# Patient Record
Sex: FEMALE | Race: WHITE | NOT HISPANIC OR LATINO | Employment: OTHER | URBAN - METROPOLITAN AREA
[De-identification: names, ages, dates, MRNs, and addresses within clinical notes are randomized per-mention and may not be internally consistent; named-entity substitution may affect disease eponyms.]

---

## 2019-07-18 ENCOUNTER — TELEPHONE (OUTPATIENT)
Dept: PRIMARY CARE CLINIC | Facility: CLINIC | Age: 84
End: 2019-07-18

## 2019-07-18 NOTE — TELEPHONE ENCOUNTER
----- Message from Carlene Holland sent at 7/18/2019 10:40 AM CDT -----  Regarding: Annual/Lab Order  Contact: [phone]  Pt called in about wanting to schedule appt for 10/8/19. Pt also wants to schedule her chesy xray and labs for 10/1/19

## 2019-07-18 NOTE — TELEPHONE ENCOUNTER
----- Message from Carlene Holland sent at 7/18/2019 10:40 AM CDT -----  Regarding: Annual/Lab Order  Contact: [phone]  Pt called in about wanting to schedule appt for 10/8/19. Pt also wants to schedule her chesy xray and labs for 10/1/19

## 2019-07-18 NOTE — TELEPHONE ENCOUNTER
This is a duplicate message. I lm for pt to contact the office to explain in detail with her regarding setting up an appt/xray/labs

## 2019-09-27 ENCOUNTER — TELEPHONE (OUTPATIENT)
Dept: PRIMARY CARE CLINIC | Facility: CLINIC | Age: 84
End: 2019-09-27

## 2019-09-27 NOTE — TELEPHONE ENCOUNTER
----- Message from Hawa Dykes sent at 9/27/2019  1:47 PM CDT -----  Contact: Self [phone]  She is calling to discuss her orders for lab work and chest XRAY. She wants to know if this can still be done in time before her appt on 10/8/19. She states someone was supposed to be scheduling her for that date and needs to confirm if this will still be done.

## 2019-10-14 RX ORDER — OMEPRAZOLE 20 MG/1
0.05 TABLET, DELAYED RELEASE ORAL DAILY
COMMUNITY
End: 2019-10-15

## 2019-10-14 RX ORDER — SIMVASTATIN 20 MG/1
1 TABLET, FILM COATED ORAL NIGHTLY
Refills: 0 | COMMUNITY
Start: 2019-09-10 | End: 2019-12-09 | Stop reason: SDUPTHER

## 2019-10-14 RX ORDER — ASPIRIN 81 MG/1
81 TABLET ORAL DAILY
COMMUNITY
End: 2019-10-15

## 2019-10-14 RX ORDER — CITALOPRAM 20 MG/1
1.5 TABLET, FILM COATED ORAL 2 TIMES DAILY
Refills: 0 | COMMUNITY
Start: 2019-09-03 | End: 2019-12-09 | Stop reason: SDUPTHER

## 2019-10-14 RX ORDER — ALBUTEROL SULFATE 90 UG/1
2 AEROSOL, METERED RESPIRATORY (INHALATION) EVERY 6 HOURS PRN
COMMUNITY
End: 2023-06-02 | Stop reason: SDUPTHER

## 2019-10-14 RX ORDER — QUINIDINE GLUCONATE 324 MG
240 TABLET, EXTENDED RELEASE ORAL DAILY
COMMUNITY
End: 2022-04-19

## 2019-10-14 RX ORDER — LEVOTHYROXINE SODIUM 100 UG/1
1 TABLET ORAL DAILY
Refills: 1 | COMMUNITY
Start: 2019-08-11 | End: 2019-12-09 | Stop reason: SDUPTHER

## 2019-10-14 RX ORDER — LORAZEPAM 0.5 MG/1
0.5 TABLET ORAL DAILY PRN
COMMUNITY
End: 2020-04-13 | Stop reason: SDUPTHER

## 2019-10-14 NOTE — PROGRESS NOTES
[facility] Note    Chief Complaint      Chief Complaint   Patient presents with    Follow-up       History of Present Illness      Nickie Segura is a 84 y.o.  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  Last visit me was at [facility]-03/27/2019.    Recurrent UTI-followed up by Dr. Smith.  Stable on vaginal cream. Pt also on Myrbetriq for OAB.     HTN- Controlled off meds.  Continue low-sodium diet.    HLD - controlled on Zocor 20 mg QHS.   Repeat FLP in March.   TG 88 HDL 64 LDL 77 3/19/19.    Hypothyroidism-TSH -0.98 3/19/19.  Controlled on levothyroxine 100 mcg daily.  Repeat TFTs in March.     Anxiety-Pt doing ok.  She inc her Celexa 30 mg daily to 40 mg/daily without my knowledge 6 wks ago.  Pt aware I do not rec this dose.  Patient takes Ativan 0.5 mg rarely prn.  Continue current regimen.    Depression- Was more depressed because she cares for one of her Best friends who is on hospice now.  She inc her Celexa 30 mg daily to 40 mg/daily without my knowledge 6 wks ago.  Pt aware I do not rec this dose.    Wheezing-Pt has ProAir which she uses prn.  PFTs-WNL-07/14/2015.    Nicotine dependence-Pt smokes E-cigarette now.  Pt aware of the dangers.  She quit cigarette 07/05/2016. Pt never got CXR I ordered last March. She will get today.    Hiatal hernia-stable on Prilosec 1/2 tab/day.      Colon polyps-Patient is s/p partial colon resection '09  CRS Dr. Gibson.  GI -Dr. Giron.  Last C scope-'13.     Iron deficiency-Resolved. Due for repeat CBC and iron studies. Pt on iron daily.     IFG - H A1c 5.2-03/19/2019.    Hepatomegaly - 23 cm.  Fu by Dr. Giron.  AST - 54 - sl elev.    OA - Preston knees - Rec glucosamine.     Renal cysts - + fam h/o PCKD.  ? Angiomyolipoma on Left Kidney.  Fu by Dr. Smith.     Obesity - BMI - 29.6 - Rec low carb diet.     Elev  IGE - 742 ? Etiol.  No allergic Sx's.  ? Eos Esophagitis.     +LORNA - neg [place].     Acc by friend    HCM - Flu - 9/2019; Td - > 10 yrs ago; PCV 13 - 2/21/17;  PVX 23 - 1/8/15;  Shingrix - ?- pt defers;  MGM - refuses;  DEXA - declined; C-scope - '13; GI - Dr. Giron; Retina Sp - Dr. Gardner; Derm - Dr. Joseph; Ophtho - Dr. Calderon    Past Medical History:  Past Medical History:   Diagnosis Date    LORNA positive     Anxiety     Bilateral cataracts     Colitis     Colon polyp     Depression     Elevated IgE level     Hepatomegaly     Hiatal hernia     Hyperlipidemia     Hypertension     Hypothyroidism     IFG (impaired fasting glucose)     Iron deficiency anemia     Macular degeneration     Mild aortic stenosis     Nicotine dependence     Osteoarthritis     Recurrent UTI     Renal cyst     Skin cancer     Tuberculosis of bladder     Urinary incontinence     Venous insufficiency     Vitamin B12 deficiency     Wheezing        Past Surgical History:   has a past surgical history that includes Cholecystectomy.    Family History:  family history includes Atrial fibrillation in her sister; Diabetes in her mother; Heart disease in her father and mother; Polycystic kidney disease in her father.     Social History:  Social History     Tobacco Use    Smoking status: Former Smoker     Types: Cigarettes, Vaping with nicotine    Smokeless tobacco: Current User   Substance Use Topics    Alcohol use: Yes     Frequency: Monthly or less     Drinks per session: 1 or 2     Binge frequency: Never    Drug use: Never       Review of Systems   Constitutional: Negative for chills and fever.   HENT: Positive for hearing loss. Negative for tinnitus.         Hearing aids radha   Eyes: Positive for blurred vision. Negative for double vision.        Saw Kvng today   Respiratory: Negative for cough, shortness of breath and wheezing.    Cardiovascular: Negative for chest pain, palpitations and leg swelling.    Gastrointestinal: Positive for constipation and heartburn. Negative for abdominal pain, blood in stool, diarrhea, melena, nausea and vomiting.        Uses gavescon prn.     Genitourinary: Positive for frequency. Negative for dysuria.   Musculoskeletal: Positive for joint pain. Negative for myalgias.        RT knee gives out at times.   Skin: Negative for itching and rash.   Neurological: Negative for dizziness and headaches.   Endo/Heme/Allergies: Negative for polydipsia. Does not bruise/bleed easily.   Psychiatric/Behavioral: Positive for depression. The patient is nervous/anxious.         Medications:  Outpatient Encounter Medications as of 10/15/2019   Medication Sig Dispense Refill    albuterol (PROVENTIL/VENTOLIN HFA) 90 mcg/actuation inhaler Inhale 2 puffs into the lungs every 6 (six) hours as needed. Rescue      aspirin (ECOTRIN) 81 MG EC tablet Take 81 mg by mouth once daily.      citalopram (CELEXA) 20 MG tablet Take 1.5 tablets by mouth 2 (two) times daily.   0    estradiol (ESTRACE) 0.01 % (0.1 mg/gram) vaginal cream Place 1 g vaginally 3 (three) times a week.      ferrous gluconate (FERGON) 240 (27 FE) MG tablet Take 240 mg by mouth once daily.      levothyroxine (SYNTHROID) 100 MCG tablet Take 1 tablet by mouth once daily.  1    LORazepam (ATIVAN) 0.5 MG tablet Take 0.5 mg by mouth daily as needed.      mag/aluminum/sod bicarb/alginc (GAVISCON ORAL) Take 1 tablet by mouth daily as needed.      MYRBETRIQ 50 mg Tb24 Take 50 mg by mouth once daily.  0    omeprazole magnesium (PRILOSEC OTC ORAL) Take 20 mg by mouth once daily.      simvastatin (ZOCOR) 20 MG tablet Take 1 tablet by mouth nightly.  0    TOVIAZ 8 mg Tb24 Take 8 mg by mouth once daily.  0    [DISCONTINUED] aspirin (ECOTRIN) 81 MG EC tablet Take 81 mg by mouth once daily.      [DISCONTINUED] omeprazole (PRILOSEC OTC) 20 MG tablet Take 0.05 tablets by mouth once daily.      [DISCONTINUED] LORazepam (ATIVAN) 0.5 MG tablet Take 0.5  mg by mouth once daily.       No facility-administered encounter medications on file as of 10/15/2019.        Current Outpatient Medications:     albuterol (PROVENTIL/VENTOLIN HFA) 90 mcg/actuation inhaler, Inhale 2 puffs into the lungs every 6 (six) hours as needed. Rescue, Disp: , Rfl:     aspirin (ECOTRIN) 81 MG EC tablet, Take 81 mg by mouth once daily., Disp: , Rfl:     citalopram (CELEXA) 20 MG tablet, Take 1.5 tablets by mouth 2 (two) times daily. , Disp: , Rfl: 0    estradiol (ESTRACE) 0.01 % (0.1 mg/gram) vaginal cream, Place 1 g vaginally 3 (three) times a week., Disp: , Rfl:     ferrous gluconate (FERGON) 240 (27 FE) MG tablet, Take 240 mg by mouth once daily., Disp: , Rfl:     levothyroxine (SYNTHROID) 100 MCG tablet, Take 1 tablet by mouth once daily., Disp: , Rfl: 1    LORazepam (ATIVAN) 0.5 MG tablet, Take 0.5 mg by mouth daily as needed., Disp: , Rfl:     mag/aluminum/sod bicarb/alginc (GAVISCON ORAL), Take 1 tablet by mouth daily as needed., Disp: , Rfl:     MYRBETRIQ 50 mg Tb24, Take 50 mg by mouth once daily., Disp: , Rfl: 0    omeprazole magnesium (PRILOSEC OTC ORAL), Take 20 mg by mouth once daily., Disp: , Rfl:     simvastatin (ZOCOR) 20 MG tablet, Take 1 tablet by mouth nightly., Disp: , Rfl: 0    TOVIAZ 8 mg Tb24, Take 8 mg by mouth once daily., Disp: , Rfl: 0    Allergies:  Review of patient's allergies indicates:   Allergen Reactions    Penicillins      rash       Health Maintenance:  Immunization History   Administered Date(s) Administered    Pneumococcal Conjugate - 13 Valent 02/21/2017    Pneumococcal Polysaccharide - 23 Valent 01/08/2015      Health Maintenance   Topic Date Due    TETANUS VACCINE  03/12/1953    DEXA SCAN  03/12/1976    Lipid Panel  03/19/2024    Pneumococcal Vaccine (65+ Low/Medium Risk)  Completed      Objective:      Vital Signs  Temp: 98 °F (36.7 °C)  Pulse: 77  Resp: 15  SpO2: 96 %  BP: 115/60  BP Location: Left arm  Patient Position:  "Sitting  Pain Score: 0-No pain  Height and Weight  Height: 5' 6" (167.6 cm)  Weight: 83.3 kg (183 lb 10.3 oz)  BSA (Calculated - sq m): 1.97 sq meters  BMI (Calculated): 29.7  Weight in (lb) to have BMI = 25: 154.6]    Laboratory:    Physical Exam   Constitutional: She is oriented to person, place, and time. She appears well-developed and well-nourished.   Lip smacking on exam while talking to me throughout visit   HENT:   Head: Normocephalic and atraumatic.   Right Ear: External ear normal.   Left Ear: External ear normal.   Eyes: Pupils are equal, round, and reactive to light. Conjunctivae and EOM are normal.   Neck: Normal range of motion. Neck supple. Thyromegaly present.   radha carotid bruits   Cardiovascular: Normal rate, regular rhythm and intact distal pulses.   Murmur heard.  Pulmonary/Chest: Effort normal and breath sounds normal. No respiratory distress.   Abdominal: Soft. Bowel sounds are normal. She exhibits no distension.   Musculoskeletal: Normal range of motion. She exhibits no edema or deformity.   Neurological: She is alert and oriented to person, place, and time.   Skin: Skin is warm and dry.   Psychiatric: She has a normal mood and affect.           Assessment:       1. Iron deficiency anemia, unspecified iron deficiency anemia type    2. Hypertension, unspecified type    3. Other tobacco product nicotine dependence, uncomplicated    4. IFG (impaired fasting glucose)    5. Hypothyroidism, unspecified type    6. Hyperlipidemia, unspecified hyperlipidemia type    7. Depression, unspecified depression type    8. Anxiety        Nickie Segura is a 84 y.o.female with:    1. Iron deficiency anemia, unspecified iron deficiency anemia type  - X-Ray Chest PA And Lateral; Future  - CBC auto differential; Future  - Ferritin; Future  - Iron and TIBC; Future  -Will repeat labs  Cont iron once daily.     2. Hypertension, unspecified type  - Comprehensive metabolic panel; Future  -Controlled on current regimen.  " "Repeat CMP.     3. Other tobacco product nicotine dependence, uncomplicated  -Rec smoking cessation.  I d/w pt dangers of vaping with e -cig.     4. IFG (impaired fasting glucose)  -Cont low carb diet.     5. Hypothyroidism, unspecified type  -Cont current.  Repeat TFT"s in Mar.     6. Hyperlipidemia, unspecified hyperlipidemia type  -Cont current.  Repeat FLP in Mar.     7. Depression, unspecified depression type  -Rec dec back to citalopram 30 mg/d.  Pt will call with any issues.  She has lip smacking on exam today. Unsure if related to her inc her own dose of citalopram or not.     8. Anxiety  -Rec dec back to citalopram 30 mg/d.  Pt will call with any issues.  She has lip smacking on exam today. Unsure if related to her inc her own dose of citalopram or not.        Chronic conditions status updated as per HPI.  Other than changes above, cont current medications and maintain follow up with specialists.  Return to clinic in 6 months.    Perla Hopper MD  [facility]                "

## 2019-10-15 ENCOUNTER — OFFICE VISIT (OUTPATIENT)
Dept: PRIMARY CARE CLINIC | Facility: CLINIC | Age: 84
End: 2019-10-15
Payer: MEDICARE

## 2019-10-15 ENCOUNTER — HOSPITAL ENCOUNTER (OUTPATIENT)
Dept: RADIOLOGY | Facility: HOSPITAL | Age: 84
Discharge: HOME | End: 2019-10-15
Attending: INTERNAL MEDICINE
Payer: MEDICARE

## 2019-10-15 VITALS
HEIGHT: 66 IN | HEART RATE: 77 BPM | TEMPERATURE: 98 F | BODY MASS INDEX: 29.51 KG/M2 | WEIGHT: 183.63 LBS | OXYGEN SATURATION: 96 % | RESPIRATION RATE: 15 BRPM | SYSTOLIC BLOOD PRESSURE: 115 MMHG | DIASTOLIC BLOOD PRESSURE: 60 MMHG

## 2019-10-15 DIAGNOSIS — F41.9 ANXIETY: ICD-10-CM

## 2019-10-15 DIAGNOSIS — I10 HYPERTENSION, UNSPECIFIED TYPE: ICD-10-CM

## 2019-10-15 DIAGNOSIS — R73.01 IFG (IMPAIRED FASTING GLUCOSE): ICD-10-CM

## 2019-10-15 DIAGNOSIS — F32.A DEPRESSION, UNSPECIFIED DEPRESSION TYPE: ICD-10-CM

## 2019-10-15 DIAGNOSIS — E03.9 HYPOTHYROIDISM, UNSPECIFIED TYPE: ICD-10-CM

## 2019-10-15 DIAGNOSIS — D50.9 IRON DEFICIENCY ANEMIA, UNSPECIFIED IRON DEFICIENCY ANEMIA TYPE: ICD-10-CM

## 2019-10-15 DIAGNOSIS — F17.290 OTHER TOBACCO PRODUCT NICOTINE DEPENDENCE, UNCOMPLICATED: ICD-10-CM

## 2019-10-15 DIAGNOSIS — D50.9 IRON DEFICIENCY ANEMIA, UNSPECIFIED IRON DEFICIENCY ANEMIA TYPE: Primary | ICD-10-CM

## 2019-10-15 DIAGNOSIS — E78.5 HYPERLIPIDEMIA, UNSPECIFIED HYPERLIPIDEMIA TYPE: ICD-10-CM

## 2019-10-15 PROCEDURE — 99215 OFFICE O/P EST HI 40 MIN: CPT | Mod: HCNC,S$GLB,, | Performed by: INTERNAL MEDICINE

## 2019-10-15 PROCEDURE — 99999 PR PBB SHADOW E&M-EST. PATIENT-LVL III: CPT | Mod: PBBFAC,HCNC,, | Performed by: INTERNAL MEDICINE

## 2019-10-15 PROCEDURE — 71046 X-RAY EXAM CHEST 2 VIEWS: CPT | Mod: 26,HCNC,, | Performed by: RADIOLOGY

## 2019-10-15 PROCEDURE — 99999 PR PBB SHADOW E&M-EST. PATIENT-LVL III: ICD-10-PCS | Mod: PBBFAC,HCNC,, | Performed by: INTERNAL MEDICINE

## 2019-10-15 PROCEDURE — 1101F PT FALLS ASSESS-DOCD LE1/YR: CPT | Mod: HCNC,CPTII,S$GLB, | Performed by: INTERNAL MEDICINE

## 2019-10-15 PROCEDURE — 99215 PR OFFICE/OUTPT VISIT, EST, LEVL V, 40-54 MIN: ICD-10-PCS | Mod: HCNC,S$GLB,, | Performed by: INTERNAL MEDICINE

## 2019-10-15 PROCEDURE — 71046 XR CHEST PA AND LATERAL: ICD-10-PCS | Mod: 26,HCNC,, | Performed by: RADIOLOGY

## 2019-10-15 PROCEDURE — 1101F PR PT FALLS ASSESS DOC 0-1 FALLS W/OUT INJ PAST YR: ICD-10-PCS | Mod: HCNC,CPTII,S$GLB, | Performed by: INTERNAL MEDICINE

## 2019-10-15 PROCEDURE — 71046 X-RAY EXAM CHEST 2 VIEWS: CPT | Mod: TC,HCNC,PN

## 2019-10-15 RX ORDER — MIRABEGRON 50 MG/1
50 TABLET, FILM COATED, EXTENDED RELEASE ORAL DAILY
Refills: 0 | COMMUNITY
Start: 2019-07-09 | End: 2022-01-18 | Stop reason: SDUPTHER

## 2019-10-15 RX ORDER — ASPIRIN 81 MG/1
81 TABLET ORAL DAILY
COMMUNITY
Start: 2009-08-18

## 2019-10-15 RX ORDER — FESOTERODINE FUMARATE 8 MG/1
8 TABLET, FILM COATED, EXTENDED RELEASE ORAL DAILY
Refills: 0 | COMMUNITY
Start: 2019-08-31 | End: 2021-09-10 | Stop reason: SDUPTHER

## 2019-10-15 RX ORDER — ESTRADIOL 0.1 MG/G
1 CREAM VAGINAL
COMMUNITY
End: 2022-11-01 | Stop reason: SDUPTHER

## 2019-10-15 RX ORDER — LORAZEPAM 0.5 MG/1
0.5 TABLET ORAL DAILY
COMMUNITY
Start: 2009-08-18 | End: 2019-10-15 | Stop reason: SDUPTHER

## 2019-10-15 NOTE — PROGRESS NOTES
Please inform pt her CXR  Showed No acute radiographic findings in the chest. 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  She is fu by Dr. Nguyen Melgar. Cont to rec smoking cessation meaning including her e-cig as discussed at visit.

## 2019-10-16 ENCOUNTER — TELEPHONE (OUTPATIENT)
Dept: PRIMARY CARE CLINIC | Facility: CLINIC | Age: 84
End: 2019-10-16

## 2019-10-16 NOTE — TELEPHONE ENCOUNTER
----- Message from Perla Hopper MD sent at 10/15/2019  5:26 PM CDT -----  Please inform pt her CXR  Showed No acute radiographic findings in the chest. 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  She is fu by Dr. Nguyen Melgar. Cont to rec smoking cessation meaning including her e-cig as discussed at visit.

## 2019-10-18 ENCOUNTER — TELEPHONE (OUTPATIENT)
Dept: PRIMARY CARE CLINIC | Facility: CLINIC | Age: 84
End: 2019-10-18

## 2019-10-18 NOTE — TELEPHONE ENCOUNTER
----- Message from Charisse Miramontes sent at 10/18/2019  9:49 AM CDT -----  Contact: patient [phone]  Patient has a few questions for the nurse regarding lab results that they discussed this morning.

## 2019-10-18 NOTE — TELEPHONE ENCOUNTER
----- Message from Perla Hopper MD sent at 10/17/2019  6:22 PM CDT -----  Please call the patient regarding her abnormal result. I still await her old records.  She remains iron deficient and is barely anemic. Rec cont iron suppl.  See if she can inc from once daily to BID and tolerate this.  If she has issues with this she should alert us.

## 2019-12-09 DIAGNOSIS — E78.5 HYPERLIPIDEMIA, UNSPECIFIED HYPERLIPIDEMIA TYPE: ICD-10-CM

## 2019-12-09 DIAGNOSIS — E03.9 HYPOTHYROIDISM, UNSPECIFIED TYPE: ICD-10-CM

## 2019-12-09 DIAGNOSIS — F32.A DEPRESSION, UNSPECIFIED DEPRESSION TYPE: Primary | ICD-10-CM

## 2019-12-09 RX ORDER — CITALOPRAM 20 MG/1
TABLET, FILM COATED ORAL
Qty: 145 TABLET | Refills: 0 | Status: SHIPPED | OUTPATIENT
Start: 2019-12-09 | End: 2020-04-13 | Stop reason: SDUPTHER

## 2019-12-09 RX ORDER — SIMVASTATIN 20 MG/1
20 TABLET, FILM COATED ORAL NIGHTLY
Qty: 90 TABLET | Refills: 1 | Status: SHIPPED | OUTPATIENT
Start: 2019-12-09 | End: 2020-06-04

## 2019-12-09 RX ORDER — LEVOTHYROXINE SODIUM 100 UG/1
100 TABLET ORAL DAILY
Qty: 90 TABLET | Refills: 1 | Status: SHIPPED | OUTPATIENT
Start: 2019-12-09 | End: 2020-05-07

## 2020-04-13 DIAGNOSIS — F32.A DEPRESSION, UNSPECIFIED DEPRESSION TYPE: ICD-10-CM

## 2020-04-13 RX ORDER — LORAZEPAM 0.5 MG/1
0.5 TABLET ORAL DAILY PRN
Qty: 30 TABLET | Refills: 0 | Status: SHIPPED | OUTPATIENT
Start: 2020-04-13 | End: 2021-03-31 | Stop reason: SDUPTHER

## 2020-04-13 RX ORDER — CITALOPRAM 20 MG/1
TABLET, FILM COATED ORAL
Qty: 145 TABLET | Refills: 0 | Status: SHIPPED | OUTPATIENT
Start: 2020-04-13 | End: 2020-06-29

## 2020-05-07 DIAGNOSIS — E03.9 HYPOTHYROIDISM, UNSPECIFIED TYPE: ICD-10-CM

## 2020-05-07 RX ORDER — LEVOTHYROXINE SODIUM 100 UG/1
TABLET ORAL
Qty: 90 TABLET | Refills: 0 | Status: SHIPPED | OUTPATIENT
Start: 2020-05-07 | End: 2020-08-28

## 2020-06-04 DIAGNOSIS — I10 HYPERTENSION, UNSPECIFIED TYPE: ICD-10-CM

## 2020-06-04 DIAGNOSIS — D50.9 IRON DEFICIENCY ANEMIA, UNSPECIFIED IRON DEFICIENCY ANEMIA TYPE: Primary | ICD-10-CM

## 2020-06-04 DIAGNOSIS — E78.5 HYPERLIPIDEMIA, UNSPECIFIED HYPERLIPIDEMIA TYPE: ICD-10-CM

## 2020-06-04 RX ORDER — SIMVASTATIN 20 MG/1
TABLET, FILM COATED ORAL
Qty: 90 TABLET | Refills: 0 | Status: SHIPPED | OUTPATIENT
Start: 2020-06-04 | End: 2020-09-23 | Stop reason: SDUPTHER

## 2020-06-04 NOTE — TELEPHONE ENCOUNTER
Pt is scheduled for 7/21/2020. Would you like any labs scheduled prior to this appt. I see pt was due for FLP in March 2020

## 2020-06-04 NOTE — TELEPHONE ENCOUNTER
----- Message from Perla Hopper MD sent at 6/4/2020  9:09 AM CDT -----  Make sure pt schedules a jenny GARG

## 2020-07-17 ENCOUNTER — TELEPHONE (OUTPATIENT)
Dept: PRIMARY CARE CLINIC | Facility: CLINIC | Age: 85
End: 2020-07-17

## 2020-07-17 ENCOUNTER — LAB VISIT (OUTPATIENT)
Dept: LAB | Facility: HOSPITAL | Age: 85
End: 2020-07-17
Attending: INTERNAL MEDICINE
Payer: MEDICARE

## 2020-07-17 DIAGNOSIS — E87.5 HYPERKALEMIA: Primary | ICD-10-CM

## 2020-07-17 DIAGNOSIS — E78.5 HYPERLIPIDEMIA, UNSPECIFIED HYPERLIPIDEMIA TYPE: ICD-10-CM

## 2020-07-17 DIAGNOSIS — I10 HYPERTENSION, UNSPECIFIED TYPE: ICD-10-CM

## 2020-07-17 DIAGNOSIS — D50.9 IRON DEFICIENCY ANEMIA, UNSPECIFIED IRON DEFICIENCY ANEMIA TYPE: ICD-10-CM

## 2020-07-17 LAB
ALBUMIN SERPL BCP-MCNC: 3.6 G/DL (ref 3.5–5.2)
ALP SERPL-CCNC: 95 U/L (ref 55–135)
ALT SERPL W/O P-5'-P-CCNC: 10 U/L (ref 10–44)
ANION GAP SERPL CALC-SCNC: 8 MMOL/L (ref 8–16)
AST SERPL-CCNC: 16 U/L (ref 10–40)
BASOPHILS # BLD AUTO: 0.05 K/UL (ref 0–0.2)
BASOPHILS NFR BLD: 0.7 % (ref 0–1.9)
BILIRUB SERPL-MCNC: 0.3 MG/DL (ref 0.1–1)
BUN SERPL-MCNC: 19 MG/DL (ref 8–23)
CALCIUM SERPL-MCNC: 10.5 MG/DL (ref 8.7–10.5)
CHLORIDE SERPL-SCNC: 105 MMOL/L (ref 95–110)
CHOLEST SERPL-MCNC: 158 MG/DL (ref 120–199)
CHOLEST/HDLC SERPL: 2.2 {RATIO} (ref 2–5)
CO2 SERPL-SCNC: 29 MMOL/L (ref 23–29)
CREAT SERPL-MCNC: 1 MG/DL (ref 0.5–1.4)
DIFFERENTIAL METHOD: ABNORMAL
EOSINOPHIL # BLD AUTO: 0.2 K/UL (ref 0–0.5)
EOSINOPHIL NFR BLD: 3 % (ref 0–8)
ERYTHROCYTE [DISTWIDTH] IN BLOOD BY AUTOMATED COUNT: 15 % (ref 11.5–14.5)
EST. GFR  (AFRICAN AMERICAN): 59.8 ML/MIN/1.73 M^2
EST. GFR  (NON AFRICAN AMERICAN): 51.9 ML/MIN/1.73 M^2
FERRITIN SERPL-MCNC: 24 NG/ML (ref 20–300)
GLUCOSE SERPL-MCNC: 78 MG/DL (ref 70–110)
HCT VFR BLD AUTO: 41.5 % (ref 37–48.5)
HDLC SERPL-MCNC: 72 MG/DL (ref 40–75)
HDLC SERPL: 45.6 % (ref 20–50)
HGB BLD-MCNC: 12.7 G/DL (ref 12–16)
IMM GRANULOCYTES # BLD AUTO: 0.02 K/UL (ref 0–0.04)
IMM GRANULOCYTES NFR BLD AUTO: 0.3 % (ref 0–0.5)
LDLC SERPL CALC-MCNC: 67.8 MG/DL (ref 63–159)
LYMPHOCYTES # BLD AUTO: 1.5 K/UL (ref 1–4.8)
LYMPHOCYTES NFR BLD: 21.3 % (ref 18–48)
MCH RBC QN AUTO: 28.3 PG (ref 27–31)
MCHC RBC AUTO-ENTMCNC: 30.6 G/DL (ref 32–36)
MCV RBC AUTO: 92 FL (ref 82–98)
MONOCYTES # BLD AUTO: 0.6 K/UL (ref 0.3–1)
MONOCYTES NFR BLD: 8.4 % (ref 4–15)
NEUTROPHILS # BLD AUTO: 4.7 K/UL (ref 1.8–7.7)
NEUTROPHILS NFR BLD: 66.3 % (ref 38–73)
NONHDLC SERPL-MCNC: 86 MG/DL
NRBC BLD-RTO: 0 /100 WBC
PLATELET # BLD AUTO: 208 K/UL (ref 150–350)
PMV BLD AUTO: 10.2 FL (ref 9.2–12.9)
POTASSIUM SERPL-SCNC: 5.5 MMOL/L (ref 3.5–5.1)
PROT SERPL-MCNC: 7.3 G/DL (ref 6–8.4)
RBC # BLD AUTO: 4.49 M/UL (ref 4–5.4)
SODIUM SERPL-SCNC: 142 MMOL/L (ref 136–145)
TRIGL SERPL-MCNC: 91 MG/DL (ref 30–150)
WBC # BLD AUTO: 7.01 K/UL (ref 3.9–12.7)

## 2020-07-17 PROCEDURE — 85025 COMPLETE CBC W/AUTO DIFF WBC: CPT | Mod: HCNC

## 2020-07-17 PROCEDURE — 80061 LIPID PANEL: CPT | Mod: HCNC

## 2020-07-17 PROCEDURE — 80053 COMPREHEN METABOLIC PANEL: CPT | Mod: HCNC

## 2020-07-17 PROCEDURE — 82728 ASSAY OF FERRITIN: CPT | Mod: HCNC

## 2020-07-17 PROCEDURE — 36415 COLL VENOUS BLD VENIPUNCTURE: CPT | Mod: HCNC,PO

## 2020-07-17 NOTE — TELEPHONE ENCOUNTER
----- Message from Perla Hopper MD sent at 7/17/2020  3:19 PM CDT -----  Please inform pt labs were wnl.  Ferritin has improved from 17 to 24.  Her cholesterol is controlled -  TG 91 HDL 72 LDL 67.  Kidney function looks ok for age.  Liver fucntions are normal.  Her potassium is elev at 5.5.  She will need to repeat this next wk.  Please put in order.  I tried but was unable to put in through [employer]. Rec low potassium diet.  Make sure she is not taking any Potassium supplementation. She is not anemic - H/H-12.7/41.6.  Cont current regimen.  No further recs at this time.    Dr. GARG

## 2020-07-17 NOTE — PROGRESS NOTES
Please inform pt labs were wnl.  Ferritin has improved from 17 to 24.  Her cholesterol is controlled -  TG 91 HDL 72 LDL 67.  Kidney function looks ok for age.  Liver fucntions are normal.  Her potassium is elev at 5.5.  She will need to repeat this next wk.  Please put in order.  I tried but was unable to put in through [facility]. Rec low potassium diet.  Make sure she is not taking any Potassium supplementation. She is not anemic - H/H-12.7/41.6.  Cont current regimen.  No further recs at this time.    Dr. GARG

## 2020-07-17 NOTE — TELEPHONE ENCOUNTER
I went over labs with patient. She has an appt next week. We can do her repeat pot then. Pt not on any pot supplements

## 2020-07-21 ENCOUNTER — TELEPHONE (OUTPATIENT)
Dept: PRIMARY CARE CLINIC | Facility: CLINIC | Age: 85
End: 2020-07-21

## 2020-07-21 NOTE — TELEPHONE ENCOUNTER
----- Message from Aminata Infante sent at 7/21/2020  8:39 AM CDT -----  Regarding: Appointment Change  Contact: Patient @ [phone]  Good Morning  Patient has an appointment for 07/22/2020 but due to transportation cannot make it. Dr's next appointment is 09/8/2020. Patient Schedule with Dr Blanchard for 08/5/2020 but Patient would like to know if she can wait until Sept or should she keep appointment with Dr Blanchard.    Please call and advise

## 2020-07-24 RX ORDER — LORAZEPAM 0.5 MG/1
0.5 TABLET ORAL
COMMUNITY
Start: 2009-08-18 | End: 2020-09-23 | Stop reason: SDUPTHER

## 2020-07-27 ENCOUNTER — TELEPHONE (OUTPATIENT)
Dept: PRIMARY CARE CLINIC | Facility: CLINIC | Age: 85
End: 2020-07-27

## 2020-07-27 DIAGNOSIS — E87.5 HYPERKALEMIA: Primary | ICD-10-CM

## 2020-07-27 NOTE — TELEPHONE ENCOUNTER
----- Message from Jessica Hernandez sent at 7/27/2020  9:56 AM CDT -----  Regarding: Ms. SeguraZvenchz-[phone]  Ms. Segura is requesting a callback.  She states that she won't be able to make her appointment for today at 11 because of the bad weather.  She states that all her levels were good except her potassium level, it was a little elevated.  She would like to be advised if it is okay if she waits until 09/23/2020 to see the doctor.    Callback number: Ms. SeguraUjadchk-[phone]

## 2020-07-27 NOTE — TELEPHONE ENCOUNTER
Pt had to r/s her appt for today due to the weather. She is currently scheduled on 9/23/2020 and added to wait list. She was going to get her repeat potassium done while here. I have pended an order and will schedule her at the [place] lab later this week when the weather is better

## 2020-07-31 ENCOUNTER — LAB VISIT (OUTPATIENT)
Dept: LAB | Facility: HOSPITAL | Age: 85
End: 2020-07-31
Attending: INTERNAL MEDICINE
Payer: MEDICARE

## 2020-07-31 DIAGNOSIS — E87.5 HYPERKALEMIA: ICD-10-CM

## 2020-07-31 LAB — POTASSIUM SERPL-SCNC: 5.4 MMOL/L (ref 3.5–5.1)

## 2020-07-31 PROCEDURE — 84132 ASSAY OF SERUM POTASSIUM: CPT | Mod: HCNC

## 2020-07-31 PROCEDURE — 36415 COLL VENOUS BLD VENIPUNCTURE: CPT | Mod: HCNC,PO

## 2020-08-03 ENCOUNTER — TELEPHONE (OUTPATIENT)
Dept: PRIMARY CARE CLINIC | Facility: CLINIC | Age: 85
End: 2020-08-03

## 2020-08-03 NOTE — TELEPHONE ENCOUNTER
----- Message from Em Farnsworth sent at 8/3/2020 12:10 PM CDT -----  Contact: self/[phone]  Name of test: Potassium [[id number]    Date of test:07/31/20    Ordering provider: Dr Hopper    Where was the test performed: [facility]    Comments:

## 2020-08-07 ENCOUNTER — TELEPHONE (OUTPATIENT)
Dept: PRIMARY CARE CLINIC | Facility: CLINIC | Age: 85
End: 2020-08-07

## 2020-08-07 NOTE — TELEPHONE ENCOUNTER
----- Message from Perla Hopper MD sent at 8/7/2020 12:23 PM CDT -----  Please inform pt that I reviewed her repeat Potassium it was still slightly elevated at 5.4 but also slightly improved from prev 5.5.  Continue low potassium diet. Will continue to monitor.  She is not on any potassium supplementations or medications that would cause this that I am aware of.     Dr. GARG

## 2020-08-07 NOTE — TELEPHONE ENCOUNTER
Pt was informed and expressed understanding. I mailed literature to her regarding low potassium diet

## 2020-08-28 DIAGNOSIS — E03.9 HYPOTHYROIDISM, UNSPECIFIED TYPE: ICD-10-CM

## 2020-08-28 RX ORDER — LEVOTHYROXINE SODIUM 100 UG/1
TABLET ORAL
Qty: 90 TABLET | Refills: 0 | Status: SHIPPED | OUTPATIENT
Start: 2020-08-28 | End: 2020-09-24 | Stop reason: SDUPTHER

## 2020-08-28 NOTE — TELEPHONE ENCOUNTER
I will need to order repeat TFt's next visit in Sept. I will just get them when she sees me to save her a trip to the lab again.  I sent her a refill on her thyroid medication.     Dr. GARG

## 2020-09-23 ENCOUNTER — LAB VISIT (OUTPATIENT)
Dept: LAB | Facility: HOSPITAL | Age: 85
End: 2020-09-23
Attending: INTERNAL MEDICINE
Payer: MEDICARE

## 2020-09-23 ENCOUNTER — IMMUNIZATION (OUTPATIENT)
Dept: PHARMACY | Facility: CLINIC | Age: 85
End: 2020-09-23
Payer: MEDICARE

## 2020-09-23 ENCOUNTER — OFFICE VISIT (OUTPATIENT)
Dept: PRIMARY CARE CLINIC | Facility: CLINIC | Age: 85
End: 2020-09-23
Payer: MEDICARE

## 2020-09-23 VITALS
HEART RATE: 86 BPM | WEIGHT: 206.56 LBS | RESPIRATION RATE: 12 BRPM | BODY MASS INDEX: 33.2 KG/M2 | SYSTOLIC BLOOD PRESSURE: 112 MMHG | OXYGEN SATURATION: 97 % | DIASTOLIC BLOOD PRESSURE: 70 MMHG | TEMPERATURE: 98 F | HEIGHT: 66 IN

## 2020-09-23 DIAGNOSIS — F17.290 OTHER TOBACCO PRODUCT NICOTINE DEPENDENCE, UNCOMPLICATED: ICD-10-CM

## 2020-09-23 DIAGNOSIS — E87.5 HYPERKALEMIA: ICD-10-CM

## 2020-09-23 DIAGNOSIS — E78.5 HYPERLIPIDEMIA, UNSPECIFIED HYPERLIPIDEMIA TYPE: ICD-10-CM

## 2020-09-23 DIAGNOSIS — R73.01 IFG (IMPAIRED FASTING GLUCOSE): ICD-10-CM

## 2020-09-23 DIAGNOSIS — I10 HYPERTENSION, UNSPECIFIED TYPE: ICD-10-CM

## 2020-09-23 DIAGNOSIS — I70.0 AORTIC ATHEROSCLEROSIS: ICD-10-CM

## 2020-09-23 DIAGNOSIS — E66.9 CLASS 1 OBESITY WITH BODY MASS INDEX (BMI) OF 33.0 TO 33.9 IN ADULT, UNSPECIFIED OBESITY TYPE, UNSPECIFIED WHETHER SERIOUS COMORBIDITY PRESENT: ICD-10-CM

## 2020-09-23 DIAGNOSIS — D50.9 IRON DEFICIENCY ANEMIA, UNSPECIFIED IRON DEFICIENCY ANEMIA TYPE: ICD-10-CM

## 2020-09-23 DIAGNOSIS — E03.9 HYPOTHYROIDISM, UNSPECIFIED TYPE: ICD-10-CM

## 2020-09-23 DIAGNOSIS — E03.9 HYPOTHYROIDISM, UNSPECIFIED TYPE: Primary | ICD-10-CM

## 2020-09-23 DIAGNOSIS — F32.A DEPRESSION, UNSPECIFIED DEPRESSION TYPE: ICD-10-CM

## 2020-09-23 DIAGNOSIS — F41.9 ANXIETY: ICD-10-CM

## 2020-09-23 LAB — TSH SERPL DL<=0.005 MIU/L-ACNC: 1.7 UIU/ML (ref 0.4–4)

## 2020-09-23 PROCEDURE — 99214 PR OFFICE/OUTPT VISIT, EST, LEVL IV, 30-39 MIN: ICD-10-PCS | Mod: HCNC,S$GLB,, | Performed by: INTERNAL MEDICINE

## 2020-09-23 PROCEDURE — 36415 COLL VENOUS BLD VENIPUNCTURE: CPT | Mod: HCNC,PN

## 2020-09-23 PROCEDURE — 3078F DIAST BP <80 MM HG: CPT | Mod: HCNC,CPTII,S$GLB, | Performed by: INTERNAL MEDICINE

## 2020-09-23 PROCEDURE — 84443 ASSAY THYROID STIM HORMONE: CPT | Mod: HCNC

## 2020-09-23 PROCEDURE — 1101F PR PT FALLS ASSESS DOC 0-1 FALLS W/OUT INJ PAST YR: ICD-10-PCS | Mod: HCNC,CPTII,S$GLB, | Performed by: INTERNAL MEDICINE

## 2020-09-23 PROCEDURE — 1159F PR MEDICATION LIST DOCUMENTED IN MEDICAL RECORD: ICD-10-PCS | Mod: HCNC,S$GLB,, | Performed by: INTERNAL MEDICINE

## 2020-09-23 PROCEDURE — 3074F PR MOST RECENT SYSTOLIC BLOOD PRESSURE < 130 MM HG: ICD-10-PCS | Mod: HCNC,CPTII,S$GLB, | Performed by: INTERNAL MEDICINE

## 2020-09-23 PROCEDURE — 99499 RISK ADDL DX/OHS AUDIT: ICD-10-PCS | Mod: HCNC,,, | Performed by: INTERNAL MEDICINE

## 2020-09-23 PROCEDURE — 1159F MED LIST DOCD IN RCRD: CPT | Mod: HCNC,S$GLB,, | Performed by: INTERNAL MEDICINE

## 2020-09-23 PROCEDURE — 1101F PT FALLS ASSESS-DOCD LE1/YR: CPT | Mod: HCNC,CPTII,S$GLB, | Performed by: INTERNAL MEDICINE

## 2020-09-23 PROCEDURE — 99499 UNLISTED E&M SERVICE: CPT | Mod: HCNC,S$GLB,, | Performed by: INTERNAL MEDICINE

## 2020-09-23 PROCEDURE — 3078F PR MOST RECENT DIASTOLIC BLOOD PRESSURE < 80 MM HG: ICD-10-PCS | Mod: HCNC,CPTII,S$GLB, | Performed by: INTERNAL MEDICINE

## 2020-09-23 PROCEDURE — 99499 RISK ADDL DX/OHS AUDIT: ICD-10-PCS | Mod: HCNC,S$GLB,, | Performed by: INTERNAL MEDICINE

## 2020-09-23 PROCEDURE — 83036 HEMOGLOBIN GLYCOSYLATED A1C: CPT | Mod: HCNC

## 2020-09-23 PROCEDURE — 3074F SYST BP LT 130 MM HG: CPT | Mod: HCNC,CPTII,S$GLB, | Performed by: INTERNAL MEDICINE

## 2020-09-23 PROCEDURE — 99214 OFFICE O/P EST MOD 30 MIN: CPT | Mod: HCNC,S$GLB,, | Performed by: INTERNAL MEDICINE

## 2020-09-23 PROCEDURE — 99999 PR PBB SHADOW E&M-EST. PATIENT-LVL IV: ICD-10-PCS | Mod: PBBFAC,HCNC,, | Performed by: INTERNAL MEDICINE

## 2020-09-23 PROCEDURE — 99499 UNLISTED E&M SERVICE: CPT | Mod: HCNC,,, | Performed by: INTERNAL MEDICINE

## 2020-09-23 PROCEDURE — 99999 PR PBB SHADOW E&M-EST. PATIENT-LVL IV: CPT | Mod: PBBFAC,HCNC,, | Performed by: INTERNAL MEDICINE

## 2020-09-23 RX ORDER — CITALOPRAM 20 MG/1
30 TABLET, FILM COATED ORAL DAILY
Qty: 135 TABLET | Refills: 1 | Status: SHIPPED | OUTPATIENT
Start: 2020-09-23 | End: 2021-03-15 | Stop reason: SDUPTHER

## 2020-09-23 RX ORDER — TRAZODONE HYDROCHLORIDE 50 MG/1
TABLET ORAL
Qty: 45 TABLET | Refills: 0 | Status: SHIPPED | OUTPATIENT
Start: 2020-09-23 | End: 2020-12-28 | Stop reason: SDUPTHER

## 2020-09-23 RX ORDER — SIMVASTATIN 20 MG/1
20 TABLET, FILM COATED ORAL NIGHTLY
Qty: 90 TABLET | Refills: 3 | Status: SHIPPED | OUTPATIENT
Start: 2020-09-23 | End: 2021-11-24

## 2020-09-23 NOTE — PROGRESS NOTES
"[facility] Note    Chief Complaint      Chief Complaint   Patient presents with    Medication Management       History of Present Illness      Nickie Segura is a 85 y.o. 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  Last visit - 10/15/19.     Recurrent UTI-Fu by Dr. Smith.  Stable on vaginal cream. Pt also on Myrbetriq for OAB. Doing well.      HTN- Controlled off meds.  Continue low-sodium diet.     HLD - controlled on Zocor 20 mg QHS.  Her cholesterol is controlled -  TG 91 HDL 72 LDL 67.      Hypothyroidism-TSH -0.98 3/19/19.  Controlled on levothyroxine 100 mcg daily. Repeat TFTs.     Anxiety-Pt doing ok.  She inc her Celexa 30 mg daily to 40 mg/daily without my knowledge 6 wks ago.  Pt aware I do not rec this dose.  Patient takes Ativan 0.5 mg rarely prn.  Continue current regimen.     Depression- Was more depressed because she cares for one of her Best friends who is on hospice now.  She inc her Celexa 30 mg daily to 40 mg/daily without my knowledge 6 wks ago.  Pt aware I do not rec this dose. "I'm not motivated to do anything and it's not related to the pandemic". Will try adding Trazodone 50 mg 1/2 QHS.      Wheezing-Pt has ProAir which she uses prn - infrequently - has not used in several mos.  PFTs-WNL-07/14/2015.     Nicotine dependence - Pt smokes E-cigarette now.  Pt aware of the dangers.  She quit cigarette 07/05/2016.       Hiatal hernia-Stable on Gavescon     Colon polyps-Patient is s/p partial colon resection '09  CRS Dr. Gibson.  GI -Dr. Giron.  Last C scope-'13.      Iron deficiency-Ferritin has improved from 17 to 24.  She is not anemic - H/H-12.7/41.6. Dec Iron suppl from BID to alt  iron daily and BID on Qother day.     Hyperkalemia  - Her potassium is elev at 5.4 down from 5.5.  Rec low potassium diet.  Make sure she is not taking any " "Potassium supplementation.  Gave handouts. Do not feel pt would tolerate even low dose furosemide 10 mg Q M,W,F. Her oral mucosa is dry and I fear she would dehydrate on a diuretic. Repeat BMP in 2 wks.      IFG - H A1c 5.2-3/19/2019.  Will repeat     Hepatomegaly - 23 cm.  Fu by Dr. Giron.  AST - 54 - sl elev.     OA - Preston knees - Rec glucosamine or turmeric.      Renal cysts - + fam h/o PCKD.  ? Angiomyolipoma on Left Kidney.  Fu by Dr. Smith.      Obesity - BMI - 33.34 - up 23 lb since Oct. Rec low carb diet. She "tries"     Elev IGE - 742 ? Etiol.  No allergic Sx's.  ? Eos Esophagitis.     Aortic atherosclerosis - Tortuosity of the thoracic aorta with aortic atherosclerosis seen on CXR in Oct.  Fu by [place], Dr. Cedillo. She had a recent echo and is due for fu. Cont zocor. Pt rports she is due for Carotid U/S with Dr. Cedillo.     +LORNA - neg [place].      Acc by friend     HCM - Flu - 9//23/20; Td - > 10 yrs ago; PCV 13 - 2/21/17;  PVX 23 - 1/8/15;  Shingrix - ?- pt defers;  MGM - refuses;  DEXA - declined; C-scope - '13; GI - Dr. Giron; Retina Sp - Dr. Gardner; Derm - Dr. Joseph; Ophtho - Dr. Calderon    Past Medical History:  Past Medical History:   Diagnosis Date    LORNA positive     Anxiety     Bilateral cataracts     Colitis     Colon polyp     Depression     Elevated IgE level     Hepatomegaly     Hiatal hernia     Hyperlipidemia     Hypertension     Hypothyroidism     IFG (impaired fasting glucose)     Iron deficiency anemia     Macular degeneration     Mild aortic stenosis     Nicotine dependence     Osteoarthritis     Recurrent UTI     Renal cyst     Skin cancer     Tuberculosis of bladder     Urinary incontinence     Venous insufficiency     Vitamin B12 deficiency     Wheezing        Past Surgical History:   has a past surgical history that includes Cholecystectomy.    Family History:  family history includes Atrial fibrillation in her sister; Diabetes in her mother; Heart disease in " her father and mother; Polycystic kidney disease in her father.     Social History:  Social History     Tobacco Use    Smoking status: Former Smoker     Types: Cigarettes, Vaping with nicotine    Smokeless tobacco: Current User   Substance Use Topics    Alcohol use: Yes     Frequency: Monthly or less     Drinks per session: 1 or 2     Binge frequency: Never    Drug use: Never       Review of Systems   Constitutional: Negative for chills, diaphoresis and fever.   HENT: Positive for hearing loss. Negative for congestion and sore throat.    Eyes: Negative for blurred vision and double vision.        Wears glasses.  + MD radha.  Gets injections.  Fu by Dr. Gardner.  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    Respiratory: Negative for cough and shortness of breath.    Cardiovascular: Negative for chest pain and palpitations.   Gastrointestinal: Positive for constipation and heartburn. Negative for abdominal pain, blood in stool, diarrhea, melena, nausea and vomiting.        Due to iron - will dec slightly.    Genitourinary: Negative for dysuria and frequency.   Musculoskeletal: Negative for joint pain and myalgias.   Skin: Positive for itching. Negative for rash.        +dry skin   Neurological: Negative for dizziness and headaches.        + imbalance   Endo/Heme/Allergies: Does not bruise/bleed easily.   Psychiatric/Behavioral: Positive for depression. Negative for suicidal ideas. The patient is nervous/anxious. The patient does not have insomnia.         Medications:  Outpatient Encounter Medications as of 9/23/2020   Medication Sig Dispense Refill    albuterol (PROVENTIL/VENTOLIN HFA) 90 mcg/actuation inhaler Inhale 2 puffs into the lungs every 6 (six) hours as needed. Rescue      aspirin (ECOTRIN) 81 MG EC tablet Take 81 mg by mouth once daily.      citalopram (CELEXA) 20 MG tablet Take 1.5 tablets (30 mg total) by mouth once daily. 135 tablet 1    estradiol (ESTRACE) 0.01 % (0.1  mg/gram) vaginal cream Place 1 g vaginally 3 (three) times a week.      ferrous gluconate (FERGON) 240 (27 FE) MG tablet Take 240 mg by mouth once daily.      levothyroxine (SYNTHROID) 100 MCG tablet TAKE 1 TABLET(100 MCG) BY MOUTH EVERY DAY 90 tablet 0    LORazepam (ATIVAN) 0.5 MG tablet Take 1 tablet (0.5 mg total) by mouth daily as needed. 30 tablet 0    mag/aluminum/sod bicarb/alginc (GAVISCON ORAL) Take 1 tablet by mouth daily as needed.      MYRBETRIQ 50 mg Tb24 Take 50 mg by mouth once daily.  0    omeprazole magnesium (PRILOSEC OTC ORAL) Take 20 mg by mouth once daily.      simvastatin (ZOCOR) 20 MG tablet Take 1 tablet (20 mg total) by mouth every evening. 90 tablet 3    TOVIAZ 8 mg Tb24 Take 8 mg by mouth once daily.  0    [DISCONTINUED] citalopram (CELEXA) 20 MG tablet TAKE 1 AND 1/2 TABLETS BY MOUTH DAILY 135 tablet 0    [DISCONTINUED] simvastatin (ZOCOR) 20 MG tablet TAKE 1 TABLET(20 MG) BY MOUTH EVERY NIGHT 90 tablet 0    traZODone (DESYREL) 50 MG tablet 1/2 tab po QHS 45 tablet 0    [DISCONTINUED] LORazepam (ATIVAN) 0.5 MG tablet Take 0.5 mg by mouth.       No facility-administered encounter medications on file as of 9/23/2020.        Current Outpatient Medications:     albuterol (PROVENTIL/VENTOLIN HFA) 90 mcg/actuation inhaler, Inhale 2 puffs into the lungs every 6 (six) hours as needed. Rescue, Disp: , Rfl:     aspirin (ECOTRIN) 81 MG EC tablet, Take 81 mg by mouth once daily., Disp: , Rfl:     citalopram (CELEXA) 20 MG tablet, Take 1.5 tablets (30 mg total) by mouth once daily., Disp: 135 tablet, Rfl: 1    estradiol (ESTRACE) 0.01 % (0.1 mg/gram) vaginal cream, Place 1 g vaginally 3 (three) times a week., Disp: , Rfl:     ferrous gluconate (FERGON) 240 (27 FE) MG tablet, Take 240 mg by mouth once daily., Disp: , Rfl:     levothyroxine (SYNTHROID) 100 MCG tablet, TAKE 1 TABLET(100 MCG) BY MOUTH EVERY DAY, Disp: 90 tablet, Rfl: 0    LORazepam (ATIVAN) 0.5 MG tablet, Take 1 tablet  "(0.5 mg total) by mouth daily as needed., Disp: 30 tablet, Rfl: 0    mag/aluminum/sod bicarb/alginc (GAVISCON ORAL), Take 1 tablet by mouth daily as needed., Disp: , Rfl:     MYRBETRIQ 50 mg Tb24, Take 50 mg by mouth once daily., Disp: , Rfl: 0    omeprazole magnesium (PRILOSEC OTC ORAL), Take 20 mg by mouth once daily., Disp: , Rfl:     simvastatin (ZOCOR) 20 MG tablet, Take 1 tablet (20 mg total) by mouth every evening., Disp: 90 tablet, Rfl: 3    TOVIAZ 8 mg Tb24, Take 8 mg by mouth once daily., Disp: , Rfl: 0    flu vac 2020 65up-[id number],PF, (FLUAD QUAD 2020-21,65Y UP,,PF,) 60 mcg (15 mcg x 4)/0.5 mL Syrg, Inject 0.5 mLs into the muscle once. for 1 dose, Disp: 0.5 mL, Rfl: 0    traZODone (DESYREL) 50 MG tablet, 1/2 tab po QHS, Disp: 45 tablet, Rfl: 0    Allergies:  Review of patient's allergies indicates:   Allergen Reactions    Penicillins      rash       Health Maintenance:  Immunization History   Administered Date(s) Administered    Influenza (FLUAD) - Quadrivalent - Adjuvanted - PF *Preferred* (65+) 09/23/2020    Pneumococcal Conjugate - 13 Valent 02/21/2017    Pneumococcal Polysaccharide - 23 Valent 01/08/2015      Health Maintenance   Topic Date Due    TETANUS VACCINE  03/12/1953    DEXA SCAN  03/12/1976    Lipid Panel  07/17/2025    Pneumococcal Vaccine (65+ Low/Medium Risk)  Completed      Objective:      Vital Signs  Temp: 97.6 °F (36.4 °C)  Pulse: 86  Resp: 12  SpO2: 97 %  BP: 112/70  BP Location: Left arm  Height and Weight  Height: 5' 6" (167.6 cm)  Weight: 93.7 kg (206 lb 9.1 oz)  BSA (Calculated - sq m): 2.09 sq meters  BMI (Calculated): 33.4  Weight in (lb) to have BMI = 25: 154.6]    Laboratory:  CBC:  Recent Labs   Lab 10/15/19  1433 07/17/20  0909   WBC 6.82 7.01   RBC 4.03 4.49   Hemoglobin 11.4 L 12.7   Hematocrit 38.2 41.5   Platelets 243 208   Mean Corpuscular Volume 95 92   Mean Corpuscular Hemoglobin 28.3 28.3   Mean Corpuscular Hemoglobin Conc 29.8 L 30.6 L "       CMP:  Recent Labs   Lab 10/15/19  1433 07/17/20  0909 07/31/20  1048   Glucose 81 78  --    Calcium 9.3 10.5  --    Albumin 3.4 L 3.6  --    Total Protein 6.8 7.3  --    Sodium 139 142  --    Potassium 4.6 5.5 H 5.4 H   CO2 26 29  --    Chloride 105 105  --    BUN, Bld 19 19  --    Creatinine 0.8 1.0  --    eGFR if  >60.0 59.8 A  --    eGFR if non African American >60.0 51.9 A  --    Alkaline Phosphatase 92 95  --    ALT 11 10  --    AST 16 16  --    Total Bilirubin 0.2 0.3  --        LIPIDS:  Recent Labs   Lab 07/17/20  0909   HDL 72   Cholesterol 158   Triglycerides 91   LDL Cholesterol 67.8   Hdl/Cholesterol Ratio 45.6   Non-HDL Cholesterol 86   Total Cholesterol/HDL Ratio 2.2       Other:   Lab Results   Component Value Date    FERRITIN 24 07/17/2020    IRON 45 10/15/2019    TRANSFERRIN 300 10/15/2019    TIBC 444 10/15/2019    FESATURATED 10 (L) 10/15/2019       Physical Exam  Vitals signs reviewed.   Constitutional:       General: She is not in acute distress.     Appearance: Normal appearance. She is obese. She is not ill-appearing, toxic-appearing or diaphoretic.      Comments: Pt asked to be examined seats in chair   HENT:      Head: Normocephalic and atraumatic.      Mouth/Throat:      Mouth: Mucous membranes are dry.   Eyes:      Extraocular Movements: Extraocular movements intact.      Conjunctiva/sclera: Conjunctivae normal.      Pupils: Pupils are equal, round, and reactive to light.   Neck:      Comments: radha carotid bruits  Cardiovascular:      Rate and Rhythm: Normal rate and regular rhythm.      Pulses: Normal pulses.      Heart sounds: Murmur present.      Comments: III/VI Sys murmur radiates to sternal notch  Pulmonary:      Effort: Pulmonary effort is normal. No respiratory distress.      Breath sounds: No wheezing or rhonchi.      Comments: Dec BS in Rt base  Abdominal:      General: Bowel sounds are normal.      Palpations: Abdomen is soft.   Musculoskeletal:       Comments: Uses seated walker to ambulate   Skin:     General: Skin is warm and dry.   Neurological:      General: No focal deficit present.      Mental Status: She is alert and oriented to person, place, and time.   Psychiatric:         Mood and Affect: Mood normal.         Behavior: Behavior normal.             Assessment:       1. Hypothyroidism, unspecified type    2. Depression, unspecified depression type    3. IFG (impaired fasting glucose)    4. Hyperkalemia    5. Hyperlipidemia, unspecified hyperlipidemia type    6. Iron deficiency anemia, unspecified iron deficiency anemia type    7. Hypertension, unspecified type    8. Other tobacco product nicotine dependence, uncomplicated    9. Anxiety    10. Aortic atherosclerosis    11. Class 1 obesity with body mass index (BMI) of 33.0 to 33.9 in adult, unspecified obesity type, unspecified whether serious comorbidity present        Nickie Segura is a 85 y.o.female with:    1. Hypothyroidism, unspecified type  - TSH; Future  -Cont current.  Repeat TFT's.     2. Depression, unspecified depression type  - traZODone (DESYREL) 50 MG tablet; 1/2 tab po QHS  Dispense: 45 tablet; Refill: 0  - citalopram (CELEXA) 20 MG tablet; Take 1.5 tablets (30 mg total) by mouth once daily.  Dispense: 135 tablet; Refill: 1  -Uncontrolled.  Pt will cont Celexa 30 mg/d for now.  Will consider weaning in future. Will add Trazodone 25 mg po QHS.     3. IFG (impaired fasting glucose)  - Hemoglobin A1C; Future  -Rec low carb diet.  Repeat HA1c.     4. Hyperkalemia  - Basic metabolic panel; Future  - Rec low potassium diet. Repeat in 2 wks.  Do not feel pt would tolerate lasix.  Her oral mucosa is dry and I fear she would dehydrate on a diuretic.     5. Hyperlipidemia, unspecified hyperlipidemia type  - simvastatin (ZOCOR) 20 MG tablet; Take 1 tablet (20 mg total) by mouth every evening.  Dispense: 90 tablet; Refill: 3    6. Iron deficiency anemia, unspecified iron deficiency anemia type  - Dec  Iron suppl from BID to alt  iron daily and BID on Qother day.     7. Hypertension, unspecified type   - Controlled.  Cont current.     8. Other tobacco product nicotine dependence, uncomplicated  -Cont to enc stopping e cig.     9. Anxiety  -Uncontrolled.  Pt will cont Celexa 30 mg/d for now.  Will consider weaning in future. Will add Trazodone 25 mg po QHS.     10. Aortic atherosclerosis  -Cont statin and ASA.     11. Class 1 obesity with body mass index (BMI) of 33.0 to 33.9 in adult, unspecified obesity type, unspecified whether serious comorbidity present  - Rec resume low carb diet. Exercise limited due to debility.      Chronic conditions status updated as per HPI.  Other than changes above, cont current medications and maintain follow up with specialists.  Return to clinic in 3 months.    Perla Hopper MD  [facility]

## 2020-09-23 NOTE — PATIENT INSTRUCTIONS
Trazodone tablets  What is this medicine?  TRAZODONE (TRAZ oh done) is used to treat depression.  How should I use this medicine?  Take this medicine by mouth with a glass of water. Follow the directions on the prescription label. Take this medicine shortly after a meal or a light snack. Take your medicine at regular intervals. Do not take your medicine more often than directed. Do not stop taking this medicine suddenly except upon the advice of your doctor. Stopping this medicine too quickly may cause serious side effects or your condition may worsen.  A special MedGuide will be given to you by the pharmacist with each prescription and refill. Be sure to read this information carefully each time.  Talk to your pediatrician regarding the use of this medicine in children. Special care may be needed.  What side effects may I notice from receiving this medicine?  Side effects that you should report to your doctor or health care professional as soon as possible:  · allergic reactions like skin rash, itching or hives, swelling of the face, lips, or tongue  · fast, irregular heartbeat  · feeling faint or lightheaded, falls  · painful erections or other sexual dysfunction  · suicidal thoughts or other mood changes  · trembling  Side effects that usually do not require medical attention (report to your doctor or health care professional if they continue or are bothersome):  · constipation  · headache  · muscle aches or pains  · nausea, vomiting  · unusually weak or tired  What may interact with this medicine?  Do not take this medicine with any of the following medications:  · certain medicines for fungal infections like fluconazole, itraconazole, ketoconazole, posaconazole, voriconazole  · cisapride  · dofetilide  · dronedarone  · linezolid  · MAOIs like Carbex, Eldepryl, Marplan, Nardil, and Parnate  · mesoridazine  · methylene blue (injected into a vein)  · pimozide  · saquinavir  · thioridazine  · ziprasidone  This  medicine may also interact with the following medications:  · alcohol  · antiviral medicines for HIV or AIDS  · aspirin and aspirin-like medicines  · barbiturates like phenobarbital  · certain medicines for blood pressure, heart disease, irregular heart beat  · certain medicines for depression, anxiety, or psychotic disturbances  · certain medicines for migraine headache like almotriptan, eletriptan, frovatriptan, naratriptan, rizatriptan, sumatriptan, zolmitriptan  · certain medicines for seizures like carbamazepine and phenytoin  · certain medicines for sleep  · certain medicines that treat or prevent blood clots like dalteparin, enoxaparin, warfarin  · digoxin  · fentanyl  · lithium  · NSAIDS, medicines for pain and inflammation, like ibuprofen or naproxen  · other medicines that prolong the QT interval (cause an abnormal heart rhythm)  · rasagiline  · supplements like Aguila's wort, kava kava, valerian  · tramadol  · tryptophan  What if I miss a dose?  If you miss a dose, take it as soon as you can. If it is almost time for your next dose, take only that dose. Do not take double or extra doses.  Where should I keep my medicine?  Keep out of the reach of children.  Store at room temperature between 15 and 30 degrees C (59 to 86 degrees F). Protect from light. Keep container tightly closed. Throw away any unused medicine after the expiration date.  What should I tell my health care provider before I take this medicine?  They need to know if you have any of these conditions:  · attempted suicide or thinking about it  · bipolar disorder  · bleeding problems  · glaucoma  · heart disease, or previous heart attack  · irregular heart beat  · kidney or liver disease  · low levels of sodium in the blood  · an unusual or allergic reaction to trazodone, other medicines, foods, dyes or preservatives  · pregnant or trying to get pregnant  · breast-feeding  What should I watch for while using this medicine?  Tell your doctor  if your symptoms do not get better or if they get worse. Visit your doctor or health care professional for regular checks on your progress. Because it may take several weeks to see the full effects of this medicine, it is important to continue your treatment as prescribed by your doctor.  Patients and their families should watch out for new or worsening thoughts of suicide or depression. Also watch out for sudden changes in feelings such as feeling anxious, agitated, panicky, irritable, hostile, aggressive, impulsive, severely restless, overly excited and hyperactive, or not being able to sleep. If this happens, especially at the beginning of treatment or after a change in dose, call your health care professional.  You may get drowsy or dizzy. Do not drive, use machinery, or do anything that needs mental alertness until you know how this medicine affects you. Do not stand or sit up quickly, especially if you are an older patient. This reduces the risk of dizzy or fainting spells. Alcohol may interfere with the effect of this medicine. Avoid alcoholic drinks.  This medicine may cause dry eyes and blurred vision. If you wear contact lenses you may feel some discomfort. Lubricating drops may help. See your eye doctor if the problem does not go away or is severe.  Your mouth may get dry. Chewing sugarless gum, sucking hard candy and drinking plenty of water may help. Contact your doctor if the problem does not go away or is severe.  NOTE:This sheet is a summary. It may not cover all possible information. If you have questions about this medicine, talk to your doctor, pharmacist, or health care provider. Copyright© 2017 [employer]

## 2020-09-24 ENCOUNTER — TELEPHONE (OUTPATIENT)
Dept: PRIMARY CARE CLINIC | Facility: CLINIC | Age: 85
End: 2020-09-24

## 2020-09-24 DIAGNOSIS — E03.9 HYPOTHYROIDISM, UNSPECIFIED TYPE: ICD-10-CM

## 2020-09-24 LAB
ESTIMATED AVG GLUCOSE: 105 MG/DL (ref 68–131)
HBA1C MFR BLD HPLC: 5.3 % (ref 4–5.6)

## 2020-09-24 RX ORDER — LEVOTHYROXINE SODIUM 100 UG/1
100 TABLET ORAL DAILY
Qty: 90 TABLET | Refills: 2 | Status: SHIPPED | OUTPATIENT
Start: 2020-09-24 | End: 2021-05-31 | Stop reason: SDUPTHER

## 2020-09-24 NOTE — TELEPHONE ENCOUNTER
----- Message from Perla Hopper MD sent at 9/24/2020  3:40 PM CDT -----  Please inform pt - I reviewed your labs.  Your TSH - 1.704 - cont. Your current dose of Levothyroxine.  Your Ha1c - 5.3 - normal. No evidence of diabetes.     Dr. GARG

## 2020-09-24 NOTE — PROGRESS NOTES
Please inform pt - I reviewed your labs.  Your TSH - 1.704 - cont. Your current dose of Levothyroxine.  Your Ha1c - 5.3 - normal. No evidence of diabetes.     Dr. GARG

## 2020-09-28 ENCOUNTER — TELEPHONE (OUTPATIENT)
Dept: PRIMARY CARE CLINIC | Facility: CLINIC | Age: 85
End: 2020-09-28

## 2020-09-28 NOTE — TELEPHONE ENCOUNTER
----- Message from Librado Mckeon sent at 9/28/2020 10:16 AM CDT -----  Regarding: copy of last clinic visit  Contact: Self  Type:  Needs Medical Advice    Who Called: Self    Would the patient rather a call back or a response via [facility]? Call back     Best Call Back Number: [phone]    Additional Information: Self [phone]----calling to spk with the nurse regarding a copy of the pt last clinic visit. The pt is requesting a call back

## 2020-09-28 NOTE — TELEPHONE ENCOUNTER
I sw pt. She wanted to confirm her medication changes from her most recent visit. I went over all the changes and answered all patients questions. She will let us know if she thinks of any additional questions/concerns

## 2020-10-22 ENCOUNTER — TELEPHONE (OUTPATIENT)
Dept: PRIMARY CARE CLINIC | Facility: CLINIC | Age: 85
End: 2020-10-22

## 2020-10-22 DIAGNOSIS — E87.5 HYPERKALEMIA: Primary | ICD-10-CM

## 2020-10-22 NOTE — TELEPHONE ENCOUNTER
Pt had orders for a bmp to be performed at [facility]. She would like to orders switched to [facility]. Pt was scheduled for Monday 10/26/2020

## 2020-10-22 NOTE — TELEPHONE ENCOUNTER
----- Message from Jessica Hernandez sent at 10/22/2020 10:54 AM CDT -----  Regarding: [phone]  Nickie is requesting a callback.  She states that she needs to have lab work done to check her potassium.    Callback number: [phone]

## 2020-10-23 ENCOUNTER — TELEPHONE (OUTPATIENT)
Dept: PRIMARY CARE CLINIC | Facility: CLINIC | Age: 85
End: 2020-10-23

## 2020-10-23 NOTE — TELEPHONE ENCOUNTER
Pt states she has been on Celexa for years. You added trazodone 50mg 1/2 qhs on her last visit(9/23/2020). Pt states it is not helpting. Reports she had a panic attack two days straight and read that could be a side effect to the trazodone. Pt would like to get off of it and would like to know if she can just stop it or wean off

## 2020-10-23 NOTE — TELEPHONE ENCOUNTER
----- Message from Deepali Lihany sent at 10/23/2020 11:25 AM CDT -----  Contact: Pt [phone]  Patient has been on citalopram (CELEXA) 20 MG tablet for years but it hasn't been working. So she was put on traZODone (DESYREL) 50 MG tablet but nothing has changed. She did have a panic attack and wants to know if she can stop taking that medication.    Please call and advise.    Thank You

## 2020-10-26 ENCOUNTER — LAB VISIT (OUTPATIENT)
Dept: LAB | Facility: HOSPITAL | Age: 85
End: 2020-10-26
Attending: INTERNAL MEDICINE
Payer: MEDICARE

## 2020-10-26 DIAGNOSIS — E87.5 HYPERKALEMIA: ICD-10-CM

## 2020-10-26 LAB
ANION GAP SERPL CALC-SCNC: 11 MMOL/L (ref 8–16)
BUN SERPL-MCNC: 19 MG/DL (ref 8–23)
CALCIUM SERPL-MCNC: 9.5 MG/DL (ref 8.7–10.5)
CHLORIDE SERPL-SCNC: 104 MMOL/L (ref 95–110)
CO2 SERPL-SCNC: 27 MMOL/L (ref 23–29)
CREAT SERPL-MCNC: 0.9 MG/DL (ref 0.5–1.4)
EST. GFR  (AFRICAN AMERICAN): >60 ML/MIN/1.73 M^2
EST. GFR  (NON AFRICAN AMERICAN): 58.9 ML/MIN/1.73 M^2
GLUCOSE SERPL-MCNC: 88 MG/DL (ref 70–110)
POTASSIUM SERPL-SCNC: 5 MMOL/L (ref 3.5–5.1)
SODIUM SERPL-SCNC: 142 MMOL/L (ref 136–145)

## 2020-10-26 PROCEDURE — 80048 BASIC METABOLIC PNL TOTAL CA: CPT | Mod: HCNC

## 2020-10-26 PROCEDURE — 36415 COLL VENOUS BLD VENIPUNCTURE: CPT | Mod: HCNC,PO

## 2020-10-26 NOTE — PROGRESS NOTES
Please inform pt - I reviewed your labs and your Potassium improved from 5.4 to 5. Your kidney function is stable and looks good. Continue your low potassium diet. Have a great day  Sincerely,    Dr. GARG

## 2020-10-27 ENCOUNTER — TELEPHONE (OUTPATIENT)
Dept: PRIMARY CARE CLINIC | Facility: CLINIC | Age: 85
End: 2020-10-27

## 2020-10-27 NOTE — TELEPHONE ENCOUNTER
----- Message from Perla Hopper MD sent at 10/26/2020  5:57 PM CDT -----  Please inform pt - I reviewed your labs and your Potassium improved from 5.4 to 5. Your kidney function is stable and looks good. Continue your low potassium diet. Have a great day  Sincerely,    Dr. GARG

## 2020-11-23 ENCOUNTER — TELEPHONE (OUTPATIENT)
Dept: PRIMARY CARE CLINIC | Facility: CLINIC | Age: 85
End: 2020-11-23

## 2020-11-23 DIAGNOSIS — R53.83 FATIGUE, UNSPECIFIED TYPE: ICD-10-CM

## 2020-11-23 DIAGNOSIS — D50.9 IRON DEFICIENCY ANEMIA, UNSPECIFIED IRON DEFICIENCY ANEMIA TYPE: ICD-10-CM

## 2020-11-23 DIAGNOSIS — R53.81 DEBILITY: Primary | ICD-10-CM

## 2020-11-23 NOTE — TELEPHONE ENCOUNTER
Ok.  Let me know what pt decides.  I am happy to sign order if this is something she is interested in.    Dr. GARG

## 2020-11-23 NOTE — TELEPHONE ENCOUNTER
----- Message from Tiffany Espinoza sent at 11/23/2020  9:19 AM CST -----  Contact: Rodrick flores/ Senior Therapy Nurse   Rodrick is requesting orders for PT and OP. Please advise

## 2020-11-23 NOTE — TELEPHONE ENCOUNTER
I radha Mensah. They go out to the patient for pt/ot at her [facility]. I lvm requesting pt contact me to confirm this is something she is interested in. If so we can fax orders to them at [phone]

## 2020-11-24 NOTE — TELEPHONE ENCOUNTER
----- Message from Evon Lee MA sent at 11/24/2020 10:39 AM CST -----  Contact: [phone]  pt  Pt is calling concerning her iron level.  Pt is feeling tried and sleeping all day.  Patient would like lab orders to check her iron level.  Pt states she think her potassium might be the problem.  Please call to advise the pt.

## 2020-11-24 NOTE — TELEPHONE ENCOUNTER
I sw pt. She would like pt/ot orders. They will come out to her at the [facility] center she resides at.    Pt also states she has been really sleepy lately. She states she gets anemic off and on and usually she gets like this when her numbers are low. Pt states she is very concerned about this and would like to see if you can order labs to check her blood count/iron.

## 2020-11-24 NOTE — TELEPHONE ENCOUNTER
Pt was informed that her pt/ot orders were faxed. Pt also scheduled for labs. Orders were originally put in for [facility]. Pt wanted to go to [facility]. I put in new orders.

## 2020-11-25 ENCOUNTER — LAB VISIT (OUTPATIENT)
Dept: LAB | Facility: HOSPITAL | Age: 85
End: 2020-11-25
Attending: INTERNAL MEDICINE
Payer: MEDICARE

## 2020-11-25 DIAGNOSIS — R53.83 FATIGUE, UNSPECIFIED TYPE: ICD-10-CM

## 2020-11-25 DIAGNOSIS — D50.9 IRON DEFICIENCY ANEMIA, UNSPECIFIED IRON DEFICIENCY ANEMIA TYPE: ICD-10-CM

## 2020-11-25 LAB
BASOPHILS # BLD AUTO: 0.05 K/UL (ref 0–0.2)
BASOPHILS NFR BLD: 0.7 % (ref 0–1.9)
DIFFERENTIAL METHOD: ABNORMAL
EOSINOPHIL # BLD AUTO: 0.2 K/UL (ref 0–0.5)
EOSINOPHIL NFR BLD: 2.9 % (ref 0–8)
ERYTHROCYTE [DISTWIDTH] IN BLOOD BY AUTOMATED COUNT: 14.9 % (ref 11.5–14.5)
HCT VFR BLD AUTO: 41.8 % (ref 37–48.5)
HGB BLD-MCNC: 12.5 G/DL (ref 12–16)
IMM GRANULOCYTES # BLD AUTO: 0.02 K/UL (ref 0–0.04)
IMM GRANULOCYTES NFR BLD AUTO: 0.3 % (ref 0–0.5)
LYMPHOCYTES # BLD AUTO: 1.5 K/UL (ref 1–4.8)
LYMPHOCYTES NFR BLD: 21.8 % (ref 18–48)
MCH RBC QN AUTO: 28.3 PG (ref 27–31)
MCHC RBC AUTO-ENTMCNC: 29.9 G/DL (ref 32–36)
MCV RBC AUTO: 95 FL (ref 82–98)
MONOCYTES # BLD AUTO: 0.7 K/UL (ref 0.3–1)
MONOCYTES NFR BLD: 9.4 % (ref 4–15)
NEUTROPHILS # BLD AUTO: 4.5 K/UL (ref 1.8–7.7)
NEUTROPHILS NFR BLD: 64.9 % (ref 38–73)
NRBC BLD-RTO: 0 /100 WBC
PLATELET # BLD AUTO: 201 K/UL (ref 150–350)
PMV BLD AUTO: 10.3 FL (ref 9.2–12.9)
RBC # BLD AUTO: 4.42 M/UL (ref 4–5.4)
WBC # BLD AUTO: 6.93 K/UL (ref 3.9–12.7)

## 2020-11-25 PROCEDURE — 36415 COLL VENOUS BLD VENIPUNCTURE: CPT | Mod: HCNC,PO

## 2020-11-25 PROCEDURE — 85025 COMPLETE CBC W/AUTO DIFF WBC: CPT | Mod: HCNC

## 2020-12-03 NOTE — PROGRESS NOTES
I sent pt a my chart message -  I reviewed your labs and you are not anemic.  Are you still feeling super tired?  Any other symptoms? Do u think it may be depression?     Dr. GARG

## 2020-12-11 ENCOUNTER — PATIENT MESSAGE (OUTPATIENT)
Dept: OTHER | Facility: OTHER | Age: 85
End: 2020-12-11

## 2020-12-28 DIAGNOSIS — F32.A DEPRESSION, UNSPECIFIED DEPRESSION TYPE: ICD-10-CM

## 2020-12-28 RX ORDER — TRAZODONE HYDROCHLORIDE 50 MG/1
TABLET ORAL
Qty: 45 TABLET | Refills: 0 | Status: SHIPPED | OUTPATIENT
Start: 2020-12-28 | End: 2020-12-28

## 2020-12-28 NOTE — TELEPHONE ENCOUNTER
Please cancel the refill for Trazodone as per phone call 10/23/20 I told her she could stop it because it was not helping.     Dr. GARG

## 2021-01-06 ENCOUNTER — TELEPHONE (OUTPATIENT)
Dept: PRIMARY CARE CLINIC | Facility: CLINIC | Age: 86
End: 2021-01-06

## 2021-01-11 ENCOUNTER — TELEPHONE (OUTPATIENT)
Dept: PRIMARY CARE CLINIC | Facility: CLINIC | Age: 86
End: 2021-01-11

## 2021-01-22 ENCOUNTER — PATIENT MESSAGE (OUTPATIENT)
Dept: ADMINISTRATIVE | Facility: OTHER | Age: 86
End: 2021-01-22

## 2021-02-01 ENCOUNTER — PATIENT OUTREACH (OUTPATIENT)
Dept: ADMINISTRATIVE | Facility: HOSPITAL | Age: 86
End: 2021-02-01

## 2021-02-05 ENCOUNTER — IMMUNIZATION (OUTPATIENT)
Dept: PHARMACY | Facility: CLINIC | Age: 86
End: 2021-02-05
Payer: MEDICARE

## 2021-02-05 DIAGNOSIS — Z23 NEED FOR VACCINATION: Primary | ICD-10-CM

## 2021-02-15 ENCOUNTER — TELEPHONE (OUTPATIENT)
Dept: PRIMARY CARE CLINIC | Facility: CLINIC | Age: 86
End: 2021-02-15

## 2021-03-05 ENCOUNTER — IMMUNIZATION (OUTPATIENT)
Dept: PHARMACY | Facility: CLINIC | Age: 86
End: 2021-03-05
Payer: MEDICARE

## 2021-03-05 DIAGNOSIS — Z23 NEED FOR VACCINATION: Primary | ICD-10-CM

## 2021-03-15 ENCOUNTER — OFFICE VISIT (OUTPATIENT)
Dept: PRIMARY CARE CLINIC | Facility: CLINIC | Age: 86
End: 2021-03-15
Payer: MEDICARE

## 2021-03-15 VITALS
HEART RATE: 90 BPM | TEMPERATURE: 98 F | DIASTOLIC BLOOD PRESSURE: 68 MMHG | HEIGHT: 66 IN | WEIGHT: 199.5 LBS | BODY MASS INDEX: 32.06 KG/M2 | SYSTOLIC BLOOD PRESSURE: 114 MMHG | OXYGEN SATURATION: 97 %

## 2021-03-15 DIAGNOSIS — F32.A DEPRESSION, UNSPECIFIED DEPRESSION TYPE: ICD-10-CM

## 2021-03-15 DIAGNOSIS — I35.0 AORTIC VALVE STENOSIS, ETIOLOGY OF CARDIAC VALVE DISEASE UNSPECIFIED: ICD-10-CM

## 2021-03-15 DIAGNOSIS — M17.0 OSTEOARTHRITIS OF BOTH KNEES, UNSPECIFIED OSTEOARTHRITIS TYPE: ICD-10-CM

## 2021-03-15 DIAGNOSIS — E03.9 HYPOTHYROIDISM, UNSPECIFIED TYPE: ICD-10-CM

## 2021-03-15 DIAGNOSIS — D50.9 IRON DEFICIENCY ANEMIA, UNSPECIFIED IRON DEFICIENCY ANEMIA TYPE: ICD-10-CM

## 2021-03-15 DIAGNOSIS — F41.9 ANXIETY: ICD-10-CM

## 2021-03-15 DIAGNOSIS — I10 HYPERTENSION, UNSPECIFIED TYPE: ICD-10-CM

## 2021-03-15 DIAGNOSIS — E78.5 HYPERLIPIDEMIA, UNSPECIFIED HYPERLIPIDEMIA TYPE: Primary | ICD-10-CM

## 2021-03-15 PROCEDURE — 99499 RISK ADDL DX/OHS AUDIT: ICD-10-PCS | Mod: S$GLB,,, | Performed by: INTERNAL MEDICINE

## 2021-03-15 PROCEDURE — 3078F DIAST BP <80 MM HG: CPT | Mod: CPTII,S$GLB,, | Performed by: INTERNAL MEDICINE

## 2021-03-15 PROCEDURE — 3074F SYST BP LT 130 MM HG: CPT | Mod: CPTII,S$GLB,, | Performed by: INTERNAL MEDICINE

## 2021-03-15 PROCEDURE — 99499 UNLISTED E&M SERVICE: CPT | Mod: S$GLB,,, | Performed by: INTERNAL MEDICINE

## 2021-03-15 PROCEDURE — 1126F AMNT PAIN NOTED NONE PRSNT: CPT | Mod: S$GLB,,, | Performed by: INTERNAL MEDICINE

## 2021-03-15 PROCEDURE — 99214 OFFICE O/P EST MOD 30 MIN: CPT | Mod: S$GLB,,, | Performed by: INTERNAL MEDICINE

## 2021-03-15 PROCEDURE — 99214 PR OFFICE/OUTPT VISIT, EST, LEVL IV, 30-39 MIN: ICD-10-PCS | Mod: S$GLB,,, | Performed by: INTERNAL MEDICINE

## 2021-03-15 PROCEDURE — 1159F MED LIST DOCD IN RCRD: CPT | Mod: S$GLB,,, | Performed by: INTERNAL MEDICINE

## 2021-03-15 PROCEDURE — 3078F PR MOST RECENT DIASTOLIC BLOOD PRESSURE < 80 MM HG: ICD-10-PCS | Mod: CPTII,S$GLB,, | Performed by: INTERNAL MEDICINE

## 2021-03-15 PROCEDURE — 1100F PTFALLS ASSESS-DOCD GE2>/YR: CPT | Mod: CPTII,S$GLB,, | Performed by: INTERNAL MEDICINE

## 2021-03-15 PROCEDURE — 1100F PR PT FALLS ASSESS DOC 2+ FALLS/FALL W/INJURY/YR: ICD-10-PCS | Mod: CPTII,S$GLB,, | Performed by: INTERNAL MEDICINE

## 2021-03-15 PROCEDURE — 3074F PR MOST RECENT SYSTOLIC BLOOD PRESSURE < 130 MM HG: ICD-10-PCS | Mod: CPTII,S$GLB,, | Performed by: INTERNAL MEDICINE

## 2021-03-15 PROCEDURE — 3288F PR FALLS RISK ASSESSMENT DOCUMENTED: ICD-10-PCS | Mod: CPTII,S$GLB,, | Performed by: INTERNAL MEDICINE

## 2021-03-15 PROCEDURE — 99999 PR PBB SHADOW E&M-EST. PATIENT-LVL IV: ICD-10-PCS | Mod: PBBFAC,,, | Performed by: INTERNAL MEDICINE

## 2021-03-15 PROCEDURE — 99999 PR PBB SHADOW E&M-EST. PATIENT-LVL IV: CPT | Mod: PBBFAC,,, | Performed by: INTERNAL MEDICINE

## 2021-03-15 PROCEDURE — 1159F PR MEDICATION LIST DOCUMENTED IN MEDICAL RECORD: ICD-10-PCS | Mod: S$GLB,,, | Performed by: INTERNAL MEDICINE

## 2021-03-15 PROCEDURE — 1126F PR PAIN SEVERITY QUANTIFIED, NO PAIN PRESENT: ICD-10-PCS | Mod: S$GLB,,, | Performed by: INTERNAL MEDICINE

## 2021-03-15 PROCEDURE — 3288F FALL RISK ASSESSMENT DOCD: CPT | Mod: CPTII,S$GLB,, | Performed by: INTERNAL MEDICINE

## 2021-03-15 RX ORDER — CITALOPRAM 20 MG/1
30 TABLET, FILM COATED ORAL DAILY
Qty: 135 TABLET | Refills: 1 | Status: SHIPPED | OUTPATIENT
Start: 2021-03-15 | End: 2021-09-10

## 2021-03-15 RX ORDER — BUPROPION HYDROCHLORIDE 75 MG/1
75 TABLET ORAL 2 TIMES DAILY
Qty: 180 TABLET | Refills: 0 | Status: SHIPPED | OUTPATIENT
Start: 2021-03-15 | End: 2022-01-25

## 2021-03-17 DIAGNOSIS — M25.562 PAIN IN BOTH KNEES, UNSPECIFIED CHRONICITY: Primary | ICD-10-CM

## 2021-03-17 DIAGNOSIS — M25.561 PAIN IN BOTH KNEES, UNSPECIFIED CHRONICITY: Primary | ICD-10-CM

## 2021-03-22 ENCOUNTER — HOSPITAL ENCOUNTER (OUTPATIENT)
Dept: RADIOLOGY | Facility: HOSPITAL | Age: 86
Discharge: HOME | End: 2021-03-22
Attending: ORTHOPAEDIC SURGERY
Payer: MEDICARE

## 2021-03-22 DIAGNOSIS — M25.562 PAIN IN BOTH KNEES, UNSPECIFIED CHRONICITY: ICD-10-CM

## 2021-03-22 DIAGNOSIS — M25.561 PAIN IN BOTH KNEES, UNSPECIFIED CHRONICITY: ICD-10-CM

## 2021-03-22 PROCEDURE — 73564 X-RAY EXAM KNEE 4 OR MORE: CPT | Mod: TC,50,PN

## 2021-03-22 PROCEDURE — 73564 XR KNEE ORTHO BILAT WITH FLEXION: ICD-10-PCS | Mod: 26,50,, | Performed by: RADIOLOGY

## 2021-03-22 PROCEDURE — 73564 X-RAY EXAM KNEE 4 OR MORE: CPT | Mod: 26,50,, | Performed by: RADIOLOGY

## 2021-03-24 ENCOUNTER — PATIENT OUTREACH (OUTPATIENT)
Dept: ADMINISTRATIVE | Facility: OTHER | Age: 86
End: 2021-03-24

## 2021-03-25 ENCOUNTER — TELEPHONE (OUTPATIENT)
Dept: PRIMARY CARE CLINIC | Facility: CLINIC | Age: 86
End: 2021-03-25

## 2021-03-30 ENCOUNTER — OFFICE VISIT (OUTPATIENT)
Dept: PRIMARY CARE CLINIC | Facility: CLINIC | Age: 86
End: 2021-03-30
Payer: MEDICARE

## 2021-03-30 VITALS
OXYGEN SATURATION: 96 % | HEART RATE: 94 BPM | TEMPERATURE: 98 F | DIASTOLIC BLOOD PRESSURE: 78 MMHG | SYSTOLIC BLOOD PRESSURE: 128 MMHG | WEIGHT: 198.63 LBS | HEIGHT: 66 IN | BODY MASS INDEX: 31.92 KG/M2

## 2021-03-30 DIAGNOSIS — M99.09 SOMATIC DYSFUNCTON OF OTHER REGION: Primary | ICD-10-CM

## 2021-03-30 PROCEDURE — 1101F PR PT FALLS ASSESS DOC 0-1 FALLS W/OUT INJ PAST YR: ICD-10-PCS | Mod: CPTII,S$GLB,, | Performed by: FAMILY MEDICINE

## 2021-03-30 PROCEDURE — 99999 PR PBB SHADOW E&M-EST. PATIENT-LVL IV: CPT | Mod: PBBFAC,,, | Performed by: FAMILY MEDICINE

## 2021-03-30 PROCEDURE — 1101F PT FALLS ASSESS-DOCD LE1/YR: CPT | Mod: CPTII,S$GLB,, | Performed by: FAMILY MEDICINE

## 2021-03-30 PROCEDURE — 99212 OFFICE O/P EST SF 10 MIN: CPT | Mod: S$GLB,,, | Performed by: FAMILY MEDICINE

## 2021-03-30 PROCEDURE — 1126F AMNT PAIN NOTED NONE PRSNT: CPT | Mod: S$GLB,,, | Performed by: FAMILY MEDICINE

## 2021-03-30 PROCEDURE — 1159F MED LIST DOCD IN RCRD: CPT | Mod: S$GLB,,, | Performed by: FAMILY MEDICINE

## 2021-03-30 PROCEDURE — 3074F SYST BP LT 130 MM HG: CPT | Mod: CPTII,S$GLB,, | Performed by: FAMILY MEDICINE

## 2021-03-30 PROCEDURE — 99999 PR PBB SHADOW E&M-EST. PATIENT-LVL IV: ICD-10-PCS | Mod: PBBFAC,,, | Performed by: FAMILY MEDICINE

## 2021-03-30 PROCEDURE — 3078F DIAST BP <80 MM HG: CPT | Mod: CPTII,S$GLB,, | Performed by: FAMILY MEDICINE

## 2021-03-30 PROCEDURE — 1126F PR PAIN SEVERITY QUANTIFIED, NO PAIN PRESENT: ICD-10-PCS | Mod: S$GLB,,, | Performed by: FAMILY MEDICINE

## 2021-03-30 PROCEDURE — 3288F PR FALLS RISK ASSESSMENT DOCUMENTED: ICD-10-PCS | Mod: CPTII,S$GLB,, | Performed by: FAMILY MEDICINE

## 2021-03-30 PROCEDURE — 3288F FALL RISK ASSESSMENT DOCD: CPT | Mod: CPTII,S$GLB,, | Performed by: FAMILY MEDICINE

## 2021-03-30 PROCEDURE — 1159F PR MEDICATION LIST DOCUMENTED IN MEDICAL RECORD: ICD-10-PCS | Mod: S$GLB,,, | Performed by: FAMILY MEDICINE

## 2021-03-30 PROCEDURE — 99212 PR OFFICE/OUTPT VISIT, EST, LEVL II, 10-19 MIN: ICD-10-PCS | Mod: S$GLB,,, | Performed by: FAMILY MEDICINE

## 2021-03-30 PROCEDURE — 3074F PR MOST RECENT SYSTOLIC BLOOD PRESSURE < 130 MM HG: ICD-10-PCS | Mod: CPTII,S$GLB,, | Performed by: FAMILY MEDICINE

## 2021-03-30 PROCEDURE — 3078F PR MOST RECENT DIASTOLIC BLOOD PRESSURE < 80 MM HG: ICD-10-PCS | Mod: CPTII,S$GLB,, | Performed by: FAMILY MEDICINE

## 2021-03-30 RX ORDER — NEOMYCIN SULFATE, POLYMYXIN B SULFATE, AND DEXAMETHASONE 3.5; 10000; 1 MG/G; [USP'U]/G; MG/G
OINTMENT OPHTHALMIC
COMMUNITY
Start: 2021-02-25

## 2021-03-31 RX ORDER — LORAZEPAM 0.5 MG/1
0.5 TABLET ORAL DAILY PRN
Qty: 30 TABLET | Refills: 0 | Status: SHIPPED | OUTPATIENT
Start: 2021-03-31 | End: 2021-05-31 | Stop reason: SDUPTHER

## 2021-04-16 ENCOUNTER — TELEPHONE (OUTPATIENT)
Dept: PRIMARY CARE CLINIC | Facility: CLINIC | Age: 86
End: 2021-04-16

## 2021-04-16 DIAGNOSIS — F32.A DEPRESSION, UNSPECIFIED DEPRESSION TYPE: Primary | ICD-10-CM

## 2021-04-16 DIAGNOSIS — F41.9 ANXIETY: ICD-10-CM

## 2021-04-16 RX ORDER — BUSPIRONE HYDROCHLORIDE 5 MG/1
TABLET ORAL
Qty: 30 TABLET | Refills: 0 | Status: SHIPPED | OUTPATIENT
Start: 2021-04-16 | End: 2021-05-17 | Stop reason: SDUPTHER

## 2021-04-19 ENCOUNTER — TELEPHONE (OUTPATIENT)
Dept: PRIMARY CARE CLINIC | Facility: CLINIC | Age: 86
End: 2021-04-19

## 2021-04-20 ENCOUNTER — PES CALL (OUTPATIENT)
Dept: ADMINISTRATIVE | Facility: CLINIC | Age: 86
End: 2021-04-20

## 2021-04-22 ENCOUNTER — PES CALL (OUTPATIENT)
Dept: ADMINISTRATIVE | Facility: CLINIC | Age: 86
End: 2021-04-22

## 2021-05-17 DIAGNOSIS — F32.A DEPRESSION, UNSPECIFIED DEPRESSION TYPE: ICD-10-CM

## 2021-05-17 DIAGNOSIS — F41.9 ANXIETY: ICD-10-CM

## 2021-05-17 RX ORDER — BUSPIRONE HYDROCHLORIDE 5 MG/1
TABLET ORAL
Qty: 180 TABLET | Refills: 0 | Status: SHIPPED | OUTPATIENT
Start: 2021-05-17 | End: 2021-08-08

## 2021-05-31 DIAGNOSIS — F41.9 ANXIETY: Primary | ICD-10-CM

## 2021-05-31 DIAGNOSIS — E03.9 HYPOTHYROIDISM, UNSPECIFIED TYPE: ICD-10-CM

## 2021-05-31 RX ORDER — LORAZEPAM 0.5 MG/1
0.5 TABLET ORAL DAILY PRN
Qty: 30 TABLET | Refills: 0 | Status: SHIPPED | OUTPATIENT
Start: 2021-05-31 | End: 2021-09-10 | Stop reason: SDUPTHER

## 2021-06-01 RX ORDER — LEVOTHYROXINE SODIUM 100 UG/1
100 TABLET ORAL DAILY
Qty: 90 TABLET | Refills: 2 | Status: SHIPPED | OUTPATIENT
Start: 2021-06-01 | End: 2021-12-12

## 2021-07-01 ENCOUNTER — PATIENT MESSAGE (OUTPATIENT)
Dept: ADMINISTRATIVE | Facility: OTHER | Age: 86
End: 2021-07-01

## 2021-07-16 ENCOUNTER — OFFICE VISIT (OUTPATIENT)
Dept: URGENT CARE | Facility: CLINIC | Age: 86
End: 2021-07-16
Payer: MEDICARE

## 2021-07-16 VITALS
OXYGEN SATURATION: 96 % | HEART RATE: 87 BPM | RESPIRATION RATE: 18 BRPM | BODY MASS INDEX: 30.22 KG/M2 | HEIGHT: 66 IN | SYSTOLIC BLOOD PRESSURE: 118 MMHG | DIASTOLIC BLOOD PRESSURE: 70 MMHG | WEIGHT: 188 LBS | TEMPERATURE: 99 F

## 2021-07-16 DIAGNOSIS — J40 BRONCHITIS: Primary | ICD-10-CM

## 2021-07-16 DIAGNOSIS — R05.9 COUGH: ICD-10-CM

## 2021-07-16 DIAGNOSIS — Z71.6 TOBACCO ABUSE COUNSELING: ICD-10-CM

## 2021-07-16 LAB
CTP QC/QA: YES
SARS-COV-2 RDRP RESP QL NAA+PROBE: NEGATIVE

## 2021-07-16 PROCEDURE — U0002 COVID-19 LAB TEST NON-CDC: HCPCS | Mod: QW,S$GLB,, | Performed by: NURSE PRACTITIONER

## 2021-07-16 PROCEDURE — 1125F AMNT PAIN NOTED PAIN PRSNT: CPT | Mod: S$GLB,,, | Performed by: NURSE PRACTITIONER

## 2021-07-16 PROCEDURE — 99499 RISK ADDL DX/OHS AUDIT: ICD-10-PCS | Mod: S$GLB,,, | Performed by: NURSE PRACTITIONER

## 2021-07-16 PROCEDURE — U0002: ICD-10-PCS | Mod: QW,S$GLB,, | Performed by: NURSE PRACTITIONER

## 2021-07-16 PROCEDURE — 99214 PR OFFICE/OUTPT VISIT, EST, LEVL IV, 30-39 MIN: ICD-10-PCS | Mod: S$GLB,,, | Performed by: NURSE PRACTITIONER

## 2021-07-16 PROCEDURE — 99499 UNLISTED E&M SERVICE: CPT | Mod: S$GLB,,, | Performed by: NURSE PRACTITIONER

## 2021-07-16 PROCEDURE — 1125F PR PAIN SEVERITY QUANTIFIED, PAIN PRESENT: ICD-10-PCS | Mod: S$GLB,,, | Performed by: NURSE PRACTITIONER

## 2021-07-16 PROCEDURE — 71046 XR CHEST PA AND LATERAL: ICD-10-PCS | Mod: FY,S$GLB,, | Performed by: RADIOLOGY

## 2021-07-16 PROCEDURE — 99214 OFFICE O/P EST MOD 30 MIN: CPT | Mod: S$GLB,,, | Performed by: NURSE PRACTITIONER

## 2021-07-16 PROCEDURE — 71046 X-RAY EXAM CHEST 2 VIEWS: CPT | Mod: FY,S$GLB,, | Performed by: RADIOLOGY

## 2021-07-16 RX ORDER — DOXYCYCLINE 100 MG/1
100 CAPSULE ORAL 2 TIMES DAILY
Qty: 14 CAPSULE | Refills: 0 | Status: SHIPPED | OUTPATIENT
Start: 2021-07-16 | End: 2021-07-16 | Stop reason: SDUPTHER

## 2021-07-16 RX ORDER — DOXYCYCLINE 100 MG/1
100 CAPSULE ORAL 2 TIMES DAILY
Qty: 14 CAPSULE | Refills: 0 | Status: SHIPPED | OUTPATIENT
Start: 2021-07-16 | End: 2021-07-23

## 2021-09-12 ENCOUNTER — HOSPITAL ENCOUNTER (EMERGENCY)
Facility: HOSPITAL | Age: 86
Discharge: HOME | End: 2021-09-12
Attending: EMERGENCY MEDICINE
Payer: MEDICARE

## 2021-09-12 VITALS
HEART RATE: 80 BPM | RESPIRATION RATE: 18 BRPM | OXYGEN SATURATION: 98 % | TEMPERATURE: 98 F | SYSTOLIC BLOOD PRESSURE: 168 MMHG | DIASTOLIC BLOOD PRESSURE: 90 MMHG

## 2021-09-12 DIAGNOSIS — L89.151 PRESSURE INJURY OF SACRAL REGION, STAGE 1: Primary | ICD-10-CM

## 2021-09-12 PROCEDURE — 99282 EMERGENCY DEPT VISIT SF MDM: CPT | Mod: HCNC

## 2021-10-21 ENCOUNTER — TELEPHONE (OUTPATIENT)
Dept: PRIMARY CARE CLINIC | Facility: CLINIC | Age: 86
End: 2021-10-21

## 2021-10-22 ENCOUNTER — TELEPHONE (OUTPATIENT)
Dept: PRIMARY CARE CLINIC | Facility: CLINIC | Age: 86
End: 2021-10-22

## 2021-11-05 ENCOUNTER — HOSPITAL ENCOUNTER (EMERGENCY)
Facility: HOSPITAL | Age: 86
Discharge: HOME | End: 2021-11-05
Attending: EMERGENCY MEDICINE
Payer: MEDICARE

## 2021-11-05 VITALS
SYSTOLIC BLOOD PRESSURE: 163 MMHG | RESPIRATION RATE: 18 BRPM | OXYGEN SATURATION: 95 % | TEMPERATURE: 98 F | HEART RATE: 77 BPM | DIASTOLIC BLOOD PRESSURE: 72 MMHG

## 2021-11-05 DIAGNOSIS — R29.810 FACIAL DROOP: Primary | ICD-10-CM

## 2021-11-05 DIAGNOSIS — I63.9 STROKE: ICD-10-CM

## 2021-11-05 DIAGNOSIS — G45.9 TIA (TRANSIENT ISCHEMIC ATTACK): ICD-10-CM

## 2021-11-05 LAB
ALBUMIN SERPL BCP-MCNC: 3.2 G/DL (ref 3.5–5.2)
ALP SERPL-CCNC: 92 U/L (ref 55–135)
ALT SERPL W/O P-5'-P-CCNC: 45 U/L (ref 10–44)
ANION GAP SERPL CALC-SCNC: 9 MMOL/L (ref 8–16)
AST SERPL-CCNC: 44 U/L (ref 10–40)
BASOPHILS # BLD AUTO: 0.04 K/UL (ref 0–0.2)
BASOPHILS NFR BLD: 0.9 % (ref 0–1.9)
BILIRUB SERPL-MCNC: 0.2 MG/DL (ref 0.1–1)
BUN SERPL-MCNC: 21 MG/DL (ref 8–23)
CALCIUM SERPL-MCNC: 8.8 MG/DL (ref 8.7–10.5)
CHLORIDE SERPL-SCNC: 105 MMOL/L (ref 95–110)
CO2 SERPL-SCNC: 26 MMOL/L (ref 23–29)
CREAT SERPL-MCNC: 0.7 MG/DL (ref 0.5–1.4)
DIFFERENTIAL METHOD: ABNORMAL
EOSINOPHIL # BLD AUTO: 0.1 K/UL (ref 0–0.5)
EOSINOPHIL NFR BLD: 3.2 % (ref 0–8)
ERYTHROCYTE [DISTWIDTH] IN BLOOD BY AUTOMATED COUNT: 15.5 % (ref 11.5–14.5)
EST. GFR  (AFRICAN AMERICAN): >60 ML/MIN/1.73 M^2
EST. GFR  (NON AFRICAN AMERICAN): >60 ML/MIN/1.73 M^2
GLUCOSE SERPL-MCNC: 89 MG/DL (ref 70–110)
HCT VFR BLD AUTO: 32.8 % (ref 37–48.5)
HGB BLD-MCNC: 10.5 G/DL (ref 12–16)
IMM GRANULOCYTES # BLD AUTO: 0.01 K/UL (ref 0–0.04)
IMM GRANULOCYTES NFR BLD AUTO: 0.2 % (ref 0–0.5)
INR PPP: 1 (ref 0.8–1.2)
LYMPHOCYTES # BLD AUTO: 1.3 K/UL (ref 1–4.8)
LYMPHOCYTES NFR BLD: 30.3 % (ref 18–48)
MCH RBC QN AUTO: 28.2 PG (ref 27–31)
MCHC RBC AUTO-ENTMCNC: 32 G/DL (ref 32–36)
MCV RBC AUTO: 88 FL (ref 82–98)
MONOCYTES # BLD AUTO: 0.6 K/UL (ref 0.3–1)
MONOCYTES NFR BLD: 13.9 % (ref 4–15)
NEUTROPHILS # BLD AUTO: 2.2 K/UL (ref 1.8–7.7)
NEUTROPHILS NFR BLD: 51.5 % (ref 38–73)
NRBC BLD-RTO: 0 /100 WBC
PLATELET # BLD AUTO: 215 K/UL (ref 150–450)
PMV BLD AUTO: 9.8 FL (ref 9.2–12.9)
POTASSIUM SERPL-SCNC: 5 MMOL/L (ref 3.5–5.1)
PROT SERPL-MCNC: 6.9 G/DL (ref 6–8.4)
PROTHROMBIN TIME: 10.8 SEC (ref 9–12.5)
RBC # BLD AUTO: 3.72 M/UL (ref 4–5.4)
SODIUM SERPL-SCNC: 140 MMOL/L (ref 136–145)
TSH SERPL DL<=0.005 MIU/L-ACNC: 2.07 UIU/ML (ref 0.4–4)
WBC # BLD AUTO: 4.32 K/UL (ref 3.9–12.7)

## 2021-11-05 PROCEDURE — 80053 COMPREHEN METABOLIC PANEL: CPT | Mod: HCNC | Performed by: EMERGENCY MEDICINE

## 2021-11-05 PROCEDURE — 85610 PROTHROMBIN TIME: CPT | Mod: HCNC | Performed by: EMERGENCY MEDICINE

## 2021-11-05 PROCEDURE — 93010 EKG 12-LEAD: ICD-10-PCS | Mod: HCNC,,, | Performed by: INTERNAL MEDICINE

## 2021-11-05 PROCEDURE — 99285 EMERGENCY DEPT VISIT HI MDM: CPT | Mod: 25,HCNC

## 2021-11-05 PROCEDURE — 84443 ASSAY THYROID STIM HORMONE: CPT | Mod: HCNC | Performed by: EMERGENCY MEDICINE

## 2021-11-05 PROCEDURE — 93005 ELECTROCARDIOGRAM TRACING: CPT | Mod: HCNC

## 2021-11-05 PROCEDURE — 85025 COMPLETE CBC W/AUTO DIFF WBC: CPT | Mod: HCNC | Performed by: EMERGENCY MEDICINE

## 2021-11-05 PROCEDURE — 93010 ELECTROCARDIOGRAM REPORT: CPT | Mod: HCNC,,, | Performed by: INTERNAL MEDICINE

## 2021-11-08 ENCOUNTER — TELEPHONE (OUTPATIENT)
Dept: PRIMARY CARE CLINIC | Facility: CLINIC | Age: 86
End: 2021-11-08
Payer: MEDICARE

## 2021-11-09 ENCOUNTER — TELEPHONE (OUTPATIENT)
Dept: PRIMARY CARE CLINIC | Facility: CLINIC | Age: 86
End: 2021-11-09
Payer: MEDICARE

## 2021-11-09 DIAGNOSIS — R53.81 DEBILITY: Primary | ICD-10-CM

## 2021-11-09 DIAGNOSIS — I67.9 CEREBROVASCULAR DISEASE: ICD-10-CM

## 2021-11-10 PROCEDURE — G0180 MD CERTIFICATION HHA PATIENT: HCPCS | Mod: ,,, | Performed by: INTERNAL MEDICINE

## 2021-11-10 PROCEDURE — G0180 PR HOME HEALTH MD CERTIFICATION: ICD-10-PCS | Mod: ,,, | Performed by: INTERNAL MEDICINE

## 2021-11-24 DIAGNOSIS — E78.5 HYPERLIPIDEMIA, UNSPECIFIED HYPERLIPIDEMIA TYPE: ICD-10-CM

## 2021-11-24 RX ORDER — SIMVASTATIN 20 MG/1
TABLET, FILM COATED ORAL
Qty: 90 TABLET | Refills: 3 | Status: SHIPPED | OUTPATIENT
Start: 2021-11-24 | End: 2022-01-31

## 2021-12-12 DIAGNOSIS — E03.9 HYPOTHYROIDISM, UNSPECIFIED TYPE: ICD-10-CM

## 2021-12-12 RX ORDER — LEVOTHYROXINE SODIUM 100 UG/1
TABLET ORAL
Qty: 90 TABLET | Refills: 0 | Status: SHIPPED | OUTPATIENT
Start: 2021-12-12 | End: 2022-01-25 | Stop reason: SDUPTHER

## 2021-12-14 ENCOUNTER — TELEPHONE (OUTPATIENT)
Dept: PRIMARY CARE CLINIC | Facility: CLINIC | Age: 86
End: 2021-12-14
Payer: MEDICARE

## 2021-12-15 ENCOUNTER — LAB VISIT (OUTPATIENT)
Dept: LAB | Facility: HOSPITAL | Age: 86
End: 2021-12-15
Attending: INTERNAL MEDICINE
Payer: MEDICARE

## 2021-12-15 DIAGNOSIS — M17.0 PRIMARY OSTEOARTHRITIS OF BOTH KNEES: Primary | ICD-10-CM

## 2021-12-15 LAB
ANION GAP SERPL CALC-SCNC: 11 MMOL/L (ref 8–16)
BASOPHILS # BLD AUTO: 0.04 K/UL (ref 0–0.2)
BASOPHILS NFR BLD: 0.9 % (ref 0–1.9)
BUN SERPL-MCNC: 16 MG/DL (ref 8–23)
CALCIUM SERPL-MCNC: 8.9 MG/DL (ref 8.7–10.5)
CHLORIDE SERPL-SCNC: 102 MMOL/L (ref 95–110)
CO2 SERPL-SCNC: 25 MMOL/L (ref 23–29)
CREAT SERPL-MCNC: 0.7 MG/DL (ref 0.5–1.4)
DIFFERENTIAL METHOD: ABNORMAL
EOSINOPHIL # BLD AUTO: 0.2 K/UL (ref 0–0.5)
EOSINOPHIL NFR BLD: 3.5 % (ref 0–8)
ERYTHROCYTE [DISTWIDTH] IN BLOOD BY AUTOMATED COUNT: 15.9 % (ref 11.5–14.5)
EST. GFR  (AFRICAN AMERICAN): >60 ML/MIN/1.73 M^2
EST. GFR  (NON AFRICAN AMERICAN): >60 ML/MIN/1.73 M^2
FOLATE SERPL-MCNC: 5.1 NG/ML (ref 4–24)
GLUCOSE SERPL-MCNC: 96 MG/DL (ref 70–110)
HCT VFR BLD AUTO: 30.7 % (ref 37–48.5)
HGB BLD-MCNC: 8.8 G/DL (ref 12–16)
IMM GRANULOCYTES # BLD AUTO: 0.02 K/UL (ref 0–0.04)
IMM GRANULOCYTES NFR BLD AUTO: 0.4 % (ref 0–0.5)
LYMPHOCYTES # BLD AUTO: 1.1 K/UL (ref 1–4.8)
LYMPHOCYTES NFR BLD: 24.1 % (ref 18–48)
MCH RBC QN AUTO: 26.2 PG (ref 27–31)
MCHC RBC AUTO-ENTMCNC: 28.7 G/DL (ref 32–36)
MCV RBC AUTO: 91 FL (ref 82–98)
MONOCYTES # BLD AUTO: 0.6 K/UL (ref 0.3–1)
MONOCYTES NFR BLD: 13.3 % (ref 4–15)
NEUTROPHILS # BLD AUTO: 2.6 K/UL (ref 1.8–7.7)
NEUTROPHILS NFR BLD: 57.8 % (ref 38–73)
NRBC BLD-RTO: 0 /100 WBC
PLATELET # BLD AUTO: 325 K/UL (ref 150–450)
PMV BLD AUTO: 9.7 FL (ref 9.2–12.9)
POTASSIUM SERPL-SCNC: 4.4 MMOL/L (ref 3.5–5.1)
RBC # BLD AUTO: 3.36 M/UL (ref 4–5.4)
SODIUM SERPL-SCNC: 138 MMOL/L (ref 136–145)
VIT B12 SERPL-MCNC: 373 PG/ML (ref 210–950)
WBC # BLD AUTO: 4.57 K/UL (ref 3.9–12.7)

## 2021-12-15 PROCEDURE — 85025 COMPLETE CBC W/AUTO DIFF WBC: CPT | Mod: HCNC | Performed by: INTERNAL MEDICINE

## 2021-12-15 PROCEDURE — 80048 BASIC METABOLIC PNL TOTAL CA: CPT | Mod: HCNC | Performed by: INTERNAL MEDICINE

## 2021-12-15 PROCEDURE — 82607 VITAMIN B-12: CPT | Mod: HCNC | Performed by: INTERNAL MEDICINE

## 2021-12-15 PROCEDURE — 82746 ASSAY OF FOLIC ACID SERUM: CPT | Mod: HCNC | Performed by: INTERNAL MEDICINE

## 2021-12-16 ENCOUNTER — LAB VISIT (OUTPATIENT)
Dept: LAB | Facility: HOSPITAL | Age: 86
End: 2021-12-16
Attending: INTERNAL MEDICINE
Payer: MEDICARE

## 2021-12-16 DIAGNOSIS — M17.0 PRIMARY OSTEOARTHRITIS OF BOTH KNEES: Primary | ICD-10-CM

## 2021-12-16 DIAGNOSIS — D50.9 IRON DEFICIENCY ANEMIA, UNSPECIFIED IRON DEFICIENCY ANEMIA TYPE: Primary | ICD-10-CM

## 2021-12-16 PROCEDURE — 81001 URINALYSIS AUTO W/SCOPE: CPT | Mod: HCNC | Performed by: INTERNAL MEDICINE

## 2021-12-16 PROCEDURE — 87086 URINE CULTURE/COLONY COUNT: CPT | Mod: HCNC | Performed by: INTERNAL MEDICINE

## 2021-12-16 RX ORDER — OMEPRAZOLE 20 MG/1
20 CAPSULE, DELAYED RELEASE ORAL DAILY
Qty: 30 CAPSULE | Refills: 0 | Status: SHIPPED | OUTPATIENT
Start: 2021-12-16 | End: 2021-12-16 | Stop reason: SDUPTHER

## 2021-12-16 RX ORDER — OMEPRAZOLE 20 MG/1
20 CAPSULE, DELAYED RELEASE ORAL DAILY
Qty: 30 CAPSULE | Refills: 0 | Status: SHIPPED | OUTPATIENT
Start: 2021-12-16 | End: 2021-12-21

## 2021-12-17 ENCOUNTER — LAB VISIT (OUTPATIENT)
Dept: LAB | Facility: HOSPITAL | Age: 86
End: 2021-12-17
Attending: INTERNAL MEDICINE
Payer: MEDICARE

## 2021-12-17 ENCOUNTER — TELEPHONE (OUTPATIENT)
Dept: PRIMARY CARE CLINIC | Facility: CLINIC | Age: 86
End: 2021-12-17
Payer: MEDICARE

## 2021-12-17 DIAGNOSIS — D50.9 IRON DEFICIENCY ANEMIA, UNSPECIFIED IRON DEFICIENCY ANEMIA TYPE: ICD-10-CM

## 2021-12-17 LAB
BILIRUB UR QL STRIP: NEGATIVE
CLARITY UR REFRACT.AUTO: ABNORMAL
COLOR UR AUTO: YELLOW
GLUCOSE UR QL STRIP: NEGATIVE
HGB UR QL STRIP: NEGATIVE
KETONES UR QL STRIP: NEGATIVE
LEUKOCYTE ESTERASE UR QL STRIP: NEGATIVE
MICROSCOPIC COMMENT: NORMAL
NITRITE UR QL STRIP: NEGATIVE
OB PNL STL: NEGATIVE
PH UR STRIP: 6 [PH] (ref 5–8)
PROT UR QL STRIP: NEGATIVE
SP GR UR STRIP: 1.01 (ref 1–1.03)
URN SPEC COLLECT METH UR: ABNORMAL

## 2021-12-17 PROCEDURE — 82272 OCCULT BLD FECES 1-3 TESTS: CPT | Mod: HCNC | Performed by: INTERNAL MEDICINE

## 2021-12-18 LAB — BACTERIA UR CULT: NORMAL

## 2021-12-20 ENCOUNTER — TELEPHONE (OUTPATIENT)
Dept: PRIMARY CARE CLINIC | Facility: CLINIC | Age: 86
End: 2021-12-20
Payer: MEDICARE

## 2021-12-29 DIAGNOSIS — F41.9 ANXIETY: ICD-10-CM

## 2021-12-29 RX ORDER — LORAZEPAM 0.5 MG/1
0.5 TABLET ORAL DAILY PRN
Qty: 30 TABLET | Refills: 0 | Status: SHIPPED | OUTPATIENT
Start: 2021-12-29 | End: 2022-02-15

## 2021-12-29 NOTE — TELEPHONE ENCOUNTER
----- Message from Deidra Aguirre sent at 12/29/2021  1:05 PM CST -----  Regarding: refill - change in pharmacy  Contact: patient  [phone]  Requesting an RX refill or new RX.  Is this a refill or new RX:   RX name and strength (LORazepam (ATIVAN) 0.5 MG tablet   Is this a 30 day or 90 day RX:   Patient advised that in the future they can use their [facility] account to request a refill?:  yes  Pharmacy name and phone # (    [id number] - [place] - [address] [facility] [address]  [address] [address]  Phone: [phone] Fax: [phone]      Comments:

## 2021-12-30 NOTE — TELEPHONE ENCOUNTER
----- Message from Jailene Connolly sent at 12/30/2021 10:43 AM CST -----  Contact: /Kary [phone]  Stated that the patient is feeling more depressed and is requesting an adjustment of the follow Rx    buPROPion (WELLBUTRIN) 75 MG tablet  citalopram (CELEXA) 20 MG tablet    [facility] #[id number] - [place] - [address] [facility] [address]  [facility] [address]  Phone: [phone] Fax: [phone]    Please call and advise.    Thank You

## 2022-01-01 NOTE — PROGRESS NOTES
Please inform pt that I reviewed her repeat Potassium it was still slightly elevated at 5.4 but also slightly improved from prev 5.5.  Continue low potassium diet. Will continue to monitor.  She is not on any potassium supplementations or medications that would cause this that I am aware of.     Dr. GARG
,[email or web address]

## 2022-01-03 ENCOUNTER — TELEPHONE (OUTPATIENT)
Dept: PRIMARY CARE CLINIC | Facility: CLINIC | Age: 87
End: 2022-01-03
Payer: MEDICARE

## 2022-01-03 DIAGNOSIS — F41.9 ANXIETY: Primary | ICD-10-CM

## 2022-01-03 DIAGNOSIS — F32.A DEPRESSION, UNSPECIFIED DEPRESSION TYPE: ICD-10-CM

## 2022-01-03 NOTE — TELEPHONE ENCOUNTER
----- Message from Shonda Mckeon sent at 1/3/2022  1:44 PM CST -----  Contact: [phone]/Kary  Patient would like to get medical advice.    Comments:   Calling to discuss getting a anti depression medication.

## 2022-01-04 NOTE — TELEPHONE ENCOUNTER
----- Message from Ana Williamson sent at 1/4/2022  2:04 PM CST -----  Contact: Sister/Kary/[phone] or [phone]  Pt's sister(Kary) said that she is calling in regards to needing to speak with the nurse about a medication that is incorrect and also need to get same names of Cardiologist in the [facility] system. Please advise

## 2022-01-04 NOTE — TELEPHONE ENCOUNTER
She needs an appt. (at least virtual) with me.  I can't add meds over the phone. She has not fu with me since Mar and given her age we have to be careful with what is prescribed. She also has not tolerated certain things in the past.  I could refer her to a psychiatrist to help with med management if she is interested.  Dr. Ron Hernandez at  is very good particularly with geriatric Psychiatry.  I would also rec she consider virtual visits with our LCSW, Mikaela Jimenez, in the meantime. Let me know    Dr. GARG

## 2022-01-04 NOTE — TELEPHONE ENCOUNTER
Pt's sister  is calling requesting to get additional  depression medication because pt is not doing well on current medications Ativan and Celexa . Pt's sister is requesting to get a cardiology appt for recent stroke  Depression has become more noticeable since her stroke

## 2022-01-05 ENCOUNTER — TELEPHONE (OUTPATIENT)
Dept: BEHAVIORAL HEALTH | Facility: CLINIC | Age: 87
End: 2022-01-05
Payer: MEDICARE

## 2022-01-05 ENCOUNTER — PATIENT MESSAGE (OUTPATIENT)
Dept: BEHAVIORAL HEALTH | Facility: CLINIC | Age: 87
End: 2022-01-05
Payer: MEDICARE

## 2022-01-05 DIAGNOSIS — F32.A DEPRESSION, UNSPECIFIED DEPRESSION TYPE: ICD-10-CM

## 2022-01-05 RX ORDER — CITALOPRAM 20 MG/1
TABLET, FILM COATED ORAL
Qty: 135 TABLET | Refills: 0 | Status: SHIPPED | OUTPATIENT
Start: 2022-01-05 | End: 2022-04-14

## 2022-01-05 NOTE — PROGRESS NOTES
Behavioral Health Community Health Worker  Initial Assessment  Completed by: Cristy Lee  Date:  1/5/2022    Patient Enrollment in Behavioral Health Program:  · Patient verbalized understanding of Behavioral Health Integration services to include:  · Patient understands that CHW, LCSW, PharmD and consulting Psychiatrist are members of the care team working collaboratively with his/her primary care provider: Yes  · Patient understands that activation of their [facility] patient portal account is required for accessing the full scope of team services: Yes  · Patient understands that some counseling sessions may occur via video: Yes  · Clinic visits with the psychiatrist may be subject to a co-pay based on your insurance: Yes  · Patient consents to enroll in BHI program: Yes    Assessments     Single Item Health Literacy Scale:  · How often do you need to have someone help you read instructions, pamphlets or other written material from your doctor or pharmacy?: Always    Promis 10:  · Promis 10 Responses  · In general, would you say your health is: Good  · In general, would you say your quality of life is: Poor  · In general, how would you rate your physical health?: Good  · In general, how would you rate your mental health, including your mood and your ability to think?: Fair  · In general, how would you rate your satisfaction with your social activities and relationships?: Poor  · In general, please rate how well you carry out your usual social activities and roles. (This includes activities at home, at work and in your community, and responsibilities as a parent, child, spouse, employee, friend, etc.): Fair  · To what extent are you able to carry out your everyday physical activities such as walking, climbing stairs, carrying groceries, or moving a chair? : Not at all  · How often have you been bothered by emotional problems such as feeling anxious, depressed or irritable?: Always  · In the past 7 days, how would  "you rate your fatigue on average?: Moderate  · In the past 7 days, on a scale of 0 to 10 (where 0 is no pain and 10 is the worst pain imaginable) how would you rate your pain on average?: 0  · Global Physical Health: 7  · Global Mental health Score: 9    Depression PHQ:  PHQ9 1/5/2022   Total Score 16         Generalized Anxiety Disorder 7-Item Scale:  GAD7 1/5/2022   1. Feeling nervous, anxious, or on edge? 3   2. Not being able to stop or control worrying? 3   3. Worrying too much about different things? 3   4. Trouble relaxing? 1   5. Being so restless that it is hard to sit still? 0   6. Becoming easily annoyed or irritable? 1   7. Feeling afraid as if something awful might happen? 3   8. If you checked off any problems, how difficult have these problems made it for you to do your work, take care of things at home, or get along with other people? 1   RENÉE-7 Score 14       History     Social History     Socioeconomic History    Marital status: Single   Tobacco Use    Smoking status: Former Smoker     Types: Cigarettes, Vaping with nicotine    Smokeless tobacco: Current User   Substance and Sexual Activity    Alcohol use: Yes    Drug use: Never    Sexual activity: Not Currently       Call Summary     Patient was referred to the BHI (Non-opioid) program by Primary Care Provider, Dr. Perla Hopper. CHW contacted Nickie Segura who reports depression and anxiety that limits her activities of daily living (ADLs).   Patient scored "16" on the PHQ9 and "14" on the RENÉE 7. Based on these scores patient is eligible for the Behavioral Health Integration (Non-opioid) Program. CHW completed the intake and scheduled a virtual appointment for patient with Mikaela Jimenez LCSW, on 01/07/22 at 9:00am.       "

## 2022-01-05 NOTE — TELEPHONE ENCOUNTER
----- Message from Susie Mckeon sent at 1/5/2022 12:28 PM CST -----  Contact: pt  Requesting an RX refill or new RX.  Is this a refill or new RX: refill  RX name and strength (copy/paste from chart): citalopram (CELEXA) 20 MG tablet  Is this a 30 day or 90 day RX:   Patient advised that in the future they can use their [facility] account to request a refill?:yes  Pharmacy name and phone # (copy/paste from chart):    [facility] #[id number] - [place] - [address] [facility] [address]  [facility] [address]  Phone: [phone] Fax: [phone]      Comments:

## 2022-01-05 NOTE — TELEPHONE ENCOUNTER
No new care gaps identified.  Powered by [employer]. Reference number: [id number].   1/05/2022 1:32:12 PM CST

## 2022-01-05 NOTE — TELEPHONE ENCOUNTER
Pt/Kary was informed and expressed understanding. She has an audio visit scheduled for 1/24/22. They are also wondering if you knew a cardiology at [facility]. She is also ok with a referral to Mikaela.

## 2022-01-05 NOTE — TELEPHONE ENCOUNTER
That's good news.  I do think it would be very helpful to see Mikaela.  I will place the referral.  Thanks for setting up an audio for us.  In regards to Cards at [facility]. Dr. Ferminf is EP but also sees general Cards I believe. I can place a referral is she likes. Let me know.    Dr. GARG

## 2022-01-07 ENCOUNTER — TELEPHONE (OUTPATIENT)
Dept: BEHAVIORAL HEALTH | Facility: CLINIC | Age: 87
End: 2022-01-07
Payer: MEDICARE

## 2022-01-07 NOTE — PROGRESS NOTES
CHW reached out to pt to remind her of virtual appointment with Mikaela Jimenez LCSW,  On Monday. Pt  confirmed  the appointment. Her daughter will assist her

## 2022-01-07 NOTE — TELEPHONE ENCOUNTER
I radha Barksdale. She will talk with pt and let us know if they prefer to got to Chiqui or get a referral to Vineet/Kylah.

## 2022-01-10 ENCOUNTER — PATIENT MESSAGE (OUTPATIENT)
Dept: BEHAVIORAL HEALTH | Facility: CLINIC | Age: 87
End: 2022-01-10

## 2022-01-10 ENCOUNTER — OFFICE VISIT (OUTPATIENT)
Dept: BEHAVIORAL HEALTH | Facility: CLINIC | Age: 87
End: 2022-01-10
Payer: MEDICARE

## 2022-01-10 DIAGNOSIS — F32.A DEPRESSION, UNSPECIFIED DEPRESSION TYPE: ICD-10-CM

## 2022-01-10 DIAGNOSIS — F41.9 ANXIETY: ICD-10-CM

## 2022-01-10 DIAGNOSIS — F41.1 GAD (GENERALIZED ANXIETY DISORDER): ICD-10-CM

## 2022-01-10 PROCEDURE — 90791 PR PSYCHIATRIC DIAGNOSTIC EVALUATION: ICD-10-PCS | Mod: HCNC,95,, | Performed by: SOCIAL WORKER

## 2022-01-10 PROCEDURE — 1157F PR ADVANCE CARE PLAN OR EQUIV PRESENT IN MEDICAL RECORD: ICD-10-PCS | Mod: HCNC,CPTII,95, | Performed by: SOCIAL WORKER

## 2022-01-10 PROCEDURE — 99499 RISK ADDL DX/OHS AUDIT: ICD-10-PCS | Mod: 95,,, | Performed by: SOCIAL WORKER

## 2022-01-10 PROCEDURE — 90791 PSYCH DIAGNOSTIC EVALUATION: CPT | Mod: HCNC,95,, | Performed by: SOCIAL WORKER

## 2022-01-10 PROCEDURE — 99499 UNLISTED E&M SERVICE: CPT | Mod: 95,,, | Performed by: SOCIAL WORKER

## 2022-01-10 PROCEDURE — 1157F ADVNC CARE PLAN IN RCRD: CPT | Mod: HCNC,CPTII,95, | Performed by: SOCIAL WORKER

## 2022-01-10 NOTE — PROGRESS NOTES
"The patient location is: home in [place]  The chief complaint leading to consultation is: anxiety    Visit type: audiovisual    Face to Face time with patient: 60  90 minutes of total time spent on the encounter, which includes face to face time and non-face to face time preparing to see the patient (eg, review of tests), Obtaining and/or reviewing separately obtained history, Documenting clinical information in the electronic or other health record, Independently interpreting results (not separately reported) and communicating results to the patient/family/caregiver, or Care coordination (not separately reported).     Each patient to whom he or she provides medical services by telemedicine is:  (1) informed of the relationship between the physician and patient and the respective role of any other health care provider with respect to management of the patient; and (2) notified that he or she may decline to receive medical services by telemedicine and may withdraw from such care at any time.    Notes:   [facility] BEHAVIORAL HEALTH INTEGRATION INTAKE    DATE:  1/10/2022  REFERRAL SOURCE:  Perla Hopper MD  TYPE OF VISIT:  Video Session  LENGTH OF SESSION: 60  .  HISTORY OF PRESENTING ILLNESS:  Nickie Azarblanc, a 86 y.o. female with history of Anxiety disorders; generalized anxiety disorder [F41.1].  Met with patient.   Anxiety and depression, different meds and none work, PT reporting needs help with anxiety and depression.     PT asked for advice stating stroke on nov 5th, only been out once for Chirstmas, goes to communion service once a week, doesn't go to the dining room PT states often feels ok with staying in apt without leaving, not afraid, however can't see and hear well, which leads to feeling "outside of the loup." VIDHYA lives next door, easier to stay put, ok alone once in awhile, if feeling anxious calls someone. 90 minutes alone ok, staying inside is depressing,   Band comes to independent living, bingo, not " too eager to go outside the room. Often feels anxious  PT reports the people that eat with all talkers, not real talker, do better with one to one, good people, wonderful place, everyone is kind.    Goal go to lunch in dining room everyday. Practice breathing exercises.     Patient does not currently have a psychiatrist.    Patient does not currently have a therapist.     Previous Psychiatric Outpatient Treatment:  No  Pt is taking citalopram (Celexa) 20mg once daily for Depression. They are not interested in medication changes.    Current symptoms:  · Depression: dysphoric mood, worthlessness/guilt, hopelessness and decreased appetite.  · Anxiety: excessive worrying and panic attacks.  · Insomnia: denies.  · Teresa:  denies.  · Psychosis: denies .    PHQ9 1/5/2022   Total Score 16     GAD7 1/5/2022   1. Feeling nervous, anxious, or on edge? 3   2. Not being able to stop or control worrying? 3   3. Worrying too much about different things? 3   4. Trouble relaxing? 1   5. Being so restless that it is hard to sit still? 0   6. Becoming easily annoyed or irritable? 1   7. Feeling afraid as if something awful might happen? 3   8. If you checked off any problems, how difficult have these problems made it for you to do your work, take care of things at home, or get along with other people? 1   RENÉE-7 Score 14        Current social stressors:   Post stroke  Decline in health  Getting out in public      Risk assessment:  Patient reports no suicidal ideation  Patient reports no homicidal ideation  Patient reports no self-injurious behavior  Patient reports no violent behavior    PSYCHIATRIC HISTORY:  Previous Psychiatric Hospitalizations:  No  Previous SI/HI:   No  Previous Suicide Attempts:  No  Previous Medication Trials: No   Head trauma:  No  History of Trauma:  No  Legal Issues: No  Access to a Gun:  No    SUBSTANCE ABUSE HISTORY:  Tobacco:  Yes - vap   Alcohol: none  Illicit Substances: No  Misuse of Prescription  Medications:  No    MEDICAL HISTORY:  Past Medical History:   Diagnosis Date    LORNA positive     Anxiety     Bilateral cataracts     Colitis     Colon polyp     Depression     Elevated IgE level     Hepatomegaly     Hiatal hernia     Hyperlipidemia     Hypertension     Hypothyroidism     IFG (impaired fasting glucose)     Iron deficiency anemia     Macular degeneration     Mild aortic stenosis     Nicotine dependence     Osteoarthritis     Recurrent UTI     Renal cyst     Skin cancer     Tuberculosis of bladder     Urinary incontinence     Venous insufficiency     Vitamin B12 deficiency     Wheezing        SOCIAL HISTORY (MARRIAGE, EMPLOYMENT, etc.):  Living Situation: Living with sister Lluvia Preston Nuclear/Marriage: 10 children (5 alive- 4 girls and 1 boy, live in the city) at 20 yo dtrs of chirag stayed for 14 yrs and came out, 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, [facility], [facility] ice cream [employer], collections for [employer], [facility] from [employer] [facility] [employer] (2nd oldest Oldest girl) kary adopted into the family,     Supports: Kary (92) and sister Brittaney  Education/Vocation: special ed  Confucianist/Spirituality: Yazidi,   Hobbies and Interests: watch tv all day, read a lot but can't do, would draw, lunches and dinners and friends, family visits   Books on tape through library      PSYCHIATRIC FAMILY HISTORY: dad, anxiety and depression, was on dysanis  Memory worse since the stroke, help to get up, use walker, walking slower, WC to go the dinning room, hard to get out of chair without help,     MENTAL HEALTH STATUS EXAM  General Appearance:  unremarkable, age appropriate   Speech: normal tone, normal rate, normal pitch, normal volume      Level of Cooperation: guarded      Thought Processes: normal and logical   Mood: steady      Thought Content: normal, no suicidality, no homicidality, delusions, or paranoia   Affect: appropriate   Orientation:  Oriented x3   Memory: issues related to stroke   Attention Span & Concentration: intact   Fund of General Knowledge: intact and appropriate to age and level of education   Abstract Reasoning: not assessed   Judgment & Insight: fair     Language  intact       IMPRESSION:   My diagnostic impression is Anxiety disorders; panic disorder [F41.0], as evidenced by excessive worrying and panic attacks    PROVISIONAL DIAGNOSES:  1. Anxiety    2. Depression, unspecified depression type    3. RENÉE (generalized anxiety disorder)         STRENGTHS AND LIABILITIES: Strength: Patient is expressive/articulate., Strength: Patient is intelligent., Strength: Patient has positive support network., Strength: Patient has reasonable judgment., Liability: Patient lacks coping skills.    TREATMENT GOALS: Anxiety: reducing physical symptoms of anxiety    PLAN: In this session a psych evaluation was conducted to get history and process pt's life. Solution-focused Therapy and Relaxation Techniques  will be utilized in future individual  therapy sessions to increase insight, support and behavior modification.     RETURN TO CLINIC: No follow-ups on file. 2 weeks

## 2022-01-11 ENCOUNTER — EXTERNAL HOME HEALTH (OUTPATIENT)
Dept: HOME HEALTH SERVICES | Facility: HOSPITAL | Age: 87
End: 2022-01-11
Payer: MEDICARE

## 2022-01-14 ENCOUNTER — TELEPHONE (OUTPATIENT)
Dept: BEHAVIORAL HEALTH | Facility: CLINIC | Age: 87
End: 2022-01-14
Payer: MEDICARE

## 2022-01-14 NOTE — PROGRESS NOTES
CHW reached out to pt to remind her of virtual appointment with Mikaela Jimenez LCSW, on Tuesday, 1/18/22 at 2:00pm, no answer, left VM of the virtual appointment.

## 2022-01-18 ENCOUNTER — OFFICE VISIT (OUTPATIENT)
Dept: BEHAVIORAL HEALTH | Facility: CLINIC | Age: 87
End: 2022-01-18
Payer: MEDICARE

## 2022-01-18 DIAGNOSIS — F41.1 GAD (GENERALIZED ANXIETY DISORDER): Primary | ICD-10-CM

## 2022-01-18 PROCEDURE — 90832 PSYTX W PT 30 MINUTES: CPT | Mod: HCNC,95,, | Performed by: SOCIAL WORKER

## 2022-01-18 PROCEDURE — 1157F PR ADVANCE CARE PLAN OR EQUIV PRESENT IN MEDICAL RECORD: ICD-10-PCS | Mod: HCNC,CPTII,95, | Performed by: SOCIAL WORKER

## 2022-01-18 PROCEDURE — 1157F ADVNC CARE PLAN IN RCRD: CPT | Mod: HCNC,CPTII,95, | Performed by: SOCIAL WORKER

## 2022-01-18 PROCEDURE — 90832 PR PSYCHOTHERAPY W/PATIENT, 30 MIN: ICD-10-PCS | Mod: HCNC,95,, | Performed by: SOCIAL WORKER

## 2022-01-18 NOTE — TELEPHONE ENCOUNTER
----- Message from Brandon Bailey sent at 1/18/2022  2:40 PM CST -----  Contact: [phone] pt  Pt would like a call back from Kylie to call on [employer] phone. Pt states Kylie has the number.  In regards to a medication. Please call and advise

## 2022-01-18 NOTE — PROGRESS NOTES
The patient location is: home in [place]  The chief complaint leading to consultation is: anxiety    Visit type: audiovisual    Face to Face time with patient: 30  45 minutes of total time spent on the encounter, which includes face to face time and non-face to face time preparing to see the patient (eg, review of tests), Obtaining and/or reviewing separately obtained history, Documenting clinical information in the electronic or other health record, Independently interpreting results (not separately reported) and communicating results to the patient/family/caregiver, or Care coordination (not separately reported).     Each patient to whom he or she provides medical services by telemedicine is:  (1) informed of the relationship between the physician and patient and the respective role of any other health care provider with respect to management of the patient; and (2) notified that he or she may decline to receive medical services by telemedicine and may withdraw from such care at any time.    Notes:  Individual Psychotherapy (LCSW/PhD)  Nickie Segura,  1/18/2022    Site:  Telemed         Therapeutic Intervention: Met with patient for individual psychotherapy.    Chief complaint/reason for encounter: anxiety   Current Goals: Go to lunch in dining room everyday. Practice breathing exercises.   Interval history and content of current session:  Pt reports dictating feelings and progress with anxiety related to going to dining room for lunch, goal for the week. Pt reports going to the dining room 5 of the possible 6 available days. Pt reports that on one day not feeling well (stomach issues) and on one day the dining room was closed. Pt reports some anxiety when entering the dining room, however many staff and other residents made Pt feel welcome. Pt noted less anxiety if Pt arrives at the dining room earlier.   Discussed trying to drink some water and breathing exercises when anxious (Pt will try).  Pt is eating half  to 3/4 of meal, Boost or Ensure PRN.   No issues with sleep.     Pt will work on picking additional activities to enjoy.     Pt reports many visitors, walks the hallway with aid, [employer] service on Saturday.     Goals: When anxious Pt will drink some water and practice breathing exercises when anxious.  Pt will work on picking additional activities to enjoy.           Treatment plan:  · Target symptoms: anxiety   · Why chosen therapy is appropriate versus another modality: relevant to diagnosis, patient responds to this modality  · Outcome monitoring methods: self-report, checklist/rating scale  · Therapeutic intervention type: insight oriented psychotherapy, behavior modifying psychotherapy, supportive psychotherapy    Risk parameters:  Patient reports no suicidal ideation  Patient reports no homicidal ideation  Patient reports no self-injurious behavior  Patient reports no violent behavior    Verbal deficits: None    Patient's response to intervention:  The patient's response to intervention is accepting.    Progress toward goals and other mental status changes:  The patient's progress toward goals is excellent.    Diagnosis:     ICD-10-CM ICD-9-CM   1. RENÉE (generalized anxiety disorder)  F41.1 300.02       Plan: Pt plans to continue individual psychotherapy    Return to clinic: 2 weeks    Length of Service (minutes): 30

## 2022-01-18 NOTE — TELEPHONE ENCOUNTER
lov 3/30/21  Pt would like to know if you can fill these medications. She has been getting them from SENG Diaz and he will not fill again until she sees him. She tried to set up a virtual but could not. Pt/pts family states it is too hard for her to get to a visit at this time

## 2022-01-19 NOTE — TELEPHONE ENCOUNTER
Get a copy of Luis's last note.  I also have not seen pt since Mar and she has had a stroke since then so I don't feel comfortable refilling without a visit as these meds particularly in combo in elderly pt's can cause anticholinergic effects such as urinary retention, dry mouth, dizziness, constipation, confusion, hallucinations which is likely why he prefers to touch base with her before refilling.  I see she has an appt with me next wk. See if she is having any of the above    Dr. GARG

## 2022-01-19 NOTE — TELEPHONE ENCOUNTER
I sw pt. She is not having any of these symptoms. I also sw 's office. Pt was last seen in Nov 2020. They will fax a copy of his note to me.

## 2022-01-20 RX ORDER — MIRABEGRON 50 MG/1
50 TABLET, FILM COATED, EXTENDED RELEASE ORAL DAILY
Qty: 90 TABLET | Refills: 0 | Status: SHIPPED | OUTPATIENT
Start: 2022-01-20 | End: 2022-04-19 | Stop reason: SDUPTHER

## 2022-01-20 RX ORDER — FESOTERODINE FUMARATE 8 MG/1
1 TABLET, FILM COATED, EXTENDED RELEASE ORAL DAILY
Qty: 90 TABLET | Refills: 0 | Status: SHIPPED | OUTPATIENT
Start: 2022-01-20 | End: 2022-02-28

## 2022-01-25 ENCOUNTER — OFFICE VISIT (OUTPATIENT)
Dept: PRIMARY CARE CLINIC | Facility: CLINIC | Age: 87
End: 2022-01-25
Payer: MEDICARE

## 2022-01-25 DIAGNOSIS — F32.A DEPRESSION, UNSPECIFIED DEPRESSION TYPE: ICD-10-CM

## 2022-01-25 DIAGNOSIS — I67.9 CEREBROVASCULAR DISEASE: ICD-10-CM

## 2022-01-25 DIAGNOSIS — E78.5 HYPERLIPIDEMIA, UNSPECIFIED HYPERLIPIDEMIA TYPE: ICD-10-CM

## 2022-01-25 DIAGNOSIS — D50.9 IRON DEFICIENCY ANEMIA, UNSPECIFIED IRON DEFICIENCY ANEMIA TYPE: ICD-10-CM

## 2022-01-25 DIAGNOSIS — I10 HYPERTENSION, UNSPECIFIED TYPE: ICD-10-CM

## 2022-01-25 DIAGNOSIS — N89.8 VAGINAL LESION: ICD-10-CM

## 2022-01-25 DIAGNOSIS — R73.01 IFG (IMPAIRED FASTING GLUCOSE): ICD-10-CM

## 2022-01-25 DIAGNOSIS — E03.9 HYPOTHYROIDISM, UNSPECIFIED TYPE: Primary | ICD-10-CM

## 2022-01-25 DIAGNOSIS — F41.9 ANXIETY: ICD-10-CM

## 2022-01-25 DIAGNOSIS — R53.81 DEBILITY: ICD-10-CM

## 2022-01-25 PROCEDURE — 1157F PR ADVANCE CARE PLAN OR EQUIV PRESENT IN MEDICAL RECORD: ICD-10-PCS | Mod: HCNC,CPTII,95, | Performed by: INTERNAL MEDICINE

## 2022-01-25 PROCEDURE — 1157F ADVNC CARE PLAN IN RCRD: CPT | Mod: HCNC,CPTII,95, | Performed by: INTERNAL MEDICINE

## 2022-01-25 PROCEDURE — 99443 PR PHYSICIAN TELEPHONE EVALUATION 21-30 MIN: CPT | Mod: HCNC,95,, | Performed by: INTERNAL MEDICINE

## 2022-01-25 PROCEDURE — 99443 PR PHYSICIAN TELEPHONE EVALUATION 21-30 MIN: ICD-10-PCS | Mod: HCNC,95,, | Performed by: INTERNAL MEDICINE

## 2022-01-25 RX ORDER — MAG/ALUMINUM/SOD BICARB/ALGINC 14.2-80MG
1 TABLET,CHEWABLE ORAL DAILY
Start: 2022-01-25 | End: 2024-01-03

## 2022-01-25 RX ORDER — LEVOTHYROXINE SODIUM 100 UG/1
100 TABLET ORAL DAILY
Qty: 90 TABLET | Refills: 2 | Status: SHIPPED | OUTPATIENT
Start: 2022-01-25 | End: 2022-12-28

## 2022-01-25 NOTE — PROGRESS NOTES
"[facility] Note    Chief Complaint    No chief complaint on file.      History of Present Illness      Nickie Segura is a 86 y.o. 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  Last visit - 3/15/21.    The patient location is: "home"  The chief complaint leading to consultation is: "Fu chronic issues"    Visit type: audio only    Face to Face time with patient: 30 minutes  30 minutes of total time spent on the encounter, which includes face to face time and non-face to face time preparing to see the patient (eg, review of tests), Obtaining and/or reviewing separately obtained history, Documenting clinical information in the electronic or other health record, Independently interpreting results (not separately reported) and communicating results to the patient/family/caregiver, or Care coordination (not separately reported).         Each patient to whom he or she provides medical services by telemedicine is:  (1) informed of the relationship between the physician and patient and the respective role of any other health care provider with respect to management of the patient; and (2) notified that he or she may decline to receive medical services by telemedicine and may withdraw from such care at any time.    Notes:     H/o CVA - ED 11/5/21 - facial droop, RT arm, and leg weakness - stroke vs TIA.  She left ER before an MRI brain was done.  So unsure if had a stroke vs TIA. She is on ASA, statin.  She has residual Rt facial droop, numbness in her RT fingers,  and RLE weakness.       Recurrent UTI-Fu by Dr. Smith.  Stable on vaginal cream. Pt also on Myrbetriq and Toviaz for OAB. Doing well.      HTN- Controlled off meds.  Continue low-sodium diet.     HLD - controlled on Zocor 20 mg QHS.  Her cholesterol is controlled -  TG 91 HDL 72 LDL 67. Repeat July. " "     Hypothyroidism- Your TSH - 2.074 cont. Your current dose of Levothyroxine. Controlled on levothyroxine 100 mcg daily. Repeat TFT's in Sept.     Anxiety-When on Wellbutrin she noticed her skin is sensitive to touch. Pt started noticing this after 2-3 day on the medication. Pt on Buspar 5 mg BID, and Celexa 30 mg/d.   Pt takes Ativan 0.5 mg rarely prn. She saw LCSW, Mikaela Jimenez, 1/10/22 and1/18/22. She found this helpful. Pt on Celexa 30 mg daily and buspar 5 mg po BID. Will discuss med regimen with Dr. Glass.      Depression- Pt has always struggled with depression and anxiety.  Not worse due to pandemic or recent stroke She is upset about her failing eye sight.   Pt on Celexa 30 mg daily and buspar 5 mg po BID.  "I'm just down and am not motivated to do anything like get dressed or pay my bills on time". Tried adding Trazodone 50 mg 1/2 QHS - no help and felt more anxious on it.  She did not tolerate Bupropion 75 mg BID.  She saw Mikaela Jimenez 1/10/22 and 1/18/22. Will discuss med regimen with Dr. Glass.      Wheezing-Pt has ProAir which she uses prn - infrequently - has not used in several mos.  PFTs-WNL-07/14/2015. Doing well with getting rid of tobacco.     Nicotine dependence - Pt smokes E-cigarette for the past 3 yrs without tobacco.  Pt aware of the dangers.  She quit cigarette 07/05/2016.       Hiatal hernia-Stable on Gavescon daily - doing well.     Colon polyps - Pt is s/p partial colon resection '09  CRS Dr. Gibson.  GI -Dr. Giron.  Last C scope-'13.      Iron deficiency-Ferritin has improved from 17 to 24.  Her H/H have dropped.  She has a history of iron deficiency.  Denies any blood in her stool or black sticky stool.  FOBT - neg.  Her vitamin b12 is slightly low at 373.  I rec she start OTC Vitamin B12 1000 mcg daily. Her folate was normal. Pt on Iron suppl 2 tabs daily. As d/w pt we could try Oral Slow FE or I could refer her to H/O to discuss IV iron.      Hyperkalemia  Potassium improved " "from 5.5 to 5. Rec low potassium diet. She is not taking any Potassium supplementation.   Do not feel pt would tolerate even low dose furosemide 10 mg Q M,W,F.       IFG - Your Ha1c - 5.3 - normal in Sept. No evidence of diabetes. Repeat Ha1c.      Hepatomegaly - 23 cm.  Fu by Dr. Giron.  Resolved.      OA - Preston knees - RT > Left. ec glucosamine or turmeric. Rt Knee gives out.  She would like to renew PTx. She uses a seated walker.       Renal cysts - + fam h/o PCKD.  ? Angiomyolipoma on Left Kidney.  Fu by Dr. Smith.      OAB - Myrbetriq and Toviaz help.      Obesity - BMI - 32.2 - Rec low carb diet. She "tries". She eats TID meals most days and if misses a meal supplements with ensure.      Elev IGE - 742 ? Etiol.  No allergic Sx's.  ? Eos Esophagitis.      Aortic atherosclerosis - Tortuosity of the thoracic aorta with aortic atherosclerosis seen on CXR in Oct.  Prev fu by Dr. Nguyen Melgar. Has appt on 1/31/21 to est with new [facility], Dr. Andrew. She had a recent  is due for fu. Cont ASA and zocor. Pt reports she is due for Carotid U/S with Dr. Cedillo. Echo - Mild/Mod AS per Echo 2017.      +LORNA - neg [place].      Acc by friend     HCM - Flu - 10/4/21; Td - > 10 yrs ago; PCV 13 - 2/21/17;  PVX 23 - 1/8/15;  Shingrix - ?- pt defers; COVID -19 Vaccine -#1 - 2/5/21; #2 - 3/5/21; #3 - 11/7/21;  MGM - refuses;  DEXA - declined; C-scope - '13; GI - Dr. Giron; Retina Sp - Dr. Gardner; Derm - Dr. Joseph; Ophtho - Dr. Calderon/Dr. Melendrez       Patient Care Team:  Perla Hopper MD as PCP - General (Internal Medicine)  Markus Giron MD as Consulting Physician (Gastroenterology)  Deepali Joseph MD as Consulting Physician (Dermatology)  Opal Aburto II, MD (Ophthalmology)  Edith Vega MA as Care Coordinator     Health Maintenance:  Immunization History   Administered Date(s) Administered    COVID-19, MRNA, LN-S, PF (MODERNA FULL 0.5 ML DOSE) 02/05/2021, 03/05/2021, 11/07/2021    Influenza 12/03/2004, " 09/30/2007, 10/06/2009, 10/01/2010, 11/28/2015, 11/15/2016    Influenza (FLUAD) - Quadrivalent - Adjuvanted - PF *Preferred* (65+) 09/23/2020    Influenza (FLUAD) - Trivalent - Adjuvanted - PF (65+) 09/04/2019    Influenza - Trivalent (ADULT) 12/03/2004, 10/06/2009, 10/01/2010    Pneumococcal Conjugate - 13 Valent 02/21/2017    Pneumococcal Polysaccharide - 23 Valent 09/30/2007, 01/08/2015      Health Maintenance   Topic Date Due    TETANUS VACCINE  Never done    DEXA SCAN  Never done    Lipid Panel  07/17/2025        Past Medical History:  Past Medical History:   Diagnosis Date    LORNA positive     Anxiety     Bilateral cataracts     Colitis     Colon polyp     Depression     Elevated IgE level     Hepatomegaly     Hiatal hernia     Hyperlipidemia     Hypertension     Hypothyroidism     IFG (impaired fasting glucose)     Iron deficiency anemia     Macular degeneration     Mild aortic stenosis     Nicotine dependence     Osteoarthritis     Recurrent UTI     Renal cyst     Skin cancer     Tuberculosis of bladder     Urinary incontinence     Venous insufficiency     Vitamin B12 deficiency     Wheezing        Past Surgical History:   has a past surgical history that includes Cholecystectomy.    Family History:  family history includes Atrial fibrillation in her sister; Diabetes in her mother; Heart disease in her father and mother; Polycystic kidney disease in her father.     Social History:  Social History     Tobacco Use    Smoking status: Former Smoker     Types: Cigarettes, Vaping with nicotine    Smokeless tobacco: Current User   Substance Use Topics    Alcohol use: Yes    Drug use: Never       Review of Systems   Constitutional: Negative for chills and fever.   HENT: Negative for nasal congestion and sore throat.    Eyes: Positive for visual disturbance.   Respiratory: Negative for cough and shortness of breath.    Cardiovascular: Negative for chest pain.   Gastrointestinal:  "Positive for constipation and nausea. Negative for abdominal pain, blood in stool, diarrhea and vomiting.   Genitourinary: Negative for dysuria and frequency.        Bumps on her vagina. Not painful.    Neurological: Positive for weakness.        Has a sitter help her get up. She walks slowly with a walker.  She would like more PTx.    Psychiatric/Behavioral: Positive for dysphoric mood. Negative for suicidal ideas. The patient is nervous/anxious.         "I just don't have any motivation". Depression and anxiety.         Medications:    Current Outpatient Medications:     albuterol (PROVENTIL/VENTOLIN HFA) 90 mcg/actuation inhaler, Inhale 2 puffs into the lungs every 6 (six) hours as needed. Rescue, Disp: , Rfl:     aspirin (ECOTRIN) 81 MG EC tablet, Take 81 mg by mouth once daily., Disp: , Rfl:     busPIRone (BUSPAR) 5 MG Tab, TAKE 1 TABLET BY MOUTH TWICE DAILY, Disp: 180 tablet, Rfl: 0    citalopram (CELEXA) 20 MG tablet, TAKE 1 1/2 TABLETS BY MOUTH ONCE DAILY, Disp: 135 tablet, Rfl: 0    estradiol (ESTRACE) 0.01 % (0.1 mg/gram) vaginal cream, Place 1 g vaginally 3 (three) times a week., Disp: , Rfl:     ferrous gluconate (FERGON) 240 (27 FE) MG tablet, Take 240 mg by mouth once daily., Disp: , Rfl:     levothyroxine (SYNTHROID) 100 MCG tablet, Take 1 tablet (100 mcg total) by mouth once daily., Disp: 90 tablet, Rfl: 2    LORazepam (ATIVAN) 0.5 MG tablet, Take 1 tablet (0.5 mg total) by mouth daily as needed for Anxiety., Disp: 30 tablet, Rfl: 0    MYRBETRIQ 50 mg Tb24, Take 1 tablet (50 mg total) by mouth once daily., Disp: 90 tablet, Rfl: 0    Al hyd-Mg tr-alg ac-sod bicarb (GAVISCON) 80-14.2 mg Chew, Take 1 tablet by mouth once daily., Disp: , Rfl:     mag/aluminum/sod bicarb/alginc (GAVISCON ORAL), Take 1 tablet by mouth daily as needed., Disp: , Rfl:     neomycin-polymyxin-dexamethasone (DEXACINE) 3.5 mg/g-10,000 unit/g-0.1 % Oint, APPLY A SMALL AMOUNT IN BOTH EYES TWICE DAILY, Disp: , Rfl:     " simvastatin (ZOCOR) 20 MG tablet, TAKE 1 TABLET(20 MG) BY MOUTH EVERY EVENING, Disp: 90 tablet, Rfl: 3    TOVIAZ 8 mg Tb24, Take 1 tablet by mouth once daily., Disp: 90 tablet, Rfl: 0     Allergies:  Review of patient's allergies indicates:   Allergen Reactions    Penicillins      rash       Physical Exam                    Physical Exam     Laboratory:  CBC:  Recent Labs   Lab 11/25/20  0944 11/25/20  0944 11/05/21  1825 11/05/21  1825 12/15/21  1612   WBC 6.93   < > 4.32   < > 4.57   RBC 4.42   < > 3.72 L   < > 3.36 L   Hemoglobin 12.5   < > 10.5 L   < > 8.8 L   Hematocrit 41.8   < > 32.8 L   < > 30.7 L   Platelets 201   < > 215   < > 325   MCV 95   < > 88   < > 91   MCH 28.3   < > 28.2   < > 26.2 L   MCHC 29.9 L  --  32.0  --  28.7 L    < > = values in this interval not displayed.       CMP:  Recent Labs   Lab 10/15/19  1433 10/15/19  1433 07/17/20  0909 07/31/20  1048 11/05/21  1825 11/05/21  1825 12/15/21  1612   Glucose 81   < > 78   < > 89   < > 96   Calcium 9.3   < > 10.5   < > 8.8   < > 8.9   Albumin 3.4 L   < > 3.6  --  3.2 L  --   --    Total Protein 6.8   < > 7.3  --  6.9  --   --    Sodium 139   < > 142   < > 140   < > 138   Potassium 4.6   < > 5.5 H   < > 5.0   < > 4.4   CO2 26   < > 29   < > 26   < > 25   Chloride 105   < > 105   < > 105   < > 102   BUN 19   < > 19   < > 21   < > 16   Creatinine 0.8   < > 1.0   < > 0.7   < > 0.7   Alkaline Phosphatase 92   < > 95  --  92  --   --    ALT 11   < > 10  --  45 H  --   --    AST 16   < > 16  --  44 H  --   --    Total Bilirubin 0.2  --  0.3  --  0.2  --   --     < > = values in this interval not displayed.           URINALYSIS:  Recent Labs   Lab 12/16/21  1246   Color, UA Yellow   Specific Gravity, UA 1.010   pH, UA 6.0   Protein, UA Negative   Nitrite, UA Negative   Leukocytes, UA Negative        LIPIDS:  Recent Labs   Lab 07/17/20  0909 09/23/20  0948 11/05/21  1825   TSH  --  1.704 2.074   HDL 72  --   --    Cholesterol 158  --   --    Triglycerides  91  --   --    LDL Cholesterol 67.8  --   --    HDL/Cholesterol Ratio 45.6  --   --    Non-HDL Cholesterol 86  --   --    Total Cholesterol/HDL Ratio 2.2  --   --        TSH:  Recent Labs   Lab 09/23/20  0948 11/05/21  1825   TSH 1.704 2.074       A1C:  Recent Labs   Lab 09/23/20  0948   Hemoglobin A1C 5.3       Other:   Recent Labs   Lab 10/15/19  1433 10/15/19  1433 07/17/20  0909 12/15/21  1612   Vitamin B-12  --   --   --  373   Ferritin 17 L   < > 24  --    Iron 45  --   --   --    Transferrin 300  --   --   --    TIBC 444  --   --   --    Saturated Iron 10 L  --   --   --     < > = values in this interval not displayed.           Assessment/Plan     Nickie Segura is a 86 y.o.female with:    Hypothyroidism, unspecified type  -     levothyroxine (SYNTHROID) 100 MCG tablet; Take 1 tablet (100 mcg total) by mouth once daily.  Dispense: 90 tablet; Refill: 2  - Stable.  Cont current regimen. Repeat TFT's.     Hypertension, unspecified type  -     Comprehensive Metabolic Panel; Future; Expected date: 01/25/2022  - Stable.  Cont current regimen.  Repeat CMP.     Hyperlipidemia, unspecified hyperlipidemia type  -     Lipid Panel; Future; Expected date: 01/25/2022  -     Comprehensive Metabolic Panel; Future; Expected date: 01/25/2022  - Controlled.  Goal LDL < 70. Pt on ASA and simvastatin.     Iron deficiency anemia, unspecified iron deficiency anemia type  -     CBC Auto Differential; Future; Expected date: 01/25/2022  -     Ferritin; Future; Expected date: 01/25/2022  -     Iron and TIBC; Future; Expected date: 01/25/2022  - Repeat CBC, iron studies.  Unsure of source.  Suspicious GI source.  FOBT was neg.  If stil low may need IV iron and GI [place].     Cerebrovascular disease  - Cont ASA and statin.     Anxiety  - Uncontrolled.  Will d/w Dr. Glass.  May need to inc Buspar.  Cont therapy with Mikaelanick Baptisteole.     Depression, unspecified depression type  - Uncontrolled.  Will d/w Dr. Glass.  May need to inc Buspar.  Cont  therapy with Mikaela Jimenez.     IFG (impaired fasting glucose)  -     Hemoglobin A1C; Future; Expected date: 01/25/2022  -Repeat Ha1c.     Debility  -     Ambulatory referral/consult to Physical/Occupational Therapy; Future; Expected date: 02/01/2022  - Renew PTx.     Vaginal lesion  -     Ambulatory referral/consult to Obstetrics / Gynecology; Future; Expected date: 02/01/2022  -Refer to Dr. Navas.        Chronic conditions status updated as per HPI.  Other than changes above, cont current medications and maintain follow up with specialists.    Follow up in about 3 months (around 4/25/2022).      Perla Hopper MD  [facility]

## 2022-01-25 NOTE — PROGRESS NOTES
Patient discussed during [facility] meeting today.  Patient is interested in increasing medication for anxiety.  Unclear what she is taking currently.  Wellbutrin, Buspar, Celexa, and Ativan are all on her MAR.  She reports taking Ativan 0.5mg every morning when she wakes up.  It also appears she has been on Celexa 30mg daily for years.  She has appointment with PCP this afternoon so will send message to PCP to help clarify.    Time: 12 minutes

## 2022-01-26 ENCOUNTER — TELEPHONE (OUTPATIENT)
Dept: PRIMARY CARE CLINIC | Facility: CLINIC | Age: 87
End: 2022-01-26
Payer: MEDICARE

## 2022-01-26 DIAGNOSIS — R53.81 DEBILITY: Primary | ICD-10-CM

## 2022-01-26 DIAGNOSIS — F32.A DEPRESSION, UNSPECIFIED DEPRESSION TYPE: ICD-10-CM

## 2022-01-26 DIAGNOSIS — F41.9 ANXIETY: ICD-10-CM

## 2022-01-26 RX ORDER — BUSPIRONE HYDROCHLORIDE 7.5 MG/1
TABLET ORAL
Qty: 180 TABLET | Refills: 0 | Status: SHIPPED | OUTPATIENT
Start: 2022-01-26 | End: 2022-04-19 | Stop reason: SDUPTHER

## 2022-01-26 NOTE — TELEPHONE ENCOUNTER
The orders you put in yesterday for Pt was outpt. Pt would like to see if she can do pt via HH. Please sign orders

## 2022-01-26 NOTE — TELEPHONE ENCOUNTER
Please inform pt I inc her Buspar to 7.5 mg BID as per our visit yesterday and my discussion with Dr. Glass today.    Dr. GARG

## 2022-01-26 NOTE — TELEPHONE ENCOUNTER
----- Message from Luh Robbins sent at 1/26/2022 12:23 PM CST -----  Contact: Home Health Nurse(Malena)-[phone]  MsDemario Malena is requesting a order for Physical Therapy, please fax to [phone] or call back at [phone]. Thanks/ar

## 2022-01-26 NOTE — TELEPHONE ENCOUNTER
----- Message from Saba Glass MD sent at 1/26/2022  9:10 AM CST -----  Regarding: RE: Anxiety medications  Yes, I agree with increasing Buspar.  Due to her age, I would go slowly and increase next to 7.5mg BID - they make a 7.5mg tablet!  I hope this helps!    Saba Glass      ----- Message -----  From: Perla Hopper MD  Sent: 1/25/2022   3:52 PM CST  To: Saba Glass MD  Subject: RE: Anxiety medications                          She is on Celexa 30 mg/d, Buspar 5 mg BID, and Alprazolam 0.5 mg daily prn. She is both depressed and anxious. I told her we may inc her buspar but that would talk to you first.     Dr. GARG      ----- Message -----  From: Saba Glass MD  Sent: 1/25/2022  10:34 AM CST  To: Perla Hopper MD  Subject: RE: Anxiety medications                          Ok great.  I was also curious if she is taking the Buspar or not because it could be an option to increase that.  Reach out to me after appointment if I can help in any way!    Saba Glass      ----- Message -----  From: Perla Hopper MD  Sent: 1/25/2022  10:27 AM CST  To: Saba Glass MD  Subject: RE: Anxiety medications                          Thanks for reaching out because I also don;t want to increase her Ativan. She is a fall risk. I have not seen in her in quite sometime.  She had a possible stroke since our last visit and it has been very difficult getting her to come in.  She stopped the Wellbutrin after only a few days due to c/o skin hypersensitivity.  I believe she is still on Wellbutrin and Celexa but will verify at her  appt. this afternoon.     Perla  ----- Message -----  From: Saba Glass MD  Sent: 1/25/2022   9:28 AM CST  To: Perla Hopper MD  Subject: Anxiety medications                              Dr. Hopper,    I am the consulting psychiatrist for the [facility].  We discussed this patient of yours today in our [facility] meeting and I see she has an  appointment with you this afternoon.  She is interested in increasing medication for her anxiety.  I'm happy to make recommendations however I wasn't sure exactly what she is already taking?  Is she taking the Wellbutrin, Buspar BID, and Celexa?  She may be hoping you will increase the Ativan but I was just trying to think of other options that might help.      Thanks!  Saba Glass

## 2022-01-27 PROCEDURE — G0180 PR HOME HEALTH MD CERTIFICATION: ICD-10-PCS | Mod: ,,, | Performed by: INTERNAL MEDICINE

## 2022-01-27 PROCEDURE — G0180 MD CERTIFICATION HHA PATIENT: HCPCS | Mod: ,,, | Performed by: INTERNAL MEDICINE

## 2022-01-31 ENCOUNTER — OFFICE VISIT (OUTPATIENT)
Dept: CARDIOLOGY | Facility: CLINIC | Age: 87
End: 2022-01-31
Payer: MEDICARE

## 2022-01-31 ENCOUNTER — TELEPHONE (OUTPATIENT)
Dept: BEHAVIORAL HEALTH | Facility: CLINIC | Age: 87
End: 2022-01-31
Payer: MEDICARE

## 2022-01-31 ENCOUNTER — LAB VISIT (OUTPATIENT)
Dept: LAB | Facility: HOSPITAL | Age: 87
End: 2022-01-31
Attending: INTERNAL MEDICINE
Payer: MEDICARE

## 2022-01-31 VITALS
BODY MASS INDEX: 27.35 KG/M2 | DIASTOLIC BLOOD PRESSURE: 64 MMHG | HEIGHT: 66 IN | SYSTOLIC BLOOD PRESSURE: 120 MMHG | WEIGHT: 170.19 LBS | HEART RATE: 80 BPM

## 2022-01-31 DIAGNOSIS — I63.9 CRYPTOGENIC STROKE: ICD-10-CM

## 2022-01-31 DIAGNOSIS — G45.9 TRANSIENT CEREBRAL ISCHEMIA, UNSPECIFIED TYPE: ICD-10-CM

## 2022-01-31 DIAGNOSIS — I35.0 NONRHEUMATIC AORTIC VALVE STENOSIS: ICD-10-CM

## 2022-01-31 DIAGNOSIS — R73.01 IFG (IMPAIRED FASTING GLUCOSE): ICD-10-CM

## 2022-01-31 DIAGNOSIS — F17.290 OTHER TOBACCO PRODUCT NICOTINE DEPENDENCE, UNCOMPLICATED: ICD-10-CM

## 2022-01-31 DIAGNOSIS — Z86.73 OLD CARDIOEMBOLIC STROKE WITHOUT LATE EFFECT: Primary | ICD-10-CM

## 2022-01-31 DIAGNOSIS — D50.9 IRON DEFICIENCY ANEMIA, UNSPECIFIED IRON DEFICIENCY ANEMIA TYPE: ICD-10-CM

## 2022-01-31 DIAGNOSIS — I10 HYPERTENSION, UNSPECIFIED TYPE: ICD-10-CM

## 2022-01-31 DIAGNOSIS — E78.00 PURE HYPERCHOLESTEROLEMIA: ICD-10-CM

## 2022-01-31 DIAGNOSIS — E78.5 HYPERLIPIDEMIA, UNSPECIFIED HYPERLIPIDEMIA TYPE: ICD-10-CM

## 2022-01-31 LAB
ALBUMIN SERPL BCP-MCNC: 3.3 G/DL (ref 3.5–5.2)
ALP SERPL-CCNC: 97 U/L (ref 55–135)
ALT SERPL W/O P-5'-P-CCNC: 75 U/L (ref 10–44)
ANION GAP SERPL CALC-SCNC: 12 MMOL/L (ref 8–16)
AST SERPL-CCNC: 59 U/L (ref 10–40)
BASOPHILS # BLD AUTO: 0.05 K/UL (ref 0–0.2)
BASOPHILS NFR BLD: 1 % (ref 0–1.9)
BILIRUB SERPL-MCNC: 0.3 MG/DL (ref 0.1–1)
BUN SERPL-MCNC: 12 MG/DL (ref 8–23)
CALCIUM SERPL-MCNC: 9.3 MG/DL (ref 8.7–10.5)
CHLORIDE SERPL-SCNC: 106 MMOL/L (ref 95–110)
CHOLEST SERPL-MCNC: 129 MG/DL (ref 120–199)
CHOLEST/HDLC SERPL: 2.3 {RATIO} (ref 2–5)
CO2 SERPL-SCNC: 21 MMOL/L (ref 23–29)
CREAT SERPL-MCNC: 0.7 MG/DL (ref 0.5–1.4)
DIFFERENTIAL METHOD: ABNORMAL
EOSINOPHIL # BLD AUTO: 0.1 K/UL (ref 0–0.5)
EOSINOPHIL NFR BLD: 1.7 % (ref 0–8)
ERYTHROCYTE [DISTWIDTH] IN BLOOD BY AUTOMATED COUNT: 17.3 % (ref 11.5–14.5)
EST. GFR  (AFRICAN AMERICAN): >60 ML/MIN/1.73 M^2
EST. GFR  (NON AFRICAN AMERICAN): >60 ML/MIN/1.73 M^2
ESTIMATED AVG GLUCOSE: 91 MG/DL (ref 68–131)
FERRITIN SERPL-MCNC: 139 NG/ML (ref 20–300)
GLUCOSE SERPL-MCNC: 89 MG/DL (ref 70–110)
HBA1C MFR BLD: 4.8 % (ref 4–5.6)
HCT VFR BLD AUTO: 36.5 % (ref 37–48.5)
HDLC SERPL-MCNC: 55 MG/DL (ref 40–75)
HDLC SERPL: 42.6 % (ref 20–50)
HGB BLD-MCNC: 10.4 G/DL (ref 12–16)
IMM GRANULOCYTES # BLD AUTO: 0.01 K/UL (ref 0–0.04)
IMM GRANULOCYTES NFR BLD AUTO: 0.2 % (ref 0–0.5)
IRON SERPL-MCNC: 39 UG/DL (ref 30–160)
LDLC SERPL CALC-MCNC: 56.8 MG/DL (ref 63–159)
LYMPHOCYTES # BLD AUTO: 1.2 K/UL (ref 1–4.8)
LYMPHOCYTES NFR BLD: 24.9 % (ref 18–48)
MCH RBC QN AUTO: 24.4 PG (ref 27–31)
MCHC RBC AUTO-ENTMCNC: 28.5 G/DL (ref 32–36)
MCV RBC AUTO: 86 FL (ref 82–98)
MONOCYTES # BLD AUTO: 0.5 K/UL (ref 0.3–1)
MONOCYTES NFR BLD: 10.2 % (ref 4–15)
NEUTROPHILS # BLD AUTO: 3 K/UL (ref 1.8–7.7)
NEUTROPHILS NFR BLD: 62 % (ref 38–73)
NONHDLC SERPL-MCNC: 74 MG/DL
NRBC BLD-RTO: 0 /100 WBC
PLATELET # BLD AUTO: 256 K/UL (ref 150–450)
PMV BLD AUTO: 10.5 FL (ref 9.2–12.9)
POTASSIUM SERPL-SCNC: 4.1 MMOL/L (ref 3.5–5.1)
PROT SERPL-MCNC: 6.7 G/DL (ref 6–8.4)
RBC # BLD AUTO: 4.27 M/UL (ref 4–5.4)
RETICS/RBC NFR AUTO: 0.9 % (ref 0.5–2.5)
SATURATED IRON: 9 % (ref 20–50)
SODIUM SERPL-SCNC: 139 MMOL/L (ref 136–145)
TOTAL IRON BINDING CAPACITY: 414 UG/DL (ref 250–450)
TRANSFERRIN SERPL-MCNC: 280 MG/DL (ref 200–375)
TRIGL SERPL-MCNC: 86 MG/DL (ref 30–150)
WBC # BLD AUTO: 4.81 K/UL (ref 3.9–12.7)

## 2022-01-31 PROCEDURE — 1100F PR PT FALLS ASSESS DOC 2+ FALLS/FALL W/INJURY/YR: ICD-10-PCS | Mod: HCNC,CPTII,S$GLB, | Performed by: INTERNAL MEDICINE

## 2022-01-31 PROCEDURE — 1160F RVW MEDS BY RX/DR IN RCRD: CPT | Mod: HCNC,CPTII,S$GLB, | Performed by: INTERNAL MEDICINE

## 2022-01-31 PROCEDURE — 1159F MED LIST DOCD IN RCRD: CPT | Mod: HCNC,CPTII,S$GLB, | Performed by: INTERNAL MEDICINE

## 2022-01-31 PROCEDURE — 99999 PR PBB SHADOW E&M-EST. PATIENT-LVL III: CPT | Mod: PBBFAC,HCNC,, | Performed by: INTERNAL MEDICINE

## 2022-01-31 PROCEDURE — 85045 AUTOMATED RETICULOCYTE COUNT: CPT | Mod: HCNC | Performed by: INTERNAL MEDICINE

## 2022-01-31 PROCEDURE — 3074F SYST BP LT 130 MM HG: CPT | Mod: HCNC,CPTII,S$GLB, | Performed by: INTERNAL MEDICINE

## 2022-01-31 PROCEDURE — 1126F AMNT PAIN NOTED NONE PRSNT: CPT | Mod: HCNC,CPTII,S$GLB, | Performed by: INTERNAL MEDICINE

## 2022-01-31 PROCEDURE — 99204 OFFICE O/P NEW MOD 45 MIN: CPT | Mod: 25,HCNC,S$GLB, | Performed by: INTERNAL MEDICINE

## 2022-01-31 PROCEDURE — 1159F PR MEDICATION LIST DOCUMENTED IN MEDICAL RECORD: ICD-10-PCS | Mod: HCNC,CPTII,S$GLB, | Performed by: INTERNAL MEDICINE

## 2022-01-31 PROCEDURE — 1126F PR PAIN SEVERITY QUANTIFIED, NO PAIN PRESENT: ICD-10-PCS | Mod: HCNC,CPTII,S$GLB, | Performed by: INTERNAL MEDICINE

## 2022-01-31 PROCEDURE — 93000 ELECTROCARDIOGRAM COMPLETE: CPT | Mod: HCNC,S$GLB,, | Performed by: INTERNAL MEDICINE

## 2022-01-31 PROCEDURE — 99499 UNLISTED E&M SERVICE: CPT | Mod: S$GLB,,, | Performed by: INTERNAL MEDICINE

## 2022-01-31 PROCEDURE — 84466 ASSAY OF TRANSFERRIN: CPT | Mod: HCNC | Performed by: INTERNAL MEDICINE

## 2022-01-31 PROCEDURE — 1100F PTFALLS ASSESS-DOCD GE2>/YR: CPT | Mod: HCNC,CPTII,S$GLB, | Performed by: INTERNAL MEDICINE

## 2022-01-31 PROCEDURE — 99204 PR OFFICE/OUTPT VISIT, NEW, LEVL IV, 45-59 MIN: ICD-10-PCS | Mod: 25,HCNC,S$GLB, | Performed by: INTERNAL MEDICINE

## 2022-01-31 PROCEDURE — 1157F ADVNC CARE PLAN IN RCRD: CPT | Mod: HCNC,CPTII,S$GLB, | Performed by: INTERNAL MEDICINE

## 2022-01-31 PROCEDURE — 82728 ASSAY OF FERRITIN: CPT | Mod: HCNC | Performed by: INTERNAL MEDICINE

## 2022-01-31 PROCEDURE — 93000 EKG 12-LEAD: ICD-10-PCS | Mod: HCNC,S$GLB,, | Performed by: INTERNAL MEDICINE

## 2022-01-31 PROCEDURE — 3288F FALL RISK ASSESSMENT DOCD: CPT | Mod: HCNC,CPTII,S$GLB, | Performed by: INTERNAL MEDICINE

## 2022-01-31 PROCEDURE — 85025 COMPLETE CBC W/AUTO DIFF WBC: CPT | Mod: HCNC | Performed by: INTERNAL MEDICINE

## 2022-01-31 PROCEDURE — 1160F PR REVIEW ALL MEDS BY PRESCRIBER/CLIN PHARMACIST DOCUMENTED: ICD-10-PCS | Mod: HCNC,CPTII,S$GLB, | Performed by: INTERNAL MEDICINE

## 2022-01-31 PROCEDURE — 99999 PR PBB SHADOW E&M-EST. PATIENT-LVL III: ICD-10-PCS | Mod: PBBFAC,HCNC,, | Performed by: INTERNAL MEDICINE

## 2022-01-31 PROCEDURE — 99499 RISK ADDL DX/OHS AUDIT: ICD-10-PCS | Mod: S$GLB,,, | Performed by: INTERNAL MEDICINE

## 2022-01-31 PROCEDURE — 36415 COLL VENOUS BLD VENIPUNCTURE: CPT | Mod: HCNC,PO | Performed by: INTERNAL MEDICINE

## 2022-01-31 PROCEDURE — 3288F PR FALLS RISK ASSESSMENT DOCUMENTED: ICD-10-PCS | Mod: HCNC,CPTII,S$GLB, | Performed by: INTERNAL MEDICINE

## 2022-01-31 PROCEDURE — 3078F DIAST BP <80 MM HG: CPT | Mod: HCNC,CPTII,S$GLB, | Performed by: INTERNAL MEDICINE

## 2022-01-31 PROCEDURE — 3074F PR MOST RECENT SYSTOLIC BLOOD PRESSURE < 130 MM HG: ICD-10-PCS | Mod: HCNC,CPTII,S$GLB, | Performed by: INTERNAL MEDICINE

## 2022-01-31 PROCEDURE — 3078F PR MOST RECENT DIASTOLIC BLOOD PRESSURE < 80 MM HG: ICD-10-PCS | Mod: HCNC,CPTII,S$GLB, | Performed by: INTERNAL MEDICINE

## 2022-01-31 PROCEDURE — 80053 COMPREHEN METABOLIC PANEL: CPT | Mod: HCNC | Performed by: INTERNAL MEDICINE

## 2022-01-31 PROCEDURE — 83036 HEMOGLOBIN GLYCOSYLATED A1C: CPT | Mod: HCNC | Performed by: INTERNAL MEDICINE

## 2022-01-31 PROCEDURE — 80061 LIPID PANEL: CPT | Mod: HCNC | Performed by: INTERNAL MEDICINE

## 2022-01-31 PROCEDURE — 1157F PR ADVANCE CARE PLAN OR EQUIV PRESENT IN MEDICAL RECORD: ICD-10-PCS | Mod: HCNC,CPTII,S$GLB, | Performed by: INTERNAL MEDICINE

## 2022-01-31 RX ORDER — ROSUVASTATIN CALCIUM 20 MG/1
20 TABLET, COATED ORAL DAILY
Qty: 90 TABLET | Refills: 3 | Status: SHIPPED | OUTPATIENT
Start: 2022-01-31 | End: 2023-01-03

## 2022-01-31 NOTE — PROGRESS NOTES
CHW reached out to pt to remind her of virtual appointment with Mikaela Jimenez LCSW,  on  tomorrow pt sister who helps her confirmed  the appointment.

## 2022-01-31 NOTE — PROGRESS NOTES
Subjective:   01/31/2022     Patient ID:  Nickie Segura is a 86 y.o. female who presents for evaulation of Cerebrovascular Accident      She comes in today for cardiac follow-up after presenting with right arm and leg weakness in November of 2021. She was not admitted.  Laboratory was unremarkable.  CT scan of the head:  1. Involutional changes with chronic microvascular ischemic changes.  2. Mild hypoattenuation in the posterior right frontal cortex and subcortical white matter could represent a small infarct or chronic microvascular ischemic change.  I favor a chronic change.  MRI would better characterize if there is high clinical suspicion.  Recommend clinical correlation pertaining to follow-up.  3. Right mastoid air cell disease.     Her right-sided weakness has resolved, she still has weakness in both legs.  She has occasional burning in the stomach.  She has no chest pains or tightness, she has no edema.  She is not having palpitations.  She has never had atrial fibrillation.    EKG today shows sinus rhythm with only nonspecific ST T wave abnormalities.    Hypercholesterolemia is present, on moderate dose statin therapy, should be on high-dose.  Will order.      Past Medical History:   Diagnosis Date    LORNA positive     Anxiety     Bilateral cataracts     Colitis     Colon polyp     Depression     Elevated IgE level     Hepatomegaly     Hiatal hernia     Hypothyroidism     IFG (impaired fasting glucose)     Iron deficiency anemia     Macular degeneration     Nicotine dependence     Osteoarthritis     Recurrent UTI     Renal cyst     Skin cancer     Tuberculosis of bladder     Urinary incontinence     Vitamin B12 deficiency     Wheezing        Review of patient's allergies indicates:   Allergen Reactions    Penicillins      rash         Current Outpatient Medications:     Al hyd-Mg tr-alg ac-sod bicarb (GAVISCON) 80-14.2 mg Chew, Take 1 tablet by mouth once daily., Disp: , Rfl:      albuterol (PROVENTIL/VENTOLIN HFA) 90 mcg/actuation inhaler, Inhale 2 puffs into the lungs every 6 (six) hours as needed. Rescue, Disp: , Rfl:     aspirin (ECOTRIN) 81 MG EC tablet, Take 81 mg by mouth once daily., Disp: , Rfl:     busPIRone (BUSPAR) 7.5 MG tablet, 1 po BID, Disp: 180 tablet, Rfl: 0    citalopram (CELEXA) 20 MG tablet, TAKE 1 1/2 TABLETS BY MOUTH ONCE DAILY, Disp: 135 tablet, Rfl: 0    estradiol (ESTRACE) 0.01 % (0.1 mg/gram) vaginal cream, Place 1 g vaginally 3 (three) times a week., Disp: , Rfl:     ferrous gluconate (FERGON) 240 (27 FE) MG tablet, Take 240 mg by mouth once daily., Disp: , Rfl:     fluticasone propionate (FLONASE ALLERGY RELIEF NASL), by Each Nostril route as needed., Disp: , Rfl:     levothyroxine (SYNTHROID) 100 MCG tablet, Take 1 tablet (100 mcg total) by mouth once daily., Disp: 90 tablet, Rfl: 2    LORazepam (ATIVAN) 0.5 MG tablet, Take 1 tablet (0.5 mg total) by mouth daily as needed for Anxiety., Disp: 30 tablet, Rfl: 0    mag/aluminum/sod bicarb/alginc (GAVISCON ORAL), Take 1 tablet by mouth daily as needed., Disp: , Rfl:     MYRBETRIQ 50 mg Tb24, Take 1 tablet (50 mg total) by mouth once daily., Disp: 90 tablet, Rfl: 0    neomycin-polymyxin-dexamethasone (DEXACINE) 3.5 mg/g-10,000 unit/g-0.1 % Oint, APPLY A SMALL AMOUNT IN BOTH EYES TWICE DAILY, Disp: , Rfl:     TOVIAZ 8 mg Tb24, Take 1 tablet by mouth once daily., Disp: 90 tablet, Rfl: 0    vit C/E/zinc ox/amy/lut/zeax (ICAPS AREDS2 ORAL), Take by mouth Daily., Disp: , Rfl:     rosuvastatin (CRESTOR) 20 MG tablet, Take 1 tablet (20 mg total) by mouth once daily., Disp: 90 tablet, Rfl: 3     Objective:   Review of Systems   Cardiovascular: Negative for chest pain, claudication, cyanosis, dyspnea on exertion, irregular heartbeat, leg swelling, near-syncope, orthopnea, palpitations, paroxysmal nocturnal dyspnea and syncope.       Vitals:    01/31/22 1313   BP: 120/64   Pulse: 80       Physical Exam  Vitals  reviewed.   Constitutional:       General: She is not in acute distress.     Appearance: She is well-developed.   HENT:      Head: Normocephalic and atraumatic.      Nose: Nose normal.   Eyes:      Conjunctiva/sclera: Conjunctivae normal.      Pupils: Pupils are equal, round, and reactive to light.   Neck:      Vascular: No JVD.   Cardiovascular:      Rate and Rhythm: Normal rate and regular rhythm.      Pulses: Intact distal pulses.      Heart sounds: Murmur (Grade 2 systolic ejection murmur) heard.   No friction rub. No gallop.    Pulmonary:      Effort: Pulmonary effort is normal. No respiratory distress.      Breath sounds: Normal breath sounds. No wheezing or rales.   Chest:      Chest wall: No tenderness.   Abdominal:      General: Bowel sounds are normal. There is no distension.      Palpations: Abdomen is soft.      Tenderness: There is no abdominal tenderness.   Musculoskeletal:         General: No tenderness or deformity. Normal range of motion.      Cervical back: Normal range of motion and neck supple.   Skin:     General: Skin is warm and dry.      Findings: No erythema or rash.   Neurological:      Mental Status: She is alert and oriented to person, place, and time.      Cranial Nerves: No cranial nerve deficit.      Motor: Weakness present. No abnormal muscle tone.      Coordination: Coordination normal.   Psychiatric:         Behavior: Behavior normal.         Thought Content: Thought content normal.         Judgment: Judgment normal.           Lab Results   Component Value Date    CHOL 158 07/17/2020     Lab Results   Component Value Date    HDL 72 07/17/2020     Lab Results   Component Value Date    LDLCALC 67.8 07/17/2020     Lab Results   Component Value Date    ALT 45 (H) 11/05/2021    AST 44 (H) 11/05/2021    AST 16 07/17/2020    AST 16 10/15/2019     Lab Results   Component Value Date    CREATININE 0.7 12/15/2021    BUN 16 12/15/2021     12/15/2021    K 4.4 12/15/2021    CO2 25  12/15/2021    CO2 26 11/05/2021    CO2 27 10/26/2020     Lab Results   Component Value Date    HGB 8.8 (L) 12/15/2021    HCT 30.7 (L) 12/15/2021    HCT 32.8 (L) 11/05/2021    HCT 41.8 11/25/2020                         Assessment and Plan:     Old 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  Dispense: 90 tablet; Refill: 3  -     Cardiac event monitor; Future; Expected date: 01/31/2022  -     Echo; Future; Expected date: 02/07/2022    Transient cerebral ischemia, unspecified type   -     Cardiac event monitor; Future; Expected date: 01/31/2022    Cryptogenic stroke  -     Cardiac event monitor; Future; Expected date: 01/31/2022    Nonrheumatic aortic valve stenosis  Comments:  Echocardiogram to be  Orders:  -     Echo; Future; Expected date: 02/07/2022    Other tobacco product nicotine dependence, uncomplicated    Pure hypercholesterolemia  Comments:  Change to high-dose statin  Orders:  -     IN OFFICE EKG 12-LEAD (to Muse)  -     rosuvastatin (CRESTOR) 20 MG tablet; Take 1 tablet (20 mg total) by mouth once daily.  Dispense: 90 tablet; Refill: 3         No follow-ups on file.

## 2022-02-01 ENCOUNTER — TELEPHONE (OUTPATIENT)
Dept: CARDIOLOGY | Facility: CLINIC | Age: 87
End: 2022-02-01
Payer: MEDICARE

## 2022-02-01 ENCOUNTER — OFFICE VISIT (OUTPATIENT)
Dept: BEHAVIORAL HEALTH | Facility: CLINIC | Age: 87
End: 2022-02-01
Payer: MEDICARE

## 2022-02-01 DIAGNOSIS — F41.1 GAD (GENERALIZED ANXIETY DISORDER): Primary | ICD-10-CM

## 2022-02-01 PROCEDURE — 90832 PR PSYCHOTHERAPY W/PATIENT, 30 MIN: ICD-10-PCS | Mod: HCNC,95,, | Performed by: SOCIAL WORKER

## 2022-02-01 PROCEDURE — 90832 PSYTX W PT 30 MINUTES: CPT | Mod: HCNC,95,, | Performed by: SOCIAL WORKER

## 2022-02-01 PROCEDURE — 1157F ADVNC CARE PLAN IN RCRD: CPT | Mod: HCNC,CPTII,95, | Performed by: SOCIAL WORKER

## 2022-02-01 PROCEDURE — 1157F PR ADVANCE CARE PLAN OR EQUIV PRESENT IN MEDICAL RECORD: ICD-10-PCS | Mod: HCNC,CPTII,95, | Performed by: SOCIAL WORKER

## 2022-02-01 NOTE — PROGRESS NOTES
Please inform pt-  I reviewed your labs.  Your Cholesterol looked great. Your kidney function looked good.  Your liver functions were still slightly elevated. Your Ha1c was normal at 4.8. Your ferritin was normal but your % Iron saturation was low at 9%.  I suspect you have a mixed picture of Iron deficiency and anemia of chronic inflammation.  You are still anemic but this is improved.  I would like to refer you to heme/Onc since the iron has been difficult for you to tolerate.  I suspect you may do better with an IV iron infusion.  If you are in agreement with this plan I will place the referral to a hematologist here at [facility].  Please let me know. No further recommendations at this time.    Dr. GARG

## 2022-02-01 NOTE — TELEPHONE ENCOUNTER
----- Message from Meryl Helton sent at 2/1/2022  3:09 PM CST -----  Regarding: Call back  Brittaney, Pt's sister, would like a call back from Sandra about a test Dr.B frost.          Brittaney [phone]        Thanks

## 2022-02-02 ENCOUNTER — TELEPHONE (OUTPATIENT)
Dept: PRIMARY CARE CLINIC | Facility: CLINIC | Age: 87
End: 2022-02-02
Payer: MEDICARE

## 2022-02-02 DIAGNOSIS — D50.9 IRON DEFICIENCY ANEMIA, UNSPECIFIED IRON DEFICIENCY ANEMIA TYPE: Primary | ICD-10-CM

## 2022-02-02 NOTE — TELEPHONE ENCOUNTER
----- Message from Kelly Glover sent at 2/2/2022  1:48 PM CST -----  Contact: [phone] shannan  Patient is returning a phone call.  Who left a message for the patient: Kylie  Does patient know what this is regarding:  yes  Would you like a call back, or a response through your [facility] portal?:   call back  Comments:

## 2022-02-02 NOTE — TELEPHONE ENCOUNTER
----- Message from Perla Hopper MD sent at 2/1/2022  7:57 AM CST -----  Please inform pt-  I reviewed your labs.  Your Cholesterol looked great. Your kidney function looked good.  Your liver functions were still slightly elevated. Your Ha1c was normal at 4.8. Your ferritin was normal but your % Iron saturation was low at 9%.  I suspect you have a mixed picture of Iron deficiency and anemia of chronic inflammation.  You are still anemic but this is improved.  I would like to refer you to heme/Onc since the iron has been difficult for you to tolerate.  I suspect you may do better with an IV iron infusion.  If you are in agreement with this plan I will place the referral to a hematologist here at [facility].  Please let me know. No further recommendations at this time.    Dr. GARG

## 2022-02-14 ENCOUNTER — HOSPITAL ENCOUNTER (OUTPATIENT)
Dept: CARDIOLOGY | Facility: HOSPITAL | Age: 87
Discharge: HOME | End: 2022-02-14
Attending: INTERNAL MEDICINE
Payer: MEDICARE

## 2022-02-14 VITALS
SYSTOLIC BLOOD PRESSURE: 122 MMHG | HEIGHT: 66 IN | BODY MASS INDEX: 27.32 KG/M2 | DIASTOLIC BLOOD PRESSURE: 70 MMHG | WEIGHT: 170 LBS | HEART RATE: 76 BPM

## 2022-02-14 DIAGNOSIS — Z86.73 OLD CARDIOEMBOLIC STROKE WITHOUT LATE EFFECT: ICD-10-CM

## 2022-02-14 DIAGNOSIS — I35.0 NONRHEUMATIC AORTIC VALVE STENOSIS: ICD-10-CM

## 2022-02-14 LAB
ASCENDING AORTA: 2.69 CM
AV INDEX (PROSTH): 0.38
AV MEAN GRADIENT: 23 MMHG
AV PEAK GRADIENT: 31 MMHG
AV VALVE AREA: 0.93 CM2
AV VELOCITY RATIO: 0.39
BSA FOR ECHO PROCEDURE: 1.89 M2
CV ECHO LV RWT: 0.37 CM
DOP CALC AO PEAK VEL: 2.77 M/S
DOP CALC AO VTI: 59.12 CM
DOP CALC LVOT AREA: 2.5 CM2
DOP CALC LVOT DIAMETER: 1.77 CM
DOP CALC LVOT PEAK VEL: 1.09 M/S
DOP CALC LVOT STROKE VOLUME: 54.74 CM3
DOP CALC MV VTI: 32.36 CM
DOP CALCLVOT PEAK VEL VTI: 22.26 CM
E WAVE DECELERATION TIME: 184.6 MSEC
E/A RATIO: 0.63
E/E' RATIO: 12.46 M/S
ECHO LV POSTERIOR WALL: 0.73 CM (ref 0.6–1.1)
EJECTION FRACTION: 65 %
FRACTIONAL SHORTENING: 40 % (ref 28–44)
INTERVENTRICULAR SEPTUM: 0.79 CM (ref 0.6–1.1)
IVRT: 97.05 MSEC
LA MAJOR: 5.23 CM
LA MINOR: 5.38 CM
LA WIDTH: 4.43 CM
LEFT ATRIUM SIZE: 3.69 CM
LEFT ATRIUM VOLUME INDEX MOD: 39.3 ML/M2
LEFT ATRIUM VOLUME INDEX: 39.4 ML/M2
LEFT ATRIUM VOLUME MOD: 73.44 CM3
LEFT ATRIUM VOLUME: 73.7 CM3
LEFT INTERNAL DIMENSION IN SYSTOLE: 2.37 CM (ref 2.1–4)
LEFT VENTRICLE DIASTOLIC VOLUME INDEX: 35.71 ML/M2
LEFT VENTRICLE DIASTOLIC VOLUME: 66.78 ML
LEFT VENTRICLE MASS INDEX: 45 G/M2
LEFT VENTRICLE SYSTOLIC VOLUME INDEX: 10.5 ML/M2
LEFT VENTRICLE SYSTOLIC VOLUME: 19.58 ML
LEFT VENTRICULAR INTERNAL DIMENSION IN DIASTOLE: 3.92 CM (ref 3.5–6)
LEFT VENTRICULAR MASS: 84.43 G
LV LATERAL E/E' RATIO: 11.57 M/S
LV SEPTAL E/E' RATIO: 13.5 M/S
MV MEAN GRADIENT: 3 MMHG
MV PEAK A VEL: 1.29 M/S
MV PEAK E VEL: 0.81 M/S
MV PEAK GRADIENT: 7 MMHG
MV STENOSIS PRESSURE HALF TIME: 53.53 MS
MV VALVE AREA BY CONTINUITY EQUATION: 1.69 CM2
MV VALVE AREA P 1/2 METHOD: 4.11 CM2
PISA TR MAX VEL: 2.47 M/S
PULM VEIN S/D RATIO: 1.33
PV PEAK D VEL: 0.4 M/S
PV PEAK S VEL: 0.53 M/S
RA MAJOR: 4.12 CM
RA PRESSURE: 3 MMHG
RA WIDTH: 3.44 CM
RIGHT VENTRICULAR END-DIASTOLIC DIMENSION: 2.94 CM
SINUS: 3.28 CM
STJ: 2.78 CM
TDI LATERAL: 0.07 M/S
TDI SEPTAL: 0.06 M/S
TDI: 0.07 M/S
TR MAX PG: 24 MMHG
TRICUSPID ANNULAR PLANE SYSTOLIC EXCURSION: 1.49 CM
TV REST PULMONARY ARTERY PRESSURE: 27 MMHG

## 2022-02-14 PROCEDURE — 93306 TTE W/DOPPLER COMPLETE: CPT | Mod: HCNC

## 2022-02-14 PROCEDURE — 93306 TTE W/DOPPLER COMPLETE: CPT | Mod: 26,HCNC,, | Performed by: INTERNAL MEDICINE

## 2022-02-14 PROCEDURE — 93306 ECHO (CUPID ONLY): ICD-10-PCS | Mod: 26,HCNC,, | Performed by: INTERNAL MEDICINE

## 2022-02-21 ENCOUNTER — TELEPHONE (OUTPATIENT)
Dept: BEHAVIORAL HEALTH | Facility: CLINIC | Age: 87
End: 2022-02-21
Payer: MEDICARE

## 2022-02-21 NOTE — PROGRESS NOTES
CHW reached out to pt to remind her of virtual appointment with Mikaela Jimenez LCSW,  on  tomorrow pt  confirmed  the appointment.

## 2022-02-22 ENCOUNTER — OFFICE VISIT (OUTPATIENT)
Dept: HEMATOLOGY/ONCOLOGY | Facility: CLINIC | Age: 87
End: 2022-02-22
Payer: MEDICARE

## 2022-02-22 ENCOUNTER — OFFICE VISIT (OUTPATIENT)
Dept: BEHAVIORAL HEALTH | Facility: CLINIC | Age: 87
End: 2022-02-22
Payer: MEDICARE

## 2022-02-22 ENCOUNTER — PATIENT MESSAGE (OUTPATIENT)
Dept: BEHAVIORAL HEALTH | Facility: CLINIC | Age: 87
End: 2022-02-22

## 2022-02-22 VITALS
RESPIRATION RATE: 18 BRPM | BODY MASS INDEX: 28.29 KG/M2 | OXYGEN SATURATION: 99 % | HEIGHT: 65 IN | SYSTOLIC BLOOD PRESSURE: 120 MMHG | DIASTOLIC BLOOD PRESSURE: 68 MMHG | TEMPERATURE: 98 F | HEART RATE: 89 BPM

## 2022-02-22 DIAGNOSIS — D50.9 IRON DEFICIENCY ANEMIA, UNSPECIFIED IRON DEFICIENCY ANEMIA TYPE: ICD-10-CM

## 2022-02-22 DIAGNOSIS — F41.1 GAD (GENERALIZED ANXIETY DISORDER): Primary | ICD-10-CM

## 2022-02-22 PROCEDURE — 1101F PR PT FALLS ASSESS DOC 0-1 FALLS W/OUT INJ PAST YR: ICD-10-PCS | Mod: HCNC,CPTII,S$GLB, | Performed by: INTERNAL MEDICINE

## 2022-02-22 PROCEDURE — 90832 PSYTX W PT 30 MINUTES: CPT | Mod: HCNC,95,, | Performed by: SOCIAL WORKER

## 2022-02-22 PROCEDURE — 1157F PR ADVANCE CARE PLAN OR EQUIV PRESENT IN MEDICAL RECORD: ICD-10-PCS | Mod: HCNC,CPTII,95, | Performed by: SOCIAL WORKER

## 2022-02-22 PROCEDURE — 99999 PR PBB SHADOW E&M-EST. PATIENT-LVL III: CPT | Mod: PBBFAC,HCNC,, | Performed by: INTERNAL MEDICINE

## 2022-02-22 PROCEDURE — 1160F PR REVIEW ALL MEDS BY PRESCRIBER/CLIN PHARMACIST DOCUMENTED: ICD-10-PCS | Mod: HCNC,CPTII,S$GLB, | Performed by: INTERNAL MEDICINE

## 2022-02-22 PROCEDURE — 1159F MED LIST DOCD IN RCRD: CPT | Mod: HCNC,CPTII,S$GLB, | Performed by: INTERNAL MEDICINE

## 2022-02-22 PROCEDURE — 1159F PR MEDICATION LIST DOCUMENTED IN MEDICAL RECORD: ICD-10-PCS | Mod: HCNC,CPTII,S$GLB, | Performed by: INTERNAL MEDICINE

## 2022-02-22 PROCEDURE — 1101F PT FALLS ASSESS-DOCD LE1/YR: CPT | Mod: HCNC,CPTII,S$GLB, | Performed by: INTERNAL MEDICINE

## 2022-02-22 PROCEDURE — 3288F PR FALLS RISK ASSESSMENT DOCUMENTED: ICD-10-PCS | Mod: HCNC,CPTII,S$GLB, | Performed by: INTERNAL MEDICINE

## 2022-02-22 PROCEDURE — 3074F SYST BP LT 130 MM HG: CPT | Mod: HCNC,CPTII,S$GLB, | Performed by: INTERNAL MEDICINE

## 2022-02-22 PROCEDURE — 99204 PR OFFICE/OUTPT VISIT, NEW, LEVL IV, 45-59 MIN: ICD-10-PCS | Mod: HCNC,S$GLB,, | Performed by: INTERNAL MEDICINE

## 2022-02-22 PROCEDURE — 1126F AMNT PAIN NOTED NONE PRSNT: CPT | Mod: HCNC,CPTII,S$GLB, | Performed by: INTERNAL MEDICINE

## 2022-02-22 PROCEDURE — 1157F ADVNC CARE PLAN IN RCRD: CPT | Mod: HCNC,CPTII,S$GLB, | Performed by: INTERNAL MEDICINE

## 2022-02-22 PROCEDURE — 99204 OFFICE O/P NEW MOD 45 MIN: CPT | Mod: HCNC,S$GLB,, | Performed by: INTERNAL MEDICINE

## 2022-02-22 PROCEDURE — 1157F PR ADVANCE CARE PLAN OR EQUIV PRESENT IN MEDICAL RECORD: ICD-10-PCS | Mod: HCNC,CPTII,S$GLB, | Performed by: INTERNAL MEDICINE

## 2022-02-22 PROCEDURE — 3074F PR MOST RECENT SYSTOLIC BLOOD PRESSURE < 130 MM HG: ICD-10-PCS | Mod: HCNC,CPTII,S$GLB, | Performed by: INTERNAL MEDICINE

## 2022-02-22 PROCEDURE — 1160F RVW MEDS BY RX/DR IN RCRD: CPT | Mod: HCNC,CPTII,S$GLB, | Performed by: INTERNAL MEDICINE

## 2022-02-22 PROCEDURE — 3288F FALL RISK ASSESSMENT DOCD: CPT | Mod: HCNC,CPTII,S$GLB, | Performed by: INTERNAL MEDICINE

## 2022-02-22 PROCEDURE — 90832 PR PSYCHOTHERAPY W/PATIENT, 30 MIN: ICD-10-PCS | Mod: HCNC,95,, | Performed by: SOCIAL WORKER

## 2022-02-22 PROCEDURE — 3078F DIAST BP <80 MM HG: CPT | Mod: HCNC,CPTII,S$GLB, | Performed by: INTERNAL MEDICINE

## 2022-02-22 PROCEDURE — 99999 PR PBB SHADOW E&M-EST. PATIENT-LVL III: ICD-10-PCS | Mod: PBBFAC,HCNC,, | Performed by: INTERNAL MEDICINE

## 2022-02-22 PROCEDURE — 1157F ADVNC CARE PLAN IN RCRD: CPT | Mod: HCNC,CPTII,95, | Performed by: SOCIAL WORKER

## 2022-02-22 PROCEDURE — 3078F PR MOST RECENT DIASTOLIC BLOOD PRESSURE < 80 MM HG: ICD-10-PCS | Mod: HCNC,CPTII,S$GLB, | Performed by: INTERNAL MEDICINE

## 2022-02-22 PROCEDURE — 1126F PR PAIN SEVERITY QUANTIFIED, NO PAIN PRESENT: ICD-10-PCS | Mod: HCNC,CPTII,S$GLB, | Performed by: INTERNAL MEDICINE

## 2022-02-22 RX ORDER — SODIUM CHLORIDE 0.9 % (FLUSH) 0.9 %
10 SYRINGE (ML) INJECTION
Status: CANCELLED | OUTPATIENT
Start: 2022-02-22

## 2022-02-22 RX ORDER — HEPARIN 100 UNIT/ML
500 SYRINGE INTRAVENOUS
Status: CANCELLED | OUTPATIENT
Start: 2022-02-22

## 2022-02-22 RX ORDER — HEPARIN 100 UNIT/ML
500 SYRINGE INTRAVENOUS
Status: CANCELLED | OUTPATIENT
Start: 2022-03-15

## 2022-02-22 RX ORDER — SODIUM CHLORIDE 0.9 % (FLUSH) 0.9 %
10 SYRINGE (ML) INJECTION
Status: CANCELLED | OUTPATIENT
Start: 2022-03-15

## 2022-02-22 RX ORDER — FERROUS SULFATE 325(65) MG
TABLET ORAL
COMMUNITY
Start: 2022-01-27 | End: 2022-02-22

## 2022-02-22 RX ORDER — CETIRIZINE HYDROCHLORIDE 10 MG/1
10 TABLET ORAL DAILY
COMMUNITY
Start: 2022-01-27 | End: 2023-08-30

## 2022-02-22 NOTE — PROGRESS NOTES
"Pt presents with, co/o \"difficulty taking a deep breath, its not pain, states symptoms started approx 1 week, with sinus congestion, then went into her lungs.\" State she has been having chills, on and off. Pt appears anxious t/o assessment. Was immediately brought to room, EKG done and monitoring placed. See assessments. Call light placed within reach.   " The patient location is: home in [facility]  The chief complaint leading to consultation is: anxiety  Visit type: audiovisual    Face to Face time with patient: 30  45 minutes of total time spent on the encounter, which includes face to face time and non-face to face time preparing to see the patient (eg, review of tests), Obtaining and/or reviewing separately obtained history, Documenting clinical information in the electronic or other health record, Independently interpreting results (not separately reported) and communicating results to the patient/family/caregiver, or Care coordination (not separately reported).     Each patient to whom he or she provides medical services by telemedicine is:  (1) informed of the relationship between the physician and patient and the respective role of any other health care provider with respect to management of the patient; and (2) notified that he or she may decline to receive medical services by telemedicine and may withdraw from such care at any time.    Notes:   Individual Psychotherapy (LCSW/PhD)  Nickie Segura,  2/22/2022    Site:  Telemed         Therapeutic Intervention: Met with patient for individual psychotherapy.    Chief complaint/reason for encounter: anxiety   Current Goals: Pt will go to facility activity if does not go to lunch.   Interval history and content of current session:   Pt is happy to report attending lunch 13 days out of 16 days and attended 3 activities. The pt attended 2 live music sing alongs and chocolate treat event. Pt reports not liking activities where pt needs to use vision. PT is taking ativan before lunches but getting better, anxiety decreasing.   Anxiety is having to do with not seeing people (vision impairment).  Pt reports being able to see shape of head not face.  Congratulated pt on accomplishment.   Pt reports having trouble with sleep for the first time, trouble getting to sleep. Pt states once asleep, sleeps all night.     Few days  not going to sleep, prayers and breathing exercises, does help go to sleep.  Thinking about things going wrong. Pt states that Kary (sister) had a fall last night - not injuried. Pt reports couldn't all this without Kary, which is a lot of times concerned worried.      Discussed using mantra. Pt will pray for [facility] to intercede.     Goal: Pt will continue lunches, activities and talk to or meet a new person.      Treatment plan:  · Target symptoms: anxiety   · Why chosen therapy is appropriate versus another modality: patient responds to this modality  · Outcome monitoring methods: self-report, checklist/rating scale  · Therapeutic intervention type: insight oriented psychotherapy    Risk parameters:  Patient reports no suicidal ideation  Patient reports no homicidal ideation  Patient reports no self-injurious behavior  Patient reports no violent behavior    Verbal deficits: None    Patient's response to intervention:  The patient's response to intervention is guarded.    Progress toward goals and other mental status changes:  The patient's progress toward goals is excellent.    Diagnosis:     ICD-10-CM ICD-9-CM   1. RENÉE (generalized anxiety disorder)  F41.1 300.02       Plan: Pt plans to continue individual psychotherapy    Return to clinic: 1 month    Length of Service (minutes): 30

## 2022-02-22 NOTE — PROGRESS NOTES
"PATIENT: Nickie Segura  MRN: [id number]  DATE: 2/22/2022    Chief Complaint: CARLIN    Subjective:     History of Present Illness:     Referred by Dr. Perla Hopper for iron def anemia    January 31, 2022 - CBC shows hemoglobin 10.4, MCV 86, ferritin 139, iron saturation 9%    Her ferritin was only 17, 2 years ago    She is having lots of issues with constipation from oral iron    [facility]    Patient accompanied by sister, Ms. Brittaney Albarran    Past Medical, Surgical, Family and Social History Reviewed.    Medications and Allergies reviewed    Review of Systems   Constitutional: Positive for fatigue.   Gastrointestinal: Positive for constipation.     Objective:     Vitals:    02/22/22 0953   BP: 120/68   BP Location: Left arm   Patient Position: Sitting   BP Method: Medium (Automatic)   Pulse: 89   Resp: 18   Temp: 97.5 °F (36.4 °C)   TempSrc: Oral   SpO2: 99%   Height: 5' 5" (1.651 m)       BMI: Body mass index is 28.29 kg/m².    Physical Exam  Vitals and nursing note reviewed.   Constitutional:       General: She is not in acute distress.  Pulmonary:      Effort: Pulmonary effort is normal.      Breath sounds: Normal breath sounds.   Neurological:      Mental Status: She is alert. Mental status is at baseline.         Assessment:       1. Iron deficiency anemia, unspecified iron deficiency anemia type        Plan:   Iron deficiency anemia  -secondary to impaired absorption and chronic disease  -labs CBC, CMP, ferritin, iron saturation reviewed  -she has severe constipation from oral iron  -discussed IV iron with Venofer  -she only wants to try 2 doses of IV iron for now  -okay to discontinue oral iron  -will check CBC, CMP, ferritin, iron saturation 2 months    Many thanks to Dr. Hopper for the kind referral          "

## 2022-03-10 ENCOUNTER — TELEPHONE (OUTPATIENT)
Dept: PRIMARY CARE CLINIC | Facility: CLINIC | Age: 87
End: 2022-03-10
Payer: MEDICARE

## 2022-03-10 NOTE — TELEPHONE ENCOUNTER
----- Message from Phillip Lucio sent at 3/10/2022 11:07 AM CST -----  Contact: [phone]  Pt is calling in, she is needing a call back, she is needing a new RX for levothyroxine (SYNTHROID) 100 MCG tablet    Requesting an RX refill or new RX.  Is this a refill or new RX: refill    RX name and strength (copy/paste from chart):  levothyroxine (SYNTHROID) 100 MCG tablet    Is this a 30 day or 90 day RX: 90      Pharmacy name and phone # (copy/paste from chart):      [id number] - [place] - [address] AT [facility] [address]  [address] [address]  Phone: [phone] Fax: [phone]        The doctors have asked that we provide their patients with the following 2 reminders -- prescription refills can take up to 72 hours, and a friendly reminder that in the future you can use your [facility] account to request refills:

## 2022-03-10 NOTE — TELEPHONE ENCOUNTER
I sw Jose with [facility]. They have a rx on hold and will fill it for her. Kary was informed of this

## 2022-03-14 ENCOUNTER — INFUSION (OUTPATIENT)
Dept: INFUSION THERAPY | Facility: HOSPITAL | Age: 87
End: 2022-03-14
Attending: INTERNAL MEDICINE
Payer: MEDICARE

## 2022-03-14 VITALS
SYSTOLIC BLOOD PRESSURE: 142 MMHG | HEART RATE: 91 BPM | TEMPERATURE: 99 F | WEIGHT: 170 LBS | HEIGHT: 65 IN | DIASTOLIC BLOOD PRESSURE: 69 MMHG | BODY MASS INDEX: 28.32 KG/M2 | OXYGEN SATURATION: 96 % | RESPIRATION RATE: 18 BRPM

## 2022-03-14 DIAGNOSIS — D50.8 OTHER IRON DEFICIENCY ANEMIA: Primary | ICD-10-CM

## 2022-03-14 PROCEDURE — 25000003 PHARM REV CODE 250: Performed by: INTERNAL MEDICINE

## 2022-03-14 PROCEDURE — 96365 THER/PROPH/DIAG IV INF INIT: CPT

## 2022-03-14 PROCEDURE — A4216 STERILE WATER/SALINE, 10 ML: HCPCS | Performed by: INTERNAL MEDICINE

## 2022-03-14 PROCEDURE — 63600175 PHARM REV CODE 636 W HCPCS: Performed by: INTERNAL MEDICINE

## 2022-03-14 RX ORDER — SODIUM CHLORIDE 0.9 % (FLUSH) 0.9 %
10 SYRINGE (ML) INJECTION
Status: DISCONTINUED | OUTPATIENT
Start: 2022-03-14 | End: 2022-03-14 | Stop reason: HOSPADM

## 2022-03-14 RX ORDER — HEPARIN 100 UNIT/ML
500 SYRINGE INTRAVENOUS
Status: DISCONTINUED | OUTPATIENT
Start: 2022-03-14 | End: 2022-03-14 | Stop reason: HOSPADM

## 2022-03-14 RX ADMIN — Medication 10 ML: at 01:03

## 2022-03-14 RX ADMIN — SODIUM CHLORIDE: 0.9 INJECTION, SOLUTION INTRAVENOUS at 12:03

## 2022-03-14 RX ADMIN — IRON SUCROSE 200 MG: 20 INJECTION, SOLUTION INTRAVENOUS at 12:03

## 2022-03-14 NOTE — NURSING
Pt received IV Venoferr infusion dose 1/2. Pt tolerated it well. AVS given. Next appointment scheduled. Discharged with no acute distress.

## 2022-03-21 ENCOUNTER — PATIENT MESSAGE (OUTPATIENT)
Dept: BEHAVIORAL HEALTH | Facility: CLINIC | Age: 87
End: 2022-03-21
Payer: MEDICARE

## 2022-03-21 ENCOUNTER — INFUSION (OUTPATIENT)
Dept: INFUSION THERAPY | Facility: HOSPITAL | Age: 87
End: 2022-03-21
Attending: INTERNAL MEDICINE
Payer: MEDICARE

## 2022-03-21 ENCOUNTER — TELEPHONE (OUTPATIENT)
Dept: BEHAVIORAL HEALTH | Facility: CLINIC | Age: 87
End: 2022-03-21
Payer: MEDICARE

## 2022-03-21 VITALS
HEART RATE: 86 BPM | OXYGEN SATURATION: 97 % | TEMPERATURE: 98 F | RESPIRATION RATE: 18 BRPM | DIASTOLIC BLOOD PRESSURE: 55 MMHG | HEIGHT: 65 IN | BODY MASS INDEX: 28.32 KG/M2 | SYSTOLIC BLOOD PRESSURE: 109 MMHG | WEIGHT: 170 LBS

## 2022-03-21 DIAGNOSIS — D50.8 OTHER IRON DEFICIENCY ANEMIA: Primary | ICD-10-CM

## 2022-03-21 PROCEDURE — 25000003 PHARM REV CODE 250: Performed by: INTERNAL MEDICINE

## 2022-03-21 PROCEDURE — 96365 THER/PROPH/DIAG IV INF INIT: CPT

## 2022-03-21 PROCEDURE — 63600175 PHARM REV CODE 636 W HCPCS: Performed by: INTERNAL MEDICINE

## 2022-03-21 RX ORDER — SODIUM CHLORIDE 0.9 % (FLUSH) 0.9 %
10 SYRINGE (ML) INJECTION
Status: DISCONTINUED | OUTPATIENT
Start: 2022-03-21 | End: 2022-03-21 | Stop reason: HOSPADM

## 2022-03-21 RX ADMIN — SODIUM CHLORIDE: 0.9 INJECTION, SOLUTION INTRAVENOUS at 11:03

## 2022-03-21 RX ADMIN — IRON SUCROSE 200 MG: 20 INJECTION, SOLUTION INTRAVENOUS at 11:03

## 2022-03-21 NOTE — NURSING
Pt received IV Venofer infusion dose 2/2. Pt tolerated it well. AVS given. Pt will follow up with MD. Discharged with no acute distress.

## 2022-03-21 NOTE — PROGRESS NOTES
CHW reached out to pt to remind her of virtual appointment with Mikaela Jimenez LCSW,  on tomorrow. No answer, left VM of appt and sent reminder and assessmetns to pt MyChart.

## 2022-03-25 ENCOUNTER — TELEPHONE (OUTPATIENT)
Dept: BEHAVIORAL HEALTH | Facility: CLINIC | Age: 87
End: 2022-03-25
Payer: MEDICARE

## 2022-03-25 NOTE — PROGRESS NOTES
CHW reached out to pt to reschedule appointment with Mikaela Jimenez LCSW. No answer, left VM to call to reschedule appointment.

## 2022-03-27 ENCOUNTER — OFFICE VISIT (OUTPATIENT)
Dept: URGENT CARE | Facility: CLINIC | Age: 87
End: 2022-03-27
Payer: MEDICARE

## 2022-03-27 VITALS
HEART RATE: 89 BPM | RESPIRATION RATE: 19 BRPM | WEIGHT: 170 LBS | BODY MASS INDEX: 28.29 KG/M2 | OXYGEN SATURATION: 96 % | TEMPERATURE: 98 F | DIASTOLIC BLOOD PRESSURE: 64 MMHG | SYSTOLIC BLOOD PRESSURE: 100 MMHG

## 2022-03-27 DIAGNOSIS — J40 BRONCHITIS: ICD-10-CM

## 2022-03-27 DIAGNOSIS — R05.9 COUGH: Primary | ICD-10-CM

## 2022-03-27 PROCEDURE — 1159F MED LIST DOCD IN RCRD: CPT | Mod: CPTII,S$GLB,, | Performed by: FAMILY MEDICINE

## 2022-03-27 PROCEDURE — 99213 OFFICE O/P EST LOW 20 MIN: CPT | Mod: S$GLB,,, | Performed by: FAMILY MEDICINE

## 2022-03-27 PROCEDURE — 1157F ADVNC CARE PLAN IN RCRD: CPT | Mod: CPTII,S$GLB,, | Performed by: FAMILY MEDICINE

## 2022-03-27 PROCEDURE — 99213 PR OFFICE/OUTPT VISIT, EST, LEVL III, 20-29 MIN: ICD-10-PCS | Mod: S$GLB,,, | Performed by: FAMILY MEDICINE

## 2022-03-27 PROCEDURE — 1159F PR MEDICATION LIST DOCUMENTED IN MEDICAL RECORD: ICD-10-PCS | Mod: CPTII,S$GLB,, | Performed by: FAMILY MEDICINE

## 2022-03-27 PROCEDURE — 1160F PR REVIEW ALL MEDS BY PRESCRIBER/CLIN PHARMACIST DOCUMENTED: ICD-10-PCS | Mod: CPTII,S$GLB,, | Performed by: FAMILY MEDICINE

## 2022-03-27 PROCEDURE — 1157F PR ADVANCE CARE PLAN OR EQUIV PRESENT IN MEDICAL RECORD: ICD-10-PCS | Mod: CPTII,S$GLB,, | Performed by: FAMILY MEDICINE

## 2022-03-27 PROCEDURE — 1160F RVW MEDS BY RX/DR IN RCRD: CPT | Mod: CPTII,S$GLB,, | Performed by: FAMILY MEDICINE

## 2022-03-27 RX ORDER — DOXYCYCLINE 100 MG/1
100 CAPSULE ORAL EVERY 12 HOURS
Qty: 14 CAPSULE | Refills: 0 | Status: SHIPPED | OUTPATIENT
Start: 2022-03-27 | End: 2022-05-30

## 2022-03-27 NOTE — PROGRESS NOTES
Subjective:       Patient ID: Nickie Segura is a 87 y.o. female.    Vitals:  weight is 77.1 kg (170 lb). Her temperature is 98 °F (36.7 °C). Her blood pressure is 100/64 and her pulse is 89. Her respiration is 19 and oxygen saturation is 96%.     Chief Complaint: Cough    Pt is complaining of cough and back pain that started about 2 weeks ago. She been taking Clartin.   C/o cough and chest congestion x 3 weeks, denies fever or chills  Denies any SOB,      Respiratory: Positive for chest tightness and cough.    Musculoskeletal: Positive for back pain.   Allergic/Immunologic: Positive for sneezing.       Objective:      Physical Exam   Constitutional: She is oriented to person, place, and time. She appears well-developed. She is cooperative.  Non-toxic appearance. She does not appear ill. No distress.      Comments:Elderly female on a wheelchair  Breathing without difficulty   normal  HENT:   Head: Normocephalic and atraumatic.   Ears:   Right Ear: Hearing, tympanic membrane, external ear and ear canal normal.   Left Ear: Hearing, tympanic membrane, external ear and ear canal normal.   Nose: Nose normal. No mucosal edema, rhinorrhea or nasal deformity. No epistaxis. Right sinus exhibits no maxillary sinus tenderness and no frontal sinus tenderness. Left sinus exhibits no maxillary sinus tenderness and no frontal sinus tenderness.   Mouth/Throat: Uvula is midline, oropharynx is clear and moist and mucous membranes are normal. Mucous membranes are moist. No trismus in the jaw. Normal dentition. No uvula swelling. No posterior oropharyngeal erythema. Oropharynx is clear.   Eyes: Conjunctivae and lids are normal. Pupils are equal, round, and reactive to light. Right eye exhibits no discharge. Left eye exhibits no discharge. No scleral icterus.      extraocular movement intact   Neck: Trachea normal and phonation normal. Neck supple.   Cardiovascular: Normal rate, regular rhythm, normal heart sounds and normal pulses.    Pulmonary/Chest: Effort normal and breath sounds normal. No respiratory distress.   Abdominal: Normal appearance and bowel sounds are normal. She exhibits no distension, no pulsatile midline mass and no mass. Soft. There is no abdominal tenderness.   Musculoskeletal: Normal range of motion.         General: No deformity. Normal range of motion.   Neurological: She is alert and oriented to person, place, and time. She exhibits normal muscle tone. Coordination normal.   Skin: Skin is warm, dry, intact, not diaphoretic and not pale.   Psychiatric: Her speech is normal and behavior is normal. Judgment and thought content normal.   Nursing note and vitals reviewed.        Assessment:       1. Cough    2. Bronchitis          Plan:         Cough  -     X-Ray Chest PA And Lateral; Future; Expected date: 03/27/2022  -     EKG 12-lead    Bronchitis    Other orders  -     doxycycline (VIBRAMYCIN) 100 MG Cap; Take 1 capsule (100 mg total) by mouth every 12 (twelve) hours.  Dispense: 14 capsule; Refill: 0              Mucinex bid    Pt unable to stand up to do CXR, will treat her empirically and advised to follow up with PCP

## 2022-03-28 ENCOUNTER — TELEPHONE (OUTPATIENT)
Dept: BEHAVIORAL HEALTH | Facility: CLINIC | Age: 87
End: 2022-03-28
Payer: MEDICARE

## 2022-03-28 NOTE — PROGRESS NOTES
CHW reached out to pt's sister Brittaney Diaz at [phone] to reschedule visit with Mikaela Jimenez LCSW.  Pt was rescheduled for virtual visit on 03/30/22 at 2:00pm. Sister will assist with virtual visit.

## 2022-03-29 ENCOUNTER — TELEPHONE (OUTPATIENT)
Dept: BEHAVIORAL HEALTH | Facility: CLINIC | Age: 87
End: 2022-03-29
Payer: MEDICARE

## 2022-03-29 NOTE — PROGRESS NOTES
CHW reached out to patient to remind her of virtual visit with Mikaela Jimenez LCSW on 03/30/22 at 2:00pm. Spoke with her sister Brittaney who asked that I call pt's phone number with [phone] area code.  I called pt, no answer, left VM.  Called sister back as agreed and she recommended calling pt again at 4:00pm when pt and sister Apolonia have return from Dr. Visit.    Behavioral Health Community Health Worker  Follow-Up  Completed by: Cristy Lee    Date:  3/29/2022    Patient Enrollment in Behavioral Health Program:  · Nickie Segura was enrolled in the Behavioral Health Program on 01/05/2022    Assessments     Promis 10:  PROMIS-10 Questionnaire Scores 3/29/2022   Global Physical Health 5   Global Mental health Score 10       Depression PHQ:  PHQ9 3/29/2022   Total Score 8       Generalized Anxiety Disorder 7-Item Scale:  GAD7 3/29/2022   1. Feeling nervous, anxious, or on edge? 3   2. Not being able to stop or control worrying? 3   3. Worrying too much about different things? 2   4. Trouble relaxing? 1   5. Being so restless that it is hard to sit still? 0   6. Becoming easily annoyed or irritable? 0   7. Feeling afraid as if something awful might happen? 1   8. If you checked off any problems, how difficult have these problems made it for you to do your work, take care of things at home, or get along with other people? 1   RENÉE-7 Score 10       Patients' Global Impression of Change (PGIC) Scale:  Since beginning treatment at this clinic, how would you describe the change (if any) in ACTIVITY LIMITATIONS, SYMPTOMS, EMOTIONS, and OVERALL QUALITY OF LIFE, related to your painful condition?  No Value exists for the : [facility]#04031      In a similar way, please check the number below that matches your degree of change since beginning care at this clinic (Much better (0) - Much Worse (10)): No Value exists for the : [facility]#49396        Much Better                                     No Change                                  "   Much Worse                        -----------------------------------------------------------------------------                        0       1       2       3       4       5       6       7      8       9      10                     Call Summary     Patient was referred to the BHI (Non-opioid) program by Primary Care Provider, Dr. Perla Hopper. CHW contacted Nickie Segura who reports depression and anxiety that limits her activities of daily living (ADLs).   Patient scored "8" on the PHQ9 and "10" on the RENÉE 7. Based on these scores patient is eligible for the Behavioral Health Integration (Non-opioid) Program. CHW completed the Monthly follow up assessments and a virtual appointment for patient with Mikaela Jimenez LCSW was previously scheduled, on 03/30/22 at 2:00pm.         "

## 2022-03-30 ENCOUNTER — TELEPHONE (OUTPATIENT)
Dept: BEHAVIORAL HEALTH | Facility: CLINIC | Age: 87
End: 2022-03-30
Payer: MEDICARE

## 2022-03-30 NOTE — PROGRESS NOTES
CHW received call from patient's sister, Brittaney, who advocates for her and set up her virtual visit.  Sister is unable to travel to [place], [place] to assist so rescheduled visit for 4/4/22 at 2:00pm.

## 2022-04-01 ENCOUNTER — TELEPHONE (OUTPATIENT)
Dept: BEHAVIORAL HEALTH | Facility: CLINIC | Age: 87
End: 2022-04-01
Payer: MEDICARE

## 2022-04-01 NOTE — PROGRESS NOTES
CHW reached out to pt to remind her of virtual appointment with Mikaela Jimenez LCSW, on Monday. Pt confirmed the appointment.

## 2022-04-04 ENCOUNTER — OFFICE VISIT (OUTPATIENT)
Dept: BEHAVIORAL HEALTH | Facility: CLINIC | Age: 87
End: 2022-04-04
Payer: MEDICARE

## 2022-04-04 DIAGNOSIS — F41.1 GAD (GENERALIZED ANXIETY DISORDER): Primary | ICD-10-CM

## 2022-04-04 PROCEDURE — 1157F PR ADVANCE CARE PLAN OR EQUIV PRESENT IN MEDICAL RECORD: ICD-10-PCS | Mod: CPTII,95,, | Performed by: SOCIAL WORKER

## 2022-04-04 PROCEDURE — 90832 PSYTX W PT 30 MINUTES: CPT | Mod: 95,,, | Performed by: SOCIAL WORKER

## 2022-04-04 PROCEDURE — 90832 PR PSYCHOTHERAPY W/PATIENT, 30 MIN: ICD-10-PCS | Mod: 95,,, | Performed by: SOCIAL WORKER

## 2022-04-04 PROCEDURE — 1157F ADVNC CARE PLAN IN RCRD: CPT | Mod: CPTII,95,, | Performed by: SOCIAL WORKER

## 2022-04-04 NOTE — PROGRESS NOTES
The patient location is: Home in [facility]  The chief complaint leading to consultation is: anxiety    Visit type: audiovisual    Face to Face time with patient: 30  45 minutes of total time spent on the encounter, which includes face to face time and non-face to face time preparing to see the patient (eg, review of tests), Obtaining and/or reviewing separately obtained history, Documenting clinical information in the electronic or other health record, Independently interpreting results (not separately reported) and communicating results to the patient/family/caregiver, or Care coordination (not separately reported).     Each patient to whom he or she provides medical services by telemedicine is:  (1) informed of the relationship between the physician and patient and the respective role of any other health care provider with respect to management of the patient; and (2) notified that he or she may decline to receive medical services by telemedicine and may withdraw from such care at any time.    Notes:  Individual Psychotherapy (LCSW/PhD)  Nickie Segura,  4/4/2022    Site:  Telemed         Therapeutic Intervention: Met with patient for individual psychotherapy.    Chief complaint/reason for encounter: anxiety   Current Goal: Pt will continue lunches, activities and talk to or meet a new person  Interval history and content of current session:   Pt apologizes for cancelling a few times due to weather.    Pt reports that is most concerned about eye sight declining having a procedure tomorrow eye lid stitching but uncertain this will help the Macular Degeneration has on pt's sight. Discussed books on tape, pt was not interested in books on tape now is considering. Pt's niece will check on these at [employer].    No stomach burning anymore, pt contributes to breathing exercises.   Increasing medication has not helped (Celexa) would like to see what can be done to adjust medications. Pt states that  breathing and Ativan has helped, is hoping dr will not take away Ativan. Pt reports only has not taken Ativan. Sometimes pt takes Ativan later in the day. Discussed Ativan and increased risk of falls. Pt states that never walks halls or goes to the bathroom alone.     Physical therapy is complete, pt is doing exercises at home.      Pt reports attending [employer],  [employer]azdan[employer] service,  Lunch, and dinner every day.     Goals- work on getting books on tape.    Treatment plan:  · Target symptoms: anxiety   · Why chosen therapy is appropriate versus another modality: relevant to diagnosis, patient responds to this modality  · Outcome monitoring methods: self-report, checklist/rating scale  · Therapeutic intervention type: behavior modifying psychotherapy    Risk parameters:  Patient reports no suicidal ideation  Patient reports no homicidal ideation  Patient reports no self-injurious behavior  Patient reports no violent behavior    Verbal deficits: None    Patient's response to intervention:  The patient's response to intervention is guarded.    Progress toward goals and other mental status changes:  The patient's progress toward goals is fair .    Diagnosis:     ICD-10-CM ICD-9-CM   1. RENÉE (generalized anxiety disorder)  F41.1 300.02       Plan: Pt plans to continue individual psychotherapy    Return to clinic: 3 weeks, 4/25 @ 2pm    Length of Service (minutes): 30

## 2022-04-05 NOTE — PROGRESS NOTES
Patient discussed during I meeting today.  Patient with poorly controlled anxiety on Celexa 30mg daily and Buspar 7.5mg BID.  She reports higher dose of Buspar was not more helpful, however she has been showing improvement in her symptoms in therapy.  I would recommend increasing Buspar to 10mg BID.  I would also recommend getting EKG at next PCP appointment (last QTc 458 on 1/31/22).  I will send message to PCP regarding plan.      Time: 12 minutes

## 2022-04-14 DIAGNOSIS — F32.A DEPRESSION, UNSPECIFIED DEPRESSION TYPE: ICD-10-CM

## 2022-04-14 RX ORDER — CITALOPRAM 20 MG/1
TABLET, FILM COATED ORAL
Qty: 135 TABLET | Refills: 3 | Status: SHIPPED | OUTPATIENT
Start: 2022-04-14 | End: 2023-04-01

## 2022-04-14 NOTE — TELEPHONE ENCOUNTER
Refill Authorization Note   Nickie Segura  is requesting a refill authorization.  Brief Assessment and Rationale for Refill:  Approve     Medication Therapy Plan:       Medication Reconciliation Completed: No   Comments:     No Care Gaps recommended.     Note composed:11:47 AM 04/14/2022

## 2022-04-14 NOTE — TELEPHONE ENCOUNTER
No new care gaps identified.  Powered by [employer]. Reference number: [id number].   4/14/2022 9:17:10 AM CDT

## 2022-04-17 NOTE — PROGRESS NOTES
"[facility] Note    Chief Complaint      Chief Complaint   Patient presents with    Follow-up       History of Present Illness      Nickie Segura is a 87 y.o.  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  Last visit - 1/25/22.     H/o CVA - ED 11/5/21 - facial droop, RT arm, and leg weakness - stroke vs TIA.  She left ER before an MRI brain was done.  So unsure if had a stroke vs TIA. She is on ASA, statin.  She has residual Rt facial droop, numbness in her RT fingers, and RLE weakness.       Recurrent UTI-Fu by Dr. Smith.  Stable on vaginal cream. Pt also on Myrbetriq and Toviaz for OAB. Doing well.      OAB - Myrbetriq and Toviaz help.      HTN- Controlled off meds.  Continue low-sodium diet.     HLD - Controlled on Crestor 20 mg QHS.  Her cholesterol is controlled -  TG 86 HDL 55 LDL 56 - 1/31/22. Repeat Jan.       Hypothyroidism-  Controlled on levothyroxine 100 mcg daily. Repeat TFT's in Nov.     Anxiety-When on Wellbutrin she noticed her skin is sensitive to touch. Pt started noticing this after 2-3 day on the medication. Pt on Buspar 7.5 mg BID, and Celexa 30 mg/d.   Pt takes Ativan 0.5 mg rarely prn but feels it is the only thing that helps. She saw LCSWMikaela, 1/10/22 and1/18/22. She found this helpful.  Will likely inc to 10 mg BID and d/w Dr. Glass again. She has started going to lunch again.       Depression- Pt has always struggled with depression and anxiety.  Not worse due to pandemic or recent stroke She is upset about her failing eye sight.   Pt on Celexa 30 mg daily and buspar 5 mg po BID.  "I'm just down and am not motivated to do anything like get dressed or pay my bills on time". Tried adding Trazodone 50 mg 1/2 QHS - no help and felt more anxious on it.  She did not tolerate Bupropion 75 mg BID.  She saw Mikaela Jimenez 1/10/22 and 1/18/22. " "Her vision and memory are worsening which concerns her.      Wheezing-Pt has ProAir which she uses prn - infrequently - has not used in several mos.  PFTs-WNL-07/14/2015. Doing well with getting rid of tobacco.     Nicotine dependence - Pt smokes E-cigarette for the past 3 yrs without tobacco.  Pt aware of the dangers.  She quit cigarette 07/05/2016.  She still vapes.      Hiatal hernia-Stable on Gavescon daily - doing well.     Colon polyps - Pt is s/p partial colon resection '09  CRS Dr. Gibson.  GI -Dr. Giron.  Last C scope-'13.      Iron deficiency- H/H -10.4/36/.5 in Jan. ferritin was normal but your % Iron saturation was low at 9%.  I suspect you have a mixed picture of Iron deficiency and anemia of chronic inflammation.   Fu by H/O - Dr. Denton.     FOBT - neg.  Her vitamin b12 is slightly low at 373.  Pt is on OTC Vitamin B12 1000 mcg daily. Her folate was normal.  Did not mary jo Oral iron and now on  IV iron (3/10/22 and 3/21/22) per H/O, Dr. Denton.       Hyperkalemia - Resolved -4.1. Rec low potassium diet. She is not taking any Potassium supplementation.   Do not feel pt would tolerate even low dose furosemide 10 mg Q M,W,F.       IFG - Your Ha1c -4.8 - normal in Sept. No evidence of diabetes.       Hepatomegaly - 23 cm.  Fu by Dr. Giron.  Resolved.      OA - Preston knees - RT > Left. Rec glucosamine or turmeric. Rt Knee gives out.  She would like to renew PTx. She uses a seated walker.       Renal cysts - + fam h/o PCKD.  ? Angiomyolipoma on Left Kidney.  Fu by Dr. Smith.      Obesity - BMI - 32.2 - Rec low carb diet. She "tries". She eats TID meals most days and if misses a meal supplements with ensure.      Elev IGE - 742 ? Etiol.  No allergic Sx's.  ? Eos Esophagitis.      Aortic atherosclerosis - Tortuosity of the thoracic aorta with aortic atherosclerosis seen on CXR in Oct.  Prev fu by Dr. Nguyen Melgar. Has appt on 1/31/21 to est with new Dr. Kamran Melgar. She had a recent  is due for fu. Cont " ASA and Crestor.    Echo - 2/21/22 - EF - 65%; Mild LAE, Mod AR, Mod Aortic stenosis     +LORNA - neg durham.      Acc by friend, Kary     HCM - Flu - 10/4/21; Td - > 10 yrs ago; PCV 13 - 2/21/17;  PVX 23 - 1/8/15;  Shingrix - ?- pt defers; COVID -19 Vaccine -#1 - 2/5/21; #2 - 3/5/21; #3 - 11/7/21; # 4 ;  MGM - refuses;  DEXA - declined; C-scope - '13; GI - Dr. Giron; Retina Sp - Dr. Gardner; Derm - Dr. Joseph; Ophtho - Dr. Calderon/Dr. Melendrez; Cards - Dr. Manley; H/O - Dr. Denton      Patient Care Team:  Perla Hopper MD as PCP - General (Internal Medicine)  Markus Giron MD as Consulting Physician (Gastroenterology)  Deepali Joseph MD as Consulting Physician (Dermatology)  Opal Aburto II, MD (Ophthalmology)  Edith Vega MA as Care Coordinator     Health Maintenance:  Immunization History   Administered Date(s) Administered    COVID-19, MRNA, LN-S, PF (MODERNA FULL 0.5 ML DOSE) 02/05/2021, 03/05/2021, 11/07/2021    Influenza 12/03/2004, 09/30/2007, 10/06/2009, 10/01/2010, 11/28/2015, 11/15/2016    Influenza (FLUAD) - Quadrivalent - Adjuvanted - PF *Preferred* (65+) 09/23/2020    Influenza (FLUAD) - Trivalent - Adjuvanted - PF (65+) 09/04/2019    Influenza - Quadrivalent - High Dose - PF (65 years and older) 10/04/2021    Influenza - Trivalent (ADULT) 12/03/2004, 10/06/2009, 10/01/2010    Pneumococcal Conjugate - 13 Valent 02/21/2017    Pneumococcal Polysaccharide - 23 Valent 09/30/2007, 01/08/2015      Health Maintenance   Topic Date Due    TETANUS VACCINE  Never done    Hemoglobin A1c  07/31/2022    Lipid Panel  01/31/2027        Past Medical History:  Past Medical History:   Diagnosis Date    LORNA positive     Anxiety     Bilateral cataracts     Colitis     Colon polyp     Depression     Elevated IgE level     Hepatomegaly     Hiatal hernia     Hypothyroidism     IFG (impaired fasting glucose)     Iron deficiency anemia     Macular degeneration     Nicotine dependence      Osteoarthritis     Recurrent UTI     Renal cyst     Skin cancer     Tuberculosis of bladder     Urinary incontinence     Vitamin B12 deficiency     Wheezing        Past Surgical History:   has a past surgical history that includes Cholecystectomy.    Family History:  family history includes Atrial fibrillation in her sister; Diabetes in her mother; Heart disease in her father and mother; Polycystic kidney disease in her father.     Social History:  Social History     Tobacco Use    Smoking status: Former Smoker     Types: Cigarettes, Vaping with nicotine    Smokeless tobacco: Current User   Substance Use Topics    Alcohol use: Yes    Drug use: Never       Review of Systems   Constitutional: Negative for chills and fever.   HENT: Positive for hearing loss. Negative for nasal congestion and sore throat.    Eyes: Positive for visual disturbance.        Left eye bothers her at night. She had abx of a benign eyelid lesion.  Since then the eyelid is droopy.    Respiratory: Negative for chest tightness and shortness of breath.    Cardiovascular: Negative for chest pain.   Gastrointestinal: Negative for abdominal pain, constipation, diarrhea, nausea and vomiting.        Doing well on Miralax QAM.    Genitourinary: Positive for bladder incontinence. Negative for dysuria and frequency.        Uses 2 pullups and pads.    Neurological: Negative for dizziness and headaches.   Psychiatric/Behavioral: Positive for dysphoric mood. The patient is nervous/anxious.         Medications:    Current Outpatient Medications:     Al hyd-Mg tr-alg ac-sod bicarb (GAVISCON) 80-14.2 mg Chew, Take 1 tablet by mouth once daily., Disp: , Rfl:     albuterol (PROVENTIL/VENTOLIN HFA) 90 mcg/actuation inhaler, Inhale 2 puffs into the lungs every 6 (six) hours as needed. Rescue, Disp: , Rfl:     aspirin (ECOTRIN) 81 MG EC tablet, Take 81 mg by mouth once daily., Disp: , Rfl:     cetirizine (ZYRTEC) 10 MG tablet, 1 tablet DAILY  "(route: oral), Disp: , Rfl:     citalopram (CELEXA) 20 MG tablet, TAKE 1 AND 1/2 TABLETS BY MOUTH DAILY, Disp: 135 tablet, Rfl: 3    doxycycline (VIBRAMYCIN) 100 MG Cap, Take 1 capsule (100 mg total) by mouth every 12 (twelve) hours., Disp: 14 capsule, Rfl: 0    erythromycin (ROMYCIN) ophthalmic ointment, SMARTSI Inch(es) Left Eye Every Night, Disp: , Rfl:     estradiol (ESTRACE) 0.01 % (0.1 mg/gram) vaginal cream, Place 1 g vaginally 3 (three) times a week., Disp: , Rfl:     fluticasone propionate (FLONASE ALLERGY RELIEF NASL), by Each Nostril route as needed., Disp: , Rfl:     levothyroxine (SYNTHROID) 100 MCG tablet, Take 1 tablet (100 mcg total) by mouth once daily., Disp: 90 tablet, Rfl: 2    mag/aluminum/sod bicarb/alginc (GAVISCON ORAL), Take 1 tablet by mouth daily as needed., Disp: , Rfl:     neomycin-polymyxin-dexamethasone (DEXACINE) 3.5 mg/g-10,000 unit/g-0.1 % Oint, APPLY A SMALL AMOUNT IN BOTH EYES TWICE DAILY, Disp: , Rfl:     rosuvastatin (CRESTOR) 20 MG tablet, Take 1 tablet (20 mg total) by mouth once daily., Disp: 90 tablet, Rfl: 3    sodium chloride (OCEAN) 0.65 % nasal spray, 1 spray DAILY (route: nasal), Disp: , Rfl:     vit C/E/zinc ox/amy/lut/zeax (ICAPS AREDS2 ORAL), Take by mouth Daily., Disp: , Rfl:     busPIRone (BUSPAR) 10 MG tablet, 1 po BID, Disp: 180 tablet, Rfl: 0    LORazepam (ATIVAN) 0.5 MG tablet, TAKE 1 TABLET BY MOUTH EVERY DAY AS NEEDED ANXIETY, Disp: 30 tablet, Rfl: 1    MYRBETRIQ 50 mg Tb24, Take 1 tablet (50 mg total) by mouth once daily., Disp: 90 tablet, Rfl: 1    TOVIAZ 8 mg Tb24, Take 1 tablet by mouth once daily., Disp: 90 tablet, Rfl: 1     Allergies:  Review of patient's allergies indicates:   Allergen Reactions    Penicillins      rash       Physical Exam      Vital Signs  Temp: 98 °F (36.7 °C)  Pulse: 88  Resp: 19  SpO2: 99 %  BP: 124/70  BP Location: Left arm  Patient Position: Sitting  Pain Score: 0-No pain  Height and Weight  Height: 5' 5" " (165.1 cm)  Weight: 78 kg (171 lb 15.3 oz)  BSA (Calculated - sq m): 1.89 sq meters  BMI (Calculated): 28.6  Weight in (lb) to have BMI = 25: 149.9      Patient Position: Sitting      Physical Exam  Vitals reviewed.   Constitutional:       General: She is not in acute distress.     Appearance: She is not ill-appearing, toxic-appearing or diaphoretic.      Comments: Wheelchair bound- examined in wheelchair   HENT:      Head: Normocephalic and atraumatic.      Ears:      Comments: Hard of hearing     Mouth/Throat:      Mouth: Mucous membranes are dry.   Eyes:      Comments: Left lower lid everted   Cardiovascular:      Rate and Rhythm: Normal rate and regular rhythm.      Pulses: Normal pulses.      Heart sounds: Normal heart sounds. No murmur heard.  Pulmonary:      Effort: No respiratory distress.      Breath sounds: Normal breath sounds.   Musculoskeletal:      Comments: Kyphosis. Pt sits slumped with head down   Neurological:      General: No focal deficit present.      Mental Status: She is alert and oriented to person, place, and time.   Psychiatric:         Mood and Affect: Mood normal.         Behavior: Behavior normal.      Comments: answers questions appropriately          Laboratory:  CBC:  Recent Labs   Lab 11/05/21  1825 12/15/21  1612 01/31/22  1225   WBC 4.32 4.57 4.81   RBC 3.72 L 3.36 L 4.27   Hemoglobin 10.5 L 8.8 L 10.4 L   Hematocrit 32.8 L 30.7 L 36.5 L   Platelets 215 325 256   MCV 88 91 86   MCH 28.2 26.2 L 24.4 L   MCHC 32.0 28.7 L 28.5 L       CMP:  Recent Labs   Lab 07/17/20  0909 07/31/20  1048 11/05/21  1825 12/15/21  1612 01/31/22  1225   Glucose 78   < > 89   < > 89   Calcium 10.5   < > 8.8   < > 9.3   Albumin 3.6  --  3.2 L  --  3.3 L   Total Protein 7.3  --  6.9  --  6.7   Sodium 142   < > 140   < > 139   Potassium 5.5 H   < > 5.0   < > 4.1   CO2 29   < > 26   < > 21 L   Chloride 105   < > 105   < > 106   BUN 19   < > 21   < > 12   Creatinine 1.0   < > 0.7   < > 0.7   Alkaline  Phosphatase 95  --  92  --  97   ALT 10  --  45 H  --  75 H   AST 16  --  44 H  --  59 H   Total Bilirubin 0.3  --  0.2  --  0.3    < > = values in this interval not displayed.           URINALYSIS:  Recent Labs   Lab 12/16/21  1246   Color, UA Yellow   Specific Gravity, UA 1.010   pH, UA 6.0   Protein, UA Negative   Nitrite, UA Negative   Leukocytes, UA Negative        LIPIDS:  Recent Labs   Lab 07/17/20  0909 09/23/20  0948 11/05/21  1825 01/31/22  1225   TSH  --  1.704 2.074  --    HDL 72  --   --  55   Cholesterol 158  --   --  129   Triglycerides 91  --   --  86   LDL Cholesterol 67.8  --   --  56.8 L   HDL/Cholesterol Ratio 45.6  --   --  42.6   Non-HDL Cholesterol 86  --   --  74   Total Cholesterol/HDL Ratio 2.2  --   --  2.3       TSH:  Recent Labs   Lab 09/23/20  0948 11/05/21  1825   TSH 1.704 2.074       A1C:  Recent Labs   Lab 09/23/20  0948 01/31/22  1225   Hemoglobin A1C 5.3 4.8       Other:   Recent Labs   Lab 12/15/21  1612 01/31/22  1225   Vitamin B-12 373  --    Ferritin  --  139   Iron  --  39   Transferrin  --  280   TIBC  --  414   Saturated Iron  --  9 L           Assessment/Plan     Nickie Segura is a 87 y.o.female with:    Hypertension, unspecified type  - Controlled.  Cont current. Sched for upcoming labs  with Dr. Denton incl CMP.     Hypothyroidism, unspecified type  - Controlled.  Cont current.     Hyperlipidemia, unspecified hyperlipidemia type  - Controlled.  Cont current.     Depression, unspecified depression type  -     busPIRone (BUSPAR) 10 MG tablet; 1 po BID  Dispense: 180 tablet; Refill: 0  - Uncontrolled.  Will inc Buspar to 10 mg BID.     Anxiety  -     busPIRone (BUSPAR) 10 MG tablet; 1 po BID  Dispense: 180 tablet; Refill: 0  -     LORazepam (ATIVAN) 0.5 MG tablet; TAKE 1 TABLET BY MOUTH EVERY DAY AS NEEDED ANXIETY  Dispense: 30 tablet; Refill: 1  - Uncontrolled.  Will inc Buspar to 10 mg BID.     Cerebrovascular disease  - Stable.  Cont current regimen.    OAB (overactive  "bladder)  -     TOVIAZ 8 mg Tb24; Take 1 tablet by mouth once daily.  Dispense: 90 tablet; Refill: 1  -     MYRBETRIQ 50 mg Tb24; Take 1 tablet (50 mg total) by mouth once daily.  Dispense: 90 tablet; Refill: 1  - Stable.  Cont current regimen.    Iron deficiency anemia, unspecified iron deficiency anemia type  - Doing well s/p IV iron. Fu by H/O.     Debility  - she always has someone with her.. she does not get up by herself.    Aortic valve stenosis, etiology of cardiac valve disease unspecified  - Stable.  Cont current regimen.    Engages in vaping  - Pt not interested in quitting. It's "the one thing she enjoys".        Chronic conditions status updated as per HPI.  Other than changes above, cont current medications and maintain follow up with specialists.  Follow up in about 4 months (around 8/19/2022) for fu chronic issues or sooner if needed.      Perla Hopper MD  [facility]                "

## 2022-04-19 ENCOUNTER — OFFICE VISIT (OUTPATIENT)
Dept: PRIMARY CARE CLINIC | Facility: CLINIC | Age: 87
End: 2022-04-19
Payer: MEDICARE

## 2022-04-19 VITALS
WEIGHT: 171.94 LBS | SYSTOLIC BLOOD PRESSURE: 124 MMHG | HEIGHT: 65 IN | DIASTOLIC BLOOD PRESSURE: 70 MMHG | BODY MASS INDEX: 28.65 KG/M2 | OXYGEN SATURATION: 99 % | TEMPERATURE: 98 F | HEART RATE: 88 BPM | RESPIRATION RATE: 19 BRPM

## 2022-04-19 DIAGNOSIS — I67.9 CEREBROVASCULAR DISEASE: ICD-10-CM

## 2022-04-19 DIAGNOSIS — I10 HYPERTENSION, UNSPECIFIED TYPE: Primary | ICD-10-CM

## 2022-04-19 DIAGNOSIS — D50.9 IRON DEFICIENCY ANEMIA, UNSPECIFIED IRON DEFICIENCY ANEMIA TYPE: ICD-10-CM

## 2022-04-19 DIAGNOSIS — F41.9 ANXIETY: ICD-10-CM

## 2022-04-19 DIAGNOSIS — R53.81 DEBILITY: ICD-10-CM

## 2022-04-19 DIAGNOSIS — I35.0 AORTIC VALVE STENOSIS, ETIOLOGY OF CARDIAC VALVE DISEASE UNSPECIFIED: ICD-10-CM

## 2022-04-19 DIAGNOSIS — F32.A DEPRESSION, UNSPECIFIED DEPRESSION TYPE: ICD-10-CM

## 2022-04-19 DIAGNOSIS — E78.5 HYPERLIPIDEMIA, UNSPECIFIED HYPERLIPIDEMIA TYPE: ICD-10-CM

## 2022-04-19 DIAGNOSIS — Z72.89 ENGAGES IN VAPING: ICD-10-CM

## 2022-04-19 DIAGNOSIS — E03.9 HYPOTHYROIDISM, UNSPECIFIED TYPE: ICD-10-CM

## 2022-04-19 DIAGNOSIS — N32.81 OAB (OVERACTIVE BLADDER): ICD-10-CM

## 2022-04-19 PROCEDURE — 3288F FALL RISK ASSESSMENT DOCD: CPT | Mod: CPTII,S$GLB,, | Performed by: INTERNAL MEDICINE

## 2022-04-19 PROCEDURE — 99499 RISK ADDL DX/OHS AUDIT: ICD-10-PCS | Mod: S$GLB,,, | Performed by: INTERNAL MEDICINE

## 2022-04-19 PROCEDURE — 1160F PR REVIEW ALL MEDS BY PRESCRIBER/CLIN PHARMACIST DOCUMENTED: ICD-10-PCS | Mod: CPTII,S$GLB,, | Performed by: INTERNAL MEDICINE

## 2022-04-19 PROCEDURE — 1126F AMNT PAIN NOTED NONE PRSNT: CPT | Mod: CPTII,S$GLB,, | Performed by: INTERNAL MEDICINE

## 2022-04-19 PROCEDURE — 99999 PR PBB SHADOW E&M-EST. PATIENT-LVL IV: ICD-10-PCS | Mod: PBBFAC,,, | Performed by: INTERNAL MEDICINE

## 2022-04-19 PROCEDURE — 1157F ADVNC CARE PLAN IN RCRD: CPT | Mod: CPTII,S$GLB,, | Performed by: INTERNAL MEDICINE

## 2022-04-19 PROCEDURE — 1101F PT FALLS ASSESS-DOCD LE1/YR: CPT | Mod: CPTII,S$GLB,, | Performed by: INTERNAL MEDICINE

## 2022-04-19 PROCEDURE — 1160F RVW MEDS BY RX/DR IN RCRD: CPT | Mod: CPTII,S$GLB,, | Performed by: INTERNAL MEDICINE

## 2022-04-19 PROCEDURE — 1159F MED LIST DOCD IN RCRD: CPT | Mod: CPTII,S$GLB,, | Performed by: INTERNAL MEDICINE

## 2022-04-19 PROCEDURE — 99999 PR PBB SHADOW E&M-EST. PATIENT-LVL IV: CPT | Mod: PBBFAC,,, | Performed by: INTERNAL MEDICINE

## 2022-04-19 PROCEDURE — 99499 UNLISTED E&M SERVICE: CPT | Mod: S$GLB,,, | Performed by: INTERNAL MEDICINE

## 2022-04-19 PROCEDURE — 1126F PR PAIN SEVERITY QUANTIFIED, NO PAIN PRESENT: ICD-10-PCS | Mod: CPTII,S$GLB,, | Performed by: INTERNAL MEDICINE

## 2022-04-19 PROCEDURE — 1157F PR ADVANCE CARE PLAN OR EQUIV PRESENT IN MEDICAL RECORD: ICD-10-PCS | Mod: CPTII,S$GLB,, | Performed by: INTERNAL MEDICINE

## 2022-04-19 PROCEDURE — 99214 PR OFFICE/OUTPT VISIT, EST, LEVL IV, 30-39 MIN: ICD-10-PCS | Mod: S$GLB,,, | Performed by: INTERNAL MEDICINE

## 2022-04-19 PROCEDURE — 1101F PR PT FALLS ASSESS DOC 0-1 FALLS W/OUT INJ PAST YR: ICD-10-PCS | Mod: CPTII,S$GLB,, | Performed by: INTERNAL MEDICINE

## 2022-04-19 PROCEDURE — 1159F PR MEDICATION LIST DOCUMENTED IN MEDICAL RECORD: ICD-10-PCS | Mod: CPTII,S$GLB,, | Performed by: INTERNAL MEDICINE

## 2022-04-19 PROCEDURE — 3288F PR FALLS RISK ASSESSMENT DOCUMENTED: ICD-10-PCS | Mod: CPTII,S$GLB,, | Performed by: INTERNAL MEDICINE

## 2022-04-19 PROCEDURE — 99214 OFFICE O/P EST MOD 30 MIN: CPT | Mod: S$GLB,,, | Performed by: INTERNAL MEDICINE

## 2022-04-19 RX ORDER — MIRABEGRON 50 MG/1
50 TABLET, FILM COATED, EXTENDED RELEASE ORAL DAILY
Qty: 90 TABLET | Refills: 1 | Status: SHIPPED | OUTPATIENT
Start: 2022-04-19 | End: 2022-11-01

## 2022-04-19 RX ORDER — LORAZEPAM 0.5 MG/1
TABLET ORAL
Qty: 30 TABLET | Refills: 1 | Status: SHIPPED | OUTPATIENT
Start: 2022-04-19 | End: 2022-06-29

## 2022-04-19 RX ORDER — ERYTHROMYCIN 5 MG/G
OINTMENT OPHTHALMIC
COMMUNITY
Start: 2022-04-05 | End: 2023-06-02

## 2022-04-19 RX ORDER — BUSPIRONE HYDROCHLORIDE 10 MG/1
TABLET ORAL
Qty: 180 TABLET | Refills: 0 | Status: SHIPPED | OUTPATIENT
Start: 2022-04-19 | End: 2022-07-19

## 2022-04-19 RX ORDER — FESOTERODINE FUMARATE 8 MG/1
1 TABLET, FILM COATED, EXTENDED RELEASE ORAL DAILY
Qty: 90 TABLET | Refills: 1 | Status: SHIPPED | OUTPATIENT
Start: 2022-04-19 | End: 2022-12-28

## 2022-04-22 ENCOUNTER — TELEPHONE (OUTPATIENT)
Dept: BEHAVIORAL HEALTH | Facility: CLINIC | Age: 87
End: 2022-04-22
Payer: MEDICARE

## 2022-04-22 NOTE — PROGRESS NOTES
CHW reached out to pt to remind her of virtual appointment with Mikaela Jimenez LCSW, on Monday. No answer, left VM.

## 2022-04-22 NOTE — PROGRESS NOTES
CHW received a call from pt sister and confirmed that sister will keep virtual visit on Monday, 04/25/22 at 2:00pm with Mikaela Jimenez LCSW.

## 2022-04-24 NOTE — PROGRESS NOTES
The patient location is: home in [facility] ([place])  The chief complaint leading to consultation is: anxiety    Visit type: audiovisual    Face to Face time with patient: 30  45 minutes of total time spent on the encounter, which includes face to face time and non-face to face time preparing to see the patient (eg, review of tests), Obtaining and/or reviewing separately obtained history, Documenting clinical information in the electronic or other health record, Independently interpreting results (not separately reported) and communicating results to the patient/family/caregiver, or Care coordination (not separately reported).     Each patient to whom he or she provides medical services by telemedicine is:  (1) informed of the relationship between the physician and patient and the respective role of any other health care provider with respect to management of the patient; and (2) notified that he or she may decline to receive medical services by telemedicine and may withdraw from such care at any time.    Individual Psychotherapy (LCSW/PhD)  Nickie Segura,  4/25/2022    Site:  Telemed         Therapeutic Intervention: Met with patient for individual psychotherapy.    Chief complaint/reason for encounter: anxiety   Current Goals- work on getting books on tape.  Interval history and content of current session:   Pt feels is doing well. Pt continues to go to lunch, participating more, confirmed by sister.   Listening to mass, novena and prayers on tv, pt feels is helping.   Going to lunch almost every day except when dining room was closed. Pt states still feels relieved when doesn't have to go to the dining room.   Pt reports Busbar increased by PCP, will see if it helps.     Pt reports the biggest thing is decrease in vision- shots since '06 for macular degeneration, injection no longer seems to be working. Will see specialist soon.   Feel like can't see and is major concern. Pt reports is sleeping well, doesn't affect  response to people.    Pt states although does not leave apartment community a lot does get a lot of company. Pt is leaving for doctor's appointments.     Pt is attending live music or a social events at complex as apposed to activities. Pt is very willing to attend activities.      Kary is reading to pt about a mystery about ASHLEY.     Sleeping well, falling a sleep around 9 pm, and up about 7 am. Less anxious than used to be upon awakening.      Goal: Focus on leg exercises, walking in the [place] with sitter behind.       Treatment plan:  · Target symptoms: anxiety   · Why chosen therapy is appropriate versus another modality: patient responds to this modality  · Outcome monitoring methods: self-report, checklist/rating scale  · Therapeutic intervention type: behavior modifying psychotherapy    Risk parameters:  Patient reports no suicidal ideation  Patient reports no homicidal ideation  Patient reports no self-injurious behavior  Patient reports no violent behavior    Verbal deficits: None    Patient's response to intervention:  The patient's response to intervention is guarded.    Progress toward goals and other mental status changes:  The patient's progress toward goals is fair .    Diagnosis:     ICD-10-CM ICD-9-CM   1. RENÉE (generalized anxiety disorder)  F41.1 300.02       Plan: Pt plans to continue individual psychotherapy    Return to clinic: 1 month, 5/24 @ 2pm    Length of Service (minutes): 30

## 2022-04-25 ENCOUNTER — OFFICE VISIT (OUTPATIENT)
Dept: BEHAVIORAL HEALTH | Facility: CLINIC | Age: 87
End: 2022-04-25
Payer: MEDICARE

## 2022-04-25 DIAGNOSIS — F41.1 GAD (GENERALIZED ANXIETY DISORDER): Primary | ICD-10-CM

## 2022-04-25 PROCEDURE — 90832 PR PSYCHOTHERAPY W/PATIENT, 30 MIN: ICD-10-PCS | Mod: 95,,, | Performed by: SOCIAL WORKER

## 2022-04-25 PROCEDURE — 90832 PSYTX W PT 30 MINUTES: CPT | Mod: 95,,, | Performed by: SOCIAL WORKER

## 2022-04-25 PROCEDURE — 1157F PR ADVANCE CARE PLAN OR EQUIV PRESENT IN MEDICAL RECORD: ICD-10-PCS | Mod: CPTII,95,, | Performed by: SOCIAL WORKER

## 2022-04-25 PROCEDURE — 1157F ADVNC CARE PLAN IN RCRD: CPT | Mod: CPTII,95,, | Performed by: SOCIAL WORKER

## 2022-05-16 ENCOUNTER — DOCUMENTATION ONLY (OUTPATIENT)
Dept: NEUROLOGY | Facility: HOSPITAL | Age: 87
End: 2022-05-16

## 2022-05-16 ENCOUNTER — LAB VISIT (OUTPATIENT)
Dept: LAB | Facility: HOSPITAL | Age: 87
End: 2022-05-16
Attending: INTERNAL MEDICINE
Payer: MEDICARE

## 2022-05-16 DIAGNOSIS — D50.9 IRON DEFICIENCY ANEMIA, UNSPECIFIED IRON DEFICIENCY ANEMIA TYPE: ICD-10-CM

## 2022-05-16 LAB
ALBUMIN SERPL BCP-MCNC: 3.4 G/DL (ref 3.5–5.2)
ALP SERPL-CCNC: 126 U/L (ref 55–135)
ALT SERPL W/O P-5'-P-CCNC: 158 U/L (ref 10–44)
ANION GAP SERPL CALC-SCNC: 5 MMOL/L (ref 8–16)
ANISOCYTOSIS BLD QL SMEAR: SLIGHT
AST SERPL-CCNC: 210 U/L (ref 10–40)
BASOPHILS # BLD AUTO: 0.03 K/UL (ref 0–0.2)
BASOPHILS NFR BLD: 1.7 % (ref 0–1.9)
BILIRUB SERPL-MCNC: 0.2 MG/DL (ref 0.1–1)
BUN SERPL-MCNC: 16 MG/DL (ref 8–23)
CALCIUM SERPL-MCNC: 8.8 MG/DL (ref 8.7–10.5)
CHLORIDE SERPL-SCNC: 107 MMOL/L (ref 95–110)
CO2 SERPL-SCNC: 29 MMOL/L (ref 23–29)
CREAT SERPL-MCNC: 0.7 MG/DL (ref 0.5–1.4)
DIFFERENTIAL METHOD: ABNORMAL
EOSINOPHIL # BLD AUTO: 0.1 K/UL (ref 0–0.5)
EOSINOPHIL NFR BLD: 4.5 % (ref 0–8)
ERYTHROCYTE [DISTWIDTH] IN BLOOD BY AUTOMATED COUNT: 18.6 % (ref 11.5–14.5)
EST. GFR  (AFRICAN AMERICAN): >60 ML/MIN/1.73 M^2
EST. GFR  (NON AFRICAN AMERICAN): >60 ML/MIN/1.73 M^2
FERRITIN SERPL-MCNC: 274 NG/ML (ref 20–300)
GLUCOSE SERPL-MCNC: 85 MG/DL (ref 70–110)
HCT VFR BLD AUTO: 22.7 % (ref 37–48.5)
HGB BLD-MCNC: 6.3 G/DL (ref 12–16)
HYPOCHROMIA BLD QL SMEAR: ABNORMAL
IMM GRANULOCYTES # BLD AUTO: 0 K/UL (ref 0–0.04)
IMM GRANULOCYTES NFR BLD AUTO: 0 % (ref 0–0.5)
IRON SERPL-MCNC: 14 UG/DL (ref 30–160)
LYMPHOCYTES # BLD AUTO: 0.7 K/UL (ref 1–4.8)
LYMPHOCYTES NFR BLD: 41.8 % (ref 18–48)
MCH RBC QN AUTO: 22 PG (ref 27–31)
MCHC RBC AUTO-ENTMCNC: 27.8 G/DL (ref 32–36)
MCV RBC AUTO: 79 FL (ref 82–98)
MONOCYTES # BLD AUTO: 0.3 K/UL (ref 0.3–1)
MONOCYTES NFR BLD: 17.5 % (ref 4–15)
NEUTROPHILS # BLD AUTO: 0.6 K/UL (ref 1.8–7.7)
NEUTROPHILS NFR BLD: 34.5 % (ref 38–73)
NRBC BLD-RTO: 0 /100 WBC
OVALOCYTES BLD QL SMEAR: ABNORMAL
PLATELET # BLD AUTO: 235 K/UL (ref 150–450)
PLATELET BLD QL SMEAR: ABNORMAL
PMV BLD AUTO: 8.8 FL (ref 9.2–12.9)
POIKILOCYTOSIS BLD QL SMEAR: SLIGHT
POLYCHROMASIA BLD QL SMEAR: ABNORMAL
POTASSIUM SERPL-SCNC: 4.5 MMOL/L (ref 3.5–5.1)
PROT SERPL-MCNC: 6.3 G/DL (ref 6–8.4)
RBC # BLD AUTO: 2.87 M/UL (ref 4–5.4)
SATURATED IRON: 3 % (ref 20–50)
SODIUM SERPL-SCNC: 141 MMOL/L (ref 136–145)
TOTAL IRON BINDING CAPACITY: 540 UG/DL (ref 250–450)
TRANSFERRIN SERPL-MCNC: 365 MG/DL (ref 200–375)
WBC # BLD AUTO: 1.77 K/UL (ref 3.9–12.7)

## 2022-05-16 PROCEDURE — 84466 ASSAY OF TRANSFERRIN: CPT | Performed by: INTERNAL MEDICINE

## 2022-05-16 PROCEDURE — 82728 ASSAY OF FERRITIN: CPT | Performed by: INTERNAL MEDICINE

## 2022-05-16 PROCEDURE — 85027 COMPLETE CBC AUTOMATED: CPT | Performed by: INTERNAL MEDICINE

## 2022-05-16 PROCEDURE — 85007 BL SMEAR W/DIFF WBC COUNT: CPT | Performed by: INTERNAL MEDICINE

## 2022-05-16 PROCEDURE — 80053 COMPREHEN METABOLIC PANEL: CPT | Performed by: INTERNAL MEDICINE

## 2022-05-16 PROCEDURE — 36415 COLL VENOUS BLD VENIPUNCTURE: CPT | Performed by: INTERNAL MEDICINE

## 2022-05-17 ENCOUNTER — PATIENT MESSAGE (OUTPATIENT)
Dept: HEMATOLOGY/ONCOLOGY | Facility: CLINIC | Age: 87
End: 2022-05-17
Payer: MEDICARE

## 2022-05-17 ENCOUNTER — PATIENT MESSAGE (OUTPATIENT)
Dept: PRIMARY CARE CLINIC | Facility: CLINIC | Age: 87
End: 2022-05-17
Payer: MEDICARE

## 2022-05-17 ENCOUNTER — HOSPITAL ENCOUNTER (OUTPATIENT)
Facility: HOSPITAL | Age: 87
Discharge: HOME HEALTH | End: 2022-05-19
Attending: EMERGENCY MEDICINE | Admitting: FAMILY MEDICINE
Payer: MEDICARE

## 2022-05-17 ENCOUNTER — TELEPHONE (OUTPATIENT)
Dept: HEMATOLOGY/ONCOLOGY | Facility: CLINIC | Age: 87
End: 2022-05-17
Payer: MEDICARE

## 2022-05-17 ENCOUNTER — TELEPHONE (OUTPATIENT)
Dept: PRIMARY CARE CLINIC | Facility: CLINIC | Age: 87
End: 2022-05-17
Payer: MEDICARE

## 2022-05-17 ENCOUNTER — DOCUMENTATION ONLY (OUTPATIENT)
Dept: HEMATOLOGY/ONCOLOGY | Facility: CLINIC | Age: 87
End: 2022-05-17
Payer: MEDICARE

## 2022-05-17 DIAGNOSIS — D64.9 SYMPTOMATIC ANEMIA: Primary | ICD-10-CM

## 2022-05-17 DIAGNOSIS — D64.9 SYMPTOMATIC ANEMIA: ICD-10-CM

## 2022-05-17 DIAGNOSIS — D64.9 ANEMIA: Primary | ICD-10-CM

## 2022-05-17 DIAGNOSIS — R07.9 CHEST PAIN: ICD-10-CM

## 2022-05-17 DIAGNOSIS — D70.9 NEUTROPENIA, UNSPECIFIED TYPE: ICD-10-CM

## 2022-05-17 DIAGNOSIS — D64.9 ANEMIA, UNSPECIFIED TYPE: ICD-10-CM

## 2022-05-17 DIAGNOSIS — R42 LIGHTHEADEDNESS: ICD-10-CM

## 2022-05-17 PROBLEM — R79.89 ELEVATED LFTS: Status: ACTIVE | Noted: 2022-05-17

## 2022-05-17 LAB
ABO + RH BLD: NORMAL
BLD GP AB SCN CELLS X3 SERPL QL: NORMAL

## 2022-05-17 PROCEDURE — 86920 COMPATIBILITY TEST SPIN: CPT | Performed by: STUDENT IN AN ORGANIZED HEALTH CARE EDUCATION/TRAINING PROGRAM

## 2022-05-17 PROCEDURE — 94761 N-INVAS EAR/PLS OXIMETRY MLT: CPT

## 2022-05-17 PROCEDURE — 99900035 HC TECH TIME PER 15 MIN (STAT)

## 2022-05-17 PROCEDURE — 36430 TRANSFUSION BLD/BLD COMPNT: CPT

## 2022-05-17 PROCEDURE — 93010 EKG 12-LEAD: ICD-10-PCS | Mod: ,,, | Performed by: INTERNAL MEDICINE

## 2022-05-17 PROCEDURE — G0378 HOSPITAL OBSERVATION PER HR: HCPCS

## 2022-05-17 PROCEDURE — 93010 ELECTROCARDIOGRAM REPORT: CPT | Mod: ,,, | Performed by: INTERNAL MEDICINE

## 2022-05-17 PROCEDURE — A4216 STERILE WATER/SALINE, 10 ML: HCPCS | Performed by: NURSE PRACTITIONER

## 2022-05-17 PROCEDURE — P9016 RBC LEUKOCYTES REDUCED: HCPCS | Performed by: STUDENT IN AN ORGANIZED HEALTH CARE EDUCATION/TRAINING PROGRAM

## 2022-05-17 PROCEDURE — 63600175 PHARM REV CODE 636 W HCPCS: Performed by: INTERNAL MEDICINE

## 2022-05-17 PROCEDURE — 93005 ELECTROCARDIOGRAM TRACING: CPT

## 2022-05-17 PROCEDURE — 25000003 PHARM REV CODE 250: Performed by: NURSE PRACTITIONER

## 2022-05-17 PROCEDURE — 86850 RBC ANTIBODY SCREEN: CPT | Mod: 91 | Performed by: EMERGENCY MEDICINE

## 2022-05-17 PROCEDURE — 96365 THER/PROPH/DIAG IV INF INIT: CPT

## 2022-05-17 PROCEDURE — 99285 EMERGENCY DEPT VISIT HI MDM: CPT | Mod: 25

## 2022-05-17 PROCEDURE — 25000003 PHARM REV CODE 250: Performed by: INTERNAL MEDICINE

## 2022-05-17 RX ORDER — TALC
6 POWDER (GRAM) TOPICAL NIGHTLY PRN
Status: DISCONTINUED | OUTPATIENT
Start: 2022-05-17 | End: 2022-05-19 | Stop reason: HOSPADM

## 2022-05-17 RX ORDER — IPRATROPIUM BROMIDE AND ALBUTEROL SULFATE 2.5; .5 MG/3ML; MG/3ML
3 SOLUTION RESPIRATORY (INHALATION) EVERY 4 HOURS PRN
Status: DISCONTINUED | OUTPATIENT
Start: 2022-05-17 | End: 2022-05-19 | Stop reason: HOSPADM

## 2022-05-17 RX ORDER — ONDANSETRON 2 MG/ML
4 INJECTION INTRAMUSCULAR; INTRAVENOUS EVERY 8 HOURS PRN
Status: DISCONTINUED | OUTPATIENT
Start: 2022-05-17 | End: 2022-05-19 | Stop reason: HOSPADM

## 2022-05-17 RX ORDER — ACETAMINOPHEN 325 MG/1
650 TABLET ORAL EVERY 8 HOURS PRN
Status: DISCONTINUED | OUTPATIENT
Start: 2022-05-17 | End: 2022-05-19 | Stop reason: HOSPADM

## 2022-05-17 RX ORDER — NALOXONE HCL 0.4 MG/ML
0.02 VIAL (ML) INJECTION
Status: DISCONTINUED | OUTPATIENT
Start: 2022-05-17 | End: 2022-05-19 | Stop reason: HOSPADM

## 2022-05-17 RX ORDER — IBUPROFEN 200 MG
16 TABLET ORAL
Status: DISCONTINUED | OUTPATIENT
Start: 2022-05-17 | End: 2022-05-19 | Stop reason: HOSPADM

## 2022-05-17 RX ORDER — SODIUM CHLORIDE 0.9 % (FLUSH) 0.9 %
10 SYRINGE (ML) INJECTION EVERY 8 HOURS
Status: DISCONTINUED | OUTPATIENT
Start: 2022-05-17 | End: 2022-05-19 | Stop reason: HOSPADM

## 2022-05-17 RX ORDER — IBUPROFEN 200 MG
24 TABLET ORAL
Status: DISCONTINUED | OUTPATIENT
Start: 2022-05-17 | End: 2022-05-19 | Stop reason: HOSPADM

## 2022-05-17 RX ORDER — DIPHENHYDRAMINE HCL 25 MG
25 CAPSULE ORAL
Status: DISCONTINUED | OUTPATIENT
Start: 2022-05-17 | End: 2022-05-19 | Stop reason: HOSPADM

## 2022-05-17 RX ORDER — DIPHENHYDRAMINE HCL 25 MG
25 CAPSULE ORAL
Status: CANCELLED | OUTPATIENT
Start: 2022-05-17

## 2022-05-17 RX ORDER — ACETAMINOPHEN 325 MG/1
650 TABLET ORAL
Status: DISCONTINUED | OUTPATIENT
Start: 2022-05-17 | End: 2022-05-19 | Stop reason: HOSPADM

## 2022-05-17 RX ORDER — HYDROCODONE BITARTRATE AND ACETAMINOPHEN 500; 5 MG/1; MG/1
TABLET ORAL ONCE
Status: CANCELLED | OUTPATIENT
Start: 2022-05-17 | End: 2022-05-17

## 2022-05-17 RX ORDER — HYDROCODONE BITARTRATE AND ACETAMINOPHEN 500; 5 MG/1; MG/1
TABLET ORAL
Status: DISCONTINUED | OUTPATIENT
Start: 2022-05-17 | End: 2022-05-19 | Stop reason: HOSPADM

## 2022-05-17 RX ORDER — HYDROCODONE BITARTRATE AND ACETAMINOPHEN 500; 5 MG/1; MG/1
TABLET ORAL ONCE
Status: COMPLETED | OUTPATIENT
Start: 2022-05-17 | End: 2022-05-17

## 2022-05-17 RX ORDER — GLUCAGON 1 MG
1 KIT INJECTION
Status: DISCONTINUED | OUTPATIENT
Start: 2022-05-17 | End: 2022-05-19 | Stop reason: HOSPADM

## 2022-05-17 RX ORDER — ACETAMINOPHEN 325 MG/1
650 TABLET ORAL
Status: CANCELLED | OUTPATIENT
Start: 2022-05-17

## 2022-05-17 RX ADMIN — IRON SUCROSE 200 MG: 20 INJECTION, SOLUTION INTRAVENOUS at 11:05

## 2022-05-17 RX ADMIN — SODIUM CHLORIDE: 0.9 INJECTION, SOLUTION INTRAVENOUS at 11:05

## 2022-05-17 RX ADMIN — Medication 10 ML: at 11:05

## 2022-05-17 NOTE — ED PROVIDER NOTES
Encounter Date: 5/17/2022       History     Chief Complaint   Patient presents with    MD referral     RBC, WBC, H&H was lower today than yesterday at Dr. Peña office and sent for recheck. Patient is awake and alert. Over weekend was lightheaded     87-year-old female with hypothyroid, iron deficiency anemia, anxiety, history of stroke with residual right-sided weakness, macular degeneration, but thyroid presents for anemia and leukopenia on outpatient labs.  Patient has been having occasional lightheadedness for the last 3 days, but is not persistent.  Patient is mostly bedbound patient has history of anemia requiring blood transfusions, but no history of leukopenia/neutropenia.  Patient is followed by Hematology as an outpatient for iron deficiency anemia.  Patient denies any chest pain, palpitations, worsening weakness, dysuria, abdominal pain, nausea/vomiting, black/bloody stools    The history is provided by the patient and medical records.     Review of patient's allergies indicates:   Allergen Reactions    Penicillins      rash     Past Medical History:   Diagnosis Date    LORNA positive     Anxiety     Bilateral cataracts     Colitis     Colon polyp     Depression     Elevated IgE level     Hepatomegaly     Hiatal hernia     Hypothyroidism     IFG (impaired fasting glucose)     Iron deficiency anemia     Macular degeneration     Nicotine dependence     Osteoarthritis     Recurrent UTI     Renal cyst     Skin cancer     Tuberculosis of bladder     Urinary incontinence     Vitamin B12 deficiency     Wheezing      Past Surgical History:   Procedure Laterality Date    CHOLECYSTECTOMY       Family History   Problem Relation Age of Onset    Heart disease Mother     Diabetes Mother     Heart disease Father     Polycystic kidney disease Father     Atrial fibrillation Sister      Social History     Tobacco Use    Smoking status: Former Smoker     Types: Cigarettes, Vaping with nicotine     Smokeless tobacco: Current User   Substance Use Topics    Alcohol use: Yes    Drug use: Never     Review of Systems   Constitutional: Negative for chills and fever.   HENT: Negative for rhinorrhea and sore throat.    Eyes: Negative for photophobia and visual disturbance.   Respiratory: Negative for cough, chest tightness and shortness of breath.    Cardiovascular: Negative for chest pain and palpitations.   Gastrointestinal: Negative for nausea and vomiting.   Genitourinary: Negative for difficulty urinating and dysuria.   Musculoskeletal: Negative for back pain and myalgias.   Skin: Negative for pallor and rash.   Neurological: Positive for light-headedness. Negative for numbness and headaches.   Psychiatric/Behavioral: Negative for agitation and confusion.       Physical Exam     Initial Vitals [05/17/22 1710]   BP Pulse Resp Temp SpO2   (!) 120/56 99 (!) 22 98.3 °F (36.8 °C) 97 %      MAP       --         Physical Exam    Nursing note and vitals reviewed.  Constitutional:   Alert, frail appearing, speaking full sentences, no acute distress   HENT:   Head: Normocephalic and atraumatic.   Mouth/Throat: Oropharynx is clear and moist.   Eyes: Conjunctivae and EOM are normal. Pupils are equal, round, and reactive to light.   Ectropion of left eye   Cardiovascular: Normal rate, regular rhythm, normal heart sounds and intact distal pulses.   Pulmonary/Chest: Breath sounds normal. No stridor. No respiratory distress.   Abdominal: Abdomen is soft. She exhibits no distension. There is no abdominal tenderness.   Musculoskeletal:         General: No tenderness or edema.     Neurological: She is alert and oriented to person, place, and time. She has normal strength. No cranial nerve deficit or sensory deficit.   Skin: Skin is warm and dry. No rash noted.   Psychiatric: She has a normal mood and affect. Thought content normal.         ED Course   Procedures  Labs Reviewed   URINALYSIS, REFLEX TO URINE CULTURE   TYPE &  SCREEN   PREPARE RBC SOFT          Imaging Results          X-Ray Chest AP Portable (Final result)  Result time 05/17/22 20:36:31    Final result by Main Garcia DO (05/17/22 20:36:31)                 Impression:      No acute cardiopulmonary abnormality.      Electronically signed by: Main Garcia  Date:    05/17/2022  Time:    20:36             Narrative:    EXAMINATION:  XR CHEST AP PORTABLE    CLINICAL HISTORY:  Anemia, unspecified    TECHNIQUE:  Single frontal view of the chest was performed.    COMPARISON:  07/16/2021.    FINDINGS:  The lungs are well expanded and clear. No focal opacities are seen. The pleural spaces are clear.    The cardiac silhouette is unremarkable.  There are atherosclerotic calcifications aortic arch.    The visualized osseous structures demonstrate degenerative changes and osteopenia.                                 Medications   0.9%  NaCl infusion (for blood administration) (has no administration in time range)   iron sucrose (VENOFER) 200 mg in sodium chloride 0.9% 100 mL IVPB (has no administration in time range)     Medical Decision Making:   History:   Old Medical Records: I decided to obtain old medical records.  Old Records Summarized: records from clinic visits.  Initial Assessment:   87-year-old female with hypothyroid, iron deficiency anemia, anxiety, history of stroke with residual right-sided weakness, macular degeneration, but thyroid presents for anemia and leukopenia on outpatient labs  Clinical Tests:   Lab Tests: Ordered and Reviewed  Radiological Study: Ordered and Reviewed  ED Management:  Anemia and leukopenia of unclear source.  Patient does have a history of iron deficiency anemia, however she has had a rapid drop in her hemoglobin compared to most recent labs before yesterday.  Patient has no history of leukopenia/neutropenia  Symptoms include occasional lightheadedness without chest pain, shortness of breath, nausea/vomiting, evidence of  bleeding  Differentials include malignancy, infection, UTI, pneumonia, less likely GI bleed or hemolysis             ED Course as of 05/17/22 2145   Tue May 17, 2022   2145 Patient admitted to hospital medicine for further evaluation [OK]      ED Course User Index  [OK] uAgie Cardenas MD             Clinical Impression:   Final diagnoses:  [R42] Lightheadedness  [D64.9] Anemia (Primary)  [D70.9] Neutropenia, unspecified type          ED Disposition Condition    Observation               Augie Cardenas MD  Resident  05/17/22 2145

## 2022-05-17 NOTE — TELEPHONE ENCOUNTER
Spoke with the patient's sister Brittaney. Confirmed today's lab appt and infusion appt 5/17 at 10:30am. Emphasized that the patient leave the armband on for blood transfusion tomorrow.

## 2022-05-17 NOTE — FIRST PROVIDER EVALUATION
Emergency Department TeleTriage Encounter Note      CHIEF COMPLAINT    Chief Complaint   Patient presents with    MD referral     RBC, WBC, H&H was lower today than yesterday at Dr. Peña office and sent for recheck. Patient is awake and alert. Over weekend was lightheaded       VITAL SIGNS   Initial Vitals [05/17/22 1710]   BP Pulse Resp Temp SpO2   (!) 120/56 99 (!) 22 (!) 83 °F (28.3 °C) 97 %      MAP       --            ALLERGIES    Review of patient's allergies indicates:   Allergen Reactions    Penicillins      rash       PROVIDER TRIAGE NOTE  This is a teletriage evaluation of a 87 y.o. female presenting to the ED complaining of abnormal labs. Patient reports feeling lightheaded over the weekend. Labs done today showed H&H 5.8 and 21.1. Patient sent for blood transfusion. She denies black or bloody stools.     Initial orders will be placed and care will be transferred to an alternate provider when patient is roomed for a full evaluation. Any additional orders and the final disposition will be determined by that provider.           ORDERS  Labs Reviewed   CBC W/ AUTO DIFFERENTIAL   COMPREHENSIVE METABOLIC PANEL   TYPE & SCREEN       ED Orders (720h ago, onward)    Start Ordered     Status Ordering Provider    05/17/22 1718 05/17/22 1718  CBC auto differential  STAT         Ordered CARLIBRI G.    05/17/22 1718 05/17/22 1718  Comprehensive metabolic panel  STAT         Ordered CARLI, BRI G.    05/17/22 1718 05/17/22 1718  Type & Screen  STAT         Ordered CARLIBRI G.    05/17/22 1718 05/17/22 1718  Insert Saline lock IV  Once         Ordered BRI BOYCE            Virtual Visit Note: The provider triage portion of this emergency department evaluation and documentation was performed via [employer], a HIPAA-compliant telemedicine application, in concert with a tele-presenter in the room. A face to face patient evaluation with one of my colleagues will occur once the patient is placed in an  emergency department room.      DISCLAIMER: This note was prepared with [employer] voice recognition transcription software. Garbled syntax, mangled pronouns, and other bizarre constructions may be attributed to that software system.

## 2022-05-17 NOTE — TELEPHONE ENCOUNTER
Spoke with the patient's sister. Notified her that the patient's hemoglobin decreased to 5.8. Dr. Gibson would like the patient to go to ED for evaluation. Pt's sister verbalizes understanding

## 2022-05-17 NOTE — TELEPHONE ENCOUNTER
----- Message from Susie Mckeon sent at 5/17/2022  8:22 AM CDT -----  Contact: Brittaney [phone]  Pt sister Brittaney is calling to speak with you regarding this patient blood work she would like to discuss the  red and white blood counts ,liver enzymes and iron she is very concerned and would like a call back from the nurse.Please advise

## 2022-05-17 NOTE — ED NOTES
Ibeth Coronel, charge nurse, aware unable to work up patient due to acuity. SILVIA Gee, preceptor, aware.

## 2022-05-17 NOTE — Clinical Note
Diagnosis: Anemia [660804]   Future Attending Provider: KOLBY POTTER [[id number]]   Admitting Provider:: KOLBY POTTER [[id number]]   Special Needs:: No Special Needs [1]

## 2022-05-17 NOTE — TELEPHONE ENCOUNTER
----- Message from Louis Gibson MD sent at 5/17/2022 10:09 AM CDT -----  Regarding: labs/blood  1. Schedule labs cbc, cmp, type screen today  2. Schedule unit of blood tomorrow or Thursday.  3. Schedule appt with Itzel reaves to get blood consent signed tomorrow. Or just tell patient to come up today/tomorrow to sign consent.    Thanks,  louis

## 2022-05-17 NOTE — TELEPHONE ENCOUNTER
I called her sister and reviewed Ms. Segura's labs. Hemoglobin is very decreased at 6.3 g/dL. I will arrange for

## 2022-05-17 NOTE — TELEPHONE ENCOUNTER
Spoke with Brittaney, informed that we received the portal messages which have been forwarded to PCP to review.  Once I receive a reply, I will contact you.  Brittaney verbalized understanding.

## 2022-05-18 PROBLEM — D72.818 OTHER DECREASED WHITE BLOOD CELL COUNT: Status: ACTIVE | Noted: 2022-05-18

## 2022-05-18 LAB
ALBUMIN SERPL BCP-MCNC: 3.1 G/DL (ref 3.5–5.2)
ALP SERPL-CCNC: 129 U/L (ref 55–135)
ALT SERPL W/O P-5'-P-CCNC: 204 U/L (ref 10–44)
ANION GAP SERPL CALC-SCNC: 11 MMOL/L (ref 8–16)
ANISOCYTOSIS BLD QL SMEAR: SLIGHT
AST SERPL-CCNC: 264 U/L (ref 10–40)
BASOPHILS # BLD AUTO: 0.03 K/UL (ref 0–0.2)
BASOPHILS NFR BLD: 1.6 % (ref 0–1.9)
BILIRUB SERPL-MCNC: 0.8 MG/DL (ref 0.1–1)
BLD PROD TYP BPU: NORMAL
BLD PROD TYP BPU: NORMAL
BLOOD UNIT EXPIRATION DATE: NORMAL
BLOOD UNIT EXPIRATION DATE: NORMAL
BLOOD UNIT TYPE CODE: 7300
BLOOD UNIT TYPE CODE: 7300
BLOOD UNIT TYPE: NORMAL
BLOOD UNIT TYPE: NORMAL
BUN SERPL-MCNC: 14 MG/DL (ref 8–23)
CALCIUM SERPL-MCNC: 8.8 MG/DL (ref 8.7–10.5)
CHLORIDE SERPL-SCNC: 107 MMOL/L (ref 95–110)
CO2 SERPL-SCNC: 23 MMOL/L (ref 23–29)
CODING SYSTEM: NORMAL
CODING SYSTEM: NORMAL
CREAT SERPL-MCNC: 0.7 MG/DL (ref 0.5–1.4)
CRP SERPL-MCNC: 1.6 MG/L (ref 0–8.2)
DIFFERENTIAL METHOD: ABNORMAL
DISPENSE STATUS: NORMAL
DISPENSE STATUS: NORMAL
EOSINOPHIL # BLD AUTO: 0.1 K/UL (ref 0–0.5)
EOSINOPHIL NFR BLD: 6.7 % (ref 0–8)
ERYTHROCYTE [DISTWIDTH] IN BLOOD BY AUTOMATED COUNT: 17.8 % (ref 11.5–14.5)
ERYTHROCYTE [SEDIMENTATION RATE] IN BLOOD BY WESTERGREN METHOD: 24 MM/HR (ref 0–20)
EST. GFR  (AFRICAN AMERICAN): >60 ML/MIN/1.73 M^2
EST. GFR  (NON AFRICAN AMERICAN): >60 ML/MIN/1.73 M^2
FIBRINOGEN PPP-MCNC: 362 MG/DL (ref 182–400)
FOLATE SERPL-MCNC: 7.3 NG/ML (ref 4–24)
GLUCOSE SERPL-MCNC: 78 MG/DL (ref 70–110)
HAPTOGLOB SERPL-MCNC: 142 MG/DL (ref 30–250)
HCT VFR BLD AUTO: 27.5 % (ref 37–48.5)
HGB BLD-MCNC: 8.1 G/DL (ref 12–16)
HYPOCHROMIA BLD QL SMEAR: ABNORMAL
IMM GRANULOCYTES # BLD AUTO: 0.01 K/UL (ref 0–0.04)
IMM GRANULOCYTES NFR BLD AUTO: 0.5 % (ref 0–0.5)
LDH SERPL L TO P-CCNC: 242 U/L (ref 110–260)
LYMPHOCYTES # BLD AUTO: 0.6 K/UL (ref 1–4.8)
LYMPHOCYTES NFR BLD: 32.1 % (ref 18–48)
MCH RBC QN AUTO: 23.3 PG (ref 27–31)
MCHC RBC AUTO-ENTMCNC: 29.5 G/DL (ref 32–36)
MCV RBC AUTO: 79 FL (ref 82–98)
MONOCYTES # BLD AUTO: 0.5 K/UL (ref 0.3–1)
MONOCYTES NFR BLD: 25.4 % (ref 4–15)
NEUTROPHILS # BLD AUTO: 0.7 K/UL (ref 1.8–7.7)
NEUTROPHILS NFR BLD: 33.7 % (ref 38–73)
NRBC BLD-RTO: 0 /100 WBC
NUM UNITS TRANS PACKED RBC: NORMAL
NUM UNITS TRANS PACKED RBC: NORMAL
OVALOCYTES BLD QL SMEAR: ABNORMAL
PATH REV BLD -IMP: NORMAL
PLATELET # BLD AUTO: 194 K/UL (ref 150–450)
PLATELET BLD QL SMEAR: ABNORMAL
PMV BLD AUTO: 8.6 FL (ref 9.2–12.9)
POIKILOCYTOSIS BLD QL SMEAR: SLIGHT
POLYCHROMASIA BLD QL SMEAR: ABNORMAL
POTASSIUM SERPL-SCNC: 4 MMOL/L (ref 3.5–5.1)
PROT SERPL-MCNC: 6.4 G/DL (ref 6–8.4)
RBC # BLD AUTO: 3.47 M/UL (ref 4–5.4)
RETICS/RBC NFR AUTO: 1.5 % (ref 0.5–2.5)
SODIUM SERPL-SCNC: 141 MMOL/L (ref 136–145)
TARGETS BLD QL SMEAR: ABNORMAL
URATE SERPL-MCNC: 3.8 MG/DL (ref 2.4–5.7)
VIT B12 SERPL-MCNC: 1925 PG/ML (ref 210–950)
WBC # BLD AUTO: 1.93 K/UL (ref 3.9–12.7)

## 2022-05-18 PROCEDURE — 25000003 PHARM REV CODE 250: Performed by: NURSE PRACTITIONER

## 2022-05-18 PROCEDURE — 84550 ASSAY OF BLOOD/URIC ACID: CPT | Performed by: INTERNAL MEDICINE

## 2022-05-18 PROCEDURE — 99214 PR OFFICE/OUTPT VISIT, EST, LEVL IV, 30-39 MIN: ICD-10-PCS | Mod: ,,, | Performed by: INTERNAL MEDICINE

## 2022-05-18 PROCEDURE — A4216 STERILE WATER/SALINE, 10 ML: HCPCS | Performed by: NURSE PRACTITIONER

## 2022-05-18 PROCEDURE — 99900035 HC TECH TIME PER 15 MIN (STAT)

## 2022-05-18 PROCEDURE — 63600175 PHARM REV CODE 636 W HCPCS: Performed by: INTERNAL MEDICINE

## 2022-05-18 PROCEDURE — P9016 RBC LEUKOCYTES REDUCED: HCPCS | Performed by: STUDENT IN AN ORGANIZED HEALTH CARE EDUCATION/TRAINING PROGRAM

## 2022-05-18 PROCEDURE — 85652 RBC SED RATE AUTOMATED: CPT | Performed by: INTERNAL MEDICINE

## 2022-05-18 PROCEDURE — 25000003 PHARM REV CODE 250: Performed by: INTERNAL MEDICINE

## 2022-05-18 PROCEDURE — 36415 COLL VENOUS BLD VENIPUNCTURE: CPT | Performed by: INTERNAL MEDICINE

## 2022-05-18 PROCEDURE — 82607 VITAMIN B-12: CPT | Performed by: INTERNAL MEDICINE

## 2022-05-18 PROCEDURE — 82746 ASSAY OF FOLIC ACID SERUM: CPT | Performed by: INTERNAL MEDICINE

## 2022-05-18 PROCEDURE — 99214 OFFICE O/P EST MOD 30 MIN: CPT | Mod: ,,, | Performed by: INTERNAL MEDICINE

## 2022-05-18 PROCEDURE — 86140 C-REACTIVE PROTEIN: CPT | Performed by: INTERNAL MEDICINE

## 2022-05-18 PROCEDURE — 83010 ASSAY OF HAPTOGLOBIN QUANT: CPT | Performed by: INTERNAL MEDICINE

## 2022-05-18 PROCEDURE — 85060 BLOOD SMEAR INTERPRETATION: CPT | Mod: ,,, | Performed by: PATHOLOGY

## 2022-05-18 PROCEDURE — 85045 AUTOMATED RETICULOCYTE COUNT: CPT | Performed by: INTERNAL MEDICINE

## 2022-05-18 PROCEDURE — 85060 PATHOLOGIST REVIEW: ICD-10-PCS | Mod: ,,, | Performed by: PATHOLOGY

## 2022-05-18 PROCEDURE — 96366 THER/PROPH/DIAG IV INF ADDON: CPT

## 2022-05-18 PROCEDURE — 94761 N-INVAS EAR/PLS OXIMETRY MLT: CPT

## 2022-05-18 PROCEDURE — G0378 HOSPITAL OBSERVATION PER HR: HCPCS

## 2022-05-18 PROCEDURE — 83615 LACTATE (LD) (LDH) ENZYME: CPT | Performed by: INTERNAL MEDICINE

## 2022-05-18 PROCEDURE — 80053 COMPREHEN METABOLIC PANEL: CPT | Performed by: NURSE PRACTITIONER

## 2022-05-18 PROCEDURE — 85025 COMPLETE CBC W/AUTO DIFF WBC: CPT | Performed by: NURSE PRACTITIONER

## 2022-05-18 PROCEDURE — 85384 FIBRINOGEN ACTIVITY: CPT | Performed by: INTERNAL MEDICINE

## 2022-05-18 RX ORDER — LORAZEPAM 0.5 MG/1
0.5 TABLET ORAL DAILY PRN
Status: DISCONTINUED | OUTPATIENT
Start: 2022-05-18 | End: 2022-05-19 | Stop reason: HOSPADM

## 2022-05-18 RX ORDER — BUSPIRONE HYDROCHLORIDE 5 MG/1
10 TABLET ORAL 2 TIMES DAILY
Status: DISCONTINUED | OUTPATIENT
Start: 2022-05-18 | End: 2022-05-19 | Stop reason: HOSPADM

## 2022-05-18 RX ORDER — LEVOTHYROXINE SODIUM 50 UG/1
100 TABLET ORAL
Status: DISCONTINUED | OUTPATIENT
Start: 2022-05-18 | End: 2022-05-19 | Stop reason: HOSPADM

## 2022-05-18 RX ADMIN — BUSPIRONE HYDROCHLORIDE 10 MG: 5 TABLET ORAL at 09:05

## 2022-05-18 RX ADMIN — Medication 10 ML: at 02:05

## 2022-05-18 RX ADMIN — LEVOTHYROXINE SODIUM 100 MCG: 50 TABLET ORAL at 05:05

## 2022-05-18 RX ADMIN — ACETAMINOPHEN 650 MG: 325 TABLET ORAL at 12:05

## 2022-05-18 RX ADMIN — Medication 10 ML: at 06:05

## 2022-05-18 RX ADMIN — IRON SUCROSE 200 MG: 20 INJECTION, SOLUTION INTRAVENOUS at 10:05

## 2022-05-18 RX ADMIN — BUSPIRONE HYDROCHLORIDE 10 MG: 5 TABLET ORAL at 08:05

## 2022-05-18 RX ADMIN — Medication 10 ML: at 10:05

## 2022-05-18 NOTE — SUBJECTIVE & OBJECTIVE
Past Medical History:   Diagnosis Date    LORNA positive     Anxiety     Bilateral cataracts     Colitis     Colon polyp     Depression     Elevated IgE level     Hepatomegaly     Hiatal hernia     Hypothyroidism     IFG (impaired fasting glucose)     Iron deficiency anemia     Macular degeneration     Nicotine dependence     Osteoarthritis     Recurrent UTI     Renal cyst     Skin cancer     Tuberculosis of bladder     Urinary incontinence     Vitamin B12 deficiency     Wheezing        Past Surgical History:   Procedure Laterality Date    CHOLECYSTECTOMY         Review of patient's allergies indicates:   Allergen Reactions    Penicillins      rash       No current facility-administered medications on file prior to encounter.     Current Outpatient Medications on File Prior to Encounter   Medication Sig    Al hyd-Mg tr-alg ac-sod bicarb (GAVISCON) 80-14.2 mg Chew Take 1 tablet by mouth once daily.    albuterol (PROVENTIL/VENTOLIN HFA) 90 mcg/actuation inhaler Inhale 2 puffs into the lungs every 6 (six) hours as needed. Rescue    aspirin (ECOTRIN) 81 MG EC tablet Take 81 mg by mouth once daily.    busPIRone (BUSPAR) 10 MG tablet 1 po BID    cetirizine (ZYRTEC) 10 MG tablet 1 tablet DAILY (route: oral)    citalopram (CELEXA) 20 MG tablet TAKE 1 AND 1/2 TABLETS BY MOUTH DAILY    doxycycline (VIBRAMYCIN) 100 MG Cap Take 1 capsule (100 mg total) by mouth every 12 (twelve) hours.    erythromycin (ROMYCIN) ophthalmic ointment SMARTSI Inch(es) Left Eye Every Night    estradiol (ESTRACE) 0.01 % (0.1 mg/gram) vaginal cream Place 1 g vaginally 3 (three) times a week.    fluticasone propionate (FLONASE ALLERGY RELIEF NASL) by Each Nostril route as needed.    levothyroxine (SYNTHROID) 100 MCG tablet Take 1 tablet (100 mcg total) by mouth once daily.    LORazepam (ATIVAN) 0.5 MG tablet TAKE 1 TABLET BY MOUTH EVERY DAY AS NEEDED ANXIETY    mag/aluminum/sod bicarb/alginc (GAVISCON ORAL) Take 1  tablet by mouth daily as needed.    MYRBETRIQ 50 mg Tb24 Take 1 tablet (50 mg total) by mouth once daily.    neomycin-polymyxin-dexamethasone (DEXACINE) 3.5 mg/g-10,000 unit/g-0.1 % Oint APPLY A SMALL AMOUNT IN BOTH EYES TWICE DAILY    rosuvastatin (CRESTOR) 20 MG tablet Take 1 tablet (20 mg total) by mouth once daily.    sodium chloride (OCEAN) 0.65 % nasal spray 1 spray DAILY (route: nasal)    TOVIAZ 8 mg Tb24 Take 1 tablet by mouth once daily.    vit C/E/zinc ox/amy/lut/zeax (ICAPS AREDS2 ORAL) Take by mouth Daily.     Family History       Problem Relation (Age of Onset)    Atrial fibrillation Sister    Diabetes Mother    Heart disease Mother, Father    Polycystic kidney disease Father          Tobacco Use    Smoking status: Former Smoker     Types: Cigarettes, Vaping with nicotine    Smokeless tobacco: Current User   Substance and Sexual Activity    Alcohol use: Yes    Drug use: Never    Sexual activity: Not Currently     Review of Systems   Constitutional:  Positive for fatigue. Negative for activity change, appetite change, chills, diaphoresis, fever and unexpected weight change.   HENT: Negative.     Eyes: Negative.    Respiratory: Negative.     Cardiovascular: Negative.    Gastrointestinal: Negative.    Genitourinary: Negative.    Musculoskeletal: Negative.    Skin: Negative.    Neurological:  Positive for weakness and light-headedness.   Objective:     Vital Signs (Most Recent):  Temp: 96.8 °F (36 °C) (05/17/22 2324)  Pulse: 74 (05/18/22 0000)  Resp: 18 (05/17/22 2324)  BP: (!) 114/56 (05/17/22 2324)  SpO2: 96 % (05/17/22 2354)   Vital Signs (24h Range):  Temp:  [96.8 °F (36 °C)-98.6 °F (37 °C)] 96.8 °F (36 °C)  Pulse:  [70-99] 74  Resp:  [17-25] 18  SpO2:  [96 %-98 %] 96 %  BP: (111-131)/(52-58) 114/56     Weight: 77.1 kg (170 lb)  Body mass index is 28.29 kg/m².    Physical Exam  Vitals and nursing note reviewed.   Constitutional:       General: She is not in acute distress.     Appearance:  She is not toxic-appearing.      Comments: Frail, coherent   HENT:      Head: Normocephalic and atraumatic.      Mouth/Throat:      Mouth: Mucous membranes are moist.   Eyes:      Pupils: Pupils are equal, round, and reactive to light.   Cardiovascular:      Rate and Rhythm: Normal rate and regular rhythm.      Heart sounds: Murmur heard.   Pulmonary:      Effort: Pulmonary effort is normal. No respiratory distress.      Breath sounds: Normal breath sounds.   Abdominal:      General: Bowel sounds are normal. There is no distension.      Palpations: Abdomen is soft.      Tenderness: There is no abdominal tenderness.   Musculoskeletal:         General: No swelling.      Cervical back: Normal range of motion.   Skin:     General: Skin is warm and dry.      Capillary Refill: Capillary refill takes 2 to 3 seconds.   Neurological:      General: No focal deficit present.      Mental Status: She is alert and oriented to person, place, and time. Mental status is at baseline.   Psychiatric:         Mood and Affect: Mood normal.         Behavior: Behavior normal.         Thought Content: Thought content normal.         Judgment: Judgment normal.         CRANIAL NERVES     CN III, IV, VI   Pupils are equal, round, and reactive to light.     Significant Labs: All pertinent labs within the past 24 hours have been reviewed.  Recent Lab Results         05/17/22 2025 05/17/22  1436        Unit Blood Type Code 7300  [P]          7300  [P]         Unit Expiration [id number]  [P]          [id number]  [P]         Unit Blood Type B POS  [P]          B POS  [P]         Albumin   3.1       Alkaline Phosphatase   134       ALT   208       Anion Gap   5       Aniso   Slight       AST   282       Baso #   0.04       Basophil %   2.3       BILIRUBIN TOTAL   0.3  Comment: For infants and newborns, interpretation of results should be based  on gestational age, weight and in agreement with clinical  observations.    Premature Infant  recommended reference ranges:  Up to 24 hours.............<8.0 mg/dL  Up to 48 hours............<12.0 mg/dL  3-5 days..................<15.0 mg/dL  6-29 days.................<15.0 mg/dL         BUN   18       Calcium   8.8       Chloride   106       CO2   29       CODING SYSTEM [id number]  [P]          [id number]  [P]         Creatinine   0.8       Differential Method   Automated       DISPENSE STATUS ISSUED  [P]          CROSSMATCHED  [P]         eGFR if    >60       eGFR if non    >60  Comment: Calculation used to obtain the estimated glomerular filtration  rate (eGFR) is the CKD-EPI equation.          Eos #   0.1       Eosinophil %   5.7       Glucose   108       Gran # (ANC)   0.5       Gran %   25.7       Group & Rh B POS   B POS       Hematocrit   21.1       Hemoglobin   5.8  Comment: WBC and Hemoglobin critical result(s) called and verbal readback   obtained from  Edith Major RN. at 15:30 on 05/17/2022 by JS   05/17/2022 16:16         Hypo   Moderate       Immature Grans (Abs)   0.00  Comment: Mild elevation in immature granulocytes is non specific and   can be seen in a variety of conditions including stress response,   acute inflammation, trauma and pregnancy. Correlation with other   laboratory and clinical findings is essential.         Immature Granulocytes   0.0       INDIRECT BRIAN NEG   NEG       Lymph #   0.8       Lymph %   44.6       MCH   21.8       MCHC   27.5       MCV   79       Mono #   0.4       Mono %   21.7       MPV   9.2       nRBC   0       Ovalocytes   Occasional       Platelet Estimate   Appears normal       Platelets   243       Poikilocytosis   Slight       Poly   Occasional       Potassium   4.5       Product Code [id number]  [P]          [id number]  [P]         PROTEIN TOTAL   6.0       RBC   2.66       RDW   18.6       Sodium   140       UNIT NUMBER [id number]  [P]          [id number]  [P]         WBC   1.75  Comment: WBC and Hemoglobin critical  result(s) called and verbal readback   obtained from  Edith Major RN. at 15:30 on 05/17/2022 by JS   05/17/2022 16:16                  [P] - Preliminary Result               Significant Imaging: I have reviewed all pertinent imaging results/findings within the past 24 hours.  I have reviewed and interpreted all pertinent imaging results/findings within the past 24 hours.

## 2022-05-18 NOTE — ASSESSMENT & PLAN NOTE
- Labs in May 2022 reveal a leukopenia/neutropenia of unclear etiology. Uric acid and LDH are within normal limits, perhaps arguing against a hematologic malignancy.  - I stated that a bone marrow biopsy is likely indicated for further evaluation of her leukopenia. After discussion with family, they would like to defer that decision to the outpatient setting.  - perhaps there is some connection between her elevated LFTs and her leukopenia, but I'm not sure at this time.  - she has a follow-up appointment with Dr. Denton on 5/23/22. They will discuss further with him then.

## 2022-05-18 NOTE — NURSING
Pt has spent a lot of time sleeping today, External female catheter in use. Was assisted to BSC once where pt voided and had large BM. Urine not able to be collected as of yet. VSS. Gastroenterology consulted. Will continue to monitor.

## 2022-05-18 NOTE — NURSING
Pt arrived from ED with sister at bedside. Denies acute issues. RA. Pt placed on telemetry. Non-slip socks applied. Call light within reach. Bed is in lowest position. Orientation to room explained. WCDIRK.

## 2022-05-18 NOTE — PROGRESS NOTES
05/17/22 2331   Admission   Initial VN Admission Questions Complete   Safety/Activity   Safety Promotion/Fall Prevention Fall Risk reviewed with patient/family   VIRTUAL NURSE: Admission questions completed with patient and friend (Kary) at this time. Care plan and safety precautions reviewed. No complaints verbalized, no needs expressed. Instructed to use call light for assistance,  they verbalized understanding. Will continue to monitor.

## 2022-05-18 NOTE — SUBJECTIVE & OBJECTIVE
Today, she is feeling better status post blood transfusion. Her lightheadedness has resolved.  - she endorses fatigue, generalized weakness, vision changes (chronic). She denies abdominal pain, bleeding.    Medications:  Continuous Infusions:  Scheduled Meds:   busPIRone  10 mg Oral BID    levothyroxine  100 mcg Oral Before breakfast    sodium chloride 0.9%  10 mL Intravenous Q8H     PRN Meds:sodium chloride, acetaminophen, acetaminophen, albuterol-ipratropium, diphenhydrAMINE, glucagon (human recombinant), glucose, glucose, LORazepam, melatonin, naloxone, ondansetron     Review of patient's allergies indicates:   Allergen Reactions    Penicillins      rash        Past Medical History:   Diagnosis Date    LORNA positive     Anxiety     Bilateral cataracts     Colitis     Colon polyp     Depression     Elevated IgE level     Hepatomegaly     Hiatal hernia     Hypothyroidism     IFG (impaired fasting glucose)     Iron deficiency anemia     Macular degeneration     Nicotine dependence     Osteoarthritis     Recurrent UTI     Renal cyst     Skin cancer     Tuberculosis of bladder     Urinary incontinence     Vitamin B12 deficiency     Wheezing      Past Surgical History:   Procedure Laterality Date    CHOLECYSTECTOMY       Family History       Problem Relation (Age of Onset)    Atrial fibrillation Sister    Diabetes Mother    Heart disease Mother, Father    Polycystic kidney disease Father          Tobacco Use    Smoking status: Former Smoker     Types: Cigarettes, Vaping with nicotine    Smokeless tobacco: Current User   Substance and Sexual Activity    Alcohol use: Yes    Drug use: Never    Sexual activity: Not Currently       Review of Systems   Constitutional:  Positive for fatigue.   HENT:  Negative for sore throat.    Eyes:  Positive for visual disturbance.   Respiratory:  Negative for cough and shortness of breath.    Cardiovascular:  Negative for chest pain.   Gastrointestinal:  Negative for abdominal pain,  constipation, diarrhea, nausea and vomiting.   Genitourinary:  Negative for dysuria.   Musculoskeletal:  Negative for back pain.   Skin:  Negative for rash.   Neurological:  Positive for weakness. Negative for headaches.   Hematological:  Negative for adenopathy.   Psychiatric/Behavioral:  The patient is not nervous/anxious.    Objective:     Vital Signs (Most Recent):  Temp: 98.6 °F (37 °C) (05/18/22 1132)  Pulse: 83 (05/18/22 1132)  Resp: 18 (05/18/22 1132)  BP: (!) 159/70 (05/18/22 1132)  SpO2: 96 % (05/18/22 1132)   Vital Signs (24h Range):  Temp:  [96.8 °F (36 °C)-98.6 °F (37 °C)] 98.6 °F (37 °C)  Pulse:  [70-99] 83  Resp:  [17-25] 18  SpO2:  [95 %-98 %] 96 %  BP: (111-171)/(52-72) 159/70     Weight: 77.1 kg (169 lb 15.6 oz)  Body mass index is 28.29 kg/m².  Body surface area is 1.88 meters squared.    No intake or output data in the 24 hours ending 05/18/22 1210    Physical Exam  Vitals and nursing note reviewed.   Constitutional:       Appearance: She is well-developed.      Comments: Fatigued.   HENT:      Head: Normocephalic and atraumatic.   Eyes:      Pupils: Pupils are equal, round, and reactive to light.   Cardiovascular:      Rate and Rhythm: Normal rate and regular rhythm.   Pulmonary:      Effort: Pulmonary effort is normal.      Breath sounds: Normal breath sounds.   Abdominal:      General: Bowel sounds are normal.      Palpations: Abdomen is soft.   Musculoskeletal:         General: Normal range of motion.      Cervical back: Normal range of motion and neck supple.   Skin:     General: Skin is warm and dry.   Neurological:      Mental Status: She is alert and oriented to person, place, and time.   Psychiatric:         Behavior: Behavior normal.         Thought Content: Thought content normal.         Judgment: Judgment normal.       Significant Labs:   Labs have been reviewed.    Lab Results   Component Value Date    WBC 1.93 (LL) 05/18/2022    HGB 8.1 (L) 05/18/2022    HCT 27.5 (L) 05/18/2022     MCV 79 (L) 05/18/2022     05/18/2022

## 2022-05-18 NOTE — ASSESSMENT & PLAN NOTE
Patient has chronic hypothyroidism. TFTs reviewed-   Lab Results   Component Value Date    TSH 2.074 11/05/2021   . Will continue chronic levothyroxine and adjust for and clinical changes.

## 2022-05-18 NOTE — ASSESSMENT & PLAN NOTE
Patient sees Dr. Denton with hematology and oncology outpatient  Last seen in February 2022  Iron deficiency is secondary to impaired absorption and chronic disease  -she has severe constipation from oral iron  -discussed receiving IV iron with Venofer with Dr. Denton at that time and she only wanted to try 2 doses of IV iron for now  -discontinued oral iron     -this admission MCV 79, ferritin pending  -Received Iron IV x1 in ED  -monitor

## 2022-05-18 NOTE — ASSESSMENT & PLAN NOTE
Anemia and leukopenia of unclear source  history of iron deficiency anemia, however with rapid drop in hemoglobin   no history of leukopenia/neutropenia  Presents with lightheadedness  Denies chest pain, shortness of breath, nausea/vomiting, evidence of bleeding, sick contacts  CXR: No acute cardiopulmonary abnormality  On exam: BS clear, no immediate evidence of infection  Iron 14  TIBC 540 hgb 5.8 WBC 1.75  Received iron IV in ED x1  Urine sample pending  Transfusing 2 units PRBCs now  Consult hematology/oncology

## 2022-05-18 NOTE — PLAN OF CARE
CM met with pt and sister Brittaney Albarran  [phone]  per [facility] (pt is on Neutropenic precautions)   pt lives at [facility] with roomate/friend Apolonia Tipton         pt uses a RW and WC - has sitter services 5x/day to assist with ADL's - takes lunch at community dining room.   Built in bench and bars present at bathroom.       Sister Brittaney transports pt to appointments and will transport pt to home at d/c.      dx:  Anemia, Neutropenia      pt wants home health at discharge - has used [facility]  in the past.   - pt info sent per Juan Andres MD informed as well.        05/18/22 0965   Discharge Planning   Assessment Type Discharge Planning Brief Assessment   Resource/Environmental Concerns none   Support Systems Family members   Equipment Currently Used at Home walker, rolling;wheelchair   Current Living Arrangements home/apartment/condo   Patient/Family Anticipates Transition to home   Patient/Family Anticipated Services at Transition home health care;other (see comments)  (sitter with assisted living - est 5 visits/day to assist with ADL)   DME Needed Upon Discharge  none   Discharge Plan A Home;Home Health

## 2022-05-18 NOTE — ASSESSMENT & PLAN NOTE
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

## 2022-05-18 NOTE — HPI
87 year-old female previously seen by Dr. Denton for iron deficiency anemia was admitted on 5/17/22 for symptomatic anemia. Consult is for leukopenia/anemia.

## 2022-05-18 NOTE — ASSESSMENT & PLAN NOTE
Dangers of cigarette smoking were reviewed with patient in detail for 3 minutes and patient was encouraged to quit. Nicotine replacement options were discussed with the patient and they decided to continue smoking her electronic cigarettes with decline of nicotine patch while inpatient.

## 2022-05-18 NOTE — ASSESSMENT & PLAN NOTE
Cardiovascular disease   Patient is chronically on statin.will not continue for now. Monitor clinically. Last LDL was   Lab Results   Component Value Date    LDLCALC 56.8 (L) 01/31/2022      Elevated LFTs   Hold statin for now  monitor

## 2022-05-18 NOTE — CONSULTS
[facility]  Hematology/Oncology  Consult Note    Patient Name: Nickie Segura  MRN: [id number]  Admission Date: 5/17/2022  Hospital Length of Stay: 0 days  Code Status: Full Code   Attending Provider: Torin Carr MD  Consulting Provider: Louis Gibson MD  Primary Care Physician: Perla Hopper MD  Principal Problem:Neutropenia    Inpatient consult to Hematology  Consult performed by: Louis Gibson MD  Consult ordered by: Augie Cardenas MD  Reason for consult: anemia/leukopenia        Subjective:     HPI:  87 year-old female previously seen by Dr. Denton for iron deficiency anemia was admitted on 5/17/22 for symptomatic anemia. Consult is for leukopenia/anemia.      Today, she is feeling better status post blood transfusion. Her lightheadedness has resolved.  - she endorses fatigue, generalized weakness, vision changes (chronic). She denies abdominal pain, bleeding.    Medications:  Continuous Infusions:  Scheduled Meds:   busPIRone  10 mg Oral BID    levothyroxine  100 mcg Oral Before breakfast    sodium chloride 0.9%  10 mL Intravenous Q8H     PRN Meds:sodium chloride, acetaminophen, acetaminophen, albuterol-ipratropium, diphenhydrAMINE, glucagon (human recombinant), glucose, glucose, LORazepam, melatonin, naloxone, ondansetron     Review of patient's allergies indicates:   Allergen Reactions    Penicillins      rash        Past Medical History:   Diagnosis Date    LORNA positive     Anxiety     Bilateral cataracts     Colitis     Colon polyp     Depression     Elevated IgE level     Hepatomegaly     Hiatal hernia     Hypothyroidism     IFG (impaired fasting glucose)     Iron deficiency anemia     Macular degeneration     Nicotine dependence     Osteoarthritis     Recurrent UTI     Renal cyst     Skin cancer     Tuberculosis of bladder     Urinary incontinence     Vitamin B12 deficiency     Wheezing      Past Surgical History:   Procedure Laterality Date     CHOLECYSTECTOMY       Family History       Problem Relation (Age of Onset)    Atrial fibrillation Sister    Diabetes Mother    Heart disease Mother, Father    Polycystic kidney disease Father          Tobacco Use    Smoking status: Former Smoker     Types: Cigarettes, Vaping with nicotine    Smokeless tobacco: Current User   Substance and Sexual Activity    Alcohol use: Yes    Drug use: Never    Sexual activity: Not Currently       Review of Systems   Constitutional:  Positive for fatigue.   HENT:  Negative for sore throat.    Eyes:  Positive for visual disturbance.   Respiratory:  Negative for cough and shortness of breath.    Cardiovascular:  Negative for chest pain.   Gastrointestinal:  Negative for abdominal pain, constipation, diarrhea, nausea and vomiting.   Genitourinary:  Negative for dysuria.   Musculoskeletal:  Negative for back pain.   Skin:  Negative for rash.   Neurological:  Positive for weakness. Negative for headaches.   Hematological:  Negative for adenopathy.   Psychiatric/Behavioral:  The patient is not nervous/anxious.    Objective:     Vital Signs (Most Recent):  Temp: 98.6 °F (37 °C) (05/18/22 1132)  Pulse: 83 (05/18/22 1132)  Resp: 18 (05/18/22 1132)  BP: (!) 159/70 (05/18/22 1132)  SpO2: 96 % (05/18/22 1132)   Vital Signs (24h Range):  Temp:  [96.8 °F (36 °C)-98.6 °F (37 °C)] 98.6 °F (37 °C)  Pulse:  [70-99] 83  Resp:  [17-25] 18  SpO2:  [95 %-98 %] 96 %  BP: (111-171)/(52-72) 159/70     Weight: 77.1 kg (169 lb 15.6 oz)  Body mass index is 28.29 kg/m².  Body surface area is 1.88 meters squared.    No intake or output data in the 24 hours ending 05/18/22 1210    Physical Exam  Vitals and nursing note reviewed.   Constitutional:       Appearance: She is well-developed.      Comments: Fatigued.   HENT:      Head: Normocephalic and atraumatic.   Eyes:      Pupils: Pupils are equal, round, and reactive to light.   Cardiovascular:      Rate and Rhythm: Normal rate and regular rhythm.    Pulmonary:      Effort: Pulmonary effort is normal.      Breath sounds: Normal breath sounds.   Abdominal:      General: Bowel sounds are normal.      Palpations: Abdomen is soft.   Musculoskeletal:         General: Normal range of motion.      Cervical back: Normal range of motion and neck supple.   Skin:     General: Skin is warm and dry.   Neurological:      Mental Status: She is alert and oriented to person, place, and time.   Psychiatric:         Behavior: Behavior normal.         Thought Content: Thought content normal.         Judgment: Judgment normal.       Significant Labs:   Labs have been reviewed.    Lab Results   Component Value Date    WBC 1.93 (LL) 05/18/2022    HGB 8.1 (L) 05/18/2022    HCT 27.5 (L) 05/18/2022    MCV 79 (L) 05/18/2022     05/18/2022             Assessment/Plan:     * Neutropenia  - Labs in May 2022 reveal a leukopenia/neutropenia of unclear etiology. Uric acid and LDH are within normal limits, perhaps arguing against a hematologic malignancy.  - I stated that a bone marrow biopsy is likely indicated for further evaluation of her leukopenia. After discussion with family, they would like to defer that decision to the outpatient setting.  - perhaps there is some connection between her elevated LFTs and her leukopenia, but I'm not sure at this time.  - she has a follow-up appointment with Dr. Denton on 5/23/22. They will discuss further with him then.    Iron deficiency anemia  - patient of Dr. Denton  - she had received iron sucrose x 2 doses in February/March 2022 for iron deficiency anemia.  - iron studies (5/16/22) reveal a normal ferritin but increased total iron binding capacity, suggestive of iron deficiency anemia  - she has responded to blood transfusion. I had written for iron sucrose x 2 doses (first dose yesterday)  - given her persistent iron deficiency and elevated liver function tests, I recommend gastroenterology consult for further evaluation for possible source  of blood loss and cause of transaminitis.        Thank you for your consult.     Louis Gibson MD  Hematology/Oncology  [facility]

## 2022-05-18 NOTE — ASSESSMENT & PLAN NOTE
- patient of Dr. Denton  - she had received iron sucrose x 2 doses in February/March 2022 for iron deficiency anemia.  - iron studies (5/16/22) reveal a normal ferritin but increased total iron binding capacity, suggestive of iron deficiency anemia  - she has responded to blood transfusion. I had written for iron sucrose x 2 doses (first dose yesterday)  - given her persistent iron deficiency and elevated liver function tests, I recommend gastroenterology consult for further evaluation for possible source of blood loss and cause of transaminitis.

## 2022-05-18 NOTE — HPI
Nickie Segura is a 87-year-old female with a history of hypothyroid, iron deficiency anemia, anxiety, history of stroke with residual right-sided weakness, and macular degeneration who presented to the ED for complaints of  occasional lightheadedness and anemia and leukopenia on outpatient labs. Sister at bedside. Patient has been having occasional lightheadedness for the last 3 days, but is not persistent. Patient is mostly bedbound and has history of anemia requiring blood transfusions, but no history of leukopenia/neutropenia. Patient is followed by Dr. Denton Hematology and Oncology as an outpatient for iron deficiency anemia and recently discontinued oral iron due to constipation. Patient has received 2 doses of IV iron outpatient. Patient denies any chest pain, palpitations, worsening weakness, dysuria, abdominal pain, nausea/vomiting, black/bloody stools.    In ED labs remarkable for WBC 1.75, RBC 2.66, Hgb 5.8, Alb 3.1, , , AG 5, (5/16/22 iron 14, TIBC 540). CXR: No acute cardiopulmonary abnormality. Admitted for observation to [facility] for further care.

## 2022-05-18 NOTE — NURSING
RAPID RESPONSE NURSE PROACTIVE ROUNDING NOTE     Time of Visit: 2330    Admit Date: 2022  LOS: 0  Code Status: Full Code   Date of Visit: 2022  : 1935  Age: 87 y.o.  Sex: female  Race: White  Bed: K533/K533 A:   MRN: [id number]       Called by MINH Vega NP for PIV placement. Per request, 20G PIV inserted to Left Posterior Forearm. Primary nurse, Valerio, updated on PIV placement.

## 2022-05-18 NOTE — NURSING
Blood administration initiated with Markus WILKINSON RN. Pt tolerating transfusion well. No adverse effects reported. SHIRAZ.

## 2022-05-19 ENCOUNTER — PATIENT MESSAGE (OUTPATIENT)
Dept: HEMATOLOGY/ONCOLOGY | Facility: CLINIC | Age: 87
End: 2022-05-19
Payer: MEDICARE

## 2022-05-19 ENCOUNTER — TELEPHONE (OUTPATIENT)
Dept: PRIMARY CARE CLINIC | Facility: CLINIC | Age: 87
End: 2022-05-19
Payer: MEDICARE

## 2022-05-19 VITALS
BODY MASS INDEX: 28.32 KG/M2 | DIASTOLIC BLOOD PRESSURE: 55 MMHG | SYSTOLIC BLOOD PRESSURE: 111 MMHG | RESPIRATION RATE: 18 BRPM | HEART RATE: 78 BPM | OXYGEN SATURATION: 96 % | HEIGHT: 65 IN | WEIGHT: 170 LBS | TEMPERATURE: 98 F

## 2022-05-19 LAB
ALBUMIN SERPL BCP-MCNC: 3.2 G/DL (ref 3.5–5.2)
ALP SERPL-CCNC: 131 U/L (ref 55–135)
ALT SERPL W/O P-5'-P-CCNC: 179 U/L (ref 10–44)
ANION GAP SERPL CALC-SCNC: 12 MMOL/L (ref 8–16)
AST SERPL-CCNC: 175 U/L (ref 10–40)
BACTERIA #/AREA URNS HPF: NORMAL /HPF
BASOPHILS # BLD AUTO: 0.03 K/UL (ref 0–0.2)
BASOPHILS NFR BLD: 1.1 % (ref 0–1.9)
BILIRUB SERPL-MCNC: 0.5 MG/DL (ref 0.1–1)
BILIRUB UR QL STRIP: NEGATIVE
BUN SERPL-MCNC: 10 MG/DL (ref 8–23)
CALCIUM SERPL-MCNC: 8.6 MG/DL (ref 8.7–10.5)
CHLORIDE SERPL-SCNC: 107 MMOL/L (ref 95–110)
CLARITY UR: CLEAR
CO2 SERPL-SCNC: 21 MMOL/L (ref 23–29)
COLOR UR: COLORLESS
CREAT SERPL-MCNC: 0.7 MG/DL (ref 0.5–1.4)
DIFFERENTIAL METHOD: ABNORMAL
EOSINOPHIL # BLD AUTO: 0.1 K/UL (ref 0–0.5)
EOSINOPHIL NFR BLD: 4.5 % (ref 0–8)
ERYTHROCYTE [DISTWIDTH] IN BLOOD BY AUTOMATED COUNT: 18.1 % (ref 11.5–14.5)
EST. GFR  (AFRICAN AMERICAN): >60 ML/MIN/1.73 M^2
EST. GFR  (NON AFRICAN AMERICAN): >60 ML/MIN/1.73 M^2
GLUCOSE SERPL-MCNC: 82 MG/DL (ref 70–110)
GLUCOSE UR QL STRIP: NEGATIVE
HCT VFR BLD AUTO: 27.7 % (ref 37–48.5)
HGB BLD-MCNC: 8.3 G/DL (ref 12–16)
HGB UR QL STRIP: ABNORMAL
IMM GRANULOCYTES # BLD AUTO: 0.02 K/UL (ref 0–0.04)
IMM GRANULOCYTES NFR BLD AUTO: 0.7 % (ref 0–0.5)
KETONES UR QL STRIP: NEGATIVE
LEUKOCYTE ESTERASE UR QL STRIP: NEGATIVE
LYMPHOCYTES # BLD AUTO: 0.7 K/UL (ref 1–4.8)
LYMPHOCYTES NFR BLD: 25.3 % (ref 18–48)
MCH RBC QN AUTO: 23.4 PG (ref 27–31)
MCHC RBC AUTO-ENTMCNC: 30 G/DL (ref 32–36)
MCV RBC AUTO: 78 FL (ref 82–98)
MICROSCOPIC COMMENT: NORMAL
MONOCYTES # BLD AUTO: 0.6 K/UL (ref 0.3–1)
MONOCYTES NFR BLD: 21.6 % (ref 4–15)
NEUTROPHILS # BLD AUTO: 1.3 K/UL (ref 1.8–7.7)
NEUTROPHILS NFR BLD: 46.8 % (ref 38–73)
NITRITE UR QL STRIP: NEGATIVE
NRBC BLD-RTO: 0 /100 WBC
PATH REV BLD -IMP: NORMAL
PH UR STRIP: 7 [PH] (ref 5–8)
PLATELET # BLD AUTO: 233 K/UL (ref 150–450)
PMV BLD AUTO: 9.6 FL (ref 9.2–12.9)
POTASSIUM SERPL-SCNC: 4 MMOL/L (ref 3.5–5.1)
PROT SERPL-MCNC: 6.1 G/DL (ref 6–8.4)
PROT UR QL STRIP: NEGATIVE
RBC # BLD AUTO: 3.55 M/UL (ref 4–5.4)
RBC #/AREA URNS HPF: 2 /HPF (ref 0–4)
SODIUM SERPL-SCNC: 140 MMOL/L (ref 136–145)
SP GR UR STRIP: 1 (ref 1–1.03)
SQUAMOUS #/AREA URNS HPF: 1 /HPF
URN SPEC COLLECT METH UR: ABNORMAL
UROBILINOGEN UR STRIP-ACNC: NEGATIVE EU/DL
WBC # BLD AUTO: 2.69 K/UL (ref 3.9–12.7)
WBC #/AREA URNS HPF: 2 /HPF (ref 0–5)

## 2022-05-19 PROCEDURE — 81000 URINALYSIS NONAUTO W/SCOPE: CPT | Performed by: STUDENT IN AN ORGANIZED HEALTH CARE EDUCATION/TRAINING PROGRAM

## 2022-05-19 PROCEDURE — 97162 PT EVAL MOD COMPLEX 30 MIN: CPT

## 2022-05-19 PROCEDURE — 97165 OT EVAL LOW COMPLEX 30 MIN: CPT

## 2022-05-19 PROCEDURE — G0378 HOSPITAL OBSERVATION PER HR: HCPCS

## 2022-05-19 PROCEDURE — 25000003 PHARM REV CODE 250: Performed by: INTERNAL MEDICINE

## 2022-05-19 PROCEDURE — 85025 COMPLETE CBC W/AUTO DIFF WBC: CPT | Performed by: NURSE PRACTITIONER

## 2022-05-19 PROCEDURE — 97530 THERAPEUTIC ACTIVITIES: CPT

## 2022-05-19 PROCEDURE — 94761 N-INVAS EAR/PLS OXIMETRY MLT: CPT

## 2022-05-19 PROCEDURE — 25000003 PHARM REV CODE 250: Performed by: NURSE PRACTITIONER

## 2022-05-19 PROCEDURE — 80053 COMPREHEN METABOLIC PANEL: CPT | Performed by: NURSE PRACTITIONER

## 2022-05-19 PROCEDURE — 97535 SELF CARE MNGMENT TRAINING: CPT

## 2022-05-19 PROCEDURE — 36415 COLL VENOUS BLD VENIPUNCTURE: CPT | Performed by: NURSE PRACTITIONER

## 2022-05-19 PROCEDURE — A4216 STERILE WATER/SALINE, 10 ML: HCPCS | Performed by: NURSE PRACTITIONER

## 2022-05-19 PROCEDURE — 97116 GAIT TRAINING THERAPY: CPT

## 2022-05-19 RX ORDER — NAPROXEN SODIUM 220 MG/1
81 TABLET, FILM COATED ORAL DAILY
Status: DISCONTINUED | OUTPATIENT
Start: 2022-05-19 | End: 2022-05-19 | Stop reason: HOSPADM

## 2022-05-19 RX ADMIN — ACETAMINOPHEN 650 MG: 325 TABLET ORAL at 03:05

## 2022-05-19 RX ADMIN — ACETAMINOPHEN 650 MG: 325 TABLET ORAL at 06:05

## 2022-05-19 RX ADMIN — ASPIRIN 81 MG CHEWABLE TABLET 81 MG: 81 TABLET CHEWABLE at 09:05

## 2022-05-19 RX ADMIN — LEVOTHYROXINE SODIUM 100 MCG: 50 TABLET ORAL at 06:05

## 2022-05-19 RX ADMIN — Medication 10 ML: at 06:05

## 2022-05-19 RX ADMIN — BUSPIRONE HYDROCHLORIDE 10 MG: 5 TABLET ORAL at 09:05

## 2022-05-19 RX ADMIN — LORAZEPAM 0.5 MG: 0.5 TABLET ORAL at 09:05

## 2022-05-19 RX ADMIN — Medication 10 ML: at 02:05

## 2022-05-19 NOTE — CONSULTS
"[facility]    CC: anemia    HPI 87 y.o. female with PMH significant for hypothyroidism, CARLIN, CVA GI consulted for acute on chronic, severe, microcytic anemia not associated with overt GI bleeding.      Past Medical History  Past Medical History:   Diagnosis Date    LORNA positive     Anxiety     Bilateral cataracts     Colitis     Colon polyp     Depression     Elevated IgE level     Hepatomegaly     Hiatal hernia     Hypothyroidism     IFG (impaired fasting glucose)     Iron deficiency anemia     Macular degeneration     Nicotine dependence     Osteoarthritis     Recurrent UTI     Renal cyst     Skin cancer     Tuberculosis of bladder     Urinary incontinence     Vitamin B12 deficiency     Wheezing        Past Surgical History  Past Surgical History:   Procedure Laterality Date    CHOLECYSTECTOMY         Social History  Social History     Tobacco Use    Smoking status: Former Smoker     Types: Cigarettes, Vaping with nicotine    Smokeless tobacco: Current User   Substance Use Topics    Alcohol use: Yes    Drug use: Never       Family History  No FHx of GI malignancy    Review of Systems  General ROS: negative for chills, fever, fatigue or weight loss  Psychological ROS: negative for hallucination, depression, anxiety, or suicidal ideation  Ophthalmic ROS: negative for blurry vision, photophobia or eye pain  ENT ROS: negative for epistaxis, sore throat or rhinorrhea  Respiratory ROS: no cough, shortness of breath, or wheezing  Cardiovascular ROS: no chest pain or dyspnea on exertion  Gastrointestinal ROS: no abdominal pain, change in bowel habits, or black/ bloody stools  Musculoskeletal ROS: +BLE weakness. No muscular weakness  Neurological ROS: no syncope or seizures; no ataxia  Dermatological ROS: negative for pruritis, rash and jaundice    Physical Examination  /60 (Patient Position: Sitting)   Pulse 82   Temp 97.4 °F (36.3 °C) (Oral)   Resp 18   Ht 5' 5" (1.651 m)   " Wt 77.1 kg (169 lb 15.6 oz)   SpO2 (!) 94%   BMI 28.29 kg/m²   General appearance: alert, cooperative, no distress  HENT: Normocephalic, atraumatic, neck symmetrical, no nasal discharge   Eyes: no scleral icterus, PERRL, EOM's intact  Heart: regular rate and rhythm without rub; no displacement of the PMI   Abdomen: soft, non-tender; non-distended, dullness to percussion, bowel sounds normoactive; no organomegaly. Brown stool in diaper  Extremities: extremities symmetric; no clubbing, cyanosis, or edema  Integument: Skin color, texture, turgor normal; no rashes; hair distrubution normal  Neurologic: Alert and oriented X 3, normal sensation, normal coordination  Psychiatric: no pressured speech; normal affect; no evidence of impaired cognition     Recent Labs   Lab 05/19/22  0557   WBC 2.69*   RBC 3.55*   HGB 8.3*   HCT 27.7*      MCV 78*   MCH 23.4*   MCHC 30.0*       Recent Labs   Lab 05/19/22  0557      K 4.0      CO2 21*   GLU 82   BUN 10   CREATININE 0.7     Recent Labs   Lab 05/19/22  0557   PROT 6.1   ALBUMIN 3.2*   BILITOT 0.5   *   *   ALKPHOS 131       Reviewed CXR images that do not show mediastinal widening or distension of stomach per my view    Review and summarization of old records    Assessment:   87 y.o. 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 Admitted for asymptomatic leukopenia and anemia. Has care taker at home who carefully monitors BM's and denies any GI bleeding, reportedly. Reports both an EGD and colonoscopy several years (PCP documents 2013) ago that was normal.    #Iron Deficiency Anemia - low iron, elevated TIBC, normal ferritin on 5/16. Possibly has occult GI bleeding. Follows GI (not able to see documentation in EMR) and heme/onc outpatient who primarily manage her with IV iron PRN (last dose 200 mg  venofer 3/21/22) and PO daily iron.  #Elevated Liver Enzymes - possibly medication induced vs infectious vs cardiac/ischemic vs fatty liver. Recently started rosuvastatin medication. First noted 11/2021.  #Leukopenia - new problem being addressed with heme/onc    Plan:   - will discuss endoscopic planning with staff  - Maintain active type and screen  - Two large bore IV's  - Trend Hb and transfuse for goal >7-8 (as indicated by co-morbidities), or >10 if symptomatic (MI or orthostatic/tachycardia not responding to IVF's)  - monitor CMP daily; possibly related to rosuvastatin vs PRN tylenol on med list; would stop tylenol for now  - okay to monitor liver enzymes and continue rosuvastatin but if continues to worsen may need to stop it      Attestation to follow which may differ from above plan. Please appreciate.    Garcia Jaeger MD  [facility]

## 2022-05-19 NOTE — PT/OT/SLP EVAL
"Physical Therapy Evaluation    Patient Name:  Nickie Segura   MRN:  [id number]    Recommendations:     Discharge Recommendations:  home health PT, home health OT   Discharge Equipment Recommendations: none   Barriers to discharge: None for discharge back to [facility] with support of daily sitters    Assessment:     Nickie Segura is a 87 y.o. female admitted with a medical diagnosis of Neutropenia.  She presents with the following impairments/functional limitations:  weakness, impaired self care skills, impaired balance, impaired endurance, impaired functional mobilty, visual deficits, gait instability, decreased lower extremity function.  Patient seen for physical therapy evaluation.  Patient is at Min assist with bed mobility and transfers.  Patient ambulated with RW x 50' with Min Assist and VC's for upright posture, slow pace, shuffling steps, forward kyphosis.  Anticipate patient will benefit from Home Health PT/OT after discharge back to [facility] with continued daily assist from sitters at facility.  No equipment needs.      Rehab Prognosis: Fair; patient would benefit from acute skilled PT services to address these deficits and reach maximum level of function.    Recent Surgery: * No surgery found *      Plan:     During this hospitalization, patient to be seen 3 x/week to address the identified rehab impairments via gait training, therapeutic activities, therapeutic exercises, neuromuscular re-education and progress toward the following goals:    · Plan of Care Expires:  06/18/22    Subjective     Chief Complaint: "I feel like I am peeing on myself"  (changed undergarment and Pure Wick replaced)  Patient/Family Comments/goals: To return back to [facility]  Pain/Comfort:  · Pain Rating 1: 0/10  · Pain Rating Post-Intervention 1: 0/10    Patients cultural, spiritual, Advent conflicts given the current situation: no    Living Environment:  Patient lives at [facility] " facility with her 87 yo sister.  Apartment is on 2nd floor with elevator access.  Bathroom has a WIS with a built in bench and grab bars.   Prior to admission, patients level of function was patient has sitters that check on patient 5x/day and assist with gait to the bathroom with RW and all ADLs, they also take patient to the dining room for lunch. Patient walks short household distances with RW and assist.   Equipment used at home: walker, rolling, wheelchair.  DME owned (not currently used): none.  Upon discharge, patient will have assistance from staff at facility.    Objective:     Communicated with nurse Amina prior to session.  Patient found supine with bed alarm, peripheral IV, PureWick  upon PT entry to room.    General Precautions: Standard, fall, neutropenic, hearing impaired, vision impaired, other (see comments) (hearing loss and macular degeneration)   Orthopedic Precautions:N/A   Braces: N/A  Respiratory Status: Room air    Exams:  · Cognitive Exam:  Patient is oriented to Person, Place, Time and Situation  · Fine Motor Coordination:    · -       Intact  Left hand, finger to nose, Right hand, finger to nose, Left hand thumb/finger opposition skills and Right hand thumb/finger opposition skills  · Gross Motor Coordination:  WFL  · Postural Exam:  Patient presented with the following abnormalities:    · -       Rounded shoulders  · -       Max Forward head  · -       Max Kyphosis  · Sensation:    · -       Intact  light/touch B LEs  · Skin Integrity/Edema:      · -       Skin integrity: Visible skin intact  · RLE ROM: WFL  · RLE Strength: Deficits: 3+ to 4/5 grossly  · LLE ROM: WFL  · LLE Strength: Deficits: 3+ to 4/5 grossly  · Vision deficits due to macular degeneration - requires VCs for locating chair and for ambulating in hallway  · Hearing loss B    Functional Mobility:  · Bed Mobility:     · Rolling Right: minimum assistance  · Scooting: minimum assistance  · Supine to Sit: minimum  assistance  · Transfers:     · Sit to Stand:  minimum assistance with rolling walker  · Gait: with RW x 50' with CG/Min A, slow pace, shuffling steps, VC's for visual directions due to low vision.  Flexed kyphotic posture with gait  · Balance: Seated:  no LOB sitting on EOB with SBA  Standing:  requires RW and Min A, unsteady    Therapeutic Activities and Exercises:  Educated on role of physical therapy.  Educated on importance of OOB.    Patient stating that she feels wet even with Pure Wick.  After gait trial, changed patient's undergarment and PureWick with total assist.  Notified Nursing.  Educated on B LE seated exercises to complete on own.  Patient also educated to call for assist to return back to bed.  Sister present for entire evaluation.      AM-PAC 6 CLICK MOBILITY  Total Score:16     Patient left up in chair with all lines intact, call button in reach, chair alarm on and nurse notified.    GOALS:   Multidisciplinary Problems     Physical Therapy Goals        Problem: Physical Therapy    Goal Priority Disciplines Outcome Goal Variances Interventions   Physical Therapy Goal     PT, PT/OT Ongoing, Progressing     Description: Goals to be met by: 2022    Patient will increase functional independence with mobility by performin. Supine to sit with Stand-by Assistance  2. Sit to supine with Stand-by Assistance  3. Sit to stand transfer with Stand-by Assistance  4. Bed to chair transfer with Stand-by Assistance using Rolling Walker  5. Gait  x 75 feet with Contact Guard Assistance using Rolling Walker.                      History:     Past Medical History:   Diagnosis Date    LORNA positive     Anxiety     Bilateral cataracts     Colitis     Colon polyp     Depression     Elevated IgE level     Hepatomegaly     Hiatal hernia     Hypothyroidism     IFG (impaired fasting glucose)     Iron deficiency anemia     Macular degeneration     Nicotine dependence     Osteoarthritis     Recurrent  UTI     Renal cyst     Skin cancer     Tuberculosis of bladder     Urinary incontinence     Vitamin B12 deficiency     Wheezing        Past Surgical History:   Procedure Laterality Date    CHOLECYSTECTOMY         Time Tracking:     PT Received On: 05/19/22  PT Start Time: 0940     PT Stop Time: 1015  PT Total Time (min): 35 min     Billable Minutes: Evaluation 10, Gait Training 10 and Therapeutic Exercise 15      05/19/2022

## 2022-05-19 NOTE — PLAN OF CARE
Problem: Physical Therapy  Goal: Physical Therapy Goal  Description: Goals to be met by: 2022    Patient will increase functional independence with mobility by performin. Supine to sit with Stand-by Assistance  2. Sit to supine with Stand-by Assistance  3. Sit to stand transfer with Stand-by Assistance  4. Bed to chair transfer with Stand-by Assistance using Rolling Walker  5. Gait  x 75 feet with Contact Guard Assistance using Rolling Walker.     Outcome: Ongoing, Progressing     Patient seen for physical therapy evaluation.  Patient is at Min assist with bed mobility and transfers.  Patient ambulated with RW x 50' with Min Assist and VC's for upright posture, slow pace, shuffling steps, forward kyphosis.  Anticipate patient will benefit from Home Health PT/OT after discharge back to [facility] with continued daily assist from sitters at facility.  No equipment needs.

## 2022-05-19 NOTE — DISCHARGE SUMMARY
[facility] Medicine  Discharge Summary      Patient Name: Nickie Segura  MRN: [id number]  Patient Class: OP- Observation  Admission Date: 5/17/2022  Hospital Length of Stay: 0 days  Discharge Date and Time:  05/19/2022 4:26 PM  Attending Physician: Torin Carr MD   Discharging Provider: Torin Carr MD  Primary Care Provider: Perla Hopper MD      HPI:   Nickie Segura is a 87-year-old female with a history of hypothyroid, iron deficiency anemia, anxiety, history of stroke with residual right-sided weakness, and macular degeneration who presented to the ED for complaints of  occasional lightheadedness and anemia and leukopenia on outpatient labs. Sister at bedside. Patient has been having occasional lightheadedness for the last 3 days, but is not persistent. Patient is mostly bedbound and has history of anemia requiring blood transfusions, but no history of leukopenia/neutropenia. Patient is followed by Dr. Denton Hematology and Oncology as an outpatient for iron deficiency anemia and recently discontinued oral iron due to constipation. Patient has received 2 doses of IV iron outpatient. Patient denies any chest pain, palpitations, worsening weakness, dysuria, abdominal pain, nausea/vomiting, black/bloody stools.    In ED labs remarkable for WBC 1.75, RBC 2.66, Hgb 5.8, Alb 3.1, , , AG 5, (5/16/22 iron 14, TIBC 540). CXR: No acute cardiopulmonary abnormality. Admitted for observation to [facility] for further care.    Hospital Course:   Neutropenia  Acute anemia on chronic CARLIN  Anemia and leukopenia of unclear source - POSSIBLE CONSUMPTION DUE TO RECENT VIRAL PROCESS ?  history of iron deficiency anemia, however with rapid drop in hemoglobin   no history of leukopenia/neutropenia  Presents with lightheadedness  Denies chest pain, shortness of breath, nausea/vomiting, evidence of bleeding, sick contacts  CXR: No acute cardiopulmonary abnormality  On exam: BS  clear, no immediate evidence of infection  Iron 14  TIBC 540 hgb 5.8 WBC 1.75  Received iron IV in ED x1  Urine sample pending  Transfusing 2 units PRBCs now  Consult hematology/oncology     APPRECIATE HEMEONC  S/P PRBC X 2 AND IV IRON X 2 - PATIENT FEELS BETTER  APPRECIATE GI CONSULTATION  PT/OT MOBILIZE AS ABLE  SERIAL LABS  STABLE FOR DC     Elevated LFTs      Patient has known hepatomegaly  Levels noted to be increasing since 11/5/21  Monitor  STABLE        Debility  Old cardioembolic stroke with late effect  Mild right sided residual weakness noted  Fall precautions         RENÉE (generalized anxiety disorder)  Depression  Anxiety  Resume home meds     Pure hypercholesterolemia  Cardiovascular disease   Patient is chronically on statin.will not continue for now. Monitor clinically. Last LDL was         Lab Results   Component Value Date     LDLCALC 56.8 (L) 01/31/2022      Elevated LFTs   Hold statin for now  monitor        Hypothyroidism  Patient has chronic hypothyroidism. TFTs reviewed-         Lab Results   Component Value Date     TSH 2.074 11/05/2021   . Will continue chronic levothyroxine and adjust for and clinical changes.     Nicotine dependence  Dangers of cigarette smoking were reviewed with patient in detail for 3 minutes and patient was encouraged to quit. Nicotine replacement options were discussed with the patient and they decided to continue smoking her electronic cigarettes with decline of nicotine patch while inpatient.        Iron deficiency anemia  Patient sees Dr. Denton with hematology and oncology outpatient  Last seen in February 2022  Iron deficiency is secondary to impaired absorption and chronic disease  -she has severe constipation from oral iron  -discussed receiving IV iron with Venofer with Dr. Denton at that time and she only wanted to try 2 doses of IV iron for now  -discontinued oral iron      -this admission MCV 79, ferritin pending  -Received Iron IV x1 in  ED  -monitor           Primary hypertension  BP stable on admission   Denies home meds  monitor       Goals of Care Treatment Preferences:  Code Status: Full Code    Living Will: Yes              Consults:   Consults (From admission, onward)        Status Ordering Provider     Inpatient consult to Gastroenterology  Once        Provider:  (Not yet assigned)    Completed RAFA PEREZ     IP consult to case management  Once        Provider:  (Not yet assigned)    Acknowledged INNOCENT-SUZANNA IQBAL     Inpatient consult to Hematology  Once        Provider:  (Not yet assigned)    Completed ADALID WARE new Assessment & Plan notes have been filed under this hospital service since the last note was generated.  Service: Hospital Medicine    Final Active Diagnoses:    Diagnosis Date Noted POA    PRINCIPAL PROBLEM:  Neutropenia [D70.9] 05/17/2022 Yes    Other decreased white blood cell count [D72.818] 05/18/2022 Yes    Elevated LFTs [R79.89] 05/17/2022 Yes    Old cardioembolic stroke without late effect [Z86.73] 01/31/2022 Not Applicable    Anxiety [F41.9] 01/25/2022 Yes    Debility [R53.81] 01/25/2022 Yes    Cerebrovascular disease [I67.9] 01/25/2022 Yes    Primary hypertension [I10]  Yes    Iron deficiency anemia [D50.9]  Yes    Nicotine dependence [F17.200]  Yes    Hypothyroidism [E03.9]  Yes    Pure hypercholesterolemia [E78.00]  Yes    Depression [F32.A]  Yes    RENÉE (generalized anxiety disorder) [F41.1]  Yes      Problems Resolved During this Admission:     DC Exam:  GEN: nontoxic, nondiaphoretic, comfortable  ENT: Moist mucous membranes  EYES: PERRLA, anicteric sclera, no conjunctival discharge  PULM: comfortable work of breathing, CTA B  CV: RRR, 2+ radial pulses  Psych: mood calm, affect normal, insight good    Discharged Condition: stable    Disposition: Home-Health Care [facility]    Follow Up:   Follow-up Information     Perla Hopper MD Follow up on 5/26/2022.    Specialty:  Internal Medicine  Why: 11:00 am  - you will see Dr. Betzy Freitas  Contact information:  [address] [facility] [address]  [phone]             [facility] [place] Follow up.    Specialty: Home Health Services  Why: Home Health  -- service set to  begin Monday, 5/23 - but you will likely be seen sooner.  Contact information:  [address]  [address]  [place] [address]  [phone]             Louis Gibson MD Follow up on 5/23/2022.    Specialty: Hematology and Oncology  Why: 3:40 pm  Contact information:  [address]  [address]  [place] [address]  [phone]             Alcides Lewis MD Follow up in 2 week(s).    Specialty: Gastroenterology  Contact information:  [address]  [place] [address]  [phone]                       Patient Instructions:      Diet Cardiac     Notify your health care provider if you experience any of the following:  temperature >100.4     Notify your health care provider if you experience any of the following:  persistent nausea and vomiting or diarrhea     Notify your health care provider if you experience any of the following:  severe uncontrolled pain     Notify your health care provider if you experience any of the following:  difficulty breathing or increased cough     SUBSEQUENT HOME HEALTH ORDERS   Order Comments: Subsequent Home Health Orders    Current Medications:  Current Facility-Administered Medications:  0.9%  NaCl infusion (for blood administration), , Intravenous, Q24H PRN, Augie Cardenas MD  acetaminophen tablet 650 mg, 650 mg, Oral, Q8H PRN, Sheila Vega NP, 650 mg at 05/19/22 1521  acetaminophen tablet 650 mg, 650 mg, Oral, PRN, Louis Gibson MD  albuterol-ipratropium 2.5 mg-0.5 mg/3 mL nebulizer solution 3 mL, 3 mL, Nebulization, Q4H PRN, Sheila Vega NP  aspirin chewable tablet 81 mg, 81 mg, Oral, Daily, Torin Carr MD, 81 mg at 05/19/22 0916  busPIRone tablet 10 mg,  10 mg, Oral, BID, Sheila Vega NP, 10 mg at 05/19/22 0916  diphenhydrAMINE capsule 25 mg, 25 mg, Oral, PRN, Louis Gibson MD  glucagon (human recombinant) injection 1 mg, 1 mg, Intramuscular, PRN, Sheila Vega NP  glucose chewable tablet 16 g, 16 g, Oral, PRN, Sheila Vega NP  glucose chewable tablet 24 g, 24 g, Oral, PRN, Sheila Vega NP  levothyroxine tablet 100 mcg, 100 mcg, Oral, Before breakfast, Sheila Vega NP, 100 mcg at 05/19/22 0600  LORazepam tablet 0.5 mg, 0.5 mg, Oral, Daily PRN, Sheila Vega NP, 0.5 mg at 05/19/22 0917  melatonin tablet 6 mg, 6 mg, Oral, Nightly PRN, Sheila Vega NP  naloxone 0.4 mg/mL injection 0.02 mg, 0.02 mg, Intravenous, PRN, Sheila Vega NP  ondansetron injection 4 mg, 4 mg, Intravenous, Q8H PRN, Sheila Vega NP  sodium chloride 0.9% flush 10 mL, 10 mL, Intravenous, Q8H, Sheila Vega NP, 10 mL at 05/19/22 1400        Nursing:   Routine Skin for Bedridden Patients: Instruct patient/caregiver to apply moisture barrier cream to all skin folds and wet areas in perineal area daily and after baths and all bowel movements.    Diet:   cardiac diet    Activities:   activity as tolerated    Labs:   none    Referrals/ Consults  Physical Therapy to evaluate and treat. Evaluate for home safety and equipment needs; Establish/upgrade home exercise program. Perform / instruct on therapeutic exercises, gait training, transfer training, and Range of Motion.  Occupational Therapy to evaluate and treat. Evaluate home environment for safety and equipment needs. Perform/Instruct on transfers, ADL training, ROM, and therapeutic exercises.    Home Health Aide:  Nursing Weekly, Physical Therapy Twice weekly, and Occupational Therapy Twice weekly     Order Specific Question Answer Comments   What Home Health Agency is the patient currently using? [facility]      Activity as tolerated       Pending Diagnostic  Studies:     None         Medications:  Reconciled Home Medications:      Medication List      CONTINUE taking these medications    albuterol 90 mcg/actuation inhaler  Commonly known as: PROVENTIL/VENTOLIN HFA  Inhale 2 puffs into the lungs every 6 (six) hours as needed. Rescue     aspirin 81 MG EC tablet  Commonly known as: ECOTRIN  Take 81 mg by mouth once daily.     busPIRone 10 MG tablet  Commonly known as: BUSPAR  1 po BID     cetirizine 10 MG tablet  Commonly known as: ZYRTEC  1 tablet DAILY (route: oral)     citalopram 20 MG tablet  Commonly known as: CeleXA  TAKE 1 AND 1/2 TABLETS BY MOUTH DAILY     doxycycline 100 MG Cap  Commonly known as: VIBRAMYCIN  Take 1 capsule (100 mg total) by mouth every 12 (twelve) hours.     erythromycin ophthalmic ointment  Commonly known as: ROMYCIN  SMARTSI Inch(es) Left Eye Every Night     estradioL 0.01 % (0.1 mg/gram) vaginal cream  Commonly known as: ESTRACE  Place 1 g vaginally 3 (three) times a week.     FLONASE ALLERGY RELIEF NASL  by Each Nostril route as needed.     GAVISCON 80-14.2 mg Chew  Generic drug: Al hyd-Mg tr-alg ac-sod bicarb  Take 1 tablet by mouth once daily.     GAVISCON ORAL  Take 1 tablet by mouth daily as needed.     ICAPS AREDS2 ORAL  Take by mouth Daily.     levothyroxine 100 MCG tablet  Commonly known as: SYNTHROID  Take 1 tablet (100 mcg total) by mouth once daily.     LORazepam 0.5 MG tablet  Commonly known as: ATIVAN  TAKE 1 TABLET BY MOUTH EVERY DAY AS NEEDED ANXIETY     MYRBETRIQ 50 mg Tb24  Generic drug: mirabegron  Take 1 tablet (50 mg total) by mouth once daily.     neomycin-polymyxin-dexamethasone 3.5 mg/g-10,000 unit/g-0.1 % Oint  Commonly known as: DEXACINE  APPLY A SMALL AMOUNT IN BOTH EYES TWICE DAILY     rosuvastatin 20 MG tablet  Commonly known as: CRESTOR  Take 1 tablet (20 mg total) by mouth once daily.     sodium chloride 0.65 % nasal spray  Commonly known as: OCEAN  1 spray DAILY (route: nasal)     TOVIAZ 8 mg Tb24  Generic  drug: fesoterodine  Take 1 tablet by mouth once daily.            Indwelling Lines/Drains at time of discharge:   Lines/Drains/Airways     None                 Time spent on the discharge of patient: 32 minutes         Torin Carr MD  Department of [facility] Medicine  [facility]

## 2022-05-19 NOTE — NURSING
AVS reviewed with pt and family. Verbalized understanding of upcoming appointments and home medications. IVs and Cardiac monitoring removed. W/c transport escorted pt and belongings to main lobby. Voiced no concerns.

## 2022-05-19 NOTE — TELEPHONE ENCOUNTER
----- Message from Yessenia Cummings sent at 5/18/2022  5:10 PM CDT -----  Regarding: hosp f/u      Patient is being discharged from [facility] and is requiring a hospital follow up with Dr. Hopper within 7 days.  I am unable to schedule an appointment within that time frame.      Please schedule a sooner appointment and message me back to advise patient prior to discharge.       DX:Iron Deficiency/labs          ThanksYessenia Luis Miguel/Discharge

## 2022-05-19 NOTE — PLAN OF CARE
VN note: VN completed AVS and attachments and notified bedside nurse, Amina. Will cont to be available and intervene prn.

## 2022-05-19 NOTE — PROGRESS NOTES
Perla Hopper MD PCP - General Internal Medicine [phone]   [address] [facility] [address] Next Steps: Follow up on 5/26/2022 Instructions: 11:00 am - you will see Dr. Betzy Freitas         [facility] [phone]   [address] [facility] [address] Next Steps: Follow up Instructions: Home Health -- service set to begin Monday, 5/23 - but you will likely be seen sooner.         Louis Gibson MD Hematology and Oncology [phone]   [address] [facility] [address] Next Steps: Follow up on 5/23/2022 Instructions: 3:40 pm

## 2022-05-19 NOTE — PT/OT/SLP EVAL
Occupational Therapy   Evaluation, tx and Discharge Summary    Name: Nickie Segura  MRN: [id number]  Admitting Diagnosis:  Neutropenia  Recent Surgery: * No surgery found *    The primary encounter diagnosis was Anemia. Diagnoses of Lightheadedness, Neutropenia, unspecified type, Chest pain, and Symptomatic anemia were also pertinent to this visit.    Recommendations:     Discharge Recommendations: home health OT, home health PT  Discharge Equipment Recommendations:  none  Barriers to discharge:  None    Assessment:     Nickie Segura is a 87 y.o. female with a medical diagnosis of Neutropenia.  She presents with Performance deficits affecting function: weakness, impaired self care skills, impaired functional mobilty, gait instability, impaired balance, decreased upper extremity function, decreased lower extremity function, decreased safety awareness, visual deficits, decreased ROM.      OT initial eval completed, tx completed and OT recommending HHOT at discharge. Dc acute OT.    Rehab Prognosis: Good and Fair; patient would benefit from acute skilled OT services to address these deficits and reach maximum level of function.       Plan:     Patient to be seen   to address the above listed problems via self-care/home management, therapeutic activities  · Plan of Care Expires:    · Plan of Care Reviewed with: patient, sibling    Subjective     Chief Complaint: none  Patient/Family Comments/goals: none    Occupational Profile:  Living Environment: pt is a resident of [facility], has a roommate who assists her, pt lives on 2nd floor with elevator access.  Previous level of function: pt was assisted with self care and ambulation via sitter service 5x/day; ambulated with RW and assist to bathroom, sponge bathe seated on toilet; pt ate lunch in facility dining room; used wc to dining room and transported by her roommate. Sister provided transportation.   Roles and Routines: pt spends most of her time in her recliner  chair  Equipment Used at Home:  walker, rolling, wheelchair  Assistance upon Discharge: sitters, sister and roommate    Pain/Comfort:  · Pain Rating 1: 0/10  · Pain Rating Post-Intervention 1: 0/10    Patients cultural, spiritual, Anglican conflicts given the current situation: no    Objective:     Communicated with: nurse prior to session.  Patient found up in chair with peripheral IV, telemetry upon OT entry to room.    General Precautions: Standard, fall, vision impaired, neutropenic, hearing impaired, aspiration   Orthopedic Precautions:N/A   Braces: N/A  Respiratory Status: Room air    Occupational Performance:      Functional Mobility/Transfers:  · Patient completed Sit <> Stand Transfer with minimum assistance  with  rolling walker   · Patient completed Toilet Transfer Step Transfer technique with contact guard assistance and minimum assistance with  rolling walker and bedside commode  · Functional Mobility: ambulated CGA/min A with RW short distance in room to BSc on opposite side of room and back to BS chair, good pace, poor vision, assisted with walker management, HP on chair to sit after ambulation.     Activities of Daily Living:  · Grooming: minimum assistance to wash hands standing at sink, impaired vision, chronic mac degeneration, standing inside RW, vc and assist to get walker infront of sink safely  · Upper Body Dressing: minimum assistance to don dress, max for snaps  · Lower Body Dressing: maximal assistance shoes, brief and pads  · Toileting: maximal assistance for all aspects, seated and standing with RW for perirectal hygiene and pull up garment from BSC    Cognitive/Visual Perceptual:  Cognitive/Psychosocial Skills:     -       Oriented to: Person, Time and Situation   -       Follows Commands/attention:Follows one-step commands  -       Communication: clear/fluent  -       Memory: Impaired STM and Poor immediate recall  -       Safety awareness/insight to disability: impaired   -        Mood/Affect/Coping skills/emotional control: Cooperative  Visual/Perceptual:      -Impaired  hx macular degeneration, wears glasses .    Physical Exam:  Balance:    -       sitting; fair plus  dynamic: fair standing: fair-  dynamic: poor plus  Postural examination/scapula alignment:    -       Rounded shoulders  -       Forward head  -       Posterior pelvic tilt  -       Lateral weight shift of hips  -       Abnormal trunk flexion  -       Kyphosis  Dominant hand:    -       right  Upper Extremity Range of Motion:     -       Right Upper Extremity: WFL  -       Left Upper Extremity: WFL  Upper Extremity Strength:    -       Right Upper Extremity: WFL  -       Left Upper Extremity: WFL   Strength:    -       Right Upper Extremity: WFL  -       Left Upper Extremity: WFL    AMPAC 6 Click ADL:  AMPAC Total Score: 15    Treatment & Education:  Purpose of OT visit and POC  Fx ambulation with RW CGA-min A , walker management assist for poor vision, vc for directions.  BSC t/f training, min A with emphasis on hand placement guidance 2/2 poor vision.  Dressing with RW, max A, safety strategies to minimize fall risk: seated to thread legs, don shoes and brief before standing to RW.  All questions/concerns addressed within scope.    Education:    Patient left up in chair with all lines intact, call button in reach, nurse notified and sister present    GOALS:   Multidisciplinary Problems     Occupational Therapy Goals        Problem: Occupational Therapy    Goal Priority Disciplines Outcome Interventions   Occupational Therapy Goal     OT, PT/OT Adequate for Care Transition                    History:     Past Medical History:   Diagnosis Date    LORNA positive     Anxiety     Bilateral cataracts     Colitis     Colon polyp     Depression     Elevated IgE level     Hepatomegaly     Hiatal hernia     Hypothyroidism     IFG (impaired fasting glucose)     Iron deficiency anemia     Macular degeneration      Nicotine dependence     Osteoarthritis     Recurrent UTI     Renal cyst     Skin cancer     Tuberculosis of bladder     Urinary incontinence     Vitamin B12 deficiency     Wheezing        Past Surgical History:   Procedure Laterality Date    CHOLECYSTECTOMY         Time Tracking:     OT Date of Treatment: 05/19/22  OT Start Time: 1517  OT Stop Time: 1552  OT Total Time (min): 35 min    Billable Minutes:Evaluation 10  Self Care/Home Management 15  Therapeutic Activity 10  Total Time 35    5/19/2022

## 2022-05-19 NOTE — PLAN OF CARE
KATHLEEN met with pt, her sister Brittaney and pt's roommate prior to d/c   reviewed f/u apts   [facility] - however she has had openings - pt should be seen sooner.   no dme ordered    Future Appointments   Date Time Provider Department Center   5/23/2022  3:40 PM Louis Gibson MD [facility] HEM ONC [facility]   5/26/2022 11:00 AM Betzy Penaloza MD [facility]        05/19/22 0909   Final Note   Assessment Type Final Discharge Note   Anticipated Discharge Disposition Home-Health  ([facility])   What phone number can be called within the next 1-3 days to see how you are doing after discharge? [phone]   Hospital Resources/Appts/Education Provided Post-Acute resouces added to AVS;Appointments scheduled and added to AVS   Post-Acute Status   Post-Acute Authorization Home Health   Home Health Status Set-up Complete/Auth obtained  (pt accepted per Lupe)   Patient choice form signed by patient/caregiver   (pt choice form signed)   Discharge Delays None known at this time

## 2022-05-19 NOTE — PLAN OF CARE
AVS and educational attachments shared with patient and family via [employer]. Discussed thoroughly. Patient and family verbalized clear understanding using teach back method. Notified bedside nurse of completion of education. At present no distress noted. Patient to be discharged via w/c with escort service and family with all of patient's belonings. Will cont to be available to patient and family and intervene prn.

## 2022-05-19 NOTE — PROGRESS NOTES
[facility]  Progress Note    Patient Name: Nickie Segura  MRN: [id number]  Patient Class: OP- Observation  Admission Date: 5/17/2022  Attending Physician: Torin Carr MD   Primary Care Provider: Perla Hopper MD  DOS: 05/18/2022     Subjective:     Principal Problem:Neutropenia    Chief Complaint:   Chief Complaint   Patient presents with    MD referral     RBC, WBC, H&H was lower today than yesterday at Dr. Peña office and sent for recheck. Patient is awake and alert. Over weekend was lightheaded        HPI: Nickie Segura is a 87-year-old female with a history of hypothyroid, iron deficiency anemia, anxiety, history of stroke with residual right-sided weakness, and macular degeneration who presented to the ED for complaints of  occasional lightheadedness and anemia and leukopenia on outpatient labs. Sister at bedside. Patient has been having occasional lightheadedness for the last 3 days, but is not persistent. Patient is mostly bedbound and has history of anemia requiring blood transfusions, but no history of leukopenia/neutropenia. Patient is followed by Dr. Denton [facility] as an outpatient for iron deficiency anemia and recently discontinued oral iron due to constipation. Patient has received 2 doses of IV iron outpatient. Patient denies any chest pain, palpitations, worsening weakness, dysuria, abdominal pain, nausea/vomiting, black/bloody stools.    In ED labs remarkable for WBC 1.75, RBC 2.66, Hgb 5.8, Alb 3.1, , , AG 5, (5/16/22 iron 14, TIBC 540). CXR: No acute cardiopulmonary abnormality. Admitted for observation to [facility] for further care.    Interval History:  Patient seen and examined. Family at bedside. Still weak and lightheaded but improved some. No N/V. No fever. No CP. Wants to work with PT/OT.    ROS: 3 pt ROS negative except as per HPI above    GEN: nontoxic, nondiaphoretic, comfortable  ENT: Moist mucous  membranes  EYES: PERRLA, anicteric sclera, no conjunctival discharge  PULM: comfortable work of breathing, CTA B  CV: RRR, 2+ radial pulses  GI: abd soft NTND +BS  Skin: dry and warm no jaundice  Psych: mood calm, affect normal, insight good    LABS:  Cr 0.7    Assessment/Plan:     * Neutropenia  Anemia and leukopenia of unclear source  history of iron deficiency anemia, however with rapid drop in hemoglobin   no history of leukopenia/neutropenia  Presents with lightheadedness  Denies chest pain, shortness of breath, nausea/vomiting, evidence of bleeding, sick contacts  CXR: No acute cardiopulmonary abnormality  On exam: BS clear, no immediate evidence of infection  Iron 14  TIBC 540 hgb 5.8 WBC 1.75  Received iron IV in ED x1  Urine sample pending  Transfusing 2 units PRBCs now  Consult hematology/oncology    APPRECIATE HEMEONC  S/P PRBC X 2 AND IV IRON X 2  GI CONSULTATION  PT/OT MOBILIZE AS ABLE  SERIAL LABS    Elevated LFTs      Patient has known hepatomegaly  Levels noted to be increasing since 11/5/21  monitor      Debility  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 Monitor clinically. Last LDL was   Lab Results   Component Value Date    LDLCALC 56.8 (L) 01/31/2022      Elevated LFTs   Hold statin for now  monitor      Hypothyroidism  Patient has chronic hypothyroidism. TFTs reviewed-   Lab Results   Component Value Date    TSH 2.074 11/05/2021   . Will continue chronic levothyroxine and adjust for and clinical changes.    Nicotine dependence  Dangers of cigarette smoking were reviewed with patient in detail for 3 minutes and patient was encouraged to quit. Nicotine replacement options were discussed with the patient and they decided to  continue smoking her electronic cigarettes with decline of nicotine patch while inpatient.      Iron deficiency anemia  Patient sees Dr. Denton with hematology and oncology outpatient  Last seen in February 2022  Iron deficiency is secondary to impaired absorption and chronic disease  -she has severe constipation from oral iron  -discussed receiving IV iron with Venofer with Dr. Denton at that time and she only wanted to try 2 doses of IV iron for now  -discontinued oral iron     -this admission MCV 79, ferritin pending  -Received Iron IV x1 in ED  -monitor        Primary hypertension  BP stable on admission   Denies home meds  monitor      VTE Risk Mitigation (From admission, onward)         Ordered     IP VTE HIGH RISK PATIENT  Once         05/17/22 2224     Place sequential compression device  Until discontinued         05/17/22 2224                 Torin Carr MD

## 2022-05-19 NOTE — PROGRESS NOTES
Future Appointments   Date Time Provider Department Center   5/23/2022  3:40 PM Louis Gibson MD [facility] HEM ONC Chiqui Kaiser

## 2022-05-19 NOTE — PLAN OF CARE
Problem: Occupational Therapy  Goal: Occupational Therapy Goal  Outcome: Adequate for Care Transition   OT initial eval and tx completed. OT recommending home with HHOT. Dc acute OT.

## 2022-05-20 NOTE — TELEPHONE ENCOUNTER
Pt was admitted and transfused per H/O.  She is fu with Dr. GUZMAN On 5/26/22.  I prefer to see her.  See where we can fit her.     Dr. GARG

## 2022-05-21 NOTE — PROGRESS NOTES
PATIENT: Nickie Segura  MRN: [id number]  DATE: 5/23/2022    Diagnosis:   1. Other iron deficiency anemia    2. Other neutropenia    3. Elevated liver enzymes    4. Vision changes      Chief Complaint: anemia, leukopenia    Oncologic History:      Oncologic History     Oncologic Treatment     Pathology       Subjective:    History of Present Illness: Ms. Segura is a 87 y.o. female who presents for evaluation and management of iron deficiency anemia and neutropenia. She had previously seen Dr. Denton.    Interval history:  - she presents for a follow-up appointment for her iron deficiency anemia and neutropenia  - she received iron sucrose x 2 doses in February/March 2022.  - she was hospitalized in mid-May 2022 for symptomatic anemia and elevated liver enzymes. She responded to blood transfusion. She received two doses of iron sucrose during the hospitalization.  - today, she is doing well. She endorses fatigue, weakness, constipation. She denies shortness of breath, chest pain, nausea, vomiting, diarrhea.      Past medical, surgical, family, and social histories have been reviewed and updated below.    Past Medical History:   Past Medical History:   Diagnosis Date    LORNA positive     Anxiety     Bilateral cataracts     Colitis     Colon polyp     Depression     Elevated IgE level     Hepatomegaly     Hiatal hernia     Hypothyroidism     IFG (impaired fasting glucose)     Iron deficiency anemia     Macular degeneration     Nicotine dependence     Osteoarthritis     Recurrent UTI     Renal cyst     Skin cancer     Tuberculosis of bladder     Urinary incontinence     Vitamin B12 deficiency     Wheezing        Past Surgical History:   Past Surgical History:   Procedure Laterality Date    CHOLECYSTECTOMY         Family History:   Family History   Problem Relation Age of Onset    Heart disease Mother     Diabetes Mother     Heart disease Father     Polycystic kidney disease Father     Atrial  fibrillation Sister        Social History:  reports that she has quit smoking. Her smoking use included cigarettes and vaping with nicotine. She uses smokeless tobacco. She reports current alcohol use. She reports that she does not use drugs.    Allergies:  Review of patient's allergies indicates:   Allergen Reactions    Penicillins      rash       Medications:  Current Outpatient Medications   Medication Sig Dispense Refill    Al hyd-Mg tr-alg ac-sod bicarb (GAVISCON) 80-14.2 mg Chew Take 1 tablet by mouth once daily.      albuterol (PROVENTIL/VENTOLIN HFA) 90 mcg/actuation inhaler Inhale 2 puffs into the lungs every 6 (six) hours as needed. Rescue      aspirin (ECOTRIN) 81 MG EC tablet Take 81 mg by mouth once daily.      busPIRone (BUSPAR) 10 MG tablet 1 po  tablet 0    cetirizine (ZYRTEC) 10 MG tablet 1 tablet DAILY (route: oral)      citalopram (CELEXA) 20 MG tablet TAKE 1 AND 1/2 TABLETS BY MOUTH DAILY 135 tablet 3    doxycycline (VIBRAMYCIN) 100 MG Cap Take 1 capsule (100 mg total) by mouth every 12 (twelve) hours. 14 capsule 0    erythromycin (ROMYCIN) ophthalmic ointment SMARTSI Inch(es) Left Eye Every Night      estradiol (ESTRACE) 0.01 % (0.1 mg/gram) vaginal cream Place 1 g vaginally 3 (three) times a week.      fluticasone propionate (FLONASE ALLERGY RELIEF NASL) by Each Nostril route as needed.      levothyroxine (SYNTHROID) 100 MCG tablet Take 1 tablet (100 mcg total) by mouth once daily. 90 tablet 2    LORazepam (ATIVAN) 0.5 MG tablet TAKE 1 TABLET BY MOUTH EVERY DAY AS NEEDED ANXIETY 30 tablet 1    mag/aluminum/sod bicarb/alginc (GAVISCON ORAL) Take 1 tablet by mouth daily as needed.      MYRBETRIQ 50 mg Tb24 Take 1 tablet (50 mg total) by mouth once daily. 90 tablet 1    neomycin-polymyxin-dexamethasone (DEXACINE) 3.5 mg/g-10,000 unit/g-0.1 % Oint APPLY A SMALL AMOUNT IN BOTH EYES TWICE DAILY      rosuvastatin (CRESTOR) 20 MG tablet Take 1 tablet (20 mg total) by mouth  "once daily. 90 tablet 3    sodium chloride (OCEAN) 0.65 % nasal spray 1 spray DAILY (route: nasal)      TOVIAZ 8 mg Tb24 Take 1 tablet by mouth once daily. 90 tablet 1    vit C/E/zinc ox/amy/lut/zeax (ICAPS AREDS2 ORAL) Take by mouth Daily.       No current facility-administered medications for this visit.       Review of Systems   Constitutional: Positive for fatigue.   HENT: Negative for sore throat.    Eyes: Positive for visual disturbance.   Respiratory: Negative for cough and shortness of breath.    Cardiovascular: Negative for chest pain.   Gastrointestinal: Positive for constipation. Negative for abdominal pain, diarrhea, nausea and vomiting.   Genitourinary: Negative for dysuria.   Musculoskeletal: Negative for back pain.   Skin: Negative for rash.   Neurological: Positive for weakness. Negative for headaches.   Hematological: Negative for adenopathy.   Psychiatric/Behavioral: The patient is not nervous/anxious.        ECOG Performance Status:   ECOG SCORE    1 - Restricted in strenuous activity-ambulatory and able to carry out work of a light nature         Objective:      Vitals:   Vitals:    05/23/22 1541   BP: 115/69   BP Location: Left arm   Patient Position: Sitting   BP Method: Medium (Automatic)   Pulse: 83   Resp: 18   Temp: 98.1 °F (36.7 °C)   TempSrc: Oral   SpO2: 98%   Weight: 77.8 kg (171 lb 8.3 oz)   Height: 5' 5" (1.651 m)     BMI: Body mass index is 28.54 kg/m².    Physical Exam  Vitals and nursing note reviewed.   Constitutional:       Appearance: She is well-developed.   HENT:      Head: Normocephalic and atraumatic.   Eyes:      Pupils: Pupils are equal, round, and reactive to light.   Cardiovascular:      Rate and Rhythm: Normal rate and regular rhythm.   Pulmonary:      Effort: Pulmonary effort is normal.      Breath sounds: Normal breath sounds.   Abdominal:      General: Bowel sounds are normal.      Palpations: Abdomen is soft.   Musculoskeletal:         General: Normal range of " motion.      Cervical back: Normal range of motion and neck supple.   Skin:     General: Skin is warm and dry.   Neurological:      Mental Status: She is alert and oriented to person, place, and time.   Psychiatric:         Behavior: Behavior normal.         Thought Content: Thought content normal.         Judgment: Judgment normal.         Laboratory Data:  Labs have been reviewed.    Lab Results   Component Value Date    WBC 2.69 (L) 05/19/2022    HGB 8.3 (L) 05/19/2022    HCT 27.7 (L) 05/19/2022    MCV 78 (L) 05/19/2022     05/19/2022           Imaging:    Assessment:       1. Other iron deficiency anemia    2. Other neutropenia    3. Elevated liver enzymes    4. Vision changes           Plan:     1. Other iron deficiency anemia  - I have reviewed her chart  - she received iron sucrose x 2 doses in February/March 2022  - Labs have been reviewed. Iron studies on 5/16/22 revealed an elevated total iron binding capacity, consistent with recurrent iron deficiency anemia.  - she was hospitalized in mid-May 2022 for symptomatic anemia and elevated liver enzymes. She responded to blood transfusion. She received two doses of iron sucrose during the hospitalization.  - check labs, including iron studies today. Depending on results, we will likely arrange for more iron sucrose.  - return to clinic in 2 months with repeat labs.    2. Other neutropenia  - unclear etiology, although her leukopenia/neutropenia was improving at time of discharge  - check labs today  - we discussed a bone marrow biopsy during her hospitalization. After discussion, she declined to proceed with it.    3. Elevated liver function tests  - unclear etiology, although her numbers were improving at time of discharge.  - check labs today.    - return to clinic in 2 months with repeat labs.    Louis Gibson M.D.  Hematology/Oncology  [facility] [address], [address]  [place] [address]  Phone: [phone]  Fax:  [phone]

## 2022-05-23 ENCOUNTER — OFFICE VISIT (OUTPATIENT)
Dept: HEMATOLOGY/ONCOLOGY | Facility: CLINIC | Age: 87
End: 2022-05-23
Payer: MEDICARE

## 2022-05-23 ENCOUNTER — LAB VISIT (OUTPATIENT)
Dept: LAB | Facility: HOSPITAL | Age: 87
End: 2022-05-23
Attending: INTERNAL MEDICINE
Payer: MEDICARE

## 2022-05-23 VITALS
DIASTOLIC BLOOD PRESSURE: 69 MMHG | OXYGEN SATURATION: 98 % | TEMPERATURE: 98 F | BODY MASS INDEX: 28.57 KG/M2 | HEIGHT: 65 IN | HEART RATE: 83 BPM | SYSTOLIC BLOOD PRESSURE: 115 MMHG | WEIGHT: 171.5 LBS | RESPIRATION RATE: 18 BRPM

## 2022-05-23 DIAGNOSIS — R74.8 ELEVATED LIVER ENZYMES: ICD-10-CM

## 2022-05-23 DIAGNOSIS — D70.8 OTHER NEUTROPENIA: ICD-10-CM

## 2022-05-23 DIAGNOSIS — D50.8 OTHER IRON DEFICIENCY ANEMIA: ICD-10-CM

## 2022-05-23 DIAGNOSIS — D50.8 OTHER IRON DEFICIENCY ANEMIA: Primary | ICD-10-CM

## 2022-05-23 DIAGNOSIS — H53.9 VISION CHANGES: ICD-10-CM

## 2022-05-23 LAB
ABO + RH BLD: NORMAL
ALBUMIN SERPL BCP-MCNC: 3.3 G/DL (ref 3.5–5.2)
ALP SERPL-CCNC: 124 U/L (ref 55–135)
ALT SERPL W/O P-5'-P-CCNC: 129 U/L (ref 10–44)
ANION GAP SERPL CALC-SCNC: 10 MMOL/L (ref 8–16)
ANISOCYTOSIS BLD QL SMEAR: SLIGHT
AST SERPL-CCNC: 109 U/L (ref 10–40)
BASOPHILS # BLD AUTO: ABNORMAL K/UL (ref 0–0.2)
BASOPHILS NFR BLD: 5 % (ref 0–1.9)
BILIRUB SERPL-MCNC: 0.2 MG/DL (ref 0.1–1)
BLD GP AB SCN CELLS X3 SERPL QL: NORMAL
BUN SERPL-MCNC: 14 MG/DL (ref 8–23)
BURR CELLS BLD QL SMEAR: ABNORMAL
CALCIUM SERPL-MCNC: 9.3 MG/DL (ref 8.7–10.5)
CHLORIDE SERPL-SCNC: 108 MMOL/L (ref 95–110)
CO2 SERPL-SCNC: 24 MMOL/L (ref 23–29)
CREAT SERPL-MCNC: 0.7 MG/DL (ref 0.5–1.4)
DACRYOCYTES BLD QL SMEAR: ABNORMAL
DIFFERENTIAL METHOD: ABNORMAL
EOSINOPHIL # BLD AUTO: ABNORMAL K/UL (ref 0–0.5)
EOSINOPHIL NFR BLD: 8 % (ref 0–8)
ERYTHROCYTE [DISTWIDTH] IN BLOOD BY AUTOMATED COUNT: 19.8 % (ref 11.5–14.5)
EST. GFR  (AFRICAN AMERICAN): >60 ML/MIN/1.73 M^2
EST. GFR  (NON AFRICAN AMERICAN): >60 ML/MIN/1.73 M^2
FERRITIN SERPL-MCNC: 364 NG/ML (ref 20–300)
GLUCOSE SERPL-MCNC: 100 MG/DL (ref 70–110)
HCT VFR BLD AUTO: 28.9 % (ref 37–48.5)
HGB BLD-MCNC: 8.5 G/DL (ref 12–16)
HYPOCHROMIA BLD QL SMEAR: ABNORMAL
IMM GRANULOCYTES # BLD AUTO: ABNORMAL K/UL (ref 0–0.04)
IMM GRANULOCYTES NFR BLD AUTO: ABNORMAL % (ref 0–0.5)
LYMPHOCYTES # BLD AUTO: ABNORMAL K/UL (ref 1–4.8)
LYMPHOCYTES NFR BLD: 35 % (ref 18–48)
MCH RBC QN AUTO: 23.7 PG (ref 27–31)
MCHC RBC AUTO-ENTMCNC: 29.4 G/DL (ref 32–36)
MCV RBC AUTO: 81 FL (ref 82–98)
MONOCYTES # BLD AUTO: ABNORMAL K/UL (ref 0.3–1)
MONOCYTES NFR BLD: 13 % (ref 4–15)
NEUTROPHILS NFR BLD: 39 % (ref 38–73)
NRBC BLD-RTO: 0 /100 WBC
OVALOCYTES BLD QL SMEAR: ABNORMAL
PLATELET # BLD AUTO: 247 K/UL (ref 150–450)
PMV BLD AUTO: 9.1 FL (ref 9.2–12.9)
POIKILOCYTOSIS BLD QL SMEAR: SLIGHT
POTASSIUM SERPL-SCNC: 3.9 MMOL/L (ref 3.5–5.1)
PROT SERPL-MCNC: 6.5 G/DL (ref 6–8.4)
RBC # BLD AUTO: 3.59 M/UL (ref 4–5.4)
SODIUM SERPL-SCNC: 142 MMOL/L (ref 136–145)
TARGETS BLD QL SMEAR: ABNORMAL
WBC # BLD AUTO: 2.52 K/UL (ref 3.9–12.7)

## 2022-05-23 PROCEDURE — 99214 OFFICE O/P EST MOD 30 MIN: CPT | Mod: S$GLB,,, | Performed by: INTERNAL MEDICINE

## 2022-05-23 PROCEDURE — 36415 COLL VENOUS BLD VENIPUNCTURE: CPT | Performed by: INTERNAL MEDICINE

## 2022-05-23 PROCEDURE — 1160F PR REVIEW ALL MEDS BY PRESCRIBER/CLIN PHARMACIST DOCUMENTED: ICD-10-PCS | Mod: CPTII,S$GLB,, | Performed by: INTERNAL MEDICINE

## 2022-05-23 PROCEDURE — 1126F PR PAIN SEVERITY QUANTIFIED, NO PAIN PRESENT: ICD-10-PCS | Mod: CPTII,S$GLB,, | Performed by: INTERNAL MEDICINE

## 2022-05-23 PROCEDURE — 99499 UNLISTED E&M SERVICE: CPT | Mod: HCNC,,, | Performed by: INTERNAL MEDICINE

## 2022-05-23 PROCEDURE — 99499 RISK ADDL DX/OHS AUDIT: ICD-10-PCS | Mod: HCNC,,, | Performed by: INTERNAL MEDICINE

## 2022-05-23 PROCEDURE — 1101F PR PT FALLS ASSESS DOC 0-1 FALLS W/OUT INJ PAST YR: ICD-10-PCS | Mod: CPTII,S$GLB,, | Performed by: INTERNAL MEDICINE

## 2022-05-23 PROCEDURE — 84466 ASSAY OF TRANSFERRIN: CPT | Performed by: INTERNAL MEDICINE

## 2022-05-23 PROCEDURE — 99999 PR PBB SHADOW E&M-EST. PATIENT-LVL III: ICD-10-PCS | Mod: PBBFAC,,, | Performed by: INTERNAL MEDICINE

## 2022-05-23 PROCEDURE — 3288F FALL RISK ASSESSMENT DOCD: CPT | Mod: CPTII,S$GLB,, | Performed by: INTERNAL MEDICINE

## 2022-05-23 PROCEDURE — 99999 PR PBB SHADOW E&M-EST. PATIENT-LVL III: CPT | Mod: PBBFAC,,, | Performed by: INTERNAL MEDICINE

## 2022-05-23 PROCEDURE — 1101F PT FALLS ASSESS-DOCD LE1/YR: CPT | Mod: CPTII,S$GLB,, | Performed by: INTERNAL MEDICINE

## 2022-05-23 PROCEDURE — 82728 ASSAY OF FERRITIN: CPT | Performed by: INTERNAL MEDICINE

## 2022-05-23 PROCEDURE — 1159F PR MEDICATION LIST DOCUMENTED IN MEDICAL RECORD: ICD-10-PCS | Mod: CPTII,S$GLB,, | Performed by: INTERNAL MEDICINE

## 2022-05-23 PROCEDURE — 1157F PR ADVANCE CARE PLAN OR EQUIV PRESENT IN MEDICAL RECORD: ICD-10-PCS | Mod: CPTII,S$GLB,, | Performed by: INTERNAL MEDICINE

## 2022-05-23 PROCEDURE — 1126F AMNT PAIN NOTED NONE PRSNT: CPT | Mod: CPTII,S$GLB,, | Performed by: INTERNAL MEDICINE

## 2022-05-23 PROCEDURE — 1157F ADVNC CARE PLAN IN RCRD: CPT | Mod: CPTII,S$GLB,, | Performed by: INTERNAL MEDICINE

## 2022-05-23 PROCEDURE — 86850 RBC ANTIBODY SCREEN: CPT | Performed by: INTERNAL MEDICINE

## 2022-05-23 PROCEDURE — 1159F MED LIST DOCD IN RCRD: CPT | Mod: CPTII,S$GLB,, | Performed by: INTERNAL MEDICINE

## 2022-05-23 PROCEDURE — 3288F PR FALLS RISK ASSESSMENT DOCUMENTED: ICD-10-PCS | Mod: CPTII,S$GLB,, | Performed by: INTERNAL MEDICINE

## 2022-05-23 PROCEDURE — 99214 PR OFFICE/OUTPT VISIT, EST, LEVL IV, 30-39 MIN: ICD-10-PCS | Mod: S$GLB,,, | Performed by: INTERNAL MEDICINE

## 2022-05-23 PROCEDURE — 85027 COMPLETE CBC AUTOMATED: CPT | Performed by: INTERNAL MEDICINE

## 2022-05-23 PROCEDURE — 1160F RVW MEDS BY RX/DR IN RCRD: CPT | Mod: CPTII,S$GLB,, | Performed by: INTERNAL MEDICINE

## 2022-05-23 PROCEDURE — 85007 BL SMEAR W/DIFF WBC COUNT: CPT | Performed by: INTERNAL MEDICINE

## 2022-05-23 PROCEDURE — 80053 COMPREHEN METABOLIC PANEL: CPT | Performed by: INTERNAL MEDICINE

## 2022-05-23 RX ORDER — HEPARIN 100 UNIT/ML
500 SYRINGE INTRAVENOUS
Status: CANCELLED | OUTPATIENT
Start: 2022-05-31

## 2022-05-23 RX ORDER — SODIUM CHLORIDE 0.9 % (FLUSH) 0.9 %
10 SYRINGE (ML) INJECTION
Status: CANCELLED | OUTPATIENT
Start: 2022-06-07

## 2022-05-23 RX ORDER — SODIUM CHLORIDE 0.9 % (FLUSH) 0.9 %
10 SYRINGE (ML) INJECTION
Status: CANCELLED | OUTPATIENT
Start: 2022-05-31

## 2022-05-23 RX ORDER — SODIUM CHLORIDE 0.9 % (FLUSH) 0.9 %
10 SYRINGE (ML) INJECTION
Status: CANCELLED | OUTPATIENT
Start: 2022-06-21

## 2022-05-23 RX ORDER — HEPARIN 100 UNIT/ML
500 SYRINGE INTRAVENOUS
Status: CANCELLED | OUTPATIENT
Start: 2022-06-14

## 2022-05-23 RX ORDER — HEPARIN 100 UNIT/ML
500 SYRINGE INTRAVENOUS
Status: CANCELLED | OUTPATIENT
Start: 2022-06-07

## 2022-05-23 RX ORDER — SODIUM CHLORIDE 0.9 % (FLUSH) 0.9 %
10 SYRINGE (ML) INJECTION
Status: CANCELLED | OUTPATIENT
Start: 2022-06-14

## 2022-05-23 RX ORDER — HEPARIN 100 UNIT/ML
500 SYRINGE INTRAVENOUS
Status: CANCELLED | OUTPATIENT
Start: 2022-06-21

## 2022-05-24 ENCOUNTER — PATIENT MESSAGE (OUTPATIENT)
Dept: HEMATOLOGY/ONCOLOGY | Facility: CLINIC | Age: 87
End: 2022-05-24
Payer: MEDICARE

## 2022-05-24 LAB
IRON SERPL-MCNC: 21 UG/DL (ref 30–160)
SATURATED IRON: 4 % (ref 20–50)
TOTAL IRON BINDING CAPACITY: 487 UG/DL (ref 250–450)
TRANSFERRIN SERPL-MCNC: 329 MG/DL (ref 200–375)

## 2022-05-25 DIAGNOSIS — Z78.0 ASYMPTOMATIC MENOPAUSAL STATE: ICD-10-CM

## 2022-05-26 ENCOUNTER — TELEPHONE (OUTPATIENT)
Dept: PRIMARY CARE CLINIC | Facility: CLINIC | Age: 87
End: 2022-05-26
Payer: MEDICARE

## 2022-05-26 NOTE — TELEPHONE ENCOUNTER
----- Message from Aminata Infante sent at 5/26/2022  9:29 AM CDT -----  Contact: Patient @ [phone]  Good Morning  Patient's sister would like a call due to her sisters appointment on Monday    Please call and advise

## 2022-05-29 NOTE — PROGRESS NOTES
"[facility]    Chief Complaint    No chief complaint on file.      History of Present Illness      Nickie Segura is a 87 y.o. 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  Last visit - 4/19/22.    The patient location is: "home"  The chief complaint leading to consultation is: "fu hospitalization"    Visit type: audiovisual    Face to Face time with patient: 22 minutes  22 minutes of total time spent on the encounter, which includes face to face time and non-face to face time preparing to see the patient (eg, review of tests), Obtaining and/or reviewing separately obtained history, Documenting clinical information in the electronic or other health record, Independently interpreting results (not separately reported) and communicating results to the patient/family/caregiver, or Care coordination (not separately reported).         Each patient to whom he or she provides medical services by telemedicine is:  (1) informed of the relationship between the physician and patient and the respective role of any other health care provider with respect to management of the patient; and (2) notified that he or she may decline to receive medical services by telemedicine and may withdraw from such care at any time.    Notes:     Hosp fu - she was sent to ED for critical labs.  Hb - 5.8. Now fu by Dr. Gibson. H/H - 8.5/28.9 - 5/23/22 s/p PRBC's and iron. She was d/c with HH, PTx, OT via [facility].     Iron deficiency Anemia - mixed picture of Iron deficiency and anemia of chronic inflammation.   Fu by H/O.  FOBT - neg.  Her vitamin b12 is slightly low at 373.  Pt is on OTC Vitamin B12 1000 mcg daily. Her folate was normal.  Did not mary jo Oral iron and now on  IV iron (3/10/22 and 3/21/22). Hosp fu - she was sent to ED for critical labs.  Hb - 5.8. Now fu by Dr. Gibson. H/H - 8.5/28.9 - " "5/23/22 s/p 2 uPRBC's and iron IV x 2.  She is sched for weekly iron infusions x 4 wks starting in 2 days and is sched for repeat labs in 8 wks.     Leukopenia -2.52 up from prev  1.75 - F/u H/O.  Considering a Bone Marrow Bx only if needed.     Transaminitis - Trending down.  AST/ALT - 129/109.  Cont to monitor.      H/o CVA - ED 11/5/21 - facial droop, RT arm, and leg weakness - stroke vs TIA.  She left ER before an MRI brain was done.  So unsure if had a stroke vs TIA. She is on ASA, statin.  She has residual Rt facial droop, numbness in her RT fingers, and RLE weakness.       Recurrent UTI-Fu by Dr. Smith.  Stable on vaginal cream. Pt also on Myrbetriq and Toviaz for OAB. Doing well.       OAB - Myrbetriq and Toviaz help.      HTN- Controlled off meds.  Continue low-sodium diet.     HLD - Controlled on Crestor 20 mg QHS.  Her cholesterol is controlled -  TG 86 HDL 55 LDL 56 - 1/31/22. Repeat Jan.       Hypothyroidism-  Controlled on levothyroxine 100 mcg daily. Repeat TFT's in Nov.     Anxiety-When on Wellbutrin she noticed her skin is sensitive to touch. Pt started noticing this after 2-3 day on the medication. Pt on Buspar 10 mg BID, and Celexa 30 mg/d.   Pt takes Ativan 0.5 mg rarely prn but feels it is the only thing that helps. She saw LCSWMikaela, 1/10/22 and1/18/22. She found this helpful.  We recently inc to 10 mg BID  As d/w Dr. Glass. She has started going to lunch again  But it makes her anxious because she can't see and there are 60 people in the lunch room. She gets anxious talking about hurricanes.      Depression- Pt has always struggled with depression and anxiety.  Not worse due to pandemic or recent stroke She is upset about her failing eye sight.   Pt on Celexa 30 mg daily and buspar 10 mg po BID.  "I'm just down and am not motivated to do anything like get dressed or pay my bills on time". Tried adding Trazodone 50 mg 1/2 QHS - no help and felt more anxious on it.  She did not " "tolerate Bupropion 75 mg BID.  She saw Mikaela Jimenez 1/10/22 and 1/18/22. Her vision and memory are worsening which concerns her.      Wheezing-Pt has ProAir which she uses prn - infrequently - has not used in several mos.  PFTs-WNL-07/14/2015. Doing well with getting rid of tobacco.     Nicotine dependence - Pt smokes E-cigarette for the past 3 yrs without tobacco.  Pt aware of the dangers.  She quit cigarette 07/05/2016.  She still vapes but less frequently. "It's the only thing she gets enjoyment from".      Hiatal hernia-Stable on Gavescon daily - doing well.     Colon polyps - Pt is s/p partial colon resection '09  CRS Dr. Gibson.  GI -Dr. Giron.  Last C scope-'13.      Hyperkalemia - Resolved - 3.9. Rec low potassium diet. She is not taking any Potassium supplementation.   Do not feel pt would tolerate even low dose furosemide 10 mg Q M,W,F.       IFG - Your Ha1c -4.8 - normal in Sept. No evidence of diabetes.       Hepatomegaly - 23 cm.  Fu by Dr. Giron.  Resolved.      OA - Preston knees - RT > Left. Rec glucosamine or turmeric. Rt Knee gives out.  She would like to renew PTx. She uses a seated walker.       Renal cysts - + fam h/o PCKD.  ? Angiomyolipoma on Left Kidney.  Fu by Dr. Smith.      Obesity - BMI - 28.54 - Rec low carb diet. She "tries". She eats TID meals most days and if misses a meal supplements with ensure.      Elev IGE - 742 ? Etiol.  No allergic Sx's.  ? Eos Esophagitis.      Aortic atherosclerosis - Tortuosity of the thoracic aorta with aortic atherosclerosis seen on CXR in Oct.  Prev fu by Dr. Nguyen Melgar. Has appt on 1/31/21 to est with new Dr. Kamran Melgar. She had a recent  is due for fu. Cont ASA and Crestor.   Echo - 2/21/22 - EF - 65%; Mild LAE, Mod AR, Mod Aortic stenosis     +LORNA - neg [place].      Acc by friend, Kary and sister Brittaney     HCM - Flu - 10/4/21; Td - > 10 yrs ago; PCV 13 - 2/21/17;  PVX 23 - 1/8/15;  Shingrix - ?- pt defers; COVID -19 Vaccine -#1 - 2/5/21; #2 - " 3/5/21; #3 - 11/7/21; # 4 ;  MGM - refuses;  DEXA - declined; C-scope - '13; GI - Dr. Giron; Retina Sp - Dr. Gardner; Derm - Dr. Joseph; Ophtho - Dr. Calderon/Dr. Melendrez; Cards - Dr. Manley; H/O - Dr. Denton    Patient Care Team:  Perla Hopper MD as PCP - General (Internal Medicine)  Markus Giron MD as Consulting Physician (Gastroenterology)  Deepali Joseph MD as Consulting Physician (Dermatology)  Opal Aburto II, MD (Ophthalmology)  Edith Vega MA as Care Coordinator     Health Maintenance:  Immunization History   Administered Date(s) Administered    COVID-19, MRNA, LN-S, PF (MODERNA FULL 0.5 ML DOSE) 02/05/2021, 03/05/2021, 11/07/2021    Influenza 12/03/2004, 09/30/2007, 10/06/2009, 10/01/2010, 11/28/2015, 11/15/2016    Influenza (FLUAD) - Quadrivalent - Adjuvanted - PF *Preferred* (65+) 09/23/2020    Influenza (FLUAD) - Trivalent - Adjuvanted - PF (65+) 09/04/2019    Influenza - Quadrivalent - High Dose - PF (65 years and older) 10/04/2021    Influenza - Trivalent (ADULT) 12/03/2004, 10/06/2009, 10/01/2010    Pneumococcal Conjugate - 13 Valent 02/21/2017    Pneumococcal Polysaccharide - 23 Valent 09/30/2007, 01/08/2015      Health Maintenance   Topic Date Due    TETANUS VACCINE  Never done    Lipid Panel  01/31/2027        Past Medical History:  Past Medical History:   Diagnosis Date    LORNA positive     Anxiety     Bilateral cataracts     Colitis     Colon polyp     Depression     Elevated IgE level     Hepatomegaly     Hiatal hernia     Hypothyroidism     IFG (impaired fasting glucose)     Iron deficiency anemia     Macular degeneration     Nicotine dependence     Osteoarthritis     Recurrent UTI     Renal cyst     Skin cancer     Tuberculosis of bladder     Urinary incontinence     Vitamin B12 deficiency     Wheezing        Past Surgical History:   has a past surgical history that includes Cholecystectomy.    Family History:  family history includes  Atrial fibrillation in her sister; Diabetes in her mother; Heart disease in her father and mother; Polycystic kidney disease in her father.     Social History:  Social History     Tobacco Use    Smoking status: Former Smoker     Types: Cigarettes, Vaping with nicotine    Smokeless tobacco: Current User   Substance Use Topics    Alcohol use: Yes    Drug use: Never       Review of Systems   Constitutional: Negative for activity change and unexpected weight change.   HENT: Negative for hearing loss, rhinorrhea and trouble swallowing.    Eyes: Negative for discharge and visual disturbance.   Respiratory: Negative for chest tightness and wheezing.    Cardiovascular: Negative for chest pain and palpitations.   Gastrointestinal: Negative for blood in stool, constipation, diarrhea, nausea and vomiting.        Does well with Prube juice and miralax and since being off oral iron.   Endocrine: Negative for polydipsia and polyuria.   Genitourinary: Negative for difficulty urinating, dysuria, hematuria and menstrual problem.   Musculoskeletal: Negative for arthralgias, joint swelling and neck pain.   Neurological: Negative for dizziness, weakness and headaches.   Psychiatric/Behavioral: Positive for dysphoric mood. Negative for confusion.        Medications:    Current Outpatient Medications:     Al hyd-Mg tr-alg ac-sod bicarb (GAVISCON) 80-14.2 mg Chew, Take 1 tablet by mouth once daily., Disp: , Rfl:     albuterol (PROVENTIL/VENTOLIN HFA) 90 mcg/actuation inhaler, Inhale 2 puffs into the lungs every 6 (six) hours as needed. Rescue, Disp: , Rfl:     aspirin (ECOTRIN) 81 MG EC tablet, Take 81 mg by mouth once daily., Disp: , Rfl:     busPIRone (BUSPAR) 10 MG tablet, 1 po BID, Disp: 180 tablet, Rfl: 0    cetirizine (ZYRTEC) 10 MG tablet, 1 tablet DAILY (route: oral), Disp: , Rfl:     citalopram (CELEXA) 20 MG tablet, TAKE 1 AND 1/2 TABLETS BY MOUTH DAILY, Disp: 135 tablet, Rfl: 3    erythromycin (ROMYCIN) ophthalmic  ointment, SMARTSI Inch(es) Left Eye Every Night, Disp: , Rfl:     estradiol (ESTRACE) 0.01 % (0.1 mg/gram) vaginal cream, Place 1 g vaginally 3 (three) times a week., Disp: , Rfl:     fluticasone propionate (FLONASE ALLERGY RELIEF NASL), by Each Nostril route as needed., Disp: , Rfl:     levothyroxine (SYNTHROID) 100 MCG tablet, Take 1 tablet (100 mcg total) by mouth once daily., Disp: 90 tablet, Rfl: 2    LORazepam (ATIVAN) 0.5 MG tablet, TAKE 1 TABLET BY MOUTH EVERY DAY AS NEEDED ANXIETY, Disp: 30 tablet, Rfl: 1    mag/aluminum/sod bicarb/alginc (GAVISCON ORAL), Take 1 tablet by mouth daily as needed., Disp: , Rfl:     MYRBETRIQ 50 mg Tb24, Take 1 tablet (50 mg total) by mouth once daily., Disp: 90 tablet, Rfl: 1    neomycin-polymyxin-dexamethasone (DEXACINE) 3.5 mg/g-10,000 unit/g-0.1 % Oint, APPLY A SMALL AMOUNT IN BOTH EYES TWICE DAILY, Disp: , Rfl:     rosuvastatin (CRESTOR) 20 MG tablet, Take 1 tablet (20 mg total) by mouth once daily., Disp: 90 tablet, Rfl: 3    sodium chloride (OCEAN) 0.65 % nasal spray, 1 spray DAILY (route: nasal), Disp: , Rfl:     TOVIAZ 8 mg Tb24, Take 1 tablet by mouth once daily., Disp: 90 tablet, Rfl: 1    vit C/E/zinc ox/amy/lut/zeax (ICAPS AREDS2 ORAL), Take by mouth Daily., Disp: , Rfl:      Allergies:  Review of patient's allergies indicates:   Allergen Reactions    Penicillins      rash       Physical Exam                    Physical Exam  Vitals reviewed.   Constitutional:       General: She is not in acute distress.     Appearance: Normal appearance. She is not ill-appearing or toxic-appearing.   Pulmonary:      Effort: No respiratory distress.   Neurological:      General: No focal deficit present.      Mental Status: She is alert and oriented to person, place, and time.   Psychiatric:         Mood and Affect: Mood normal.         Behavior: Behavior normal.          Laboratory:  CBC:  Recent Labs   Lab 22  0626 22  0557 22  1651   WBC 1.93 LL  2.69 L 2.52 L   RBC 3.47 L 3.55 L 3.59 L   Hemoglobin 8.1 L 8.3 L 8.5 L   Hematocrit 27.5 L 27.7 L 28.9 L   Platelets 194 233 247   MCV 79 L 78 L 81 L   MCH 23.3 L 23.4 L 23.7 L   MCHC 29.5 L 30.0 L 29.4 L       CMP:  Recent Labs   Lab 05/18/22  0627 05/19/22  0557 05/23/22  1651   Glucose 78 82 100   Calcium 8.8 8.6 L 9.3   Albumin 3.1 L 3.2 L 3.3 L   Total Protein 6.4 6.1 6.5   Sodium 141 140 142   Potassium 4.0 4.0 3.9   CO2 23 21 L 24   Chloride 107 107 108   BUN 14 10 14   Creatinine 0.7 0.7 0.7   Alkaline Phosphatase 129 131 124    H 179 H 129 H    H 175 H 109 H   Total Bilirubin 0.8 0.5 0.2       URINALYSIS:  Recent Labs   Lab 05/19/22  1046   Color, UA Colorless A   Specific Gravity, UA 1.005   pH, UA 7.0   Protein, UA Negative   Bacteria Rare   Nitrite, UA Negative   Leukocytes, UA Negative   Urobilinogen, UA Negative        LIPIDS:  Recent Labs   Lab 07/17/20  0909 09/23/20  0948 11/05/21  1825 01/31/22  1225   TSH  --  1.704 2.074  --    HDL 72  --   --  55   Cholesterol 158  --   --  129   Triglycerides 91  --   --  86   LDL Cholesterol 67.8  --   --  56.8 L   HDL/Cholesterol Ratio 45.6  --   --  42.6   Non-HDL Cholesterol 86  --   --  74   Total Cholesterol/HDL Ratio 2.2  --   --  2.3       TSH:  Recent Labs   Lab 09/23/20  0948 11/05/21  1825   TSH 1.704 2.074       A1C:  Recent Labs   Lab 09/23/20  0948 01/31/22  1225   Hemoglobin A1C 5.3 4.8          Other:   Recent Labs   Lab 05/18/22  0627 05/23/22  1651   Vitamin B-12 1925 H  --    Ferritin  --  364 H   Iron  --  21 L   Transferrin  --  329   TIBC  --  487 H   Saturated Iron  --  4 L           Assessment/Plan     Nickieaurelio Segura is a 87 y.o.female with:    Transaminitis  -     Hepatic Function Panel; Future; Expected date: 06/30/2022  - Improving.     Hypertension, unspecified type  - Controlled.  Cont current.     Iron deficiency anemia, unspecified iron deficiency anemia type  - Improving with PRBCs and iron infusions.  Fu by H/O.      Anxiety  - Stable.  Cont current regimen. She takes Ativan sparingly but finds it helpful.     Other tobacco product nicotine dependence, uncomplicated  - Pt not interested in quitting at this time.     Debility  - Pt has HH, PT, OT.     Leukopenia, unspecified type  - Monitored by H/O.     Depression, unspecified depression type  - Stable.  Cont current regimen.      Chronic conditions status updated as per HPI.  Other than changes above, cont current medications and maintain follow up with specialists.   Follow up in about 3 months (around 8/30/2022) for fu chronic issues or sooner if needed.      Perla Hopper MD  [facility]

## 2022-05-30 ENCOUNTER — PATIENT MESSAGE (OUTPATIENT)
Dept: ADMINISTRATIVE | Facility: HOSPITAL | Age: 87
End: 2022-05-30
Payer: MEDICARE

## 2022-05-30 ENCOUNTER — OFFICE VISIT (OUTPATIENT)
Dept: PRIMARY CARE CLINIC | Facility: CLINIC | Age: 87
End: 2022-05-30
Payer: MEDICARE

## 2022-05-30 DIAGNOSIS — D50.9 IRON DEFICIENCY ANEMIA, UNSPECIFIED IRON DEFICIENCY ANEMIA TYPE: ICD-10-CM

## 2022-05-30 DIAGNOSIS — R53.81 DEBILITY: ICD-10-CM

## 2022-05-30 DIAGNOSIS — I10 HYPERTENSION, UNSPECIFIED TYPE: ICD-10-CM

## 2022-05-30 DIAGNOSIS — D72.819 LEUKOPENIA, UNSPECIFIED TYPE: ICD-10-CM

## 2022-05-30 DIAGNOSIS — R74.01 TRANSAMINITIS: Primary | ICD-10-CM

## 2022-05-30 DIAGNOSIS — F17.290 OTHER TOBACCO PRODUCT NICOTINE DEPENDENCE, UNCOMPLICATED: ICD-10-CM

## 2022-05-30 DIAGNOSIS — F32.A DEPRESSION, UNSPECIFIED DEPRESSION TYPE: ICD-10-CM

## 2022-05-30 DIAGNOSIS — F41.9 ANXIETY: ICD-10-CM

## 2022-05-30 PROCEDURE — 99214 PR OFFICE/OUTPT VISIT, EST, LEVL IV, 30-39 MIN: ICD-10-PCS | Mod: 95,,, | Performed by: INTERNAL MEDICINE

## 2022-05-30 PROCEDURE — 99214 OFFICE O/P EST MOD 30 MIN: CPT | Mod: 95,,, | Performed by: INTERNAL MEDICINE

## 2022-05-30 PROCEDURE — 1157F PR ADVANCE CARE PLAN OR EQUIV PRESENT IN MEDICAL RECORD: ICD-10-PCS | Mod: CPTII,95,, | Performed by: INTERNAL MEDICINE

## 2022-05-30 PROCEDURE — 1157F ADVNC CARE PLAN IN RCRD: CPT | Mod: CPTII,95,, | Performed by: INTERNAL MEDICINE

## 2022-06-01 ENCOUNTER — INFUSION (OUTPATIENT)
Dept: INFUSION THERAPY | Facility: HOSPITAL | Age: 87
End: 2022-06-01
Attending: INTERNAL MEDICINE
Payer: MEDICARE

## 2022-06-01 VITALS
DIASTOLIC BLOOD PRESSURE: 65 MMHG | TEMPERATURE: 99 F | HEIGHT: 65 IN | OXYGEN SATURATION: 97 % | HEART RATE: 79 BPM | SYSTOLIC BLOOD PRESSURE: 141 MMHG | BODY MASS INDEX: 28.57 KG/M2 | RESPIRATION RATE: 16 BRPM | WEIGHT: 171.5 LBS

## 2022-06-01 DIAGNOSIS — D50.8 OTHER IRON DEFICIENCY ANEMIA: Primary | ICD-10-CM

## 2022-06-01 PROCEDURE — 25000003 PHARM REV CODE 250: Performed by: INTERNAL MEDICINE

## 2022-06-01 PROCEDURE — 63600175 PHARM REV CODE 636 W HCPCS: Performed by: INTERNAL MEDICINE

## 2022-06-01 PROCEDURE — A4216 STERILE WATER/SALINE, 10 ML: HCPCS | Performed by: INTERNAL MEDICINE

## 2022-06-01 PROCEDURE — 96365 THER/PROPH/DIAG IV INF INIT: CPT

## 2022-06-01 RX ORDER — HEPARIN 100 UNIT/ML
500 SYRINGE INTRAVENOUS
Status: DISCONTINUED | OUTPATIENT
Start: 2022-06-01 | End: 2022-06-01 | Stop reason: HOSPADM

## 2022-06-01 RX ORDER — SODIUM CHLORIDE 0.9 % (FLUSH) 0.9 %
10 SYRINGE (ML) INJECTION
Status: DISCONTINUED | OUTPATIENT
Start: 2022-06-01 | End: 2022-06-01 | Stop reason: HOSPADM

## 2022-06-01 RX ADMIN — SODIUM CHLORIDE: 0.9 INJECTION, SOLUTION INTRAVENOUS at 10:06

## 2022-06-01 RX ADMIN — IRON SUCROSE 200 MG: 20 INJECTION, SOLUTION INTRAVENOUS at 10:06

## 2022-06-01 RX ADMIN — Medication 10 ML: at 11:06

## 2022-06-01 NOTE — NURSING
Pt received IV Venofer infusion dose 1/4. Pt tolerated it well. AVS given. Next appointment scheduled. Discharged with no acute distress.

## 2022-06-08 ENCOUNTER — INFUSION (OUTPATIENT)
Dept: INFUSION THERAPY | Facility: HOSPITAL | Age: 87
End: 2022-06-08
Attending: INTERNAL MEDICINE
Payer: MEDICARE

## 2022-06-08 VITALS
DIASTOLIC BLOOD PRESSURE: 61 MMHG | WEIGHT: 171.5 LBS | SYSTOLIC BLOOD PRESSURE: 126 MMHG | HEART RATE: 84 BPM | TEMPERATURE: 98 F | RESPIRATION RATE: 16 BRPM | HEIGHT: 65 IN | BODY MASS INDEX: 28.57 KG/M2 | OXYGEN SATURATION: 97 %

## 2022-06-08 DIAGNOSIS — D50.8 OTHER IRON DEFICIENCY ANEMIA: Primary | ICD-10-CM

## 2022-06-08 PROCEDURE — 96365 THER/PROPH/DIAG IV INF INIT: CPT

## 2022-06-08 PROCEDURE — A4216 STERILE WATER/SALINE, 10 ML: HCPCS | Performed by: INTERNAL MEDICINE

## 2022-06-08 PROCEDURE — 25000003 PHARM REV CODE 250: Performed by: INTERNAL MEDICINE

## 2022-06-08 PROCEDURE — 63600175 PHARM REV CODE 636 W HCPCS: Performed by: INTERNAL MEDICINE

## 2022-06-08 RX ORDER — HEPARIN 100 UNIT/ML
500 SYRINGE INTRAVENOUS
Status: DISCONTINUED | OUTPATIENT
Start: 2022-06-08 | End: 2022-06-08 | Stop reason: HOSPADM

## 2022-06-08 RX ORDER — SODIUM CHLORIDE 0.9 % (FLUSH) 0.9 %
10 SYRINGE (ML) INJECTION
Status: DISCONTINUED | OUTPATIENT
Start: 2022-06-08 | End: 2022-06-08 | Stop reason: HOSPADM

## 2022-06-08 RX ADMIN — Medication 10 ML: at 12:06

## 2022-06-08 RX ADMIN — IRON SUCROSE 200 MG: 20 INJECTION, SOLUTION INTRAVENOUS at 11:06

## 2022-06-08 RX ADMIN — SODIUM CHLORIDE: 0.9 INJECTION, SOLUTION INTRAVENOUS at 11:06

## 2022-06-08 NOTE — NURSING
Tolerated Venofer infusion well, no s/s of discomfort or reaction noted, instructed to speak with MD if worsening of symptoms occur prior to next infusion such as increased shortness of breath, increased fatigue or weakness noted. Ambulated out of clinic.

## 2022-06-13 ENCOUNTER — TELEPHONE (OUTPATIENT)
Dept: BEHAVIORAL HEALTH | Facility: CLINIC | Age: 87
End: 2022-06-13
Payer: MEDICARE

## 2022-06-14 ENCOUNTER — PATIENT OUTREACH (OUTPATIENT)
Dept: ADMINISTRATIVE | Facility: HOSPITAL | Age: 87
End: 2022-06-14
Payer: MEDICARE

## 2022-06-15 ENCOUNTER — INFUSION (OUTPATIENT)
Dept: INFUSION THERAPY | Facility: HOSPITAL | Age: 87
End: 2022-06-15
Attending: INTERNAL MEDICINE
Payer: MEDICARE

## 2022-06-15 VITALS
HEART RATE: 80 BPM | WEIGHT: 171.5 LBS | DIASTOLIC BLOOD PRESSURE: 62 MMHG | TEMPERATURE: 98 F | SYSTOLIC BLOOD PRESSURE: 131 MMHG | HEIGHT: 65 IN | BODY MASS INDEX: 28.57 KG/M2 | RESPIRATION RATE: 16 BRPM | OXYGEN SATURATION: 96 %

## 2022-06-15 DIAGNOSIS — D50.8 OTHER IRON DEFICIENCY ANEMIA: Primary | ICD-10-CM

## 2022-06-15 PROCEDURE — 63600175 PHARM REV CODE 636 W HCPCS: Performed by: INTERNAL MEDICINE

## 2022-06-15 PROCEDURE — 25000003 PHARM REV CODE 250: Performed by: INTERNAL MEDICINE

## 2022-06-15 PROCEDURE — 96365 THER/PROPH/DIAG IV INF INIT: CPT

## 2022-06-15 PROCEDURE — A4216 STERILE WATER/SALINE, 10 ML: HCPCS | Performed by: INTERNAL MEDICINE

## 2022-06-15 RX ORDER — HEPARIN 100 UNIT/ML
500 SYRINGE INTRAVENOUS
Status: DISCONTINUED | OUTPATIENT
Start: 2022-06-15 | End: 2022-06-15 | Stop reason: HOSPADM

## 2022-06-15 RX ORDER — SODIUM CHLORIDE 0.9 % (FLUSH) 0.9 %
10 SYRINGE (ML) INJECTION
Status: DISCONTINUED | OUTPATIENT
Start: 2022-06-15 | End: 2022-06-15 | Stop reason: HOSPADM

## 2022-06-15 RX ADMIN — Medication 10 ML: at 11:06

## 2022-06-15 RX ADMIN — SODIUM CHLORIDE: 0.9 INJECTION, SOLUTION INTRAVENOUS at 10:06

## 2022-06-15 RX ADMIN — IRON SUCROSE 200 MG: 20 INJECTION, SOLUTION INTRAVENOUS at 10:06

## 2022-06-17 ENCOUNTER — TELEPHONE (OUTPATIENT)
Dept: BEHAVIORAL HEALTH | Facility: CLINIC | Age: 87
End: 2022-06-17
Payer: MEDICARE

## 2022-06-20 ENCOUNTER — TELEPHONE (OUTPATIENT)
Dept: PRIMARY CARE CLINIC | Facility: CLINIC | Age: 87
End: 2022-06-20
Payer: MEDICARE

## 2022-06-20 NOTE — TELEPHONE ENCOUNTER
----- Message from Deidra Aguirre sent at 6/20/2022  3:51 PM CDT -----  Regarding: order needed  Contact: [facility] [phone]  Type: Home Health (orders, updates, clarifications, etc.)    Home Health Agency/Nurse: [facility] [phone]    Phone number:[facility] [phone]    Reason for call: requesting verbal order to continue PT for 2 x a week for 3 weeks    Comments:

## 2022-06-21 ENCOUNTER — TELEPHONE (OUTPATIENT)
Dept: BEHAVIORAL HEALTH | Facility: CLINIC | Age: 87
End: 2022-06-21
Payer: MEDICARE

## 2022-06-21 NOTE — PROGRESS NOTES
CHW reached out to pt to remind her of virtual appointment with Mikaela Jimenez LCSW, on tomorrow. Pt sister confirmed the appointment.

## 2022-06-22 ENCOUNTER — INFUSION (OUTPATIENT)
Dept: INFUSION THERAPY | Facility: HOSPITAL | Age: 87
End: 2022-06-22
Attending: INTERNAL MEDICINE
Payer: MEDICARE

## 2022-06-22 ENCOUNTER — OFFICE VISIT (OUTPATIENT)
Dept: BEHAVIORAL HEALTH | Facility: CLINIC | Age: 87
End: 2022-06-22
Payer: MEDICARE

## 2022-06-22 VITALS
WEIGHT: 171.5 LBS | BODY MASS INDEX: 28.57 KG/M2 | HEART RATE: 88 BPM | HEIGHT: 65 IN | RESPIRATION RATE: 16 BRPM | TEMPERATURE: 98 F | SYSTOLIC BLOOD PRESSURE: 112 MMHG | DIASTOLIC BLOOD PRESSURE: 56 MMHG | OXYGEN SATURATION: 97 %

## 2022-06-22 DIAGNOSIS — F41.1 GAD (GENERALIZED ANXIETY DISORDER): Primary | ICD-10-CM

## 2022-06-22 DIAGNOSIS — D50.8 OTHER IRON DEFICIENCY ANEMIA: Primary | ICD-10-CM

## 2022-06-22 PROCEDURE — 96365 THER/PROPH/DIAG IV INF INIT: CPT

## 2022-06-22 PROCEDURE — 63600175 PHARM REV CODE 636 W HCPCS: Performed by: INTERNAL MEDICINE

## 2022-06-22 PROCEDURE — 90832 PR PSYCHOTHERAPY W/PATIENT, 30 MIN: ICD-10-PCS | Mod: 95,,, | Performed by: SOCIAL WORKER

## 2022-06-22 PROCEDURE — 1157F PR ADVANCE CARE PLAN OR EQUIV PRESENT IN MEDICAL RECORD: ICD-10-PCS | Mod: CPTII,95,, | Performed by: SOCIAL WORKER

## 2022-06-22 PROCEDURE — 25000003 PHARM REV CODE 250: Performed by: INTERNAL MEDICINE

## 2022-06-22 PROCEDURE — 90832 PSYTX W PT 30 MINUTES: CPT | Mod: 95,,, | Performed by: SOCIAL WORKER

## 2022-06-22 PROCEDURE — 1157F ADVNC CARE PLAN IN RCRD: CPT | Mod: CPTII,95,, | Performed by: SOCIAL WORKER

## 2022-06-22 PROCEDURE — A4216 STERILE WATER/SALINE, 10 ML: HCPCS | Performed by: INTERNAL MEDICINE

## 2022-06-22 RX ORDER — HEPARIN 100 UNIT/ML
500 SYRINGE INTRAVENOUS
Status: DISCONTINUED | OUTPATIENT
Start: 2022-06-22 | End: 2022-06-22 | Stop reason: HOSPADM

## 2022-06-22 RX ORDER — SODIUM CHLORIDE 0.9 % (FLUSH) 0.9 %
10 SYRINGE (ML) INJECTION
Status: DISCONTINUED | OUTPATIENT
Start: 2022-06-22 | End: 2022-06-22 | Stop reason: HOSPADM

## 2022-06-22 RX ADMIN — SODIUM CHLORIDE: 9 INJECTION, SOLUTION INTRAVENOUS at 09:06

## 2022-06-22 RX ADMIN — IRON SUCROSE 200 MG: 20 INJECTION, SOLUTION INTRAVENOUS at 09:06

## 2022-06-22 RX ADMIN — Medication 10 ML: at 10:06

## 2022-06-22 NOTE — NURSING
Pt received IV Venofer infusion dose 4/4. Pt tolerated it well. AVS given. Pt will follow up with MD. Discharged with no acute distress.

## 2022-06-22 NOTE — PROGRESS NOTES
"The patient location is:Home in [place]  The chief complaint leading to consultation is: anxiety    Visit type: audiovisual    Face to Face time with patient: 30  45 minutes of total time spent on the encounter, which includes face to face time and non-face to face time preparing to see the patient (eg, review of tests), Obtaining and/or reviewing separately obtained history, Documenting clinical information in the electronic or other health record, Independently interpreting results (not separately reported) and communicating results to the patient/family/caregiver, or Care coordination (not separately reported).     Each patient to whom he or she provides medical services by telemedicine is:  (1) informed of the relationship between the physician and patient and the respective role of any other health care provider with respect to management of the patient; and (2) notified that he or she may decline to receive medical services by telemedicine and may withdraw from such care at any time.    Individual Psychotherapy (LCSW/PhD)  Nickie Segura,  6/22/2022    Site:  Telemed         Therapeutic Intervention: Met with patient for individual psychotherapy.    Chief complaint/reason for encounter: anxiety   Current Goal: Focus on leg exercises, walking in the [place] with sitter behind.   Interval history and content of current session:   Pt states is doing fair to good.     Pt states that biggest area of growth since starting therapy is LCSW has helped pt stay on task to go to the dining room. Pt practices breathing exercises beofre going to the dining room, before getting out of bed, and when pt starts to feel anxious. Pt states continues to take PRN ativan daily for anxiety. Pt states that meds have not helped with depression.  Pt states wants it "on the record" that pt is that the oncologist is very good, very please with Dr Marie.      Pt's goal is to work with PT to get up from lounge chair without assistance.  Pt " "states is currently not walking as much but has started back with PT/OT.    Pt states that family suggests that lack of motivation is the issue.   Discussed gradually increasing activity one additional day a week each week, discussed increasing attendance at facility activities. Pt expressed desire to focus on getting out of chair and walking, focus on walking standing up and sitting down.    Discussed termination, pt feels as had "enough therapy for now." LCSW will provide resources for pt to have on hand.     Treatment plan:  · Target symptoms: anxiety   · Why chosen therapy is appropriate versus another modality: relevant to diagnosis, patient responds to this modality, evidence based practice  · Outcome monitoring methods: self-report, checklist/rating scale  · Therapeutic intervention type: insight oriented psychotherapy, behavior modifying psychotherapy, supportive psychotherapy    Risk parameters:  Patient reports no suicidal ideation  Patient reports no homicidal ideation  Patient reports no self-injurious behavior  Patient reports no violent behavior    Verbal deficits: None    Patient's response to intervention:  The patient's response to intervention is accepting.    Progress toward goals and other mental status changes:  The patient's progress toward goals is excellent.    Diagnosis:     ICD-10-CM ICD-9-CM   1. RENÉE (generalized anxiety disorder)  F41.1 300.02       Plan: Pt plans to continue individual psychotherapy    Return to clinic: terminate    Length of Service (minutes): 30          "

## 2022-06-29 ENCOUNTER — PATIENT MESSAGE (OUTPATIENT)
Dept: BEHAVIORAL HEALTH | Facility: CLINIC | Age: 87
End: 2022-06-29
Payer: MEDICARE

## 2022-07-14 ENCOUNTER — DOCUMENT SCAN (OUTPATIENT)
Dept: HOME HEALTH SERVICES | Facility: HOSPITAL | Age: 87
End: 2022-07-14
Payer: MEDICARE

## 2022-07-22 NOTE — PROGRESS NOTES
PATIENT: Nickie Segura  MRN: [id number]  DATE: 7/25/2022    Diagnosis:   1. Other iron deficiency anemia    2. Other neutropenia    3. Elevated liver enzymes    4. Vision changes    5. Anemia of chronic disease      Chief Complaint: Neutropenia and iron deficiency anemia    Oncologic History:      Oncologic History     Oncologic Treatment     Pathology       Subjective:    History of Present Illness: Ms. Segura is a 87 y.o. female who presents for evaluation and management of iron deficiency anemia and neutropenia. She had previously seen Dr. Denton.    - she received iron sucrose x 2 doses in February/March 2022.  - she was hospitalized in mid-May 2022 for symptomatic anemia and elevated liver enzymes. She responded to blood transfusion. She received two doses of iron sucrose during the hospitalization.    Interval history:  - she presents for a follow-up appointment for her iron deficiency anemia and neutropenia  - she received iron sucrose x 4 doses in May/June 2022.  - today, she is doing okay. She does not see a significant improvement in her fatigue, weakness, constipation. She denies shortness of breath, chest pain, nausea, vomiting, diarrhea.      Past medical, surgical, family, and social histories have been reviewed and updated below.    Past Medical History:   Past Medical History:   Diagnosis Date    LORNA positive     Anxiety     Bilateral cataracts     Colitis     Colon polyp     Depression     Elevated IgE level     Hepatomegaly     Hiatal hernia     Hypothyroidism     IFG (impaired fasting glucose)     Iron deficiency anemia     Macular degeneration     Nicotine dependence     Osteoarthritis     Recurrent UTI     Renal cyst     Skin cancer     Tuberculosis of bladder     Urinary incontinence     Vitamin B12 deficiency     Wheezing        Past Surgical History:   Past Surgical History:   Procedure Laterality Date    CHOLECYSTECTOMY         Family History:   Family History    Problem Relation Age of Onset    Heart disease Mother     Diabetes Mother     Heart disease Father     Polycystic kidney disease Father     Atrial fibrillation Sister        Social History:  reports that she has quit smoking. Her smoking use included cigarettes and vaping with nicotine. She uses smokeless tobacco. She reports current alcohol use. She reports that she does not use drugs.    Allergies:  Review of patient's allergies indicates:   Allergen Reactions    Penicillins      rash       Medications:  Current Outpatient Medications   Medication Sig Dispense Refill    Al hyd-Mg tr-alg ac-sod bicarb (GAVISCON) 80-14.2 mg Chew Take 1 tablet by mouth once daily.      albuterol (PROVENTIL/VENTOLIN HFA) 90 mcg/actuation inhaler Inhale 2 puffs into the lungs every 6 (six) hours as needed. Rescue      aspirin (ECOTRIN) 81 MG EC tablet Take 81 mg by mouth once daily.      busPIRone (BUSPAR) 10 MG tablet TAKE 1 TABLET BY MOUTH TWICE DAILY 180 tablet 0    cetirizine (ZYRTEC) 10 MG tablet 1 tablet DAILY (route: oral)      citalopram (CELEXA) 20 MG tablet TAKE 1 AND 1/2 TABLETS BY MOUTH DAILY 135 tablet 3    erythromycin (ROMYCIN) ophthalmic ointment SMARTSI Inch(es) Left Eye Every Night      estradiol (ESTRACE) 0.01 % (0.1 mg/gram) vaginal cream Place 1 g vaginally 3 (three) times a week.      fluticasone propionate (FLONASE ALLERGY RELIEF NASL) by Each Nostril route as needed.      levothyroxine (SYNTHROID) 100 MCG tablet Take 1 tablet (100 mcg total) by mouth once daily. 90 tablet 2    LORazepam (ATIVAN) 0.5 MG tablet TAKE 1 TABLET BY MOUTH EVERY DAY AS NEEDED FOR ANXIETY 30 tablet 1    mag/aluminum/sod bicarb/alginc (GAVISCON ORAL) Take 1 tablet by mouth daily as needed.      MYRBETRIQ 50 mg Tb24 Take 1 tablet (50 mg total) by mouth once daily. 90 tablet 1    neomycin-polymyxin-dexamethasone (DEXACINE) 3.5 mg/g-10,000 unit/g-0.1 % Oint APPLY A SMALL AMOUNT IN BOTH EYES TWICE DAILY       rosuvastatin (CRESTOR) 20 MG tablet Take 1 tablet (20 mg total) by mouth once daily. 90 tablet 3    sodium chloride (OCEAN) 0.65 % nasal spray 1 spray DAILY (route: nasal)      TOVIAZ 8 mg Tb24 Take 1 tablet by mouth once daily. 90 tablet 1    vit C/E/zinc ox/amy/lut/zeax (ICAPS AREDS2 ORAL) Take by mouth Daily.       No current facility-administered medications for this visit.       Review of Systems   Constitutional: Positive for fatigue.   HENT: Negative for sore throat.    Eyes: Positive for visual disturbance.   Respiratory: Negative for cough and shortness of breath.    Cardiovascular: Negative for chest pain.   Gastrointestinal: Positive for constipation. Negative for abdominal pain, diarrhea, nausea and vomiting.   Genitourinary: Negative for dysuria.   Musculoskeletal: Negative for back pain.   Skin: Negative for rash.   Neurological: Positive for weakness. Negative for headaches.   Hematological: Negative for adenopathy.   Psychiatric/Behavioral: The patient is not nervous/anxious.        ECOG Performance Status:   ECOG SCORE 1          Objective:      Vitals:   Vitals:    07/25/22 1100   BP: (!) 97/59   BP Location: Left arm   Patient Position: Sitting   BP Method: Medium (Automatic)   Pulse: 85   Resp: 16   SpO2: 95%     BMI: There is no height or weight on file to calculate BMI.    Physical Exam  Vitals and nursing note reviewed.   Constitutional:       Appearance: She is well-developed.   HENT:      Head: Normocephalic and atraumatic.   Eyes:      Pupils: Pupils are equal, round, and reactive to light.   Cardiovascular:      Rate and Rhythm: Normal rate and regular rhythm.   Pulmonary:      Effort: Pulmonary effort is normal.      Breath sounds: Normal breath sounds.   Abdominal:      General: Bowel sounds are normal.      Palpations: Abdomen is soft.   Musculoskeletal:         General: Normal range of motion.      Cervical back: Normal range of motion and neck supple.   Skin:     General: Skin is  warm and dry.   Neurological:      Mental Status: She is alert and oriented to person, place, and time.   Psychiatric:         Behavior: Behavior normal.         Thought Content: Thought content normal.         Judgment: Judgment normal.         Laboratory Data:  Labs have been reviewed.    Lab Results   Component Value Date    WBC 2.95 (L) 07/25/2022    HGB 8.4 (L) 07/25/2022    HCT 28.4 (L) 07/25/2022    MCV 78 (L) 07/25/2022     07/25/2022           Imaging:    Assessment:       1. Other iron deficiency anemia    2. Other neutropenia    3. Elevated liver enzymes    4. Vision changes           Plan:     1. Other iron deficiency anemia  - I have reviewed her chart  - she received iron sucrose x 2 doses in February/March 2022  - Labs have been reviewed. Iron studies on 5/16/22 revealed an elevated total iron binding capacity, consistent with recurrent iron deficiency anemia.  - she was hospitalized in mid-May 2022 for symptomatic anemia and elevated liver enzymes. She responded to blood transfusion. She received two doses of iron sucrose during the hospitalization.  - labs (5/23/22) revealed an elevated total iron binding capacity, suggesting continued iron deficiency  - she received iron sucrose x 4 doses in May/June 2022.  - clinically, she did not notice a significant improvement in her symptoms.  - Labs have been reviewed. Hemoglobin is stable at 8.4 g/dL. Unfortunately, her anemia did not improve much. Follow up iron studies.  - repeat labs in 2 months  - return to clinic in 4 months with repeat labs.    2. Other neutropenia  - unclear etiology, although her leukopenia/neutropenia was improving at time of discharge  - Labs have been reviewed. White blood cell count had improved slightly  - we discussed a bone marrow biopsy during her hospitalization. After discussion, she declined to proceed with it.    3. Elevated liver function tests  - unclear etiology, although her numbers were improving at time of  discharge.  - follow up CMP from today.    - repeat labs in 2 months  - return to clinic in 4 months with repeat labs.    Louis Gibson M.D.  Hematology/Oncology  [facility] - [address], [address]  [place] [address]  Phone: [phone]  Fax: [phone]

## 2022-07-25 ENCOUNTER — OFFICE VISIT (OUTPATIENT)
Dept: HEMATOLOGY/ONCOLOGY | Facility: CLINIC | Age: 87
End: 2022-07-25
Payer: MEDICARE

## 2022-07-25 ENCOUNTER — LAB VISIT (OUTPATIENT)
Dept: LAB | Facility: HOSPITAL | Age: 87
End: 2022-07-25
Attending: INTERNAL MEDICINE
Payer: MEDICARE

## 2022-07-25 VITALS
HEART RATE: 85 BPM | OXYGEN SATURATION: 95 % | SYSTOLIC BLOOD PRESSURE: 97 MMHG | RESPIRATION RATE: 16 BRPM | DIASTOLIC BLOOD PRESSURE: 59 MMHG

## 2022-07-25 DIAGNOSIS — H53.9 VISION CHANGES: ICD-10-CM

## 2022-07-25 DIAGNOSIS — R74.01 TRANSAMINITIS: ICD-10-CM

## 2022-07-25 DIAGNOSIS — D70.8 OTHER NEUTROPENIA: ICD-10-CM

## 2022-07-25 DIAGNOSIS — D50.8 OTHER IRON DEFICIENCY ANEMIA: Primary | ICD-10-CM

## 2022-07-25 DIAGNOSIS — D50.8 OTHER IRON DEFICIENCY ANEMIA: ICD-10-CM

## 2022-07-25 DIAGNOSIS — D63.8 ANEMIA OF CHRONIC DISEASE: ICD-10-CM

## 2022-07-25 DIAGNOSIS — R74.8 ELEVATED LIVER ENZYMES: ICD-10-CM

## 2022-07-25 LAB
ALBUMIN SERPL BCP-MCNC: 3.1 G/DL (ref 3.5–5.2)
ALP SERPL-CCNC: 168 U/L (ref 55–135)
ALT SERPL W/O P-5'-P-CCNC: 338 U/L (ref 10–44)
ANION GAP SERPL CALC-SCNC: 10 MMOL/L (ref 8–16)
AST SERPL-CCNC: 457 U/L (ref 10–40)
BASOPHILS # BLD AUTO: 0.02 K/UL (ref 0–0.2)
BASOPHILS NFR BLD: 0.7 % (ref 0–1.9)
BILIRUB DIRECT SERPL-MCNC: 0.1 MG/DL (ref 0.1–0.3)
BILIRUB SERPL-MCNC: 0.3 MG/DL (ref 0.1–1)
BUN SERPL-MCNC: 16 MG/DL (ref 8–23)
CALCIUM SERPL-MCNC: 9.1 MG/DL (ref 8.7–10.5)
CHLORIDE SERPL-SCNC: 106 MMOL/L (ref 95–110)
CO2 SERPL-SCNC: 25 MMOL/L (ref 23–29)
CREAT SERPL-MCNC: 0.8 MG/DL (ref 0.5–1.4)
DIFFERENTIAL METHOD: ABNORMAL
EOSINOPHIL # BLD AUTO: 0.1 K/UL (ref 0–0.5)
EOSINOPHIL NFR BLD: 2.4 % (ref 0–8)
ERYTHROCYTE [DISTWIDTH] IN BLOOD BY AUTOMATED COUNT: 22 % (ref 11.5–14.5)
EST. GFR  (AFRICAN AMERICAN): >60 ML/MIN/1.73 M^2
EST. GFR  (NON AFRICAN AMERICAN): >60 ML/MIN/1.73 M^2
FERRITIN SERPL-MCNC: 3846 NG/ML (ref 20–300)
GLUCOSE SERPL-MCNC: 101 MG/DL (ref 70–110)
HCT VFR BLD AUTO: 28.4 % (ref 37–48.5)
HGB BLD-MCNC: 8.4 G/DL (ref 12–16)
IMM GRANULOCYTES # BLD AUTO: 0.01 K/UL (ref 0–0.04)
IMM GRANULOCYTES NFR BLD AUTO: 0.3 % (ref 0–0.5)
IRON SERPL-MCNC: 20 UG/DL (ref 30–160)
LYMPHOCYTES # BLD AUTO: 0.6 K/UL (ref 1–4.8)
LYMPHOCYTES NFR BLD: 20 % (ref 18–48)
MCH RBC QN AUTO: 23.1 PG (ref 27–31)
MCHC RBC AUTO-ENTMCNC: 29.6 G/DL (ref 32–36)
MCV RBC AUTO: 78 FL (ref 82–98)
MONOCYTES # BLD AUTO: 0.3 K/UL (ref 0.3–1)
MONOCYTES NFR BLD: 11.5 % (ref 4–15)
NEUTROPHILS # BLD AUTO: 1.9 K/UL (ref 1.8–7.7)
NEUTROPHILS NFR BLD: 65.1 % (ref 38–73)
NRBC BLD-RTO: 0 /100 WBC
PLATELET # BLD AUTO: 236 K/UL (ref 150–450)
PMV BLD AUTO: 9.6 FL (ref 9.2–12.9)
POTASSIUM SERPL-SCNC: 4.2 MMOL/L (ref 3.5–5.1)
PROT SERPL-MCNC: 6.6 G/DL (ref 6–8.4)
RBC # BLD AUTO: 3.63 M/UL (ref 4–5.4)
SATURATED IRON: 5 % (ref 20–50)
SODIUM SERPL-SCNC: 141 MMOL/L (ref 136–145)
TOTAL IRON BINDING CAPACITY: 432 UG/DL (ref 250–450)
TRANSFERRIN SERPL-MCNC: 292 MG/DL (ref 200–375)
WBC # BLD AUTO: 2.95 K/UL (ref 3.9–12.7)

## 2022-07-25 PROCEDURE — 82248 BILIRUBIN DIRECT: CPT | Performed by: INTERNAL MEDICINE

## 2022-07-25 PROCEDURE — 99999 PR PBB SHADOW E&M-EST. PATIENT-LVL IV: CPT | Mod: PBBFAC,,, | Performed by: INTERNAL MEDICINE

## 2022-07-25 PROCEDURE — 99214 OFFICE O/P EST MOD 30 MIN: CPT | Mod: S$GLB,,, | Performed by: INTERNAL MEDICINE

## 2022-07-25 PROCEDURE — 99214 PR OFFICE/OUTPT VISIT, EST, LEVL IV, 30-39 MIN: ICD-10-PCS | Mod: S$GLB,,, | Performed by: INTERNAL MEDICINE

## 2022-07-25 PROCEDURE — 80053 COMPREHEN METABOLIC PANEL: CPT | Performed by: INTERNAL MEDICINE

## 2022-07-25 PROCEDURE — 3288F FALL RISK ASSESSMENT DOCD: CPT | Mod: CPTII,S$GLB,, | Performed by: INTERNAL MEDICINE

## 2022-07-25 PROCEDURE — 1160F PR REVIEW ALL MEDS BY PRESCRIBER/CLIN PHARMACIST DOCUMENTED: ICD-10-PCS | Mod: CPTII,S$GLB,, | Performed by: INTERNAL MEDICINE

## 2022-07-25 PROCEDURE — 82728 ASSAY OF FERRITIN: CPT | Performed by: INTERNAL MEDICINE

## 2022-07-25 PROCEDURE — 1126F PR PAIN SEVERITY QUANTIFIED, NO PAIN PRESENT: ICD-10-PCS | Mod: CPTII,S$GLB,, | Performed by: INTERNAL MEDICINE

## 2022-07-25 PROCEDURE — 1100F PR PT FALLS ASSESS DOC 2+ FALLS/FALL W/INJURY/YR: ICD-10-PCS | Mod: CPTII,S$GLB,, | Performed by: INTERNAL MEDICINE

## 2022-07-25 PROCEDURE — 1126F AMNT PAIN NOTED NONE PRSNT: CPT | Mod: CPTII,S$GLB,, | Performed by: INTERNAL MEDICINE

## 2022-07-25 PROCEDURE — 84466 ASSAY OF TRANSFERRIN: CPT | Performed by: INTERNAL MEDICINE

## 2022-07-25 PROCEDURE — 1157F ADVNC CARE PLAN IN RCRD: CPT | Mod: CPTII,S$GLB,, | Performed by: INTERNAL MEDICINE

## 2022-07-25 PROCEDURE — 1160F RVW MEDS BY RX/DR IN RCRD: CPT | Mod: CPTII,S$GLB,, | Performed by: INTERNAL MEDICINE

## 2022-07-25 PROCEDURE — 1157F PR ADVANCE CARE PLAN OR EQUIV PRESENT IN MEDICAL RECORD: ICD-10-PCS | Mod: CPTII,S$GLB,, | Performed by: INTERNAL MEDICINE

## 2022-07-25 PROCEDURE — 85025 COMPLETE CBC W/AUTO DIFF WBC: CPT | Performed by: INTERNAL MEDICINE

## 2022-07-25 PROCEDURE — 1159F MED LIST DOCD IN RCRD: CPT | Mod: CPTII,S$GLB,, | Performed by: INTERNAL MEDICINE

## 2022-07-25 PROCEDURE — 36415 COLL VENOUS BLD VENIPUNCTURE: CPT | Performed by: INTERNAL MEDICINE

## 2022-07-25 PROCEDURE — 99999 PR PBB SHADOW E&M-EST. PATIENT-LVL IV: ICD-10-PCS | Mod: PBBFAC,,, | Performed by: INTERNAL MEDICINE

## 2022-07-25 PROCEDURE — 1159F PR MEDICATION LIST DOCUMENTED IN MEDICAL RECORD: ICD-10-PCS | Mod: CPTII,S$GLB,, | Performed by: INTERNAL MEDICINE

## 2022-07-25 PROCEDURE — 3288F PR FALLS RISK ASSESSMENT DOCUMENTED: ICD-10-PCS | Mod: CPTII,S$GLB,, | Performed by: INTERNAL MEDICINE

## 2022-07-25 PROCEDURE — 1100F PTFALLS ASSESS-DOCD GE2>/YR: CPT | Mod: CPTII,S$GLB,, | Performed by: INTERNAL MEDICINE

## 2022-07-26 ENCOUNTER — TELEPHONE (OUTPATIENT)
Dept: HEMATOLOGY/ONCOLOGY | Facility: CLINIC | Age: 87
End: 2022-07-26
Payer: MEDICARE

## 2022-07-26 NOTE — TELEPHONE ENCOUNTER
----- Message from Tex Araujo sent at 7/26/2022  9:33 AM CDT -----  Type:  Needs Medical Advice    Who Called: sister  Reason:speak with nurse   Would the patient rather a call back or a response via [employer]? call  Best Call Back Number: [phone]

## 2022-07-29 ENCOUNTER — TELEPHONE (OUTPATIENT)
Dept: TRANSPLANT | Facility: CLINIC | Age: 87
End: 2022-07-29
Payer: MEDICARE

## 2022-07-29 ENCOUNTER — DOCUMENTATION ONLY (OUTPATIENT)
Dept: TRANSPLANT | Facility: CLINIC | Age: 87
End: 2022-07-29
Payer: MEDICARE

## 2022-07-29 ENCOUNTER — TELEPHONE (OUTPATIENT)
Dept: HEMATOLOGY/ONCOLOGY | Facility: CLINIC | Age: 87
End: 2022-07-29
Payer: MEDICARE

## 2022-07-29 DIAGNOSIS — R74.8 ELEVATED LIVER ENZYMES: Primary | ICD-10-CM

## 2022-07-29 NOTE — NURSING
Pt records reviewed.  Pt will be referred to Hepatology due to   Elevated liver enzymes  Initial referral received  from Louis Gibson MD  Referral letter sent to provider and patient.      RECORDS SCANNED IN MEDIA UNDER HEPATOLOGY REFERRAL .

## 2022-07-29 NOTE — LETTER
July 29, 2022    Nickie Segura  [address]  [place] [address]      Dear Nickie Segura:    Your doctor has referred you to the [facility]. We are sending this letter to remind you to make an appointment with us to complete the referral process.     Please call us at [phone] to schedule an appointment. We look forward to seeing you soon.     If you received a call and have been scheduled, please disregard this letter.       Sincerely,        [facility]   [facility] [place] [address]  [phone]

## 2022-07-29 NOTE — TELEPHONE ENCOUNTER
----- Message from Edith Major RN sent at 7/29/2022  1:54 PM CDT -----  Good afternoon, Dr. Gibson is referring this pt to hepatology for elevated liver enzymes. Thanks.

## 2022-07-29 NOTE — TELEPHONE ENCOUNTER
Informed pt's sister that Dr. Gibson stated pt's liver function tests have worsened, so he ordered an ultrasound and put in referral to liver specialist. Confirmed lab/ ultrasound for 8/9/22 at 10:15 am. Pt's sister verbalized understanding.

## 2022-08-09 ENCOUNTER — HOSPITAL ENCOUNTER (OUTPATIENT)
Dept: RADIOLOGY | Facility: HOSPITAL | Age: 87
Discharge: HOME | End: 2022-08-09
Attending: INTERNAL MEDICINE
Payer: MEDICARE

## 2022-08-09 DIAGNOSIS — R74.8 ELEVATED LIVER ENZYMES: ICD-10-CM

## 2022-08-09 PROCEDURE — 76705 ECHO EXAM OF ABDOMEN: CPT | Mod: 26,,, | Performed by: RADIOLOGY

## 2022-08-09 PROCEDURE — 76705 US ABDOMEN LIMITED: ICD-10-PCS | Mod: 26,,, | Performed by: RADIOLOGY

## 2022-08-09 PROCEDURE — 76705 ECHO EXAM OF ABDOMEN: CPT | Mod: TC

## 2022-08-11 ENCOUNTER — PATIENT MESSAGE (OUTPATIENT)
Dept: HEMATOLOGY/ONCOLOGY | Facility: CLINIC | Age: 87
End: 2022-08-11
Payer: MEDICARE

## 2022-08-13 ENCOUNTER — HOSPITAL ENCOUNTER (EMERGENCY)
Facility: HOSPITAL | Age: 87
Discharge: HOME | End: 2022-08-13
Attending: EMERGENCY MEDICINE
Payer: MEDICARE

## 2022-08-13 VITALS
WEIGHT: 174 LBS | TEMPERATURE: 98 F | OXYGEN SATURATION: 98 % | DIASTOLIC BLOOD PRESSURE: 58 MMHG | RESPIRATION RATE: 16 BRPM | HEART RATE: 75 BPM | SYSTOLIC BLOOD PRESSURE: 119 MMHG | BODY MASS INDEX: 28.96 KG/M2

## 2022-08-13 DIAGNOSIS — R53.1 WEAKNESS: ICD-10-CM

## 2022-08-13 DIAGNOSIS — D64.9 CHRONIC ANEMIA: ICD-10-CM

## 2022-08-13 DIAGNOSIS — R53.83 OTHER FATIGUE: Primary | ICD-10-CM

## 2022-08-13 LAB
ALBUMIN SERPL BCP-MCNC: 3.5 G/DL (ref 3.5–5.2)
ALP SERPL-CCNC: 161 U/L (ref 55–135)
ALT SERPL W/O P-5'-P-CCNC: 242 U/L (ref 10–44)
ANION GAP SERPL CALC-SCNC: 10 MMOL/L (ref 8–16)
AST SERPL-CCNC: 265 U/L (ref 10–40)
BASOPHILS # BLD AUTO: 0.03 K/UL (ref 0–0.2)
BASOPHILS NFR BLD: 1.1 % (ref 0–1.9)
BILIRUB SERPL-MCNC: 0.3 MG/DL (ref 0.1–1)
BILIRUB UR QL STRIP: NEGATIVE
BNP SERPL-MCNC: 17 PG/ML (ref 0–99)
BUN SERPL-MCNC: 17 MG/DL (ref 8–23)
CALCIUM SERPL-MCNC: 9.2 MG/DL (ref 8.7–10.5)
CHLORIDE SERPL-SCNC: 105 MMOL/L (ref 95–110)
CK SERPL-CCNC: 153 U/L (ref 20–180)
CLARITY UR: CLEAR
CO2 SERPL-SCNC: 25 MMOL/L (ref 23–29)
COLOR UR: YELLOW
CREAT SERPL-MCNC: 0.7 MG/DL (ref 0.5–1.4)
DIFFERENTIAL METHOD: ABNORMAL
EOSINOPHIL # BLD AUTO: 0.1 K/UL (ref 0–0.5)
EOSINOPHIL NFR BLD: 3.2 % (ref 0–8)
ERYTHROCYTE [DISTWIDTH] IN BLOOD BY AUTOMATED COUNT: 20.8 % (ref 11.5–14.5)
EST. GFR  (NO RACE VARIABLE): >60 ML/MIN/1.73 M^2
GLUCOSE SERPL-MCNC: 80 MG/DL (ref 70–110)
GLUCOSE UR QL STRIP: NEGATIVE
HCT VFR BLD AUTO: 29 % (ref 37–48.5)
HGB BLD-MCNC: 8.4 G/DL (ref 12–16)
HGB UR QL STRIP: NEGATIVE
IMM GRANULOCYTES # BLD AUTO: 0.02 K/UL (ref 0–0.04)
IMM GRANULOCYTES NFR BLD AUTO: 0.7 % (ref 0–0.5)
KETONES UR QL STRIP: NEGATIVE
LACTATE SERPL-SCNC: 1.3 MMOL/L (ref 0.5–2.2)
LEUKOCYTE ESTERASE UR QL STRIP: NEGATIVE
LYMPHOCYTES # BLD AUTO: 0.7 K/UL (ref 1–4.8)
LYMPHOCYTES NFR BLD: 25.9 % (ref 18–48)
MAGNESIUM SERPL-MCNC: 2.1 MG/DL (ref 1.6–2.6)
MCH RBC QN AUTO: 22.5 PG (ref 27–31)
MCHC RBC AUTO-ENTMCNC: 29 G/DL (ref 32–36)
MCV RBC AUTO: 78 FL (ref 82–98)
MONOCYTES # BLD AUTO: 0.4 K/UL (ref 0.3–1)
MONOCYTES NFR BLD: 15.8 % (ref 4–15)
NEUTROPHILS # BLD AUTO: 1.5 K/UL (ref 1.8–7.7)
NEUTROPHILS NFR BLD: 53.3 % (ref 38–73)
NITRITE UR QL STRIP: NEGATIVE
NRBC BLD-RTO: 0 /100 WBC
PH UR STRIP: 8 [PH] (ref 5–8)
PLATELET # BLD AUTO: 228 K/UL (ref 150–450)
PMV BLD AUTO: 9.1 FL (ref 9.2–12.9)
POTASSIUM SERPL-SCNC: 4.4 MMOL/L (ref 3.5–5.1)
PROT SERPL-MCNC: 6.7 G/DL (ref 6–8.4)
PROT UR QL STRIP: NEGATIVE
RBC # BLD AUTO: 3.73 M/UL (ref 4–5.4)
SODIUM SERPL-SCNC: 140 MMOL/L (ref 136–145)
SP GR UR STRIP: 1.01 (ref 1–1.03)
TROPONIN I SERPL DL<=0.01 NG/ML-MCNC: 0.02 NG/ML (ref 0–0.03)
TSH SERPL DL<=0.005 MIU/L-ACNC: 0.93 UIU/ML (ref 0.4–4)
URN SPEC COLLECT METH UR: NORMAL
UROBILINOGEN UR STRIP-ACNC: NEGATIVE EU/DL
WBC # BLD AUTO: 2.78 K/UL (ref 3.9–12.7)

## 2022-08-13 PROCEDURE — 83605 ASSAY OF LACTIC ACID: CPT | Performed by: EMERGENCY MEDICINE

## 2022-08-13 PROCEDURE — 93010 ELECTROCARDIOGRAM REPORT: CPT | Mod: ,,, | Performed by: INTERNAL MEDICINE

## 2022-08-13 PROCEDURE — 81003 URINALYSIS AUTO W/O SCOPE: CPT | Performed by: EMERGENCY MEDICINE

## 2022-08-13 PROCEDURE — 93005 ELECTROCARDIOGRAM TRACING: CPT

## 2022-08-13 PROCEDURE — 93010 EKG 12-LEAD: ICD-10-PCS | Mod: ,,, | Performed by: INTERNAL MEDICINE

## 2022-08-13 PROCEDURE — 85025 COMPLETE CBC W/AUTO DIFF WBC: CPT | Performed by: EMERGENCY MEDICINE

## 2022-08-13 PROCEDURE — 80053 COMPREHEN METABOLIC PANEL: CPT | Performed by: EMERGENCY MEDICINE

## 2022-08-13 PROCEDURE — 83735 ASSAY OF MAGNESIUM: CPT | Performed by: EMERGENCY MEDICINE

## 2022-08-13 PROCEDURE — 99284 EMERGENCY DEPT VISIT MOD MDM: CPT | Mod: 25

## 2022-08-13 PROCEDURE — 83880 ASSAY OF NATRIURETIC PEPTIDE: CPT | Performed by: EMERGENCY MEDICINE

## 2022-08-13 PROCEDURE — 84484 ASSAY OF TROPONIN QUANT: CPT | Performed by: EMERGENCY MEDICINE

## 2022-08-13 PROCEDURE — 82550 ASSAY OF CK (CPK): CPT | Performed by: EMERGENCY MEDICINE

## 2022-08-13 PROCEDURE — 84443 ASSAY THYROID STIM HORMONE: CPT | Performed by: EMERGENCY MEDICINE

## 2022-08-13 NOTE — ED PROVIDER NOTES
Encounter Date: 8/13/2022       History     Chief Complaint   Patient presents with    Fatigue     Pt complains of feeling fatigue over the past 2 days days, denies cp/sob/cough/fever, pt has hx of anemia      88 yo female extensive past medical history including prior CVAs hypothyroid OA and chronic iron deficiency anemia presents with functional decline over the past week. Recently communicated with PCP for elevated LFTs with normal US more likely related to her statin, not discontinued. Recently had covid but denies any fevers sweats and chills nausea or vomiting dyspnea only generalized weakness and fatigued.         Review of patient's allergies indicates:   Allergen Reactions    Penicillins      rash     Past Medical History:   Diagnosis Date    LORNA positive     Anxiety     Bilateral cataracts     Colitis     Colon polyp     Depression     Elevated IgE level     Hepatomegaly     Hiatal hernia     Hypothyroidism     IFG (impaired fasting glucose)     Iron deficiency anemia     Macular degeneration     Nicotine dependence     Osteoarthritis     Recurrent UTI     Renal cyst     Skin cancer     Tuberculosis of bladder     Urinary incontinence     Vitamin B12 deficiency     Wheezing      Past Surgical History:   Procedure Laterality Date    CHOLECYSTECTOMY       Family History   Problem Relation Age of Onset    Heart disease Mother     Diabetes Mother     Heart disease Father     Polycystic kidney disease Father     Atrial fibrillation Sister      Social History     Tobacco Use    Smoking status: Former Smoker     Types: Cigarettes, Vaping with nicotine    Smokeless tobacco: Current User   Substance Use Topics    Alcohol use: Yes    Drug use: Never     Review of Systems   Constitutional: Positive for activity change, appetite change and fatigue.       Physical Exam     Initial Vitals [08/13/22 1211]   BP Pulse Resp Temp SpO2   (!) 140/65 82 18 97.9 °F (36.6 °C) 98 %      MAP        --         Physical Exam    Nursing note and vitals reviewed.  Constitutional: She appears well-developed.   HENT:   Head: Normocephalic and atraumatic.   Eyes: Conjunctivae are normal.   Neck: Neck supple.   Normal range of motion.  Cardiovascular: Normal rate and regular rhythm.   Murmur heard.  Pulmonary/Chest: Breath sounds normal.   Abdominal: Abdomen is soft. Bowel sounds are normal.   Musculoskeletal:         General: Normal range of motion.      Cervical back: Normal range of motion and neck supple.     Neurological: She is alert and oriented to person, place, and time.   Skin: Skin is warm and dry. Capillary refill takes less than 2 seconds.         ED Course   Procedures  Labs Reviewed   CBC W/ AUTO DIFFERENTIAL - Abnormal; Notable for the following components:       Result Value    WBC 2.78 (*)     RBC 3.73 (*)     Hemoglobin 8.4 (*)     Hematocrit 29.0 (*)     MCV 78 (*)     MCH 22.5 (*)     MCHC 29.0 (*)     RDW 20.8 (*)     MPV 9.1 (*)     Immature Granulocytes 0.7 (*)     Gran # (ANC) 1.5 (*)     Lymph # 0.7 (*)     Mono % 15.8 (*)     All other components within normal limits   COMPREHENSIVE METABOLIC PANEL - Abnormal; Notable for the following components:    Alkaline Phosphatase 161 (*)      (*)      (*)     All other components within normal limits   B-TYPE NATRIURETIC PEPTIDE   LACTIC ACID, PLASMA   MAGNESIUM   TROPONIN I   TSH   URINALYSIS, REFLEX TO URINE CULTURE    Narrative:     Specimen Source->Urine   CK          Imaging Results          X-Ray Chest AP Portable (Final result)  Result time 08/13/22 13:37:22    Final result by Lala Thomas MD (08/13/22 13:37:22)                 Impression:      As above.      Electronically signed by: Lala Thomas MD  Date:    08/13/2022  Time:    13:37             Narrative:    EXAMINATION:  XR CHEST AP PORTABLE    CLINICAL HISTORY:  weakness;    TECHNIQUE:  Single frontal view of the chest was  performed.    COMPARISON:  05/17/2022    FINDINGS:  Chronic lung changes are seen.  No consolidation or pleural effusions.  Heart size is normal.  Calcified atheromatous disease affects the aorta.  Age-appropriate degenerative changes affect the skeleton.                                 Medications - No data to display              ED Course as of 08/13/22 1417   Sat Aug 13, 2022   1244 Nsr rate 78 no li or std nsst changes normal qt [CG]   1332 Normal sinus rhythm rate of 80 nonspecific ST changes no ST segment elevation depression normal QT no PACs normal intervals [CG]   1414 No clear reason for her symptoms no infection no acute hgb drop anemia or myositis. Discussed with family that she may need outpatient transfusion if her heme onc agrees and may increase her energy level.  [CG]      ED Course User Index  [CG] Jeff Pizarro MD             Clinical Impression:   Final diagnoses:  [R53.1] Weakness  [R53.83] Other fatigue (Primary)  [D64.9] Chronic anemia          ED Disposition Condition    Discharge Stable        ED Prescriptions     None        Follow-up Information     Follow up With Specialties Details Why Contact Info    Perla Hopper MD Internal Medicine Schedule an appointment as soon as possible for a visit   [address] [facility] [address]  [phone]             Jeff Pizarro MD  08/13/22 1417

## 2022-08-13 NOTE — ED NOTES
Patient arrives with sister and niece for evaluation of fatigue worsening over past 2 days - denies falls - denies any obvious bleeding - states she had to have a transfusion last time this happened - patient denies any pain or SOB - pale skin without obvious bruising - no apparent distress

## 2022-08-15 ENCOUNTER — TELEPHONE (OUTPATIENT)
Dept: PRIMARY CARE CLINIC | Facility: CLINIC | Age: 87
End: 2022-08-15
Payer: MEDICARE

## 2022-08-15 NOTE — TELEPHONE ENCOUNTER
----- Message from Lavern Hendrix sent at 8/15/2022  9:44 AM CDT -----  Contact: Brittaney/Sister [phone]  Pt sister called and stated that she will not be able to get her to the appt for 8/29 @ 11am and would like a call back. She stated that the pt is okay. She stated that she can come in on a Monday afternoon or Tuesday or Friday.  She also stated that a virtual appt would be better.     Please call and advise

## 2022-08-15 NOTE — TELEPHONE ENCOUNTER
I radha Quispe. She will keep the appt as is since we do not have any openings until late Sept/early Oct. I will let her know if something opens on a better day /time for her

## 2022-09-07 ENCOUNTER — EXTERNAL HOME HEALTH (OUTPATIENT)
Dept: HOME HEALTH SERVICES | Facility: HOSPITAL | Age: 87
End: 2022-09-07
Payer: MEDICARE

## 2022-09-07 ENCOUNTER — TELEPHONE (OUTPATIENT)
Dept: PRIMARY CARE CLINIC | Facility: CLINIC | Age: 87
End: 2022-09-07
Payer: MEDICARE

## 2022-09-07 NOTE — TELEPHONE ENCOUNTER
----- Message from Enma Parisi sent at 9/7/2022  9:48 AM CDT -----  Contact: Kary Corona (sister) @  [phone]  Patient is running a low grade temp and they want to be advised on what to do for her.

## 2022-09-07 NOTE — TELEPHONE ENCOUNTER
I sw Kary. Pt does not have a fever. Her temp has not been 100.4 greater. I explained that no need to take anything unless tesmp >100.4,ha or ba. Kary and pt expressed understanding

## 2022-09-10 ENCOUNTER — OFFICE VISIT (OUTPATIENT)
Dept: URGENT CARE | Facility: CLINIC | Age: 87
End: 2022-09-10
Payer: MEDICARE

## 2022-09-10 VITALS
HEIGHT: 65 IN | OXYGEN SATURATION: 94 % | BODY MASS INDEX: 28.99 KG/M2 | SYSTOLIC BLOOD PRESSURE: 134 MMHG | TEMPERATURE: 99 F | WEIGHT: 174 LBS | DIASTOLIC BLOOD PRESSURE: 71 MMHG | RESPIRATION RATE: 20 BRPM | HEART RATE: 87 BPM

## 2022-09-10 DIAGNOSIS — R05.8 POST-VIRAL COUGH SYNDROME: ICD-10-CM

## 2022-09-10 DIAGNOSIS — R05.8 COUGH WITH CONGESTION OF PARANASAL SINUS: ICD-10-CM

## 2022-09-10 DIAGNOSIS — B96.89 ACUTE BACTERIAL BRONCHITIS: Primary | ICD-10-CM

## 2022-09-10 DIAGNOSIS — J20.8 ACUTE BACTERIAL BRONCHITIS: Primary | ICD-10-CM

## 2022-09-10 DIAGNOSIS — Z20.822 ENCOUNTER FOR LABORATORY TESTING FOR COVID-19 VIRUS: ICD-10-CM

## 2022-09-10 DIAGNOSIS — R09.81 COUGH WITH CONGESTION OF PARANASAL SINUS: ICD-10-CM

## 2022-09-10 DIAGNOSIS — Z86.16 PERSONAL HISTORY OF COVID-19: ICD-10-CM

## 2022-09-10 DIAGNOSIS — U07.1 COVID-19 VIRUS DETECTED: ICD-10-CM

## 2022-09-10 LAB
CTP QC/QA: YES
SARS-COV-2 RDRP RESP QL NAA+PROBE: POSITIVE

## 2022-09-10 PROCEDURE — 1159F PR MEDICATION LIST DOCUMENTED IN MEDICAL RECORD: ICD-10-PCS | Mod: CPTII,S$GLB,, | Performed by: PHYSICIAN ASSISTANT

## 2022-09-10 PROCEDURE — 99214 PR OFFICE/OUTPT VISIT, EST, LEVL IV, 30-39 MIN: ICD-10-PCS | Mod: S$GLB,,, | Performed by: PHYSICIAN ASSISTANT

## 2022-09-10 PROCEDURE — 1157F ADVNC CARE PLAN IN RCRD: CPT | Mod: CPTII,S$GLB,, | Performed by: PHYSICIAN ASSISTANT

## 2022-09-10 PROCEDURE — 1159F MED LIST DOCD IN RCRD: CPT | Mod: CPTII,S$GLB,, | Performed by: PHYSICIAN ASSISTANT

## 2022-09-10 PROCEDURE — U0002 COVID-19 LAB TEST NON-CDC: HCPCS | Mod: QW,S$GLB,, | Performed by: PHYSICIAN ASSISTANT

## 2022-09-10 PROCEDURE — 99214 OFFICE O/P EST MOD 30 MIN: CPT | Mod: S$GLB,,, | Performed by: PHYSICIAN ASSISTANT

## 2022-09-10 PROCEDURE — U0002: ICD-10-PCS | Mod: QW,S$GLB,, | Performed by: PHYSICIAN ASSISTANT

## 2022-09-10 PROCEDURE — 1157F PR ADVANCE CARE PLAN OR EQUIV PRESENT IN MEDICAL RECORD: ICD-10-PCS | Mod: CPTII,S$GLB,, | Performed by: PHYSICIAN ASSISTANT

## 2022-09-10 RX ORDER — ALBUTEROL SULFATE 90 UG/1
1-2 AEROSOL, METERED RESPIRATORY (INHALATION) EVERY 6 HOURS PRN
Qty: 18 G | Refills: 0 | Status: SHIPPED | OUTPATIENT
Start: 2022-09-10 | End: 2024-01-03

## 2022-09-10 RX ORDER — BENZONATATE 100 MG/1
100 CAPSULE ORAL 3 TIMES DAILY PRN
Qty: 30 CAPSULE | Refills: 0 | Status: SHIPPED | OUTPATIENT
Start: 2022-09-10 | End: 2022-09-20

## 2022-09-10 RX ORDER — DOXYCYCLINE 100 MG/1
100 CAPSULE ORAL 2 TIMES DAILY
Qty: 14 CAPSULE | Refills: 0 | Status: SHIPPED | OUTPATIENT
Start: 2022-09-10 | End: 2022-09-17

## 2022-09-10 NOTE — PATIENT INSTRUCTIONS
PLEASE READ YOUR DISCHARGE INSTRUCTIONS ENTIRELY AS IT CONTAINS IMPORTANT INFORMATION.    Patient had covid testing done today.  Discussed corona virus precautions and reviewed CDC FAC; printed a copy for patient.  I discussed to continue to monitor their symptoms. Discussed that if their symptoms persist or worsen to seek re-evaluation. Clinic vs. ER precautions were given.  Patient verbalized understanding and agreed with the entire plan of care.      If you tested positive and have symptoms, you must isolate for 5 days starting today OR day of the positive test. After 5 days (ON DAY #6), if your symptoms have improved and you have not had fever on day 5, you can return to the community on day #6- NO TESTING REQUIRED to return to community! If no fever without fever reducing meds for 24 hours, before coming out of quarantine. After your 5 days of isolation are completed, the CDC recommends strict mask use for the first 5 days (day #6-10) that you come out of isolation.   YOU HAVE COMPLETED 10 DAY QUARANTINE.     You should continue to wear a well-fitting mask around others at home and in public for 5 additional days (day 6 through day 10) after the end of your 5-day isolation period. If you are unable to wear a mask when around others, you should continue to isolate for a full 10 days. Avoid people who have weakened immune systems or are more likely to get very sick from COVID-19, and nursing homes and other high-risk settings, until after at least 10 days.      - Reviewed radiographs and all diagnostic testing with patient/family.    - Rest.  Drink plenty of fluids.    - Tylenol OR anti-inflammatory (NSAIDs, ibuprofen, aleve, motrin) as directed as needed for fever/pain.  For Tylenol, do not exceed 3000 mg/ day. If no contraindication or allergies.  -OK to supplement with OTC DayQuil, NyQuil or TheraFlu every 6 hours as needed for cough and congestion.  Use caution of total amount of Tylenol/acetaminophen per  day.  - continue albuterol inhaler as needed for shortness of breath/wheezing  - take Tessalon as needed for cough suppression.     - If you were prescribed antibiotics, please take them to completion. Please supplement with OTC probiotics and yogurt.  Contact clinic if develop profuse diarrhea and weakness.  PLEASE TAKE ON FULL STOMACH.       -Below are suggestions for symptomatic relief:              -Salt water gargles to soothe throat pain.              -Chloroseptic spray also helps to numb throat pain.              -Nasal saline spray reduces inflammation and dryness.              -Warm face compresses to help with facial sinus pain/pressure.              -Vicks vapor rub at night.            **may also supplement with OTC nasal spray to help with inflammation and congestion. Wean to off when you nose becomes to dry or bleed. Also use nasal saline twice a day to help with dryness.               -Flonase OTC or Nasacort OTC  once or twice a day for nasal/sinus congestion. DON'T USE IF YOU HAVE GLAUCOMA. CHECK WITH YOUR PHARMACIST/PHYSICIAN.              -Simple foods like chicken noodle soup.              -Mucinex DM (ANY COUGH EXPECTORANT-- guaifenesin) for cough or chest congestion with mucus during the day time. Delsym or robitussin (ANY COUGH SUPPRESSANT- dextromethorphan) helps with coughing at night. Mucinex-DM if you have chest congestion or sputum (caution if history of high blood pressure or palpitations).              -Zyrtec/Claritin/xyzal during the day time  & Benadryl at night (only if severe runny nose) may help with allergies and runny nose. Add decongestant if you have nasal/sinus congestion/sinus pressure/ear fullness sensation. (see below)              -may take OTC meclizine as needed for dizziness or nausea.     Caution with use of Decongestant meds:  -If you DO NOT have Hypertension or any history of palpitations, it is ok to take over the counter Sudafed or Mucinex D or Allegra-D or  Claritin-D or Zyrtec-D.  -If you do take one of the above, it is ok to combine that with plain over the counter Mucinex or Allegra or Claritin or Zyrtec. If, for example, you are taking Zyrtec -D, you can combine that with Mucinex, but not Mucinex-D.  If you are taking Mucinex-D, you can combine that with plain Allegra or Claritin or Zyrtec.     -Do not combine pseudophed or phenylephrine with any other brand allergy-D for DECONGESTANT.   -Or vice versa, you can you take plain allergy medications (allegra/claritin/zyrtec with NO Decongestant) and ADD OTC pseudophed or phenelyphrine 2-3 times a day (or every 4-6 hours needed). Avoid taking decongestant late at night or with caffeine as it can keep you up or cause jittery feeling.     -If you DO have Hypertension , anxiety, or palpitations, it is safe to take Coricidin HBP for relief of sinus symptoms.      For your GI symptoms:  -Use gatorade/pedialyte or rehydration packets to help stay hydrated. Vitamin water and plain water do not contain rehydrating electrolytes.  -Increase clear liquids (water, gatorade, pedialyte, broths, jello, etc) Hold off on solids for 12-18 hours. Then advance to BRAT diet (banana, rice, applesauce, tea, toast/crackers), then advance further as tolerated. Avoid spicy or fatty foods.   -May take Emitrol OTC as needed for nausea.   -Use Peptobismol or Immodium to help alleviate your diarrhea symptoms.   -Take mylanta or simethicone for bloating or gas pain.   -Take pepcid or omeprazole if you have heartburn or reflux sensation.  -Avoid imodium unless you have more than 6 loose stools in 24 hours. Take 1 dose and monitor to see if you can repeat AS IT WILL CAUSE CONSTIPATION.  -Wash hands frequently while sick. Avoid ibuprofen or other NSAIDS until you are well.   -Please go to the ER if you experience worsening abdominal pain, blood in your vomit or stool, high fever, dizziness, fainting, swelling of your abdomen, inability to pass gas or  stool, or inability to urinate.         -You must understand that you've received an Urgent Care treatment only and that you may be released before all your medical problems are known or treated. You, the patient, will arrange for follow up care as instructed. Please arrange follow up with your primary medical clinic within 2-5 days if your signs and symptoms have not resolved or worsen.     - Follow up with your PCP or specialty clinic as directed.  You can call [phone] or [phone] to schedule an appointment with the appropriate provider.  [facility] is open Mon-Friday 8-5pm (excluded holidays).    - If your condition worsens or fails to improve we recommend that you receive another evaluation at the emergency room immediately or contact your primary medical clinic to discuss your concerns.            Prevention steps for patients with confirmed or suspected COVID-19  Stay home and stay away from family members and friends. The CDC says, you can leave home after these three things have happened: 1) You have had no fever for at least 24 hours (that is one full day of no fever without the use of medicine that reduces fevers) 2) AND other symptoms have improved (for example, when your cough or shortness of breath have improved) 3) AND at least 10 days have passed since your symptoms first appeared OR after 7-10 days passed from first positive test.  Separate yourself from other people and animals in your home.  Call ahead before visiting your doctor.  Wear a facemask.  Cover your coughs and sneezes.  Wash your hands often with soap and water; hand  can be used, too.  Avoid sharing personal household items.  Wipe down surfaces used daily.  Monitor your symptoms. Seek prompt medical attention if your illness is worsening (e.g., difficulty breathing).   Before seeking care, call your healthcare provider.  If you have a medical emergency and need to call 911, notify the dispatch personnel that you  have, or are being evaluated for COVID-19. If possible, put on a facemask before emergency medical services arrive.        Recommended precautions for household members, intimate partners, and caregivers in a home setting of a patient with symptomatic laboratory-confirmed COVID-19 or a patient under investigation.  Household members, intimate partners, and caregivers in the home setting awaiting tests results have close contact with a person with symptomatic, laboratory-confirmed COVID-19 or a person under investigation. Close contacts should monitor their health; they should call their provider right away if they develop symptoms suggestive of COVID-19 (e.g., fever, cough, shortness of breath).    Close contacts should also follow these recommendations:  Make sure that you understand and can help the patient follow their provider's instructions for medication(s) and care. You should help the patient with basic needs in the home and provide support for getting groceries, prescriptions, and other personal needs.  Monitor the patient's symptoms. If the patient is getting sicker, call his or her healthcare provider and tell them that the patient has laboratory-confirmed COVID-19. If the patient has a medical emergency and you need to call 911, notify the dispatch personnel that the patient has, or is being evaluated for COVID-19.  Household members should stay in another room or be  from the patient. Household members should use a separate bedroom and bathroom, if available.  Prohibit visitors.  Household members should care for any pets in the home.  Make sure that shared spaces in the home have good air flow, such as by an air conditioner or an opened window, weather permitting.  Perform hand hygiene frequently. Wash your hands often with soap and water for at least 20 seconds or use an alcohol-based hand  (that contains > 60% alcohol) covering all surfaces of your hands and rubbing them together  until they feel dry. Soap and water should be used preferentially.  Avoid touching your eyes, nose, and mouth.  The patient should wear a facemask. If the patient is not able to wear a facemask (for example, because it causes trouble breathing), caregivers should wear a mask when they are in the same room as the patient.  Wear a disposable facemask and gloves when you touch or have contact with the patient's blood, stool, or body fluids, such as saliva, sputum, nasal mucus, vomit, urine.  Throw out disposable facemasks and gloves after using them. Do not reuse.  When removing personal protective equipment, first remove and dispose of gloves. Then, immediately clean your hands with soap and water or alcohol-based hand . Next, remove and dispose of facemask, and immediately clean your hands again with soap and water or alcohol-based hand .  You should not share dishes, drinking glasses, cups, eating utensils, towels, bedding, or other items with the patient. After the patient uses these items, you should wash them thoroughly (see below Wash laundry thoroughly).  Clean all high-touch surfaces, such as counters, tabletops, doorknobs, bathroom fixtures, toilets, phones, keyboards, tablets, and bedside tables, every day. Also, clean any surfaces that may have blood, stool, or body fluids on them.  Use a household cleaning spray or wipe, according to the label instructions. Labels contain instructions for safe and effective use of the cleaning product including precautions you should take when applying the product, such as wearing gloves and making sure you have good ventilation during use of the product.  Wash laundry thoroughly.  Immediately remove and wash clothes or bedding that have blood, stool, or body fluids on them.  Wear disposable gloves while handling soiled items and keep soiled items away from your body. Clean your hands (with soap and water or an alcohol-based hand )  immediately after removing your gloves.  Read and follow directions on labels of laundry or clothing items and detergent. In general, using a normal laundry detergent according to washing machine instructions and dry thoroughly using the warmest temperatures recommended on the clothing label.  Place all used disposable gloves, facemasks, and other contaminated items in a lined container before disposing of them with other household waste. Clean your hands (with soap and water or an alcohol-based hand ) immediately after handling these items. Soap and water should be used preferentially if hands are visibly dirty.  Discuss any additional questions with your [facility] or local health department or healthcare provider. Check available hours when contacting your local health department.    For more information see CDC link below.      https://www.cdc.gov/coronavirus/2019-ncov/hcp/guidance-prevent-spread.html#precautions        Sources:  [facility], [place] [facility]          Instructions for Home Care of Patients and Caretakers with Coronavirus Disease 2019  Limit visitors to the home.  Older persons and those that have chronic medical conditions such as diabetes, lung and heart disease are at increased risk for illness.   If possible, patients should use a separate bedroom while recovering. Caregivers and household members should avoid prolonged contact with the patient which means to stay 6 feet away and avoid contact with cough droplets.  When close contact is necessary, wash your hands before and immediately after contact.   Perform hand hygiene frequently. Wash your hands often with soap and water for at least 20 seconds or use an alcohol-based hand , covering all surfaces of your hands and rubbing them together until they feel dry.   Avoid touching your eyes, nose, and mouth with unwashed hands.  Avoid sharing household items with the patient. You should not share dishes, drinking  glasses, cups, eating utensils, towels, bedding, or other items. After the patient uses these items, you should wash them thoroughly.  Wash laundry thoroughly.   Immediately remove and wash clothes or bedding that have blood, stool, or body fluids on them.  Clean all high-touch surfaces, such as counters, tabletops, doorknobs, bathroom fixtures, toilets, phones, keyboards, tablets, and bedside tables, every day.   Use a household cleaning spray or wipe, according to the label instructions. Labels contain instructions for safe and effective use of the cleaning product including precautions you should take when applying the product, such as wearing gloves and making sure you have good ventilation during use of the product.    For more information see CDC link below.      https://www.cdc.gov/coronavirus/2019-ncov/hcp/guidance-prevent-spread.html#precautions               If your symptoms worsen or if you have any other concerns, please contact [facility] at [phone].

## 2022-09-10 NOTE — PROGRESS NOTES
"Subjective:       Patient ID: Nickie Segura is a 87 y.o. female.    Vitals:  height is 5' 5" (1.651 m) and weight is 78.9 kg (174 lb). Her temperature is 98.6 °F (37 °C). Her blood pressure is 134/71 and her pulse is 87. Her respiration is 20 and oxygen saturation is 94% (abnormal).     Chief Complaint: Cough    87-year-old female with multiple medical comorbidities including anemia and generalized fatigue who presents urgent care clinic with her sisters/primary caregivers.  Home test was positive for COVID 10 days ago.  Today's day 10 of her COVID-19 infection. Hx of previous Bronchitis and PNA.  Patient complaining of mostly dry Cough, runny nose, SOB with this excess coughing, and Chest Congestion. Symptoms started 10 days ago. Treatments at home include Mucinex + and Robitussin with some relief. COVID vaccinated with booster. This is the 1st time she tested Positive for COVID.  No other associated symptoms.    Cough  This is a new problem. Episode onset: 10 days. The problem has been unchanged. The problem occurs every few minutes. The cough is Non-productive. Associated symptoms include nasal congestion, postnasal drip, rhinorrhea and shortness of breath. Pertinent negatives include no chest pain, chills, ear congestion, ear pain, fever, headaches, heartburn, hemoptysis, myalgias, rash, sore throat, sweats, weight loss or wheezing. Associated symptoms comments: Cough, SOB, Nausea, Chest Congestion, loss of appetite  Symptoms started 10 days ago. She has tried OTC cough suppressant for the symptoms. Her past medical history is significant for bronchitis, environmental allergies and pneumonia. There is no history of asthma, bronchiectasis, COPD or emphysema.     Constitution: Negative for activity change, appetite change, chills, sweating, fatigue, fever and generalized weakness.   HENT:  Positive for congestion and postnasal drip. Negative for ear pain, hearing loss, facial swelling, sinus pain, sinus pressure, " sore throat, trouble swallowing and voice change.    Neck: Negative for neck pain, neck stiffness and painful lymph nodes.   Cardiovascular:  Negative for chest pain, leg swelling, palpitations, sob on exertion and passing out.   Eyes:  Negative for eye discharge, eye pain, photophobia, vision loss, double vision and blurred vision.   Respiratory:  Positive for cough and shortness of breath. Negative for chest tightness, sputum production, bloody sputum, COPD, stridor, wheezing and asthma.    Gastrointestinal:  Negative for abdominal pain, nausea, vomiting, constipation, diarrhea, bright red blood in stool, rectal bleeding, heartburn and bowel incontinence.   Genitourinary:  Negative for dysuria, frequency, urgency, urine decreased, flank pain, bladder incontinence and hematuria.   Musculoskeletal:  Negative for trauma, joint pain, joint swelling, abnormal ROM of joint, muscle cramps and muscle ache.   Skin:  Negative for color change, pale, rash and wound.   Allergic/Immunologic: Positive for environmental allergies. Negative for seasonal allergies, asthma and immunocompromised state.   Neurological:  Negative for dizziness, history of vertigo, light-headedness, passing out, facial drooping, speech difficulty, coordination disturbances, loss of balance, headaches, disorientation, altered mental status, loss of consciousness, numbness, tingling and seizures.   Hematologic/Lymphatic: Negative for swollen lymph nodes, easy bruising/bleeding and trouble clotting. Does not bruise/bleed easily.   Psychiatric/Behavioral:  Negative for altered mental status and disorientation.        Past Medical History:   Diagnosis Date    LORNA positive     Anxiety     Bilateral cataracts     Colitis     Colon polyp     Depression     Elevated IgE level     Hepatomegaly     Hiatal hernia     Hypothyroidism     IFG (impaired fasting glucose)     Iron deficiency anemia     Macular degeneration     Nicotine dependence     Osteoarthritis      Recurrent UTI     Renal cyst     Skin cancer     Tuberculosis of bladder     Urinary incontinence     Vitamin B12 deficiency     Wheezing        Objective:      Physical Exam   Constitutional: She is oriented to person, place, and time. She appears well-developed. She is cooperative.  Non-toxic appearance. She does not appear ill. No distress.      Comments:Well appearing.  Presents and wheelchair at baseline.     HENT:   Head: Normocephalic and atraumatic.   Ears:   Right Ear: Hearing, external ear and ear canal normal. No drainage, swelling or tenderness.   Left Ear: Hearing, external ear and ear canal normal. No drainage, swelling or tenderness.   Nose: Nose normal. No rhinorrhea, purulent discharge or congestion. Right sinus exhibits no maxillary sinus tenderness and no frontal sinus tenderness. Left sinus exhibits no maxillary sinus tenderness and no frontal sinus tenderness.   Mouth/Throat: Uvula is midline, oropharynx is clear and moist and mucous membranes are normal. Mucous membranes are moist. No oral lesions. No trismus in the jaw. No uvula swelling. No oropharyngeal exudate, posterior oropharyngeal edema or posterior oropharyngeal erythema. No tonsillar exudate. Oropharynx is clear.   Eyes: Conjunctivae, EOM and lids are normal. Pupils are equal, round, and reactive to light. No visual field deficit is present. Right eye exhibits no discharge. Left eye exhibits no discharge. Right conjunctiva is not injected. Right conjunctiva has no hemorrhage. Left conjunctiva is not injected. Left conjunctiva has no hemorrhage. Extraocular movement intact vision grossly intact gaze aligned appropriately   Neck: Neck supple. No neck rigidity present.   Cardiovascular: Normal rate, regular rhythm, normal heart sounds and normal pulses.   No murmur heard.     Comments: No leg edema, calf tenderness/erythema, or Homans sign bilaterally.       Pulmonary/Chest: Effort normal and breath sounds normal. No accessory muscle  usage or stridor. No respiratory distress. She has no wheezes. She exhibits no tenderness.         Comments: Every few minutes with wet cough on exam    Abdominal: Normal appearance. She exhibits no distension and no mass. Soft. There is no abdominal tenderness. There is no rebound, no guarding, no left CVA tenderness and no right CVA tenderness.   Musculoskeletal: Normal range of motion.         General: Normal range of motion.      Right lower leg: No edema.      Left lower leg: No edema.      Comments: Moves all extremities with normal tone, strength, and ROM.  Gait normal.   Lymphadenopathy:     She has no cervical adenopathy.   Neurological: no focal deficit. She is alert, oriented to person, place, and time and at baseline. She has normal motor skills and normal sensation. She displays no weakness, facial symmetry, normal reflexes and no dysarthria. No cranial nerve deficit or sensory deficit. She exhibits normal muscle tone. She has a normal Finger-Nose-Finger Test. Coordination: Heel to shin test normal. She shows no pronator drift. She displays no seizure activity. Gait and coordination normal. Coordination normal. GCS eye subscore is 4. GCS verbal subscore is 5. GCS motor subscore is 6.   Skin: Skin is warm, dry, not diaphoretic and no rash. Capillary refill takes less than 2 seconds.   Psychiatric: Her speech is normal and behavior is normal. Mood and thought content normal.   Nursing note and vitals reviewed.        Results for orders placed or performed in visit on 09/10/22   POCT COVID-19 Rapid Screening   Result Value Ref Range    POC Rapid COVID Positive (A) Negative     Acceptable Yes        Assessment:       1. Acute bacterial bronchitis    2. Post-viral cough syndrome    3. Cough with congestion of paranasal sinus    4. Personal history of COVID-19    5. Encounter for laboratory testing for COVID-19 virus          Nontoxic appearing. Vitals are stable. Patient presents for COVID  nasal swab testing.  Patient today had home COVID positive test 10 days ago.  All diagnostic testing personally reviewed and interpreted.   Patient has symptoms at this time which is consistent with above diagnosis.        Patient was recommended OTC treatments for their symptoms. Patient was also prescribed medications for their symptoms.  She already completed 10 day COVID quarantine.  Today's day 10 of infection.    Initially discussed chest x-ray given her history of bronchitis.  However, lungs are clear with no consolidation or abnormal findings.  Patient unable to stand on her own without wheelchair.  She decided that she did not want chest x-ray since she is receiving antibiotic course.      Patient was counseled, explained with the test results meaning, expected course, and answered all of questions. They can also receive results via my chart.  Printed and verbal COVID guidelines were given.   Recommend follow-up PCP in the next 2-3 days if new or worsening symptoms.    Patient understands that they received an Urgent Care treatment only and that they may be released before all your medical problems are known or treated. Strict ED versus clinic precautions given.  Patient verbalized understanding and agreed with plan of care.    Note dictated with voice recognition software, please excuse any grammatical errors.       Plan:         Acute bacterial bronchitis  -     doxycycline (VIBRAMYCIN) 100 MG Cap; Take 1 capsule (100 mg total) by mouth 2 (two) times daily. for 7 days  Dispense: 14 capsule; Refill: 0  -     albuterol (VENTOLIN HFA) 90 mcg/actuation inhaler; Inhale 1-2 puffs into the lungs every 6 (six) hours as needed for Wheezing or Shortness of Breath. Rescue  Dispense: 18 g; Refill: 0    Post-viral cough syndrome  -     benzonatate (TESSALON) 100 MG capsule; Take 1 capsule (100 mg total) by mouth 3 (three) times daily as needed for Cough.  Dispense: 30 capsule; Refill: 0  -     albuterol (VENTOLIN HFA)  90 mcg/actuation inhaler; Inhale 1-2 puffs into the lungs every 6 (six) hours as needed for Wheezing or Shortness of Breath. Rescue  Dispense: 18 g; Refill: 0    Cough with congestion of paranasal sinus  -     benzonatate (TESSALON) 100 MG capsule; Take 1 capsule (100 mg total) by mouth 3 (three) times daily as needed for Cough.  Dispense: 30 capsule; Refill: 0    Personal history of COVID-19  -     Cancel: XR CHEST PA AND LATERAL; Future; Expected date: 09/10/2022    Encounter for laboratory testing for COVID-19 virus  -     POCT COVID-19 Rapid Screening            Additional MDM:     Heart Failure Score:   COPD = No    Patient Instructions     PLEASE READ YOUR DISCHARGE INSTRUCTIONS ENTIRELY AS IT CONTAINS IMPORTANT INFORMATION.    Patient had covid testing done today.  Discussed corona virus precautions and reviewed CDC FAC; printed a copy for patient.  I discussed to continue to monitor their symptoms. Discussed that if their symptoms persist or worsen to seek re-evaluation. Clinic vs. ER precautions were given.  Patient verbalized understanding and agreed with the entire plan of care.      If you tested positive and have symptoms, you must isolate for 5 days starting today OR day of the positive test. After 5 days (ON DAY #6), if your symptoms have improved and you have not had fever on day 5, you can return to the community on day #6- NO TESTING REQUIRED to return to community! If no fever without fever reducing meds for 24 hours, before coming out of quarantine. After your 5 days of isolation are completed, the CDC recommends strict mask use for the first 5 days (day #6-10) that you come out of isolation.   YOU HAVE COMPLETED 10 DAY QUARANTINE.     You should continue to wear a well-fitting mask around others at home and in public for 5 additional days (day 6 through day 10) after the end of your 5-day isolation period. If you are unable to wear a mask when around others, you should continue to isolate for a  full 10 days. Avoid people who have weakened immune systems or are more likely to get very sick from COVID-19, and nursing homes and other high-risk settings, until after at least 10 days.      - Reviewed radiographs and all diagnostic testing with patient/family.    - Rest.  Drink plenty of fluids.    - Tylenol OR anti-inflammatory (NSAIDs, ibuprofen, aleve, motrin) as directed as needed for fever/pain.  For Tylenol, do not exceed 3000 mg/ day. If no contraindication or allergies.  -OK to supplement with OTC DayQuil, NyQuil or TheraFlu every 6 hours as needed for cough and congestion.  Use caution of total amount of Tylenol/acetaminophen per day.  - continue albuterol inhaler as needed for shortness of breath/wheezing  - take Tessalon as needed for cough suppression.     - If you were prescribed antibiotics, please take them to completion. Please supplement with OTC probiotics and yogurt.  Contact clinic if develop profuse diarrhea and weakness.  PLEASE TAKE ON FULL STOMACH.       -Below are suggestions for symptomatic relief:              -Salt water gargles to soothe throat pain.              -Chloroseptic spray also helps to numb throat pain.              -Nasal saline spray reduces inflammation and dryness.              -Warm face compresses to help with facial sinus pain/pressure.              -Vicks vapor rub at night.            **may also supplement with OTC nasal spray to help with inflammation and congestion. Wean to off when you nose becomes to dry or bleed. Also use nasal saline twice a day to help with dryness.               -Flonase OTC or Nasacort OTC  once or twice a day for nasal/sinus congestion. DON'T USE IF YOU HAVE GLAUCOMA. CHECK WITH YOUR PHARMACIST/PHYSICIAN.              -Simple foods like chicken noodle soup.              -Mucinex DM (ANY COUGH EXPECTORANT-- guaifenesin) for cough or chest congestion with mucus during the day time. Delsym or robitussin (ANY COUGH SUPPRESSANT-  dextromethorphan) helps with coughing at night. Mucinex-DM if you have chest congestion or sputum (caution if history of high blood pressure or palpitations).              -Zyrtec/Claritin/xyzal during the day time  & Benadryl at night (only if severe runny nose) may help with allergies and runny nose. Add decongestant if you have nasal/sinus congestion/sinus pressure/ear fullness sensation. (see below)              -may take OTC meclizine as needed for dizziness or nausea.     Caution with use of Decongestant meds:  -If you DO NOT have Hypertension or any history of palpitations, it is ok to take over the counter Sudafed or Mucinex D or Allegra-D or Claritin-D or Zyrtec-D.  -If you do take one of the above, it is ok to combine that with plain over the counter Mucinex or Allegra or Claritin or Zyrtec. If, for example, you are taking Zyrtec -D, you can combine that with Mucinex, but not Mucinex-D.  If you are taking Mucinex-D, you can combine that with plain Allegra or Claritin or Zyrtec.     -Do not combine pseudophed or phenylephrine with any other brand allergy-D for DECONGESTANT.   -Or vice versa, you can you take plain allergy medications (allegra/claritin/zyrtec with NO Decongestant) and ADD OTC pseudophed or phenelyphrine 2-3 times a day (or every 4-6 hours needed). Avoid taking decongestant late at night or with caffeine as it can keep you up or cause jittery feeling.     -If you DO have Hypertension , anxiety, or palpitations, it is safe to take Coricidin HBP for relief of sinus symptoms.      For your GI symptoms:  -Use gatorade/pedialyte or rehydration packets to help stay hydrated. Vitamin water and plain water do not contain rehydrating electrolytes.  -Increase clear liquids (water, gatorade, pedialyte, broths, jello, etc) Hold off on solids for 12-18 hours. Then advance to BRAT diet (banana, rice, applesauce, tea, toast/crackers), then advance further as tolerated. Avoid spicy or fatty foods.   -May  take Emitrol OTC as needed for nausea.   -Use Peptobismol or Immodium to help alleviate your diarrhea symptoms.   -Take mylanta or simethicone for bloating or gas pain.   -Take pepcid or omeprazole if you have heartburn or reflux sensation.  -Avoid imodium unless you have more than 6 loose stools in 24 hours. Take 1 dose and monitor to see if you can repeat AS IT WILL CAUSE CONSTIPATION.  -Wash hands frequently while sick. Avoid ibuprofen or other NSAIDS until you are well.   -Please go to the ER if you experience worsening abdominal pain, blood in your vomit or stool, high fever, dizziness, fainting, swelling of your abdomen, inability to pass gas or stool, or inability to urinate.         -You must understand that you've received an Urgent Care treatment only and that you may be released before all your medical problems are known or treated. You, the patient, will arrange for follow up care as instructed. Please arrange follow up with your primary medical clinic within 2-5 days if your signs and symptoms have not resolved or worsen.     - Follow up with your PCP or specialty clinic as directed.  You can call [phone] or [phone] to schedule an appointment with the appropriate provider.  Schedule CENTER is open Mon-Friday 8-5pm (excluded holidays).    - If your condition worsens or fails to improve we recommend that you receive another evaluation at the emergency room immediately or contact your primary medical clinic to discuss your concerns.            Prevention steps for patients with confirmed or suspected COVID-19  Stay home and stay away from family members and friends. The CDC says, you can leave home after these three things have happened: 1) You have had no fever for at least 24 hours (that is one full day of no fever without the use of medicine that reduces fevers) 2) AND other symptoms have improved (for example, when your cough or shortness of breath have improved) 3) AND at least 10 days  have passed since your symptoms first appeared OR after 7-10 days passed from first positive test.  Separate yourself from other people and animals in your home.  Call ahead before visiting your doctor.  Wear a facemask.  Cover your coughs and sneezes.  Wash your hands often with soap and water; hand  can be used, too.  Avoid sharing personal household items.  Wipe down surfaces used daily.  Monitor your symptoms. Seek prompt medical attention if your illness is worsening (e.g., difficulty breathing).   Before seeking care, call your healthcare provider.  If you have a medical emergency and need to call 911, notify the dispatch personnel that you have, or are being evaluated for COVID-19. If possible, put on a facemask before emergency medical services arrive.        Recommended precautions for household members, intimate partners, and caregivers in a home setting of a patient with symptomatic laboratory-confirmed COVID-19 or a patient under investigation.  Household members, intimate partners, and caregivers in the home setting awaiting tests results have close contact with a person with symptomatic, laboratory-confirmed COVID-19 or a person under investigation. Close contacts should monitor their health; they should call their provider right away if they develop symptoms suggestive of COVID-19 (e.g., fever, cough, shortness of breath).    Close contacts should also follow these recommendations:  Make sure that you understand and can help the patient follow their provider's instructions for medication(s) and care. You should help the patient with basic needs in the home and provide support for getting groceries, prescriptions, and other personal needs.  Monitor the patient's symptoms. If the patient is getting sicker, call his or her healthcare provider and tell them that the patient has laboratory-confirmed COVID-19. If the patient has a medical emergency and you need to call 911, notify the dispatch  personnel that the patient has, or is being evaluated for COVID-19.  Household members should stay in another room or be  from the patient. Household members should use a separate bedroom and bathroom, if available.  Prohibit visitors.  Household members should care for any pets in the home.  Make sure that shared spaces in the home have good air flow, such as by an air conditioner or an opened window, weather permitting.  Perform hand hygiene frequently. Wash your hands often with soap and water for at least 20 seconds or use an alcohol-based hand  (that contains > 60% alcohol) covering all surfaces of your hands and rubbing them together until they feel dry. Soap and water should be used preferentially.  Avoid touching your eyes, nose, and mouth.  The patient should wear a facemask. If the patient is not able to wear a facemask (for example, because it causes trouble breathing), caregivers should wear a mask when they are in the same room as the patient.  Wear a disposable facemask and gloves when you touch or have contact with the patient's blood, stool, or body fluids, such as saliva, sputum, nasal mucus, vomit, urine.  Throw out disposable facemasks and gloves after using them. Do not reuse.  When removing personal protective equipment, first remove and dispose of gloves. Then, immediately clean your hands with soap and water or alcohol-based hand . Next, remove and dispose of facemask, and immediately clean your hands again with soap and water or alcohol-based hand .  You should not share dishes, drinking glasses, cups, eating utensils, towels, bedding, or other items with the patient. After the patient uses these items, you should wash them thoroughly (see below Wash laundry thoroughly).  Clean all high-touch surfaces, such as counters, tabletops, doorknobs, bathroom fixtures, toilets, phones, keyboards, tablets, and bedside tables, every day. Also, clean any surfaces  that may have blood, stool, or body fluids on them.  Use a household cleaning spray or wipe, according to the label instructions. Labels contain instructions for safe and effective use of the cleaning product including precautions you should take when applying the product, such as wearing gloves and making sure you have good ventilation during use of the product.  Wash laundry thoroughly.  Immediately remove and wash clothes or bedding that have blood, stool, or body fluids on them.  Wear disposable gloves while handling soiled items and keep soiled items away from your body. Clean your hands (with soap and water or an alcohol-based hand ) immediately after removing your gloves.  Read and follow directions on labels of laundry or clothing items and detergent. In general, using a normal laundry detergent according to washing machine instructions and dry thoroughly using the warmest temperatures recommended on the clothing label.  Place all used disposable gloves, facemasks, and other contaminated items in a lined container before disposing of them with other household waste. Clean your hands (with soap and water or an alcohol-based hand ) immediately after handling these items. Soap and water should be used preferentially if hands are visibly dirty.  Discuss any additional questions with your state or local health department or healthcare provider. Check available hours when contacting your local health department.    For more information see CDC link below.      https://www.cdc.gov/coronavirus/2019-ncov/hcp/guidance-prevent-spread.html#precautions        Sources:  [facility], [facility]          Instructions for Home Care of Patients and Caretakers with Coronavirus Disease 2019  Limit visitors to the home.  Older persons and those that have chronic medical conditions such as diabetes, lung and heart disease are at increased risk for illness.   If possible, patients should  use a separate bedroom while recovering. Caregivers and household members should avoid prolonged contact with the patient which means to stay 6 feet away and avoid contact with cough droplets.  When close contact is necessary, wash your hands before and immediately after contact.   Perform hand hygiene frequently. Wash your hands often with soap and water for at least 20 seconds or use an alcohol-based hand , covering all surfaces of your hands and rubbing them together until they feel dry.   Avoid touching your eyes, nose, and mouth with unwashed hands.  Avoid sharing household items with the patient. You should not share dishes, drinking glasses, cups, eating utensils, towels, bedding, or other items. After the patient uses these items, you should wash them thoroughly.  Wash laundry thoroughly.   Immediately remove and wash clothes or bedding that have blood, stool, or body fluids on them.  Clean all high-touch surfaces, such as counters, tabletops, doorknobs, bathroom fixtures, toilets, phones, keyboards, tablets, and bedside tables, every day.   Use a household cleaning spray or wipe, according to the label instructions. Labels contain instructions for safe and effective use of the cleaning product including precautions you should take when applying the product, such as wearing gloves and making sure you have good ventilation during use of the product.    For more information see CDC link below.      https://www.cdc.gov/coronavirus/2019-ncov/hcp/guidance-prevent-spread.html#precautions               If your symptoms worsen or if you have any other concerns, please contact [facility] at [phone].

## 2022-09-15 ENCOUNTER — TELEPHONE (OUTPATIENT)
Dept: HEMATOLOGY/ONCOLOGY | Facility: CLINIC | Age: 87
End: 2022-09-15
Payer: MEDICARE

## 2022-09-15 NOTE — TELEPHONE ENCOUNTER
----- Message from Dacia Roque sent at 9/15/2022 10:56 AM CDT -----  Regarding: call back  Contact: Brittaney [phone]  Who Called: Brittaney [phone]    Patient's sister is calling to get orders for patient to have home health to stop by to get labs done. Please advice

## 2022-09-16 ENCOUNTER — TELEPHONE (OUTPATIENT)
Dept: HEMATOLOGY/ONCOLOGY | Facility: CLINIC | Age: 87
End: 2022-09-16
Payer: MEDICARE

## 2022-09-16 ENCOUNTER — DOCUMENTATION ONLY (OUTPATIENT)
Dept: HEMATOLOGY/ONCOLOGY | Facility: CLINIC | Age: 87
End: 2022-09-16
Payer: MEDICARE

## 2022-09-16 DIAGNOSIS — R53.81 PHYSICAL DECONDITIONING: Primary | ICD-10-CM

## 2022-09-16 NOTE — TELEPHONE ENCOUNTER
Returned pt sister call. She just wanted to give us more information to help her sister with getting home health. I told her thank you; however the pt should be fine. The Dr will sent the orders then some one from home health will contact you to set it up. Once set up for home health to do labs at the home she would not need to worry about the lab appts will have scheduled because then home health would be doing them. ----- Message from Shonda Kimball sent at 9/16/2022  8:23 AM CDT -----  Contact: Brittaney( Sister)-[phone]  Type:  Needs Medical Advice    Who Called: Pt's Sister  Reason for call; regarding speaking with the nurse again regarding the pt is just get over Covid and as a Cough   Would the patient rather a call back or a response via [facility]? No call back  Best Call Back Number: [phone]

## 2022-09-16 NOTE — PROGRESS NOTES
Received message via Epic from Dr. Gibson, consulting Oncology Social Worker re: arranging additional home health services with lab draws for next week. Contacted [facility] liason nurse Rafia at ext. [phone]. She confirmed that patient is current with [facility] and she will send the orders to patient's  and get her visits and labs scheduled for next week. Called the number listed in the chart for patient - [phone]. Her younger sister/POA Brittaney Albarran answered; it is her phone number. Discussed above noted information with her and she stated understanding and agreement. Will continue to follow and assist as needs are identified.

## 2022-09-16 NOTE — TELEPHONE ENCOUNTER
----- Message from Louis Gibson MD sent at 9/16/2022  3:05 PM CDT -----  Contact: ronan  Let them know i'm putting in orders now.    louis  ----- Message -----  From: Brunilda Erazo  Sent: 9/16/2022   2:59 PM CDT  To: [facility] Staff    Type:  Needs Medical Advice    [facility]  Symptoms (please be specific): they need to speak with Dr Gibson for home health orders for blood work and physical therapy consults     Would the patient rather a call back or a response via [facility]? call  Best Call Back Number: [phone]  Additional Information:

## 2022-09-21 PROCEDURE — G0179 MD RECERTIFICATION HHA PT: HCPCS | Mod: ,,, | Performed by: INTERNAL MEDICINE

## 2022-09-21 PROCEDURE — G0179 PR HOME HEALTH MD RECERTIFICATION: ICD-10-PCS | Mod: ,,, | Performed by: INTERNAL MEDICINE

## 2022-09-22 ENCOUNTER — LAB VISIT (OUTPATIENT)
Dept: LAB | Facility: HOSPITAL | Age: 87
End: 2022-09-22
Attending: INTERNAL MEDICINE
Payer: MEDICARE

## 2022-09-22 DIAGNOSIS — D50.9 IRON DEFICIENCY ANEMIA, UNSPECIFIED: Primary | ICD-10-CM

## 2022-09-22 LAB
ALBUMIN SERPL BCP-MCNC: 3.2 G/DL (ref 3.5–5.2)
ALP SERPL-CCNC: 130 U/L (ref 55–135)
ALT SERPL W/O P-5'-P-CCNC: 120 U/L (ref 10–44)
ANION GAP SERPL CALC-SCNC: 10 MMOL/L (ref 8–16)
AST SERPL-CCNC: 200 U/L (ref 10–40)
BILIRUB SERPL-MCNC: 0.3 MG/DL (ref 0.1–1)
BUN SERPL-MCNC: 15 MG/DL (ref 8–23)
CALCIUM SERPL-MCNC: 9.2 MG/DL (ref 8.7–10.5)
CHLORIDE SERPL-SCNC: 106 MMOL/L (ref 95–110)
CO2 SERPL-SCNC: 26 MMOL/L (ref 23–29)
CREAT SERPL-MCNC: 0.8 MG/DL (ref 0.5–1.4)
ERYTHROCYTE [DISTWIDTH] IN BLOOD BY AUTOMATED COUNT: 21.5 % (ref 11.5–14.5)
EST. GFR  (NO RACE VARIABLE): >60 ML/MIN/1.73 M^2
FERRITIN SERPL-MCNC: 934 NG/ML (ref 20–300)
GLUCOSE SERPL-MCNC: 98 MG/DL (ref 70–110)
HCT VFR BLD AUTO: 30.6 % (ref 37–48.5)
HGB BLD-MCNC: 8.4 G/DL (ref 12–16)
IGA SERPL-MCNC: 322 MG/DL (ref 40–350)
IGG SERPL-MCNC: 1141 MG/DL (ref 650–1600)
IGM SERPL-MCNC: 148 MG/DL (ref 50–300)
IRON SERPL-MCNC: 25 UG/DL (ref 30–160)
MCH RBC QN AUTO: 22 PG (ref 27–31)
MCHC RBC AUTO-ENTMCNC: 27.5 G/DL (ref 32–36)
MCV RBC AUTO: 80 FL (ref 82–98)
PLATELET # BLD AUTO: 279 K/UL (ref 150–450)
PMV BLD AUTO: 9.7 FL (ref 9.2–12.9)
POTASSIUM SERPL-SCNC: 3.7 MMOL/L (ref 3.5–5.1)
PROT SERPL-MCNC: 6.5 G/DL (ref 6–8.4)
RBC # BLD AUTO: 3.81 M/UL (ref 4–5.4)
SODIUM SERPL-SCNC: 142 MMOL/L (ref 136–145)
WBC # BLD AUTO: 2.99 K/UL (ref 3.9–12.7)

## 2022-09-22 PROCEDURE — 82728 ASSAY OF FERRITIN: CPT | Performed by: INTERNAL MEDICINE

## 2022-09-22 PROCEDURE — 84165 PROTEIN E-PHORESIS SERUM: CPT | Performed by: INTERNAL MEDICINE

## 2022-09-22 PROCEDURE — 86334 PATHOLOGIST INTERPRETATION IFE: ICD-10-PCS | Mod: 26,,, | Performed by: PATHOLOGY

## 2022-09-22 PROCEDURE — 86334 IMMUNOFIX E-PHORESIS SERUM: CPT | Performed by: INTERNAL MEDICINE

## 2022-09-22 PROCEDURE — 85027 COMPLETE CBC AUTOMATED: CPT | Performed by: INTERNAL MEDICINE

## 2022-09-22 PROCEDURE — 83520 IMMUNOASSAY QUANT NOS NONAB: CPT | Mod: 59 | Performed by: INTERNAL MEDICINE

## 2022-09-22 PROCEDURE — 84165 PATHOLOGIST INTERPRETATION SPE: ICD-10-PCS | Mod: 26,,, | Performed by: PATHOLOGY

## 2022-09-22 PROCEDURE — 84165 PROTEIN E-PHORESIS SERUM: CPT | Mod: 26,,, | Performed by: PATHOLOGY

## 2022-09-22 PROCEDURE — 80053 COMPREHEN METABOLIC PANEL: CPT | Performed by: INTERNAL MEDICINE

## 2022-09-22 PROCEDURE — 82784 ASSAY IGA/IGD/IGG/IGM EACH: CPT | Performed by: INTERNAL MEDICINE

## 2022-09-22 PROCEDURE — 83540 ASSAY OF IRON: CPT | Performed by: INTERNAL MEDICINE

## 2022-09-22 PROCEDURE — 86334 IMMUNOFIX E-PHORESIS SERUM: CPT | Mod: 26,,, | Performed by: PATHOLOGY

## 2022-09-23 LAB
ALBUMIN SERPL ELPH-MCNC: 3.07 G/DL (ref 3.35–5.55)
ALPHA1 GLOB SERPL ELPH-MCNC: 0.38 G/DL (ref 0.17–0.41)
ALPHA2 GLOB SERPL ELPH-MCNC: 0.71 G/DL (ref 0.43–0.99)
B-GLOBULIN SERPL ELPH-MCNC: 0.83 G/DL (ref 0.5–1.1)
GAMMA GLOB SERPL ELPH-MCNC: 1.1 G/DL (ref 0.67–1.58)
INTERPRETATION SERPL IFE-IMP: NORMAL
KAPPA LC SER QL IA: 7.33 MG/DL (ref 0.33–1.94)
KAPPA LC/LAMBDA SER IA: 1.91 (ref 0.26–1.65)
LAMBDA LC SER QL IA: 3.83 MG/DL (ref 0.57–2.63)
PROT SERPL-MCNC: 6.1 G/DL (ref 6–8.4)

## 2022-09-26 LAB
PATHOLOGIST INTERPRETATION IFE: NORMAL
PATHOLOGIST INTERPRETATION SPE: NORMAL

## 2022-09-27 ENCOUNTER — PATIENT MESSAGE (OUTPATIENT)
Dept: HEMATOLOGY/ONCOLOGY | Facility: CLINIC | Age: 87
End: 2022-09-27
Payer: MEDICARE

## 2022-09-29 DIAGNOSIS — F41.9 ANXIETY: ICD-10-CM

## 2022-09-29 RX ORDER — LORAZEPAM 0.5 MG/1
TABLET ORAL
Qty: 30 TABLET | Refills: 1 | Status: SHIPPED | OUTPATIENT
Start: 2022-09-29 | End: 2022-11-29 | Stop reason: SDUPTHER

## 2022-09-29 NOTE — TELEPHONE ENCOUNTER
No new care gaps identified.  [facility] Embedded Care Gaps. Reference number: [id number]. 9/29/2022   9:27:01 AM [place]

## 2022-09-30 ENCOUNTER — TELEPHONE (OUTPATIENT)
Dept: PRIMARY CARE CLINIC | Facility: CLINIC | Age: 87
End: 2022-09-30
Payer: MEDICARE

## 2022-09-30 NOTE — TELEPHONE ENCOUNTER
----- Message from Misty Lee sent at 9/30/2022  8:59 AM CDT -----  Contact: Bharti with [facility]/ [phone]  Bharti with [facility]/ [phone], calling for recert for  .

## 2022-09-30 NOTE — TELEPHONE ENCOUNTER
----- Message from Kinsey Narvaez sent at 9/30/2022  2:58 PM CDT -----  Contact: [phone]  Lizeth called with [employer]  Call back needed by Monday 9am for peer to peer regarding pt's home health. Please Advise

## 2022-09-30 NOTE — TELEPHONE ENCOUNTER
Lvm on recording informing that I am returning Lizeth's call regarding patient. Unsure why they are requesting a peer to peer for HH services.

## 2022-10-03 ENCOUNTER — TELEPHONE (OUTPATIENT)
Dept: PRIMARY CARE CLINIC | Facility: CLINIC | Age: 87
End: 2022-10-03
Payer: MEDICARE

## 2022-10-03 NOTE — TELEPHONE ENCOUNTER
I radha Stanley. Pt insurance did approve extended PT orders. Pt does have new hh orders ordered by hem/onc  on 9/16/22.

## 2022-10-03 NOTE — TELEPHONE ENCOUNTER
----- Message from Sharita Cardoza sent at 9/30/2022  4:43 PM CDT -----  Contact: Lizeth with [facility] [phone]  Lizeth called with [facility]  Call back needed by Monday 9am for peer to peer regarding pt's home health. Please Advise

## 2022-10-08 ENCOUNTER — DOCUMENT SCAN (OUTPATIENT)
Dept: HOME HEALTH SERVICES | Facility: HOSPITAL | Age: 87
End: 2022-10-08
Payer: MEDICARE

## 2022-10-10 ENCOUNTER — EXTERNAL HOME HEALTH (OUTPATIENT)
Dept: HOME HEALTH SERVICES | Facility: HOSPITAL | Age: 87
End: 2022-10-10
Payer: MEDICARE

## 2022-11-01 ENCOUNTER — PATIENT MESSAGE (OUTPATIENT)
Dept: PRIMARY CARE CLINIC | Facility: CLINIC | Age: 87
End: 2022-11-01
Payer: MEDICARE

## 2022-11-03 ENCOUNTER — TELEPHONE (OUTPATIENT)
Dept: PRIMARY CARE CLINIC | Facility: CLINIC | Age: 87
End: 2022-11-03
Payer: MEDICARE

## 2022-11-03 NOTE — TELEPHONE ENCOUNTER
----- Message from Aminata Infante sent at 11/3/2022  9:19 AM CDT -----  Contact: [facility] review @ [phone]  Good Morning,  [facility] is calling to check on the PA for the Rx MYRBETRIQ 50 mg Tb24    When calling be give Ref#[id number]    Thank you

## 2022-11-16 ENCOUNTER — PATIENT MESSAGE (OUTPATIENT)
Dept: HEMATOLOGY/ONCOLOGY | Facility: CLINIC | Age: 87
End: 2022-11-16
Payer: MEDICARE

## 2022-11-17 ENCOUNTER — TELEPHONE (OUTPATIENT)
Dept: HEMATOLOGY/ONCOLOGY | Facility: CLINIC | Age: 87
End: 2022-11-17
Payer: MEDICARE

## 2022-11-18 ENCOUNTER — TELEPHONE (OUTPATIENT)
Dept: PRIMARY CARE CLINIC | Facility: CLINIC | Age: 87
End: 2022-11-18
Payer: MEDICARE

## 2022-11-18 DIAGNOSIS — R53.81 DEBILITY: Primary | ICD-10-CM

## 2022-11-18 NOTE — TELEPHONE ENCOUNTER
----- Message from Syl Wade sent at 11/18/2022  9:43 AM CST -----  Contact: Verito/sister [phone]  Sister requesting orders for physical therapy done through [facility] be renewed. Can you please assist her with this and call her when completed? Thanks

## 2022-11-21 ENCOUNTER — TELEPHONE (OUTPATIENT)
Dept: PRIMARY CARE CLINIC | Facility: CLINIC | Age: 87
End: 2022-11-21
Payer: MEDICARE

## 2022-11-21 NOTE — TELEPHONE ENCOUNTER
----- Message from Charisse Pang sent at 11/21/2022  3:29 PM CST -----  Contact: self/[phone]  Pt called in regard to checking the status of physical therapy. Call back    Please advise

## 2022-11-29 DIAGNOSIS — F41.9 ANXIETY: ICD-10-CM

## 2022-11-29 RX ORDER — LORAZEPAM 0.5 MG/1
0.5 TABLET ORAL DAILY PRN
Qty: 30 TABLET | Refills: 1 | Status: SHIPPED | OUTPATIENT
Start: 2022-11-29 | End: 2023-01-27

## 2022-11-29 NOTE — TELEPHONE ENCOUNTER
No new care gaps identified.  [facility] Embedded Care Gaps. Reference number: [id number]. 11/29/2022   3:04:27 PM CST

## 2022-12-01 ENCOUNTER — TELEPHONE (OUTPATIENT)
Dept: HEMATOLOGY/ONCOLOGY | Facility: CLINIC | Age: 87
End: 2022-12-01
Payer: MEDICARE

## 2022-12-01 NOTE — TELEPHONE ENCOUNTER
----- Message from Louis Gibson MD sent at 11/30/2022  2:31 PM CST -----  Can you look into this? thanks  ----- Message -----  From: Shiloh Hanna  Sent: 11/30/2022   9:47 AM CST  To: [facility] Staff    .Type:  Home Health Request    Who Called:pt's sister  Would the patient rather a call back or a response via [facility]? Call   Best Call Back Number:[phone]  Additional Information:     Canceled 11/28 appt because blood work was ordered   Stated that Dr. Gibson ordered home healthcare and that they would like to know the status of request.

## 2022-12-05 ENCOUNTER — PATIENT MESSAGE (OUTPATIENT)
Dept: HEMATOLOGY/ONCOLOGY | Facility: CLINIC | Age: 87
End: 2022-12-05
Payer: MEDICARE

## 2022-12-05 DIAGNOSIS — D50.8 OTHER IRON DEFICIENCY ANEMIA: Primary | ICD-10-CM

## 2022-12-06 ENCOUNTER — TELEPHONE (OUTPATIENT)
Dept: PRIMARY CARE CLINIC | Facility: CLINIC | Age: 87
End: 2022-12-06
Payer: MEDICARE

## 2022-12-06 NOTE — TELEPHONE ENCOUNTER
----- Message from Ana Williamson sent at 12/6/2022  9:26 AM CST -----  Contact: Sister/Brittaney/[phone]  Pt's sister(Brittaney) said that she is calling in regards to needing to get a return call from the nurse about getting Home Health for pt. Please advise

## 2022-12-06 NOTE — TELEPHONE ENCOUNTER
I sw pts sister. They are checking the status of HH orders we placed. I left a message on the voicemail for the intake dept for [facility] requesting a call back with the status. It is possible the ins denied the referral and pts sister was informed of ths

## 2022-12-09 NOTE — TELEPHONE ENCOUNTER
I sw Alcides with [facility]. He states there was an issue a couple weeks back and they were not receiving orders. I gave patients mrnick and he will pull the order and get it processed for us

## 2022-12-12 ENCOUNTER — TELEPHONE (OUTPATIENT)
Dept: PRIMARY CARE CLINIC | Facility: CLINIC | Age: 87
End: 2022-12-12
Payer: MEDICARE

## 2022-12-12 ENCOUNTER — PES CALL (OUTPATIENT)
Dept: ADMINISTRATIVE | Facility: OTHER | Age: 87
End: 2022-12-12
Payer: MEDICARE

## 2022-12-12 DIAGNOSIS — R53.81 PHYSICAL DECONDITIONING: ICD-10-CM

## 2022-12-12 DIAGNOSIS — D50.8 OTHER IRON DEFICIENCY ANEMIA: Primary | ICD-10-CM

## 2022-12-12 PROCEDURE — G0180 PR HOME HEALTH MD CERTIFICATION: ICD-10-PCS | Mod: ,,, | Performed by: INTERNAL MEDICINE

## 2022-12-12 PROCEDURE — G0180 MD CERTIFICATION HHA PATIENT: HCPCS | Mod: ,,, | Performed by: INTERNAL MEDICINE

## 2022-12-13 ENCOUNTER — TELEPHONE (OUTPATIENT)
Dept: HEMATOLOGY/ONCOLOGY | Facility: CLINIC | Age: 87
End: 2022-12-13
Payer: MEDICARE

## 2022-12-13 NOTE — TELEPHONE ENCOUNTER
Spoke with the pt sister to let her know that the home health orders are in and are ready to be scheduled. She will contact the agency so they can set it up

## 2022-12-21 ENCOUNTER — LAB VISIT (OUTPATIENT)
Dept: LAB | Facility: HOSPITAL | Age: 87
End: 2022-12-21
Attending: INTERNAL MEDICINE
Payer: MEDICARE

## 2022-12-21 ENCOUNTER — TELEPHONE (OUTPATIENT)
Dept: INTERNAL MEDICINE | Facility: CLINIC | Age: 87
End: 2022-12-21
Payer: MEDICARE

## 2022-12-21 DIAGNOSIS — D50.8 IRON DEFICIENCY ANEMIA SECONDARY TO INADEQUATE DIETARY IRON INTAKE: Primary | ICD-10-CM

## 2022-12-21 LAB
BASOPHILS # BLD AUTO: 0.02 K/UL (ref 0–0.2)
BASOPHILS NFR BLD: 0.9 % (ref 0–1.9)
DIFFERENTIAL METHOD: ABNORMAL
EOSINOPHIL # BLD AUTO: 0.1 K/UL (ref 0–0.5)
EOSINOPHIL NFR BLD: 5 % (ref 0–8)
ERYTHROCYTE [DISTWIDTH] IN BLOOD BY AUTOMATED COUNT: 22.4 % (ref 11.5–14.5)
FERRITIN SERPL-MCNC: 698 NG/ML (ref 20–300)
HCT VFR BLD AUTO: 22.2 % (ref 37–48.5)
HGB BLD-MCNC: 6 G/DL (ref 12–16)
IMM GRANULOCYTES # BLD AUTO: 0.01 K/UL (ref 0–0.04)
IMM GRANULOCYTES NFR BLD AUTO: 0.5 % (ref 0–0.5)
IRON SERPL-MCNC: 24 UG/DL (ref 30–160)
IRON SERPL-MCNC: 24 UG/DL (ref 30–160)
LYMPHOCYTES # BLD AUTO: 0.6 K/UL (ref 1–4.8)
LYMPHOCYTES NFR BLD: 25 % (ref 18–48)
MCH RBC QN AUTO: 22.1 PG (ref 27–31)
MCHC RBC AUTO-ENTMCNC: 27 G/DL (ref 32–36)
MCV RBC AUTO: 82 FL (ref 82–98)
MONOCYTES # BLD AUTO: 0.3 K/UL (ref 0.3–1)
MONOCYTES NFR BLD: 15.5 % (ref 4–15)
NEUTROPHILS # BLD AUTO: 1.2 K/UL (ref 1.8–7.7)
NEUTROPHILS NFR BLD: 53.1 % (ref 38–73)
NRBC BLD-RTO: 0 /100 WBC
PLATELET # BLD AUTO: 294 K/UL (ref 150–450)
PMV BLD AUTO: 10.1 FL (ref 9.2–12.9)
RBC # BLD AUTO: 2.71 M/UL (ref 4–5.4)
SATURATED IRON: 5 % (ref 20–50)
TOTAL IRON BINDING CAPACITY: 491 UG/DL (ref 250–450)
TRANSFERRIN SERPL-MCNC: 332 MG/DL (ref 200–375)
WBC # BLD AUTO: 2.2 K/UL (ref 3.9–12.7)

## 2022-12-21 PROCEDURE — 82728 ASSAY OF FERRITIN: CPT | Mod: HCNC | Performed by: INTERNAL MEDICINE

## 2022-12-21 PROCEDURE — 84466 ASSAY OF TRANSFERRIN: CPT | Mod: HCNC | Performed by: INTERNAL MEDICINE

## 2022-12-21 PROCEDURE — 85025 COMPLETE CBC W/AUTO DIFF WBC: CPT | Mod: HCNC | Performed by: INTERNAL MEDICINE

## 2022-12-22 ENCOUNTER — PATIENT MESSAGE (OUTPATIENT)
Dept: HEMATOLOGY/ONCOLOGY | Facility: CLINIC | Age: 87
End: 2022-12-22
Payer: MEDICARE

## 2022-12-22 ENCOUNTER — LAB VISIT (OUTPATIENT)
Dept: LAB | Facility: HOSPITAL | Age: 87
End: 2022-12-22
Attending: INTERNAL MEDICINE
Payer: MEDICARE

## 2022-12-22 ENCOUNTER — TELEPHONE (OUTPATIENT)
Dept: HEMATOLOGY/ONCOLOGY | Facility: CLINIC | Age: 87
End: 2022-12-22
Payer: MEDICARE

## 2022-12-22 DIAGNOSIS — D64.9 SYMPTOMATIC ANEMIA: ICD-10-CM

## 2022-12-22 DIAGNOSIS — D50.8 OTHER IRON DEFICIENCY ANEMIA: ICD-10-CM

## 2022-12-22 DIAGNOSIS — D50.8 OTHER IRON DEFICIENCY ANEMIA: Primary | ICD-10-CM

## 2022-12-22 LAB
ABO + RH BLD: NORMAL
BLD GP AB SCN CELLS X3 SERPL QL: NORMAL

## 2022-12-22 PROCEDURE — 86920 COMPATIBILITY TEST SPIN: CPT | Mod: HCNC | Performed by: INTERNAL MEDICINE

## 2022-12-22 PROCEDURE — P9021 RED BLOOD CELLS UNIT: HCPCS | Mod: HCNC | Performed by: INTERNAL MEDICINE

## 2022-12-22 PROCEDURE — 36415 COLL VENOUS BLD VENIPUNCTURE: CPT | Mod: HCNC | Performed by: INTERNAL MEDICINE

## 2022-12-22 PROCEDURE — 86901 BLOOD TYPING SEROLOGIC RH(D): CPT | Mod: HCNC | Performed by: INTERNAL MEDICINE

## 2022-12-22 RX ORDER — HEPARIN 100 UNIT/ML
500 SYRINGE INTRAVENOUS
Status: CANCELLED | OUTPATIENT
Start: 2022-12-28

## 2022-12-22 RX ORDER — SODIUM CHLORIDE 0.9 % (FLUSH) 0.9 %
10 SYRINGE (ML) INJECTION
Status: CANCELLED | OUTPATIENT
Start: 2023-01-07

## 2022-12-22 RX ORDER — SODIUM CHLORIDE 0.9 % (FLUSH) 0.9 %
10 SYRINGE (ML) INJECTION
Status: CANCELLED | OUTPATIENT
Start: 2022-12-28

## 2022-12-22 RX ORDER — DIPHENHYDRAMINE HCL 25 MG
25 CAPSULE ORAL
Status: CANCELLED | OUTPATIENT
Start: 2022-12-22

## 2022-12-22 RX ORDER — HYDROCODONE BITARTRATE AND ACETAMINOPHEN 500; 5 MG/1; MG/1
TABLET ORAL ONCE
Status: CANCELLED | OUTPATIENT
Start: 2022-12-22 | End: 2022-12-22

## 2022-12-22 RX ORDER — HEPARIN 100 UNIT/ML
500 SYRINGE INTRAVENOUS
Status: CANCELLED | OUTPATIENT
Start: 2023-01-07

## 2022-12-22 RX ORDER — SODIUM CHLORIDE 0.9 % (FLUSH) 0.9 %
10 SYRINGE (ML) INJECTION
Status: CANCELLED | OUTPATIENT
Start: 2023-01-21

## 2022-12-22 RX ORDER — HEPARIN 100 UNIT/ML
500 SYRINGE INTRAVENOUS
Status: CANCELLED | OUTPATIENT
Start: 2023-01-21

## 2022-12-22 RX ORDER — ACETAMINOPHEN 325 MG/1
650 TABLET ORAL
Status: CANCELLED | OUTPATIENT
Start: 2022-12-22

## 2022-12-22 RX ORDER — SODIUM CHLORIDE 0.9 % (FLUSH) 0.9 %
10 SYRINGE (ML) INJECTION
Status: CANCELLED | OUTPATIENT
Start: 2023-01-14

## 2022-12-22 RX ORDER — HEPARIN 100 UNIT/ML
500 SYRINGE INTRAVENOUS
Status: CANCELLED | OUTPATIENT
Start: 2023-01-14

## 2022-12-22 NOTE — TELEPHONE ENCOUNTER
Panic lab value call: H/H today at 6.0/22.2%(last at 8.4/30.6%).  Pt has a hx of iron def anemia with prior iron infusions per Heme-Onc/Dr Gibson.  'Spoke with pt's sister, Brittaney, who states her sister,Ms Fu, is feeling fine-no CP/SOB and has no gross evidence of bleeding,so feel ER is not necessary at present. 'Will message Dr Gibson/Dr Hopper and Brittaney will call Dr Gibson's office in the AM.  Dr Gibson/Ward DORADO  Thanks, Dr Mathis

## 2022-12-22 NOTE — PROGRESS NOTES
PATIENT: Nickie Segura  MRN: [id number]  DATE: 12/23/2022    Diagnosis:   1. Other iron deficiency anemia    2. Other neutropenia    3. Symptomatic anemia      Chief Complaint: Anemia    Oncologic History:      Oncologic History     Oncologic Treatment     Pathology       Subjective:    History of Present Illness: Ms. Segura is a 87 y.o. female who presents for evaluation and management of iron deficiency anemia and neutropenia. She had previously seen Dr. Denton.    - she received iron sucrose x 2 doses in February/March 2022.  - she was hospitalized in mid-May 2022 for symptomatic anemia and elevated liver enzymes. She responded to blood transfusion. She received two doses of iron sucrose during the hospitalization.  - she received iron sucrose x 4 doses in May/June 2022.    Interval history:  - she presents for a follow-up appointment for her iron deficiency anemia and neutropenia  - she got labs with home health on 12/20/22 that revealed a hemoglobin of 6 g/dL.  - today, she is doing okay. She endorses mild fatigue, weakness. She denies shortness of breath, chest pain, nausea, vomiting, diarrhea.      Past medical, surgical, family, and social histories have been reviewed and updated below.    Past Medical History:   Past Medical History:   Diagnosis Date    LORNA positive     Anxiety     Bilateral cataracts     Colitis     Colon polyp     Depression     Elevated IgE level     Hepatomegaly     Hiatal hernia     Hypothyroidism     IFG (impaired fasting glucose)     Iron deficiency anemia     Macular degeneration     Nicotine dependence     Osteoarthritis     Recurrent UTI     Renal cyst     Skin cancer     Tuberculosis of bladder     Urinary incontinence     Vitamin B12 deficiency     Wheezing        Past Surgical History:   Past Surgical History:   Procedure Laterality Date    CHOLECYSTECTOMY         Family History:   Family History   Problem Relation Age of Onset    Heart disease Mother     Diabetes Mother      Heart disease Father     Polycystic kidney disease Father     Atrial fibrillation Sister        Social History:  reports that she has quit smoking. Her smoking use included cigarettes and vaping with nicotine. She uses smokeless tobacco. She reports current alcohol use. She reports that she does not use drugs.    Allergies:  Review of patient's allergies indicates:   Allergen Reactions    Penicillins      rash       Medications:  Current Outpatient Medications   Medication Sig Dispense Refill    Al hyd-Mg tr-alg ac-sod bicarb (GAVISCON) 80-14.2 mg Chew Take 1 tablet by mouth once daily.      albuterol (PROVENTIL/VENTOLIN HFA) 90 mcg/actuation inhaler Inhale 2 puffs into the lungs every 6 (six) hours as needed. Rescue      albuterol (VENTOLIN HFA) 90 mcg/actuation inhaler Inhale 1-2 puffs into the lungs every 6 (six) hours as needed for Wheezing or Shortness of Breath. Rescue 18 g 0    aspirin (ECOTRIN) 81 MG EC tablet Take 81 mg by mouth once daily.      busPIRone (BUSPAR) 10 MG tablet TAKE 1 TABLET BY MOUTH TWICE DAILY 180 tablet 0    cetirizine (ZYRTEC) 10 MG tablet 1 tablet DAILY (route: oral)      citalopram (CELEXA) 20 MG tablet TAKE 1 AND 1/2 TABLETS BY MOUTH DAILY 135 tablet 3    erythromycin (ROMYCIN) ophthalmic ointment SMARTSI Inch(es) Left Eye Every Night      estradioL (ESTRACE) 0.01 % (0.1 mg/gram) vaginal cream Place 1 g vaginally 3 (three) times a week. 12 g 2    fluticasone propionate (FLONASE ALLERGY RELIEF NASL) by Each Nostril route as needed.      levothyroxine (SYNTHROID) 100 MCG tablet Take 1 tablet (100 mcg total) by mouth once daily. 90 tablet 2    LORazepam (ATIVAN) 0.5 MG tablet Take 1 tablet (0.5 mg total) by mouth daily as needed for Anxiety. 30 tablet 1    mag/aluminum/sod bicarb/alginc (GAVISCON ORAL) Take 1 tablet by mouth daily as needed.      MYRBETRIQ 50 mg Tb24 Take 1 tablet (50 mg total) by mouth once daily. 90 tablet 0    neomycin-polymyxin-dexamethasone (DEXACINE) 3.5  mg/g-10,000 unit/g-0.1 % Oint APPLY A SMALL AMOUNT IN BOTH EYES TWICE DAILY      rosuvastatin (CRESTOR) 20 MG tablet Take 1 tablet (20 mg total) by mouth once daily. 90 tablet 3    sodium chloride (OCEAN) 0.65 % nasal spray 1 spray DAILY (route: nasal)      TOVIAZ 8 mg Tb24 Take 1 tablet by mouth once daily. 90 tablet 1    vit C/E/zinc ox/aym/lut/zeax (ICAPS AREDS2 ORAL) Take by mouth Daily.       No current facility-administered medications for this visit.       Review of Systems   Constitutional:  Positive for fatigue.   HENT:  Negative for sore throat.    Eyes:  Positive for visual disturbance.   Respiratory:  Negative for cough and shortness of breath.    Cardiovascular:  Negative for chest pain.   Gastrointestinal:  Positive for constipation. Negative for abdominal pain, diarrhea, nausea and vomiting.   Genitourinary:  Negative for dysuria.   Musculoskeletal:  Negative for back pain.   Skin:  Negative for rash.   Neurological:  Positive for weakness. Negative for headaches.   Hematological:  Negative for adenopathy.   Psychiatric/Behavioral:  The patient is not nervous/anxious.      ECOG Performance Status:   ECOG SCORE 1            Objective:      Vitals:   Vitals:    12/23/22 0813   BP: (!) 120/58   Pulse: 87   SpO2: 100%     BMI: There is no height or weight on file to calculate BMI.    Physical Exam  Vitals and nursing note reviewed.   Constitutional:       Appearance: She is well-developed.   HENT:      Head: Normocephalic and atraumatic.   Eyes:      Pupils: Pupils are equal, round, and reactive to light.   Cardiovascular:      Rate and Rhythm: Normal rate and regular rhythm.   Pulmonary:      Effort: Pulmonary effort is normal.      Breath sounds: Normal breath sounds.   Abdominal:      General: Bowel sounds are normal.      Palpations: Abdomen is soft.   Musculoskeletal:         General: Normal range of motion.      Cervical back: Normal range of motion and neck supple.   Skin:     General: Skin is  warm and dry.   Neurological:      Mental Status: She is alert and oriented to person, place, and time.   Psychiatric:         Behavior: Behavior normal.         Thought Content: Thought content normal.         Judgment: Judgment normal.       Laboratory Data:  Labs have been reviewed.    Lab Results   Component Value Date    WBC 2.20 (L) 12/21/2022    HGB 6.0 (L) 12/21/2022    HCT 22.2 (L) 12/21/2022    MCV 82 12/21/2022     12/21/2022           Imaging:    Assessment:       1. Other iron deficiency anemia    2. Other neutropenia    3. Symptomatic anemia           Plan:     1. Other iron deficiency anemia  - I have reviewed her chart  - she received iron sucrose x 2 doses in February/March 2022  - Labs have been reviewed. Iron studies on 5/16/22 revealed an elevated total iron binding capacity, consistent with recurrent iron deficiency anemia.  - she was hospitalized in mid-May 2022 for symptomatic anemia and elevated liver enzymes. She responded to blood transfusion. She received two doses of iron sucrose during the hospitalization.  - labs (5/23/22) revealed an elevated total iron binding capacity, suggesting continued iron deficiency  - she received iron sucrose x 4 doses in May/June 2022.  - clinically, she did not notice a significant improvement in her symptoms.  - she got labs with home health on 12/20/22 that revealed a hemoglobin of 6 g/dL.  - iron studies (12/21/22) revealed an elevated total iron binding capacity, suggesting continued iron deficiency  - proceed with blood transfusion today. She has signed a consent form  - proceed with iron sucrose x 4 doses.  - repeat labs in 2 months  - return to clinic in 4 months with repeat labs.    2. Other neutropenia  - unclear etiology  - we discussed a bone marrow biopsy during her hospitalization previously. After discussion, she declined to proceed with it.    - repeat labs in 2 months  - return to clinic in 4 months with repeat labs.    Louis Gibson,  M.D.  Hematology/Oncology  [facility] - [address], [address]  [place] [address]  Phone: [phone]  Fax: [phone]

## 2022-12-23 ENCOUNTER — OFFICE VISIT (OUTPATIENT)
Dept: HEMATOLOGY/ONCOLOGY | Facility: CLINIC | Age: 87
End: 2022-12-23
Payer: MEDICARE

## 2022-12-23 ENCOUNTER — INFUSION (OUTPATIENT)
Dept: INFUSION THERAPY | Facility: HOSPITAL | Age: 87
End: 2022-12-23
Attending: INTERNAL MEDICINE
Payer: MEDICARE

## 2022-12-23 VITALS — OXYGEN SATURATION: 100 % | HEART RATE: 87 BPM | DIASTOLIC BLOOD PRESSURE: 58 MMHG | SYSTOLIC BLOOD PRESSURE: 120 MMHG

## 2022-12-23 VITALS
RESPIRATION RATE: 18 BRPM | DIASTOLIC BLOOD PRESSURE: 65 MMHG | HEART RATE: 70 BPM | SYSTOLIC BLOOD PRESSURE: 149 MMHG | TEMPERATURE: 98 F | OXYGEN SATURATION: 98 %

## 2022-12-23 DIAGNOSIS — D64.9 SYMPTOMATIC ANEMIA: ICD-10-CM

## 2022-12-23 DIAGNOSIS — D50.8 OTHER IRON DEFICIENCY ANEMIA: ICD-10-CM

## 2022-12-23 DIAGNOSIS — D50.8 OTHER IRON DEFICIENCY ANEMIA: Primary | ICD-10-CM

## 2022-12-23 DIAGNOSIS — D70.8 OTHER NEUTROPENIA: ICD-10-CM

## 2022-12-23 LAB
BLD PROD TYP BPU: NORMAL
BLOOD UNIT EXPIRATION DATE: NORMAL
BLOOD UNIT TYPE CODE: 7300
BLOOD UNIT TYPE: NORMAL
CODING SYSTEM: NORMAL
DISPENSE STATUS: NORMAL
TRANS ERYTHROCYTES VOL PATIENT: NORMAL ML

## 2022-12-23 PROCEDURE — 1126F PR PAIN SEVERITY QUANTIFIED, NO PAIN PRESENT: ICD-10-PCS | Mod: HCNC,CPTII,S$GLB, | Performed by: INTERNAL MEDICINE

## 2022-12-23 PROCEDURE — 1101F PR PT FALLS ASSESS DOC 0-1 FALLS W/OUT INJ PAST YR: ICD-10-PCS | Mod: HCNC,CPTII,S$GLB, | Performed by: INTERNAL MEDICINE

## 2022-12-23 PROCEDURE — 99999 PR PBB SHADOW E&M-EST. PATIENT-LVL IV: ICD-10-PCS | Mod: PBBFAC,HCNC,, | Performed by: INTERNAL MEDICINE

## 2022-12-23 PROCEDURE — 1160F RVW MEDS BY RX/DR IN RCRD: CPT | Mod: HCNC,CPTII,S$GLB, | Performed by: INTERNAL MEDICINE

## 2022-12-23 PROCEDURE — 36430 TRANSFUSION BLD/BLD COMPNT: CPT | Mod: HCNC

## 2022-12-23 PROCEDURE — 99999 PR PBB SHADOW E&M-EST. PATIENT-LVL IV: CPT | Mod: PBBFAC,HCNC,, | Performed by: INTERNAL MEDICINE

## 2022-12-23 PROCEDURE — 1126F AMNT PAIN NOTED NONE PRSNT: CPT | Mod: HCNC,CPTII,S$GLB, | Performed by: INTERNAL MEDICINE

## 2022-12-23 PROCEDURE — 1157F PR ADVANCE CARE PLAN OR EQUIV PRESENT IN MEDICAL RECORD: ICD-10-PCS | Mod: HCNC,CPTII,S$GLB, | Performed by: INTERNAL MEDICINE

## 2022-12-23 PROCEDURE — 1160F PR REVIEW ALL MEDS BY PRESCRIBER/CLIN PHARMACIST DOCUMENTED: ICD-10-PCS | Mod: HCNC,CPTII,S$GLB, | Performed by: INTERNAL MEDICINE

## 2022-12-23 PROCEDURE — 1159F PR MEDICATION LIST DOCUMENTED IN MEDICAL RECORD: ICD-10-PCS | Mod: HCNC,CPTII,S$GLB, | Performed by: INTERNAL MEDICINE

## 2022-12-23 PROCEDURE — 25000003 PHARM REV CODE 250: Mod: HCNC | Performed by: INTERNAL MEDICINE

## 2022-12-23 PROCEDURE — 3288F FALL RISK ASSESSMENT DOCD: CPT | Mod: HCNC,CPTII,S$GLB, | Performed by: INTERNAL MEDICINE

## 2022-12-23 PROCEDURE — 99214 PR OFFICE/OUTPT VISIT, EST, LEVL IV, 30-39 MIN: ICD-10-PCS | Mod: HCNC,S$GLB,, | Performed by: INTERNAL MEDICINE

## 2022-12-23 PROCEDURE — 1157F ADVNC CARE PLAN IN RCRD: CPT | Mod: HCNC,CPTII,S$GLB, | Performed by: INTERNAL MEDICINE

## 2022-12-23 PROCEDURE — 1159F MED LIST DOCD IN RCRD: CPT | Mod: HCNC,CPTII,S$GLB, | Performed by: INTERNAL MEDICINE

## 2022-12-23 PROCEDURE — 99214 OFFICE O/P EST MOD 30 MIN: CPT | Mod: HCNC,S$GLB,, | Performed by: INTERNAL MEDICINE

## 2022-12-23 PROCEDURE — 3288F PR FALLS RISK ASSESSMENT DOCUMENTED: ICD-10-PCS | Mod: HCNC,CPTII,S$GLB, | Performed by: INTERNAL MEDICINE

## 2022-12-23 PROCEDURE — 1101F PT FALLS ASSESS-DOCD LE1/YR: CPT | Mod: HCNC,CPTII,S$GLB, | Performed by: INTERNAL MEDICINE

## 2022-12-23 RX ORDER — HYDROCODONE BITARTRATE AND ACETAMINOPHEN 500; 5 MG/1; MG/1
TABLET ORAL ONCE
Status: COMPLETED | OUTPATIENT
Start: 2022-12-23 | End: 2022-12-23

## 2022-12-23 RX ORDER — DIPHENHYDRAMINE HCL 25 MG
25 CAPSULE ORAL
Status: DISCONTINUED | OUTPATIENT
Start: 2022-12-23 | End: 2022-12-23 | Stop reason: HOSPADM

## 2022-12-23 RX ORDER — ACETAMINOPHEN 325 MG/1
650 TABLET ORAL
Status: DISCONTINUED | OUTPATIENT
Start: 2022-12-23 | End: 2022-12-23 | Stop reason: HOSPADM

## 2022-12-23 RX ADMIN — SODIUM CHLORIDE: 0.9 INJECTION, SOLUTION INTRAVENOUS at 09:12

## 2022-12-23 NOTE — NURSING
1 unit PRBC given. Blood consent in chart. Blood checked and verified with RN. Vital signs remain stable throughout. No complications noted.

## 2022-12-26 NOTE — PROGRESS NOTES
"[facility] Note    Chief Complaint    No chief complaint on file.      History of Present Illness      Nickie Segura is a 87 y.o. 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  Last visit - 4/19/22.     The patient location is: Home  The chief complaint leading to consultation is: "Fu chronic issues"    Visit type: audio only    Face to Face time with patient: 20 min  20 minutes of total time spent on the encounter, which includes face to face time and non-face to face time preparing to see the patient (eg, review of tests), Obtaining and/or reviewing separately obtained history, Documenting clinical information in the electronic or other health record, Independently interpreting results (not separately reported) and communicating results to the patient/family/caregiver, or Care coordination (not separately reported).         Each patient to whom he or she provides medical services by telemedicine is:  (1) informed of the relationship between the physician and patient and the respective role of any other health care provider with respect to management of the patient; and (2) notified that he or she may decline to receive medical services by telemedicine and may withdraw from such care at any time.    Notes:     H/o COVID 19 - 9/2022.    Transaminitis - liver function tests have improved. H/O  ordered an ultrasound and put in referral to liver specialist. Abd U/S 8/9/22 - Liver normal in size.  Incidental right renal cysts. Lgst - 2.5 cm.       Iron deficiency Anemia - mixed picture of Iron deficiency and anemia of chronic inflammation.   Fu by H/O.  FOBT - neg.  Her vitamin b12 is slightly low at 373.  Pt is on OTC Vitamin B12 1000 mcg daily. Her folate was normal.  Did not mary jo Oral iron and now on  IV iron (3/10/22 and 3/21/22). Hosp fu - she was sent to ED for critical " "labs.  Now fu by Dr. Gibson.  5/23/22 s/p 2 uPRBC's and iron IV.  Recent  Hb - 6.  So she got transfused on 12/23/22.  She does not feel symptomatic.      Leukopenia -2.2 up from prev  1.75 - F/u H/O.  Considering a Bone Marrow Bx only if needed.      Transaminitis - Trending down.  AST/ALT - 200/120 - 9/22/22.  Cont to monitor.      H/o CVA - ED 11/5/21 - facial droop, RT arm, and leg weakness - stroke vs TIA.  She left ER before an MRI brain was done.  So unsure if had a stroke vs TIA. She is on ASA, statin.  She has residual Rt facial droop, numbness in her RT fingers, and RLE weakness.       Recurrent UTI-Fu by Dr. Smith.  Stable on vaginal cream. Pt  on Myrbetriq and  is off Toviaz for OAB. Doing well.       OAB - Myrbetriq and is off Toviaz.      HTN- Controlled off meds.  Continue low-sodium diet.     HLD - Controlled on Crestor 20 mg QHS.  Her cholesterol is controlled -  TG 86 HDL 55 LDL 56 - 1/31/22. Repeat  FLP.        Hypothyroidism-  Controlled on levothyroxine 100 mcg daily. Repeat TFT's in Aug.     Anxiety-When on Wellbutrin she noticed her skin is sensitive to touch. Pt started noticing this after 2-3 day on the medication. Pt on Buspar 10 mg BID, and Celexa 30 mg/d.   Pt takes Ativan 0.5 mg rarely prn but feels it is the only thing that helps. She saw LCSWMikaela, 1/10/22 and1/18/22. She found this helpful.  We recently inc to 10 mg BID  As d/w Dr. Glass. She has started going to lunch again  But it makes her anxious because she can't see and there are 60 people in the lunch room. She gets anxious talking about hurricanes. "The Ativan is a lifesaver and she is getting by with this".      Depression- Pt has always struggled with depression and anxiety.  Not worse due to pandemic or recent stroke She is upset about her failing eye sight.   Pt on Celexa 30 mg daily and buspar 10 mg po BID.  "I'm just down and am not motivated to do anything like get dressed or pay my bills on time". Tried " "adding Trazodone 50 mg 1/2 QHS - no help and felt more anxious on it.  She did not tolerate Bupropion 75 mg BID.  Her vision and memory are worsening which concerns her. "I think it's better and so does Kary".      Wheezing-Pt has ProAir which she uses prn - infrequently - has not used in several mos.  PFTs-WNL-07/14/2015. Doing well with getting rid of tobacco.     Nicotine dependence - Pt smokes E-cigarette for the past 3 yrs without tobacco.  Pt aware of the dangers.  She quit cigarette 07/05/2016.  She still vapes but less frequently. "It's the only thing she gets enjoyment from".      Hiatal hernia-Stable on Gavescon daily - doing well.     Colon polyps - Pt is s/p partial colon resection '09  CRS Dr. Gibson.  GI -Dr. Giron.  Last C scope-'13.      Hyperkalemia - Resolved - 3.7. Rec low potassium diet. She is not taking any Potassium supplementation.   Do not feel pt would tolerate even low dose furosemide 10 mg Q M,W,F.       IFG - Ha1c -4.8 - normal in Sept. No evidence of diabetes.  Repeat Ha1c as she has been eating more sweets since the Holidays.       Hepatomegaly - 23 cm.  Fu by Dr. Giron.  Resolved.      OA - Preston knees - RT > Left. Rec glucosamine or turmeric. Rt Knee gives out.  She would like to renew PTx. She uses a seated walker.       Renal cysts - + fam h/o PCKD.  ? Angiomyolipoma on Left Kidney.  Fu by Dr. Smith.      Obesity - BMI - 28.54 - Rec low carb diet. She "tries". She eats TID meals most days and if misses a meal supplements with ensure.      Elev IGE - 742 ? Etiol.  No allergic Sx's.  ? Eos Esophagitis.      Aortic atherosclerosis - Tortuosity of the thoracic aorta with aortic atherosclerosis seen on CXR in Oct.  Prev fu by Dr. Nguyen Melgar. Has appt on 1/31/21 to est with new Dr. Kamran Melgar. She had a recent  is due for fu. Cont ASA and Crestor.   Echo - 2/21/22 - EF - 65%; Mild LAE, Mod AR, Mod Aortic stenosis     +LORNA - neg durham.     Debility - Pt has to use a walker " with assistance to ambulate.  She sis in Ptx and OT for working on leg strength.      Acc by friend, Kary and sister Brittaney     HCM - Flu - 10/4/21; Td - > 10 yrs ago; PCV 13 - 2/21/17;  PVX 23 - 1/8/15;  Shingrix - ?- pt defers; COVID -19 Vaccine -#1 - 2/5/21; #2 - 3/5/21; #3 - 11/7/21; # 4 ;  MGM - refuses;  DEXA - declined; C-scope - '13; GI - Dr. Giron; Retina Sp - Dr. Gardner; Derm - Dr. Joseph; Ophtho - Dr. Calderon/Dr. Melendrez; Cards - Dr. Manley; H/O - Dr. Denton      Patient Care Team:  Perla Hopper MD as PCP - General (Internal Medicine)  Markus Giron MD as Consulting Physician (Gastroenterology)  Deepali Joseph MD as Consulting Physician (Dermatology)  Opal Aburto II, MD (Ophthalmology)  Edith Vega MA as Care Coordinator  Carlene Youngblood LCSW as  (Licensed Clinical )     Health Maintenance:  Immunization History   Administered Date(s) Administered    COVID-19, MRNA, LN-S, PF (MODERNA FULL 0.5 ML DOSE) 02/05/2021, 03/05/2021, 11/07/2021    Influenza 12/03/2004, 09/30/2007, 10/06/2009, 10/01/2010, 11/28/2015, 11/15/2016    Influenza (FLUAD) - Quadrivalent - Adjuvanted - PF *Preferred* (65+) 09/23/2020    Influenza (FLUAD) - Trivalent - Adjuvanted - PF (65+) 09/04/2019    Influenza - Quadrivalent - High Dose - PF (65 years and older) 10/04/2021    Influenza - Trivalent (ADULT) 12/03/2004, 10/06/2009, 10/01/2010    Pneumococcal Conjugate - 13 Valent 02/21/2017    Pneumococcal Polysaccharide - 23 Valent 09/30/2007, 01/08/2015      Health Maintenance   Topic Date Due    TETANUS VACCINE  Never done    Lipid Panel  01/31/2027        Past Medical History:  Past Medical History:   Diagnosis Date    LORNA positive     Anxiety     Bilateral cataracts     Colitis     Colon polyp     Depression     Elevated IgE level     Hepatomegaly     Hiatal hernia     Hypothyroidism     IFG (impaired fasting glucose)     Iron deficiency anemia     Macular degeneration      Nicotine dependence     Osteoarthritis     Recurrent UTI     Renal cyst     Skin cancer     Tuberculosis of bladder     Urinary incontinence     Vitamin B12 deficiency     Wheezing        Past Surgical History:   has a past surgical history that includes Cholecystectomy.    Family History:  family history includes Atrial fibrillation in her sister; Diabetes in her mother; Heart disease in her father and mother; Polycystic kidney disease in her father.     Social History:  Social History     Tobacco Use    Smoking status: Former     Types: Cigarettes, Vaping with nicotine    Smokeless tobacco: Current   Substance Use Topics    Alcohol use: Yes    Drug use: Never       Review of Systems   Constitutional:  Negative for chills and fever.   Respiratory:  Positive for shortness of breath. Negative for chest tightness.         After she does her exercise - not worse than usual.    Cardiovascular:  Negative for chest pain.   Gastrointestinal:  Positive for constipation. Negative for abdominal pain, diarrhea, nausea and vomiting.   Neurological:  Negative for dizziness and headaches.   Psychiatric/Behavioral:  Positive for dysphoric mood. The patient is nervous/anxious.       Medications:    Current Outpatient Medications:     Al hyd-Mg tr-alg ac-sod bicarb (GAVISCON) 80-14.2 mg Chew, Take 1 tablet by mouth once daily., Disp: , Rfl:     albuterol (PROVENTIL/VENTOLIN HFA) 90 mcg/actuation inhaler, Inhale 2 puffs into the lungs every 6 (six) hours as needed. Rescue, Disp: , Rfl:     albuterol (VENTOLIN HFA) 90 mcg/actuation inhaler, Inhale 1-2 puffs into the lungs every 6 (six) hours as needed for Wheezing or Shortness of Breath. Rescue, Disp: 18 g, Rfl: 0    aspirin (ECOTRIN) 81 MG EC tablet, Take 81 mg by mouth once daily., Disp: , Rfl:     busPIRone (BUSPAR) 10 MG tablet, TAKE 1 TABLET BY MOUTH TWICE DAILY, Disp: 180 tablet, Rfl: 1    cetirizine (ZYRTEC) 10 MG tablet, 1 tablet DAILY (route: oral), Disp: , Rfl:      citalopram (CELEXA) 20 MG tablet, TAKE 1 AND 1/2 TABLETS BY MOUTH DAILY, Disp: 135 tablet, Rfl: 3    erythromycin (ROMYCIN) ophthalmic ointment, SMARTSI Inch(es) Left Eye Every Night, Disp: , Rfl:     estradioL (ESTRACE) 0.01 % (0.1 mg/gram) vaginal cream, Place 1 g vaginally 3 (three) times a week., Disp: 12 g, Rfl: 2    fluticasone propionate (FLONASE ALLERGY RELIEF NASL), by Each Nostril route as needed., Disp: , Rfl:     levothyroxine (SYNTHROID) 100 MCG tablet, TAKE 1 TABLET(100 MCG) BY MOUTH EVERY DAY, Disp: 90 tablet, Rfl: 1    LORazepam (ATIVAN) 0.5 MG tablet, Take 1 tablet (0.5 mg total) by mouth daily as needed for Anxiety., Disp: 30 tablet, Rfl: 1    mag/aluminum/sod bicarb/alginc (GAVISCON ORAL), Take 1 tablet by mouth daily as needed., Disp: , Rfl:     MYRBETRIQ 50 mg Tb24, Take 1 tablet (50 mg total) by mouth once daily., Disp: 90 tablet, Rfl: 0    neomycin-polymyxin-dexamethasone (DEXACINE) 3.5 mg/g-10,000 unit/g-0.1 % Oint, APPLY A SMALL AMOUNT IN BOTH EYES TWICE DAILY, Disp: , Rfl:     rosuvastatin (CRESTOR) 20 MG tablet, Take 1 tablet (20 mg total) by mouth once daily., Disp: 90 tablet, Rfl: 3    sodium chloride (OCEAN) 0.65 % nasal spray, 1 spray DAILY (route: nasal), Disp: , Rfl:     vit C/E/zinc ox/amy/lut/zeax (ICAPS AREDS2 ORAL), Take by mouth Daily., Disp: , Rfl:      Allergies:  Review of patient's allergies indicates:   Allergen Reactions    Penicillins      rash       Physical Exam                    Physical Exam     Laboratory:  CBC:  Recent Labs   Lab 22  1250 22  1124 22  1135   WBC 2.78 L 2.99 L 2.20 L   RBC 3.73 L 3.81 L 2.71 L   Hemoglobin 8.4 L 8.4 L 6.0 L   Hematocrit 29.0 L 30.6 L 22.2 L   Platelets 228 279 294   MCV 78 L 80 L 82   MCH 22.5 L 22.0 L 22.1 L   MCHC 29.0 L 27.5 L 27.0 L       CMP:  Recent Labs   Lab 22  1019 22  1250 22  1124   Glucose 106 80 98   Calcium 9.1 9.2 9.2   Albumin 3.3 L 3.5 3.2 L   Total Protein 6.8 6.7 6.5    Sodium 139 140 142   Potassium 4.3 4.4 3.7   CO2 25 25 26   Chloride 106 105 106   BUN 17 17 15   Creatinine 0.7 0.7 0.8   Alkaline Phosphatase 143 H 161 H 130    H 242 H 120 H    H 265 H 200 H   Total Bilirubin 0.3 0.3 0.3           URINALYSIS:  Recent Labs   Lab 05/19/22  1046 08/13/22  1349   Color, UA Colorless A Yellow   Specific Gravity, UA 1.005 1.010   pH, UA 7.0 8.0   Protein, UA Negative Negative   Bacteria Rare  --    Nitrite, UA Negative Negative   Leukocytes, UA Negative Negative   Urobilinogen, UA Negative Negative        LIPIDS:  Recent Labs   Lab 07/17/20  0909 09/23/20  0948 11/05/21  1825 01/31/22  1225 08/13/22  1250   TSH  --  1.704 2.074  --  0.928   HDL 72  --   --  55  --    Cholesterol 158  --   --  129  --    Triglycerides 91  --   --  86  --    LDL Cholesterol 67.8  --   --  56.8 L  --    HDL/Cholesterol Ratio 45.6  --   --  42.6  --    Non-HDL Cholesterol 86  --   --  74  --    Total Cholesterol/HDL Ratio 2.2  --   --  2.3  --        TSH:  Recent Labs   Lab 09/23/20  0948 11/05/21  1825 08/13/22  1250   TSH 1.704 2.074 0.928       A1C:  Recent Labs   Lab 09/23/20  0948 01/31/22  1225   Hemoglobin A1C 5.3 4.8       Other:   Recent Labs   Lab 05/18/22  0627 05/23/22  1651 12/21/22  1135   Vitamin B-12 1925 H  --   --    Ferritin  --    < > 698 H   Iron  --    < > 24 L  24 L   Transferrin  --    < > 332   TIBC  --    < > 491 H   Saturated Iron  --    < > 5 L    < > = values in this interval not displayed.       Assessment/Plan     Nickie Segura is a 87 y.o.female with:    Hypertension, unspecified type  -     Basic Metabolic Panel; Future; Expected date: 12/28/2022  - Stable.  Cont current.     Iron deficiency anemia, unspecified iron deficiency anemia type  -     CBC Auto Differential; Future; Expected date: 12/28/2022  - Fu by H/O/  Pt just received PRBC's last wk.  She gets IV iron prn.  Repeat CBC with upcoming labs next month.     Depression, unspecified depression  type  -     busPIRone (BUSPAR) 10 MG tablet; TAKE 1 TABLET BY MOUTH TWICE DAILY  Dispense: 180 tablet; Refill: 1  - Stable.  Cont current regimen.    Anxiety  -     busPIRone (BUSPAR) 10 MG tablet; TAKE 1 TABLET BY MOUTH TWICE DAILY  Dispense: 180 tablet; Refill: 1  - Stable.  Cont current regimen.    Debility  - Working with PT/OT.     Hyperlipidemia, unspecified hyperlipidemia type  -     Lipid Panel; Future; Expected date: 12/28/2022  - Controlled.  Cont current. Repeat FLP.     Hypothyroidism, unspecified type  - Controlled.  Cont current.     Transaminitis  -     Hepatic Function Panel; Future; Expected date: 12/28/2022  - Improving.  Repeat with next labs.     IFG (impaired fasting glucose)  -     Hemoglobin A1C; Future; Expected date: 12/28/2022    Leukopenia, unspecified type  - Stable.  Fu by H/O.     OAB (overactive bladder)  - Stable on Myrbetriq.        Chronic conditions status updated as per HPI.  Other than changes above, cont current medications and maintain follow up with specialists.  Follow up in about 4 months (around 4/28/2023) for fu chronic issues or sooner if needed.      Perla Hopper MD  [facility]

## 2022-12-27 NOTE — PROGRESS NOTES
Pt aware of these results as they were conveyed to her while I was out of town.  She is fu by H/O and was transfused with PRBC's due to drop in H/H - 6/22.2 with iron def and elevated Ferritin.

## 2022-12-28 ENCOUNTER — OFFICE VISIT (OUTPATIENT)
Dept: PRIMARY CARE CLINIC | Facility: CLINIC | Age: 87
End: 2022-12-28
Payer: MEDICARE

## 2022-12-28 DIAGNOSIS — D72.819 LEUKOPENIA, UNSPECIFIED TYPE: ICD-10-CM

## 2022-12-28 DIAGNOSIS — R74.01 TRANSAMINITIS: ICD-10-CM

## 2022-12-28 DIAGNOSIS — F41.9 ANXIETY: ICD-10-CM

## 2022-12-28 DIAGNOSIS — F33.9 DEPRESSION, RECURRENT: ICD-10-CM

## 2022-12-28 DIAGNOSIS — E78.5 HYPERLIPIDEMIA, UNSPECIFIED HYPERLIPIDEMIA TYPE: ICD-10-CM

## 2022-12-28 DIAGNOSIS — E03.9 HYPOTHYROIDISM, UNSPECIFIED TYPE: ICD-10-CM

## 2022-12-28 DIAGNOSIS — N32.81 OAB (OVERACTIVE BLADDER): ICD-10-CM

## 2022-12-28 DIAGNOSIS — I10 HYPERTENSION, UNSPECIFIED TYPE: Primary | ICD-10-CM

## 2022-12-28 DIAGNOSIS — R73.01 IFG (IMPAIRED FASTING GLUCOSE): ICD-10-CM

## 2022-12-28 DIAGNOSIS — R53.81 DEBILITY: ICD-10-CM

## 2022-12-28 DIAGNOSIS — D50.9 IRON DEFICIENCY ANEMIA, UNSPECIFIED IRON DEFICIENCY ANEMIA TYPE: ICD-10-CM

## 2022-12-28 PROCEDURE — 1157F PR ADVANCE CARE PLAN OR EQUIV PRESENT IN MEDICAL RECORD: ICD-10-PCS | Mod: HCNC,CPTII,95, | Performed by: INTERNAL MEDICINE

## 2022-12-28 PROCEDURE — 99442 PR PHYSICIAN TELEPHONE EVALUATION 11-20 MIN: ICD-10-PCS | Mod: HCNC,95,, | Performed by: INTERNAL MEDICINE

## 2022-12-28 PROCEDURE — 1160F PR REVIEW ALL MEDS BY PRESCRIBER/CLIN PHARMACIST DOCUMENTED: ICD-10-PCS | Mod: HCNC,CPTII,95, | Performed by: INTERNAL MEDICINE

## 2022-12-28 PROCEDURE — 1160F RVW MEDS BY RX/DR IN RCRD: CPT | Mod: HCNC,CPTII,95, | Performed by: INTERNAL MEDICINE

## 2022-12-28 PROCEDURE — 99442 PR PHYSICIAN TELEPHONE EVALUATION 11-20 MIN: CPT | Mod: HCNC,95,, | Performed by: INTERNAL MEDICINE

## 2022-12-28 PROCEDURE — 1157F ADVNC CARE PLAN IN RCRD: CPT | Mod: HCNC,CPTII,95, | Performed by: INTERNAL MEDICINE

## 2022-12-28 PROCEDURE — 1159F MED LIST DOCD IN RCRD: CPT | Mod: HCNC,CPTII,95, | Performed by: INTERNAL MEDICINE

## 2022-12-28 PROCEDURE — 1159F PR MEDICATION LIST DOCUMENTED IN MEDICAL RECORD: ICD-10-PCS | Mod: HCNC,CPTII,95, | Performed by: INTERNAL MEDICINE

## 2022-12-28 RX ORDER — BUSPIRONE HYDROCHLORIDE 10 MG/1
TABLET ORAL
Qty: 180 TABLET | Refills: 1 | Status: SHIPPED | OUTPATIENT
Start: 2022-12-28 | End: 2023-05-03 | Stop reason: SDUPTHER

## 2022-12-30 ENCOUNTER — TELEPHONE (OUTPATIENT)
Dept: INFUSION THERAPY | Facility: HOSPITAL | Age: 87
End: 2022-12-30
Payer: MEDICARE

## 2022-12-30 ENCOUNTER — TELEPHONE (OUTPATIENT)
Dept: PRIMARY CARE CLINIC | Facility: CLINIC | Age: 87
End: 2022-12-30
Payer: MEDICARE

## 2022-12-30 NOTE — TELEPHONE ENCOUNTER
I was informed by Mariana with HH that patients nursing services were d/c on 12/21/22. Informed pts sister and scheduled a lab appt for the Chiqui liriano.

## 2022-12-30 NOTE — TELEPHONE ENCOUNTER
Confirmed infusion appointments with the patient's sister Brittaney  1/6 at 1130a  1/13 at 11a  1/20 at 11a  1/27 at 11a

## 2022-12-30 NOTE — TELEPHONE ENCOUNTER
----- Message from Ele Glover sent at 12/30/2022  1:38 PM CST -----  Contact: [phone]  Pt's sister is requesting blood work orders be sent to  so they can get it done on next visit, pl advise

## 2023-01-04 ENCOUNTER — LAB VISIT (OUTPATIENT)
Dept: LAB | Facility: HOSPITAL | Age: 88
End: 2023-01-04
Attending: INTERNAL MEDICINE
Payer: MEDICARE

## 2023-01-04 DIAGNOSIS — I10 HYPERTENSION, UNSPECIFIED TYPE: ICD-10-CM

## 2023-01-04 DIAGNOSIS — R74.01 TRANSAMINITIS: ICD-10-CM

## 2023-01-04 DIAGNOSIS — E78.5 HYPERLIPIDEMIA, UNSPECIFIED HYPERLIPIDEMIA TYPE: ICD-10-CM

## 2023-01-04 DIAGNOSIS — D50.9 IRON DEFICIENCY ANEMIA, UNSPECIFIED IRON DEFICIENCY ANEMIA TYPE: ICD-10-CM

## 2023-01-04 DIAGNOSIS — R73.01 IFG (IMPAIRED FASTING GLUCOSE): ICD-10-CM

## 2023-01-04 LAB
ALBUMIN SERPL BCP-MCNC: 3.1 G/DL (ref 3.5–5.2)
ALP SERPL-CCNC: 118 U/L (ref 55–135)
ALT SERPL W/O P-5'-P-CCNC: 235 U/L (ref 10–44)
ANION GAP SERPL CALC-SCNC: 8 MMOL/L (ref 8–16)
AST SERPL-CCNC: 368 U/L (ref 10–40)
BASOPHILS # BLD AUTO: 0.04 K/UL (ref 0–0.2)
BASOPHILS NFR BLD: 1.4 % (ref 0–1.9)
BILIRUB DIRECT SERPL-MCNC: 0.2 MG/DL (ref 0.1–0.3)
BILIRUB SERPL-MCNC: 0.4 MG/DL (ref 0.1–1)
BUN SERPL-MCNC: 14 MG/DL (ref 8–23)
CALCIUM SERPL-MCNC: 9.3 MG/DL (ref 8.7–10.5)
CHLORIDE SERPL-SCNC: 107 MMOL/L (ref 95–110)
CHOLEST SERPL-MCNC: 96 MG/DL (ref 120–199)
CHOLEST/HDLC SERPL: 2.1 {RATIO} (ref 2–5)
CO2 SERPL-SCNC: 24 MMOL/L (ref 23–29)
CREAT SERPL-MCNC: 0.8 MG/DL (ref 0.5–1.4)
DIFFERENTIAL METHOD: ABNORMAL
EOSINOPHIL # BLD AUTO: 0.1 K/UL (ref 0–0.5)
EOSINOPHIL NFR BLD: 3.8 % (ref 0–8)
ERYTHROCYTE [DISTWIDTH] IN BLOOD BY AUTOMATED COUNT: 20.5 % (ref 11.5–14.5)
EST. GFR  (NO RACE VARIABLE): >60 ML/MIN/1.73 M^2
ESTIMATED AVG GLUCOSE: 91 MG/DL (ref 68–131)
GLUCOSE SERPL-MCNC: 87 MG/DL (ref 70–110)
HBA1C MFR BLD: 4.8 % (ref 4–5.6)
HCT VFR BLD AUTO: 30.7 % (ref 37–48.5)
HDLC SERPL-MCNC: 45 MG/DL (ref 40–75)
HDLC SERPL: 46.9 % (ref 20–50)
HGB BLD-MCNC: 8.7 G/DL (ref 12–16)
IMM GRANULOCYTES # BLD AUTO: 0.01 K/UL (ref 0–0.04)
IMM GRANULOCYTES NFR BLD AUTO: 0.3 % (ref 0–0.5)
LDLC SERPL CALC-MCNC: 39.2 MG/DL (ref 63–159)
LYMPHOCYTES # BLD AUTO: 0.8 K/UL (ref 1–4.8)
LYMPHOCYTES NFR BLD: 27.6 % (ref 18–48)
MCH RBC QN AUTO: 22.5 PG (ref 27–31)
MCHC RBC AUTO-ENTMCNC: 28.3 G/DL (ref 32–36)
MCV RBC AUTO: 79 FL (ref 82–98)
MONOCYTES # BLD AUTO: 0.4 K/UL (ref 0.3–1)
MONOCYTES NFR BLD: 12.4 % (ref 4–15)
NEUTROPHILS # BLD AUTO: 1.6 K/UL (ref 1.8–7.7)
NEUTROPHILS NFR BLD: 54.5 % (ref 38–73)
NONHDLC SERPL-MCNC: 51 MG/DL
NRBC BLD-RTO: 0 /100 WBC
PLATELET # BLD AUTO: 229 K/UL (ref 150–450)
PMV BLD AUTO: 9.1 FL (ref 9.2–12.9)
POTASSIUM SERPL-SCNC: 4.5 MMOL/L (ref 3.5–5.1)
PROT SERPL-MCNC: 6.8 G/DL (ref 6–8.4)
RBC # BLD AUTO: 3.87 M/UL (ref 4–5.4)
SODIUM SERPL-SCNC: 139 MMOL/L (ref 136–145)
TRIGL SERPL-MCNC: 59 MG/DL (ref 30–150)
WBC # BLD AUTO: 2.9 K/UL (ref 3.9–12.7)

## 2023-01-04 PROCEDURE — 85025 COMPLETE CBC W/AUTO DIFF WBC: CPT | Mod: HCNC | Performed by: INTERNAL MEDICINE

## 2023-01-04 PROCEDURE — 80076 HEPATIC FUNCTION PANEL: CPT | Mod: HCNC | Performed by: INTERNAL MEDICINE

## 2023-01-04 PROCEDURE — 83036 HEMOGLOBIN GLYCOSYLATED A1C: CPT | Mod: HCNC | Performed by: INTERNAL MEDICINE

## 2023-01-04 PROCEDURE — 80048 BASIC METABOLIC PNL TOTAL CA: CPT | Mod: HCNC | Performed by: INTERNAL MEDICINE

## 2023-01-04 PROCEDURE — 36415 COLL VENOUS BLD VENIPUNCTURE: CPT | Mod: HCNC | Performed by: INTERNAL MEDICINE

## 2023-01-04 PROCEDURE — 80061 LIPID PANEL: CPT | Mod: HCNC | Performed by: INTERNAL MEDICINE

## 2023-01-05 NOTE — PROGRESS NOTES
I sent pt a my chart message -  I reviewed your labs.   Your Cholesterol looked good.  Your kidney function looked good.  Your  liver functions remain elevated. I await your evaluation with the liver specialist. Your Ha1c was normal at 4.8. You are still anemic but this has improved to your baseline. Your White blood cell count remains low but stable.  No further recommendations at this time.    Dr. GARG

## 2023-01-06 ENCOUNTER — INFUSION (OUTPATIENT)
Dept: INFUSION THERAPY | Facility: HOSPITAL | Age: 88
End: 2023-01-06
Attending: INTERNAL MEDICINE
Payer: MEDICARE

## 2023-01-06 VITALS
OXYGEN SATURATION: 100 % | RESPIRATION RATE: 18 BRPM | DIASTOLIC BLOOD PRESSURE: 59 MMHG | TEMPERATURE: 98 F | HEART RATE: 79 BPM | SYSTOLIC BLOOD PRESSURE: 128 MMHG

## 2023-01-06 DIAGNOSIS — D50.8 OTHER IRON DEFICIENCY ANEMIA: Primary | ICD-10-CM

## 2023-01-06 PROCEDURE — 96365 THER/PROPH/DIAG IV INF INIT: CPT | Mod: HCNC

## 2023-01-06 PROCEDURE — A4216 STERILE WATER/SALINE, 10 ML: HCPCS | Mod: HCNC | Performed by: INTERNAL MEDICINE

## 2023-01-06 PROCEDURE — 63600175 PHARM REV CODE 636 W HCPCS: Mod: HCNC | Performed by: INTERNAL MEDICINE

## 2023-01-06 PROCEDURE — 25000003 PHARM REV CODE 250: Mod: HCNC | Performed by: INTERNAL MEDICINE

## 2023-01-06 RX ORDER — SODIUM CHLORIDE 0.9 % (FLUSH) 0.9 %
10 SYRINGE (ML) INJECTION
Status: DISCONTINUED | OUTPATIENT
Start: 2023-01-06 | End: 2023-01-06 | Stop reason: HOSPADM

## 2023-01-06 RX ADMIN — SODIUM CHLORIDE: 9 INJECTION, SOLUTION INTRAVENOUS at 12:01

## 2023-01-06 RX ADMIN — Medication 10 ML: at 01:01

## 2023-01-06 RX ADMIN — IRON SUCROSE 200 MG: 20 INJECTION, SOLUTION INTRAVENOUS at 12:01

## 2023-01-06 NOTE — NURSING
Pt tolerated venofer1/4 infusion well.  No adverse reaction noted.   IV flushed with NS and D/C per protocol.  Patient left clinic in no acute distress.

## 2023-01-12 ENCOUNTER — EXTERNAL HOME HEALTH (OUTPATIENT)
Dept: HOME HEALTH SERVICES | Facility: HOSPITAL | Age: 88
End: 2023-01-12
Payer: MEDICARE

## 2023-01-13 ENCOUNTER — INFUSION (OUTPATIENT)
Dept: INFUSION THERAPY | Facility: HOSPITAL | Age: 88
End: 2023-01-13
Attending: INTERNAL MEDICINE
Payer: MEDICARE

## 2023-01-13 VITALS
DIASTOLIC BLOOD PRESSURE: 58 MMHG | HEART RATE: 83 BPM | TEMPERATURE: 99 F | OXYGEN SATURATION: 98 % | SYSTOLIC BLOOD PRESSURE: 115 MMHG | RESPIRATION RATE: 17 BRPM

## 2023-01-13 DIAGNOSIS — D50.8 OTHER IRON DEFICIENCY ANEMIA: Primary | ICD-10-CM

## 2023-01-13 PROCEDURE — 25000003 PHARM REV CODE 250: Mod: HCNC | Performed by: INTERNAL MEDICINE

## 2023-01-13 PROCEDURE — 96365 THER/PROPH/DIAG IV INF INIT: CPT | Mod: HCNC

## 2023-01-13 PROCEDURE — A4216 STERILE WATER/SALINE, 10 ML: HCPCS | Mod: HCNC | Performed by: INTERNAL MEDICINE

## 2023-01-13 PROCEDURE — 63600175 PHARM REV CODE 636 W HCPCS: Mod: HCNC | Performed by: INTERNAL MEDICINE

## 2023-01-13 RX ORDER — SODIUM CHLORIDE 0.9 % (FLUSH) 0.9 %
10 SYRINGE (ML) INJECTION
Status: DISCONTINUED | OUTPATIENT
Start: 2023-01-13 | End: 2023-01-13 | Stop reason: HOSPADM

## 2023-01-13 RX ADMIN — SODIUM CHLORIDE: 9 INJECTION, SOLUTION INTRAVENOUS at 11:01

## 2023-01-13 RX ADMIN — Medication 10 ML: at 12:01

## 2023-01-13 RX ADMIN — IRON SUCROSE 200 MG: 20 INJECTION, SOLUTION INTRAVENOUS at 11:01

## 2023-01-20 ENCOUNTER — INFUSION (OUTPATIENT)
Dept: INFUSION THERAPY | Facility: HOSPITAL | Age: 88
End: 2023-01-20
Attending: INTERNAL MEDICINE
Payer: MEDICARE

## 2023-01-20 VITALS
HEART RATE: 77 BPM | SYSTOLIC BLOOD PRESSURE: 127 MMHG | RESPIRATION RATE: 18 BRPM | OXYGEN SATURATION: 99 % | TEMPERATURE: 98 F | DIASTOLIC BLOOD PRESSURE: 60 MMHG

## 2023-01-20 DIAGNOSIS — D50.8 OTHER IRON DEFICIENCY ANEMIA: Primary | ICD-10-CM

## 2023-01-20 PROCEDURE — A4216 STERILE WATER/SALINE, 10 ML: HCPCS | Mod: HCNC | Performed by: INTERNAL MEDICINE

## 2023-01-20 PROCEDURE — 63600175 PHARM REV CODE 636 W HCPCS: Mod: HCNC | Performed by: INTERNAL MEDICINE

## 2023-01-20 PROCEDURE — 25000003 PHARM REV CODE 250: Mod: HCNC | Performed by: INTERNAL MEDICINE

## 2023-01-20 PROCEDURE — 96365 THER/PROPH/DIAG IV INF INIT: CPT | Mod: HCNC

## 2023-01-20 RX ORDER — SODIUM CHLORIDE 0.9 % (FLUSH) 0.9 %
10 SYRINGE (ML) INJECTION
Status: DISCONTINUED | OUTPATIENT
Start: 2023-01-20 | End: 2023-01-20 | Stop reason: HOSPADM

## 2023-01-20 RX ADMIN — SODIUM CHLORIDE: 9 INJECTION, SOLUTION INTRAVENOUS at 11:01

## 2023-01-20 RX ADMIN — Medication 10 ML: at 12:01

## 2023-01-20 RX ADMIN — IRON SUCROSE 200 MG: 20 INJECTION, SOLUTION INTRAVENOUS at 11:01

## 2023-01-25 DIAGNOSIS — F41.9 ANXIETY: ICD-10-CM

## 2023-01-25 NOTE — TELEPHONE ENCOUNTER
No new care gaps identified.  [facility] Embedded Care Gaps. Reference number: [id number]. 1/25/2023   8:15:38 AM CST

## 2023-01-26 ENCOUNTER — TELEPHONE (OUTPATIENT)
Dept: INFUSION THERAPY | Facility: HOSPITAL | Age: 88
End: 2023-01-26
Payer: MEDICARE

## 2023-01-26 ENCOUNTER — TELEPHONE (OUTPATIENT)
Dept: PRIMARY CARE CLINIC | Facility: CLINIC | Age: 88
End: 2023-01-26
Payer: MEDICARE

## 2023-01-26 NOTE — TELEPHONE ENCOUNTER
Received phone call from Brittaney stating Ms Fu will not be able to make it to her last venofer infusion appointment tomorrow and needs to reschedule. Rescheduled pt to next Friday 2/3/23 at 11:30.

## 2023-01-26 NOTE — TELEPHONE ENCOUNTER
----- Message from Chapis Rosa sent at 1/26/2023  8:37 AM CST -----  Contact: Sister Verito   Pt's sis called in regards to speaking with Kylie or staff she has a concern please advise

## 2023-01-26 NOTE — TELEPHONE ENCOUNTER
"LOV 12/28/22  I radha Preston. She reports pt had "TIA" yesterday per sitter. Reports that pt was having trouble speaking(slurred speech),weakness to rt arm. I explained that pt should have went to the Ed with these sxs.They did not take her to Ed for eval b/c they figured they was not much they would do for her and it is very hard to get her around. Note: pt had stroke one year ago. States she is much better today. Kary reports today still some weakness to the rt hand but she is able to squeeze. Pt is coherent and appears back normal today. Kary states that she mainly just wants to inform  this happened yesterday but will cont to moniter pt and call 911 if anything changes and they feel she needs to seek care.    "

## 2023-01-27 ENCOUNTER — TELEPHONE (OUTPATIENT)
Dept: PRIMARY CARE CLINIC | Facility: CLINIC | Age: 88
End: 2023-01-27
Payer: MEDICARE

## 2023-01-27 RX ORDER — LORAZEPAM 0.5 MG/1
TABLET ORAL
Qty: 30 TABLET | Refills: 0 | Status: SHIPPED | OUTPATIENT
Start: 2023-01-27 | End: 2023-02-23

## 2023-01-27 NOTE — TELEPHONE ENCOUNTER
Caregiver informed that rx has been called in and also that I sw [facility] and they have refills on pts Buspar

## 2023-01-27 NOTE — TELEPHONE ENCOUNTER
----- Message from Felipa Paz sent at 1/27/2023 12:55 PM CST -----  Contact: pt [phone]  Requesting an RX refill or new RX.  Is this a refill or new RX: refill  RX name and strength (copy/paste from chart):  busPIRone (BUSPAR) 10 MG tablet  Is this a 30 day or 90 day RX: 90  Pharmacy name and phone # (copy/paste from chart):    [id number] - [place] - [address] [facility] AT [address]  [place] [address]  Phone: [phone] Fax: [phone]    Requesting an RX refill or new RX.  Is this a refill or new RX: refill  RX name and strength (copy/paste from chart):  LORazepam (ATIVAN) 0.5 MG tablet  Is this a 30 day or 90 day RX: 90  Pharmacy name and phone # (copy/paste from chart):    [id number] - [place], [place] - [address] [facility] AT [place] [address] [address] [address]  Phone: [phone] Fax: [phone]    The doctors have asked that we provide their patients with the following 2 reminders -- prescription refills can take up to 72 hours, and a friendly reminder that in the future you can use your [facility] account to request refills: yes

## 2023-01-27 NOTE — TELEPHONE ENCOUNTER
Kary was informed... states that pt is doing just fine now. She is aware that if anything changes with patients condition she needs to go to ED

## 2023-01-27 NOTE — TELEPHONE ENCOUNTER
----- Message from Aminata Blumantonio sent at 1/27/2023 12:48 PM CST -----  Contact: [phone] @ Kary Sister of patient  Requesting an RX refill or new RX.  Is this a refill or new RX:   RX name and strength LORazepam (ATIVAN) 0.5 MG tablet      Is this a 30 day or 90 day RX:   Pharmacy name and phone #[id number] - [place], [place] - [address] AT [facility] [address]  The doctors have asked that we provide their patients with the following 2 reminders -- prescription refills can take up to 72 hours, and a friendly reminder that in the future you can use your [facility] account to request refills:

## 2023-01-27 NOTE — TELEPHONE ENCOUNTER
If she still has some new weakness > 24 hrs then this may have been another stroke rather than a TIA. If she has any new/worsening Sx's then she does need to go to the ED.  Make sure she is taking all her meds    Dr. GARG

## 2023-02-03 ENCOUNTER — INFUSION (OUTPATIENT)
Dept: INFUSION THERAPY | Facility: HOSPITAL | Age: 88
End: 2023-02-03
Attending: INTERNAL MEDICINE
Payer: MEDICARE

## 2023-02-03 VITALS
DIASTOLIC BLOOD PRESSURE: 58 MMHG | RESPIRATION RATE: 18 BRPM | SYSTOLIC BLOOD PRESSURE: 118 MMHG | OXYGEN SATURATION: 98 % | TEMPERATURE: 99 F | HEART RATE: 77 BPM

## 2023-02-03 DIAGNOSIS — D50.8 OTHER IRON DEFICIENCY ANEMIA: Primary | ICD-10-CM

## 2023-02-03 PROCEDURE — 25000003 PHARM REV CODE 250: Mod: HCNC | Performed by: INTERNAL MEDICINE

## 2023-02-03 PROCEDURE — A4216 STERILE WATER/SALINE, 10 ML: HCPCS | Mod: HCNC | Performed by: INTERNAL MEDICINE

## 2023-02-03 PROCEDURE — 63600175 PHARM REV CODE 636 W HCPCS: Mod: HCNC | Performed by: INTERNAL MEDICINE

## 2023-02-03 PROCEDURE — 96365 THER/PROPH/DIAG IV INF INIT: CPT | Mod: HCNC

## 2023-02-03 RX ORDER — SODIUM CHLORIDE 0.9 % (FLUSH) 0.9 %
10 SYRINGE (ML) INJECTION
Status: DISCONTINUED | OUTPATIENT
Start: 2023-02-03 | End: 2023-02-03 | Stop reason: HOSPADM

## 2023-02-03 RX ADMIN — IRON SUCROSE 200 MG: 20 INJECTION, SOLUTION INTRAVENOUS at 12:02

## 2023-02-03 RX ADMIN — SODIUM CHLORIDE: 9 INJECTION, SOLUTION INTRAVENOUS at 11:02

## 2023-02-03 RX ADMIN — Medication 10 ML: at 01:02

## 2023-02-03 NOTE — NURSING
Pt tolerated venofer 4/4  infusion well.  No adverse reaction noted.   IV flushed with NS and D/C per protocol.  Patient left clinic in no acute distress.

## 2023-02-07 DIAGNOSIS — Z00.00 ENCOUNTER FOR MEDICARE ANNUAL WELLNESS EXAM: ICD-10-CM

## 2023-02-09 ENCOUNTER — LAB VISIT (OUTPATIENT)
Dept: LAB | Facility: HOSPITAL | Age: 88
End: 2023-02-09
Attending: INTERNAL MEDICINE
Payer: MEDICARE

## 2023-02-09 DIAGNOSIS — Z00.00 ENCOUNTER FOR MEDICARE ANNUAL WELLNESS EXAM: ICD-10-CM

## 2023-02-09 DIAGNOSIS — D50.8 OTHER IRON DEFICIENCY ANEMIA: ICD-10-CM

## 2023-02-09 DIAGNOSIS — D64.9 SYMPTOMATIC ANEMIA: ICD-10-CM

## 2023-02-09 LAB
ABO + RH BLD: NORMAL
BASOPHILS # BLD AUTO: 0.05 K/UL (ref 0–0.2)
BASOPHILS NFR BLD: 1 % (ref 0–1.9)
BLD GP AB SCN CELLS X3 SERPL QL: NORMAL
DIFFERENTIAL METHOD: ABNORMAL
EOSINOPHIL # BLD AUTO: 0.1 K/UL (ref 0–0.5)
EOSINOPHIL NFR BLD: 2.8 % (ref 0–8)
ERYTHROCYTE [DISTWIDTH] IN BLOOD BY AUTOMATED COUNT: 23.1 % (ref 11.5–14.5)
FERRITIN SERPL-MCNC: 616 NG/ML (ref 20–300)
HCT VFR BLD AUTO: 37.1 % (ref 37–48.5)
HGB BLD-MCNC: 10.9 G/DL (ref 12–16)
IMM GRANULOCYTES # BLD AUTO: 0.01 K/UL (ref 0–0.04)
IMM GRANULOCYTES NFR BLD AUTO: 0.2 % (ref 0–0.5)
IRON SERPL-MCNC: 43 UG/DL (ref 30–160)
LYMPHOCYTES # BLD AUTO: 1.1 K/UL (ref 1–4.8)
LYMPHOCYTES NFR BLD: 22.1 % (ref 18–48)
MCH RBC QN AUTO: 23.9 PG (ref 27–31)
MCHC RBC AUTO-ENTMCNC: 29.4 G/DL (ref 32–36)
MCV RBC AUTO: 81 FL (ref 82–98)
MONOCYTES # BLD AUTO: 0.5 K/UL (ref 0.3–1)
MONOCYTES NFR BLD: 9.3 % (ref 4–15)
NEUTROPHILS # BLD AUTO: 3.3 K/UL (ref 1.8–7.7)
NEUTROPHILS NFR BLD: 64.6 % (ref 38–73)
NRBC BLD-RTO: 0 /100 WBC
PLATELET # BLD AUTO: 198 K/UL (ref 150–450)
PMV BLD AUTO: 9.4 FL (ref 9.2–12.9)
RBC # BLD AUTO: 4.56 M/UL (ref 4–5.4)
SATURATED IRON: 11 % (ref 20–50)
TOTAL IRON BINDING CAPACITY: 408 UG/DL (ref 250–450)
TRANSFERRIN SERPL-MCNC: 276 MG/DL (ref 200–375)
WBC # BLD AUTO: 5.03 K/UL (ref 3.9–12.7)

## 2023-02-09 PROCEDURE — 82728 ASSAY OF FERRITIN: CPT | Mod: HCNC | Performed by: INTERNAL MEDICINE

## 2023-02-09 PROCEDURE — 85025 COMPLETE CBC W/AUTO DIFF WBC: CPT | Mod: HCNC | Performed by: INTERNAL MEDICINE

## 2023-02-09 PROCEDURE — 86900 BLOOD TYPING SEROLOGIC ABO: CPT | Mod: HCNC | Performed by: INTERNAL MEDICINE

## 2023-02-09 PROCEDURE — 36415 COLL VENOUS BLD VENIPUNCTURE: CPT | Mod: HCNC | Performed by: INTERNAL MEDICINE

## 2023-02-09 PROCEDURE — 84466 ASSAY OF TRANSFERRIN: CPT | Mod: HCNC | Performed by: INTERNAL MEDICINE

## 2023-02-10 ENCOUNTER — TELEPHONE (OUTPATIENT)
Dept: HEMATOLOGY/ONCOLOGY | Facility: CLINIC | Age: 88
End: 2023-02-10
Payer: MEDICARE

## 2023-02-10 NOTE — TELEPHONE ENCOUNTER
I called and reviewed her labs. Hemoglobin is 10.9 g/dL, and iron studies have improved.    Louis Gibson M.D.  Hematology/Oncology  [facility] - [address], [address]  [place] [address]  Phone: [phone]  Fax: [phone]

## 2023-02-17 DIAGNOSIS — N32.81 OAB (OVERACTIVE BLADDER): ICD-10-CM

## 2023-02-17 NOTE — TELEPHONE ENCOUNTER
----- Message from Sharita Cardoza sent at 2/17/2023 11:59 AM CST -----  Contact: [phone]  Requesting an RX refill or new RX.  Is this a refill or new RX: refill  RX name and strength (copy/paste from chart):  TOVIAZ 8 mg Tb24   Is this a 30 day or 90 day RX:   Pharmacy name and phone # (copy/paste from chart):    [id number] - [place] - [address] AT [facility] [address]  [facility] [place] [address]  Phone: [phone] Fax: [phone]  The doctors have asked that we provide their patients with the following 2 reminders -- prescription refills can take up to 72 hours, and a friendly reminder that in the future you can use your [facility] account to request refills: call back

## 2023-02-17 NOTE — TELEPHONE ENCOUNTER
No new care gaps identified.  [facility] Embedded Care Gaps. Reference number: [id number]. 2/17/2023   4:48:17 PM CST

## 2023-02-19 RX ORDER — FESOTERODINE FUMARATE 8 MG/1
1 TABLET, FILM COATED, EXTENDED RELEASE ORAL DAILY
Qty: 90 TABLET | Refills: 1 | OUTPATIENT
Start: 2023-02-19 | End: 2023-08-18

## 2023-02-22 ENCOUNTER — TELEPHONE (OUTPATIENT)
Dept: PRIMARY CARE CLINIC | Facility: CLINIC | Age: 88
End: 2023-02-22
Payer: MEDICARE

## 2023-02-22 DIAGNOSIS — N32.81 OAB (OVERACTIVE BLADDER): ICD-10-CM

## 2023-02-22 RX ORDER — FESOTERODINE FUMARATE 8 MG/1
1 TABLET, FILM COATED, EXTENDED RELEASE ORAL DAILY
Qty: 90 TABLET | Refills: 0 | Status: SHIPPED | OUTPATIENT
Start: 2023-02-22 | End: 2023-03-01

## 2023-02-22 NOTE — TELEPHONE ENCOUNTER
----- Message from Kelly Glover sent at 2/22/2023  9:31 AM CST -----  Contact: [phone] Charla Quiñonez [facility] is calling she states the pt is needing tovies filled for the pt she states the pharmacy is stating this has been closed and they are needing a new prescription

## 2023-02-22 NOTE — TELEPHONE ENCOUNTER
Ok, I took Myrbetriq off her list and sent Toviaz.  If she has any dizziness, Urinary retention on this she should let me know    Dr. GARG

## 2023-02-22 NOTE — TELEPHONE ENCOUNTER
Spoke with Charla, who advised that the pt has been taking the Toviaz 8 mg not the Myrbetriq 50 mg as listed in the medication list & in Dr. Hopper's last note. She advised that the Myrbetriq 50 mg was to expensive. Pt has also not been following up with Luis.    Requesting refill on Toviaz. Please advise.     LOV 12/28/22  Appt 04/19/23

## 2023-03-01 ENCOUNTER — TELEPHONE (OUTPATIENT)
Dept: PRIMARY CARE CLINIC | Facility: CLINIC | Age: 88
End: 2023-03-01
Payer: MEDICARE

## 2023-03-01 RX ORDER — MIRABEGRON 50 MG/1
1 TABLET, FILM COATED, EXTENDED RELEASE ORAL DAILY
Qty: 90 TABLET | Refills: 0 | Status: SHIPPED | OUTPATIENT
Start: 2023-03-01 | End: 2023-05-03 | Stop reason: SDUPTHER

## 2023-03-01 NOTE — TELEPHONE ENCOUNTER
They just told me the myrbetriq was too expensive which is why we Refilled the Toviaz.  I can send Myrbetriq but if still an issue financially she will need to fu again with Dr snow who started her on these meds for any other suggestions.    Dr. GARG

## 2023-03-01 NOTE — TELEPHONE ENCOUNTER
----- Message from Kinsey Narvaez sent at 3/1/2023 10:07 AM CST -----  Contact: [phone]  Pt's sister Brittaney called to speak to the nurse in regards to Dot's bladder medication. Please Advise

## 2023-03-01 NOTE — TELEPHONE ENCOUNTER
Pt's sister Brittaney called stating the Toviaz 8 mg tab is no longer covered med cost $1000.00, she is asking for a cheaper alternative med. Pt previously took the Myrbetriq 50 mg. Pt has be out of the med for 9 days.

## 2023-03-02 ENCOUNTER — TELEPHONE (OUTPATIENT)
Dept: PRIMARY CARE CLINIC | Facility: CLINIC | Age: 88
End: 2023-03-02
Payer: MEDICARE

## 2023-03-02 NOTE — TELEPHONE ENCOUNTER
----- Message from Claudia Guzman sent at 3/2/2023 12:03 PM CST -----  Contact: [phone] opt 2 Samuel with [facility] states an order request will be faxed today for PT, OT and speech therapy.

## 2023-03-07 ENCOUNTER — TELEPHONE (OUTPATIENT)
Dept: PRIMARY CARE CLINIC | Facility: CLINIC | Age: 88
End: 2023-03-07
Payer: MEDICARE

## 2023-03-07 DIAGNOSIS — Z86.73 HISTORY OF CVA (CEREBROVASCULAR ACCIDENT): ICD-10-CM

## 2023-03-07 DIAGNOSIS — R13.19 OTHER DYSPHAGIA: ICD-10-CM

## 2023-03-07 DIAGNOSIS — D50.9 IRON DEFICIENCY ANEMIA, UNSPECIFIED IRON DEFICIENCY ANEMIA TYPE: ICD-10-CM

## 2023-03-07 DIAGNOSIS — R53.81 DEBILITY: Primary | ICD-10-CM

## 2023-03-07 DIAGNOSIS — M17.0 OSTEOARTHRITIS OF BOTH KNEES, UNSPECIFIED OSTEOARTHRITIS TYPE: ICD-10-CM

## 2023-03-07 DIAGNOSIS — I10 PRIMARY HYPERTENSION: ICD-10-CM

## 2023-03-07 NOTE — TELEPHONE ENCOUNTER
----- Message from Charisse Pagn sent at 3/7/2023 10:10 AM CST -----  Contact: /juan josé/[phone]  Pt sister called in regard to getting an order for speech therapy and home health care. She would like to use [facility]. Call back    Please advise

## 2023-03-07 NOTE — TELEPHONE ENCOUNTER
Lov 12/28/22  We signed orders on 3/3/13 for pt to get pt/ot/st thru [facility](company that comes to her [facility] facility). Due to insurance they would like orders sent to [facility]

## 2023-03-22 ENCOUNTER — TELEPHONE (OUTPATIENT)
Dept: PRIMARY CARE CLINIC | Facility: CLINIC | Age: 88
End: 2023-03-22
Payer: MEDICARE

## 2023-03-22 NOTE — TELEPHONE ENCOUNTER
----- Message from Matthew Zepeda sent at 3/22/2023  2:30 PM CDT -----  Contact: Jhonathan GLASGOW / JONA/ Kylie [phone]  She wants the status the home health orders.    Thank you

## 2023-03-25 PROCEDURE — G0180 PR HOME HEALTH MD CERTIFICATION: ICD-10-PCS | Mod: ,,, | Performed by: INTERNAL MEDICINE

## 2023-03-25 PROCEDURE — G0180 MD CERTIFICATION HHA PATIENT: HCPCS | Mod: ,,, | Performed by: INTERNAL MEDICINE

## 2023-04-04 ENCOUNTER — TELEPHONE (OUTPATIENT)
Dept: PRIMARY CARE CLINIC | Facility: CLINIC | Age: 88
End: 2023-04-04
Payer: MEDICARE

## 2023-04-04 DIAGNOSIS — R82.90 ABNORMAL URINE ODOR: ICD-10-CM

## 2023-04-04 DIAGNOSIS — L75.0 URINARY BODY ODOR: ICD-10-CM

## 2023-04-04 DIAGNOSIS — R35.89 POLYURIA: Primary | ICD-10-CM

## 2023-04-04 NOTE — TELEPHONE ENCOUNTER
----- Message from Em Farnsworth sent at 4/4/2023  9:03 AM CDT -----  Contact: Charla/ Nurse/[phone]  What orders is pt asking for?: urinalysis     Is there a future appointment with the provider?: no    When?:    Comments?:Charla called, requested urinalysis order for a possible uti. Thank you

## 2023-04-04 NOTE — TELEPHONE ENCOUNTER
Pt's private nurse (Charla) advised that the pt has been experiencing polyuria the past couple of day. Urine has a mild odor but no other symptoms. She is requesting an order for & UA & culture to rule out UTI before the symptoms worsen. Please advise.

## 2023-04-05 ENCOUNTER — LAB VISIT (OUTPATIENT)
Dept: LAB | Facility: HOSPITAL | Age: 88
End: 2023-04-05
Attending: INTERNAL MEDICINE
Payer: MEDICARE

## 2023-04-05 DIAGNOSIS — L75.0 URINARY BODY ODOR: ICD-10-CM

## 2023-04-05 DIAGNOSIS — R35.89 POLYURIA: ICD-10-CM

## 2023-04-05 LAB
BILIRUB UR QL STRIP: NEGATIVE
CLARITY UR REFRACT.AUTO: CLEAR
COLOR UR AUTO: YELLOW
GLUCOSE UR QL STRIP: NEGATIVE
HGB UR QL STRIP: NEGATIVE
KETONES UR QL STRIP: NEGATIVE
LEUKOCYTE ESTERASE UR QL STRIP: NEGATIVE
MICROSCOPIC COMMENT: NORMAL
NITRITE UR QL STRIP: NEGATIVE
PH UR STRIP: 7 [PH] (ref 5–8)
PROT UR QL STRIP: NEGATIVE
RBC #/AREA URNS AUTO: 1 /HPF (ref 0–4)
SP GR UR STRIP: 1.01 (ref 1–1.03)
SQUAMOUS #/AREA URNS AUTO: 2 /HPF
URN SPEC COLLECT METH UR: NORMAL
WBC #/AREA URNS AUTO: 1 /HPF (ref 0–5)

## 2023-04-05 PROCEDURE — 81001 URINALYSIS AUTO W/SCOPE: CPT | Mod: HCNC | Performed by: INTERNAL MEDICINE

## 2023-04-06 NOTE — PROGRESS NOTES
I sent pt a my chart message -  I reviewed your urine analysis which was normal. It did not appear consistent with a Urinary tract infection. Are you still having urinary symptoms and if so what are they?  May need to set u back up with urology is so  Dr. GARG

## 2023-04-07 ENCOUNTER — PATIENT MESSAGE (OUTPATIENT)
Dept: PRIMARY CARE CLINIC | Facility: CLINIC | Age: 88
End: 2023-04-07
Payer: MEDICARE

## 2023-04-25 PROBLEM — I77.1 STRICTURE OF ARTERY: Status: ACTIVE | Noted: 2023-04-25

## 2023-04-25 NOTE — PROGRESS NOTES
PATIENT: Nickie Segura  MRN: [id number]  DATE: 4/26/2023    Diagnosis:   1. Other iron deficiency anemia    2. Other neutropenia    3. Stricture of artery    4. Depression, recurrent      Chief Complaint: Anemia    Oncologic History:      Oncologic History     Oncologic Treatment     Pathology       Subjective:    History of Present Illness: Ms. Segura is a 88 y.o. female who presents for evaluation and management of iron deficiency anemia and neutropenia. She had previously seen Dr. Denton.    - she received iron sucrose x 2 doses in February/March 2022.  - she was hospitalized in mid-May 2022 for symptomatic anemia and elevated liver enzymes. She responded to blood transfusion. She received two doses of iron sucrose during the hospitalization.  - she received iron sucrose x 4 doses in May/June 2022.  - she got labs with home health on 12/20/22 that revealed a hemoglobin of 6 g/dL.    Interval history:  - she presents for a follow-up appointment for her iron deficiency anemia and neutropenia  - she received iron sucrose x 4 doses in December 2022 / January 2023.  - today, she endorses mild fatigue, weakness, hip pain. She denies shortness of breath, chest pain, nausea, vomiting, diarrhea.      Past medical, surgical, family, and social histories have been reviewed and updated below.    Past Medical History:   Past Medical History:   Diagnosis Date    LORNA positive     Anxiety     Bilateral cataracts     Colitis     Colon polyp     Depression     Elevated IgE level     Hepatomegaly     Hiatal hernia     Hypothyroidism     IFG (impaired fasting glucose)     Iron deficiency anemia     Macular degeneration     Nicotine dependence     Osteoarthritis     Recurrent UTI     Renal cyst     Skin cancer     Tuberculosis of bladder     Urinary incontinence     Vitamin B12 deficiency     Wheezing        Past Surgical History:   Past Surgical History:   Procedure Laterality Date    CHOLECYSTECTOMY         Family History:   Family  History   Problem Relation Age of Onset    Heart disease Mother     Diabetes Mother     Heart disease Father     Polycystic kidney disease Father     Atrial fibrillation Sister        Social History:  reports that she has quit smoking. Her smoking use included cigarettes and vaping with nicotine. She uses smokeless tobacco. She reports current alcohol use. She reports that she does not use drugs.    Allergies:  Review of patient's allergies indicates:   Allergen Reactions    Penicillins      rash       Medications:  Current Outpatient Medications   Medication Sig Dispense Refill    Al hyd-Mg tr-alg ac-sod bicarb (GAVISCON) 80-14.2 mg Chew Take 1 tablet by mouth once daily.      albuterol (PROVENTIL/VENTOLIN HFA) 90 mcg/actuation inhaler Inhale 2 puffs into the lungs every 6 (six) hours as needed. Rescue      albuterol (VENTOLIN HFA) 90 mcg/actuation inhaler Inhale 1-2 puffs into the lungs every 6 (six) hours as needed for Wheezing or Shortness of Breath. Rescue 18 g 0    aspirin (ECOTRIN) 81 MG EC tablet Take 81 mg by mouth once daily.      busPIRone (BUSPAR) 10 MG tablet TAKE 1 TABLET BY MOUTH TWICE DAILY 180 tablet 1    cetirizine (ZYRTEC) 10 MG tablet 1 tablet DAILY (route: oral)      citalopram (CELEXA) 20 MG tablet TAKE 1 AND 1/2 TABLETS BY MOUTH DAILY 135 tablet 2    erythromycin (ROMYCIN) ophthalmic ointment SMARTSI Inch(es) Left Eye Every Night      estradioL (ESTRACE) 0.01 % (0.1 mg/gram) vaginal cream Place 1 g vaginally 3 (three) times a week. 12 g 2    fluticasone propionate (FLONASE ALLERGY RELIEF NASL) by Each Nostril route as needed.      levothyroxine (SYNTHROID) 100 MCG tablet TAKE 1 TABLET(100 MCG) BY MOUTH EVERY DAY 90 tablet 1    LORazepam (ATIVAN) 0.5 MG tablet TAKE 1 TABLET(0.5 MG) BY MOUTH DAILY AS NEEDED FOR ANXIETY 30 tablet 0    mag/aluminum/sod bicarb/alginc (GAVISCON ORAL) Take 1 tablet by mouth daily as needed.      mirabegron (MYRBETRIQ) 50 mg Tb24 Take 1 tablet (50 mg total) by  mouth once daily. 90 tablet 0    neomycin-polymyxin-dexamethasone (DEXACINE) 3.5 mg/g-10,000 unit/g-0.1 % Oint APPLY A SMALL AMOUNT IN BOTH EYES TWICE DAILY      rosuvastatin (CRESTOR) 20 MG tablet TAKE 1 TABLET(20 MG) BY MOUTH EVERY DAY 90 tablet 3    sodium chloride (OCEAN) 0.65 % nasal spray 1 spray DAILY (route: nasal)      vit C/E/zinc ox/amy/lut/zeax (ICAPS AREDS2 ORAL) Take by mouth Daily.       No current facility-administered medications for this visit.       Review of Systems   Constitutional:  Positive for fatigue.   HENT:  Negative for sore throat.    Eyes:  Positive for visual disturbance.   Respiratory:  Negative for cough and shortness of breath.    Cardiovascular:  Negative for chest pain.   Gastrointestinal:  Negative for abdominal pain, diarrhea, nausea and vomiting.   Genitourinary:  Negative for dysuria.   Musculoskeletal:  Negative for back pain.   Skin:  Negative for rash.   Neurological:  Positive for weakness. Negative for headaches.   Hematological:  Negative for adenopathy.   Psychiatric/Behavioral:  The patient is not nervous/anxious.      ECOG Performance Status:   ECOG SCORE 1            Objective:      Vitals:   Vitals:    04/26/23 1110   BP: (!) 161/71   BP Location: Left arm   Patient Position: Sitting   BP Method: Medium (Automatic)   Pulse: 61   Resp: 18   SpO2: 99%     BMI: There is no height or weight on file to calculate BMI.    Physical Exam  Vitals and nursing note reviewed.   Constitutional:       Appearance: She is well-developed.   HENT:      Head: Normocephalic and atraumatic.   Eyes:      Pupils: Pupils are equal, round, and reactive to light.   Cardiovascular:      Rate and Rhythm: Normal rate and regular rhythm.   Pulmonary:      Effort: Pulmonary effort is normal.      Breath sounds: Normal breath sounds.   Abdominal:      General: Bowel sounds are normal.      Palpations: Abdomen is soft.   Musculoskeletal:         General: Normal range of motion.      Cervical back:  Normal range of motion and neck supple.   Skin:     General: Skin is warm and dry.   Neurological:      Mental Status: She is alert and oriented to person, place, and time.   Psychiatric:         Behavior: Behavior normal.         Thought Content: Thought content normal.         Judgment: Judgment normal.       Laboratory Data:  Labs have been reviewed.    Lab Results   Component Value Date    WBC 5.03 02/09/2023    HGB 10.9 (L) 02/09/2023    HCT 37.1 02/09/2023    MCV 81 (L) 02/09/2023     02/09/2023           Imaging:    Assessment:       1. Other iron deficiency anemia    2. Other neutropenia    3. Stricture of artery    4. Depression, recurrent           Plan:     1. Other iron deficiency anemia  - I have reviewed her chart  - she received iron sucrose x 2 doses in February/March 2022  - Labs have been reviewed. Iron studies on 5/16/22 revealed an elevated total iron binding capacity, consistent with recurrent iron deficiency anemia.  - she was hospitalized in mid-May 2022 for symptomatic anemia and elevated liver enzymes. She responded to blood transfusion. She received two doses of iron sucrose during the hospitalization.  - labs (5/23/22) revealed an elevated total iron binding capacity, suggesting continued iron deficiency  - she received iron sucrose x 4 doses in May/June 2022.  - clinically, she did not notice a significant improvement in her symptoms.  - she got labs with home health on 12/20/22 that revealed a hemoglobin of 6 g/dL.  - iron studies (12/21/22) revealed an elevated total iron binding capacity, suggesting continued iron deficiency  - she received iron sucrose x 4 doses in December 2022 / January 2023.  - Labs have been reviewed. Hemoglobin is 10.4 g/dL. Follow up iron studies.  - I think she will need more IV iron. We will arrange for iron sucrose given monthly.  - repeat labs in 2 and 4 months  - return to clinic in 6 months with repeat labs.    2. Other neutropenia  - unclear  etiology  - we discussed a bone marrow biopsy during her hospitalization previously. After discussion, she declined to proceed with it.    - repeat labs in 2 and 4 months  - return to clinic in 6 months with repeat labs.    Louis Gibson M.D.  Hematology/Oncology  [facility] - [address], [address]  [place] [address]  Phone: [phone]  Fax: [phone]

## 2023-04-26 ENCOUNTER — OFFICE VISIT (OUTPATIENT)
Dept: HEMATOLOGY/ONCOLOGY | Facility: CLINIC | Age: 88
End: 2023-04-26
Payer: MEDICARE

## 2023-04-26 ENCOUNTER — LAB VISIT (OUTPATIENT)
Dept: LAB | Facility: HOSPITAL | Age: 88
End: 2023-04-26
Attending: INTERNAL MEDICINE
Payer: MEDICARE

## 2023-04-26 VITALS
OXYGEN SATURATION: 99 % | RESPIRATION RATE: 18 BRPM | HEART RATE: 61 BPM | SYSTOLIC BLOOD PRESSURE: 161 MMHG | DIASTOLIC BLOOD PRESSURE: 71 MMHG

## 2023-04-26 DIAGNOSIS — F33.9 DEPRESSION, RECURRENT: ICD-10-CM

## 2023-04-26 DIAGNOSIS — D50.8 OTHER IRON DEFICIENCY ANEMIA: ICD-10-CM

## 2023-04-26 DIAGNOSIS — D50.8 OTHER IRON DEFICIENCY ANEMIA: Primary | ICD-10-CM

## 2023-04-26 DIAGNOSIS — I77.1 STRICTURE OF ARTERY: ICD-10-CM

## 2023-04-26 DIAGNOSIS — D64.9 SYMPTOMATIC ANEMIA: ICD-10-CM

## 2023-04-26 DIAGNOSIS — D70.8 OTHER NEUTROPENIA: ICD-10-CM

## 2023-04-26 LAB
ABO + RH BLD: ABNORMAL
BASOPHILS # BLD AUTO: 0.05 K/UL (ref 0–0.2)
BASOPHILS NFR BLD: 0.9 % (ref 0–1.9)
BLD GP AB SCN CELLS X3 SERPL QL: ABNORMAL
BLOOD GROUP ANTIBODIES SERPL: NORMAL
DAT IGG-SP REAG RBC-IMP: NORMAL
DIFFERENTIAL METHOD: ABNORMAL
EOSINOPHIL # BLD AUTO: 0.2 K/UL (ref 0–0.5)
EOSINOPHIL NFR BLD: 3.5 % (ref 0–8)
ERYTHROCYTE [DISTWIDTH] IN BLOOD BY AUTOMATED COUNT: 19.9 % (ref 11.5–14.5)
FERRITIN SERPL-MCNC: 138 NG/ML (ref 20–300)
HCT VFR BLD AUTO: 33.5 % (ref 37–48.5)
HGB BLD-MCNC: 10.4 G/DL (ref 12–16)
IMM GRANULOCYTES # BLD AUTO: 0.01 K/UL (ref 0–0.04)
IMM GRANULOCYTES NFR BLD AUTO: 0.2 % (ref 0–0.5)
IRON SERPL-MCNC: 37 UG/DL (ref 30–160)
LYMPHOCYTES # BLD AUTO: 1 K/UL (ref 1–4.8)
LYMPHOCYTES NFR BLD: 18.1 % (ref 18–48)
MCH RBC QN AUTO: 27.4 PG (ref 27–31)
MCHC RBC AUTO-ENTMCNC: 31 G/DL (ref 32–36)
MCV RBC AUTO: 88 FL (ref 82–98)
MONOCYTES # BLD AUTO: 0.5 K/UL (ref 0.3–1)
MONOCYTES NFR BLD: 8.7 % (ref 4–15)
NEUTROPHILS # BLD AUTO: 3.9 K/UL (ref 1.8–7.7)
NEUTROPHILS NFR BLD: 68.6 % (ref 38–73)
NRBC BLD-RTO: 0 /100 WBC
PLATELET # BLD AUTO: 198 K/UL (ref 150–450)
PMV BLD AUTO: 9.5 FL (ref 9.2–12.9)
RBC # BLD AUTO: 3.79 M/UL (ref 4–5.4)
SATURATED IRON: 9 % (ref 20–50)
SPECIMEN OUTDATE: ABNORMAL
TOTAL IRON BINDING CAPACITY: 414 UG/DL (ref 250–450)
TRANSFERRIN SERPL-MCNC: 280 MG/DL (ref 200–375)
WBC # BLD AUTO: 5.74 K/UL (ref 3.9–12.7)

## 2023-04-26 PROCEDURE — 99999 PR PBB SHADOW E&M-EST. PATIENT-LVL III: CPT | Mod: PBBFAC,HCNC,, | Performed by: INTERNAL MEDICINE

## 2023-04-26 PROCEDURE — 36415 COLL VENOUS BLD VENIPUNCTURE: CPT | Mod: HCNC | Performed by: INTERNAL MEDICINE

## 2023-04-26 PROCEDURE — 86870 RBC ANTIBODY IDENTIFICATION: CPT | Mod: HCNC | Performed by: INTERNAL MEDICINE

## 2023-04-26 PROCEDURE — 86900 BLOOD TYPING SEROLOGIC ABO: CPT | Mod: HCNC | Performed by: INTERNAL MEDICINE

## 2023-04-26 PROCEDURE — 86905 BLOOD TYPING RBC ANTIGENS: CPT | Mod: HCNC | Performed by: INTERNAL MEDICINE

## 2023-04-26 PROCEDURE — 99999 PR PBB SHADOW E&M-EST. PATIENT-LVL III: ICD-10-PCS | Mod: PBBFAC,HCNC,, | Performed by: INTERNAL MEDICINE

## 2023-04-26 PROCEDURE — 1160F RVW MEDS BY RX/DR IN RCRD: CPT | Mod: HCNC,CPTII,S$GLB, | Performed by: INTERNAL MEDICINE

## 2023-04-26 PROCEDURE — 1126F AMNT PAIN NOTED NONE PRSNT: CPT | Mod: HCNC,CPTII,S$GLB, | Performed by: INTERNAL MEDICINE

## 2023-04-26 PROCEDURE — 1126F PR PAIN SEVERITY QUANTIFIED, NO PAIN PRESENT: ICD-10-PCS | Mod: HCNC,CPTII,S$GLB, | Performed by: INTERNAL MEDICINE

## 2023-04-26 PROCEDURE — 1157F ADVNC CARE PLAN IN RCRD: CPT | Mod: HCNC,CPTII,S$GLB, | Performed by: INTERNAL MEDICINE

## 2023-04-26 PROCEDURE — 1157F PR ADVANCE CARE PLAN OR EQUIV PRESENT IN MEDICAL RECORD: ICD-10-PCS | Mod: HCNC,CPTII,S$GLB, | Performed by: INTERNAL MEDICINE

## 2023-04-26 PROCEDURE — 99214 PR OFFICE/OUTPT VISIT, EST, LEVL IV, 30-39 MIN: ICD-10-PCS | Mod: HCNC,S$GLB,, | Performed by: INTERNAL MEDICINE

## 2023-04-26 PROCEDURE — 1101F PR PT FALLS ASSESS DOC 0-1 FALLS W/OUT INJ PAST YR: ICD-10-PCS | Mod: HCNC,CPTII,S$GLB, | Performed by: INTERNAL MEDICINE

## 2023-04-26 PROCEDURE — 84466 ASSAY OF TRANSFERRIN: CPT | Mod: HCNC | Performed by: INTERNAL MEDICINE

## 2023-04-26 PROCEDURE — 1160F PR REVIEW ALL MEDS BY PRESCRIBER/CLIN PHARMACIST DOCUMENTED: ICD-10-PCS | Mod: HCNC,CPTII,S$GLB, | Performed by: INTERNAL MEDICINE

## 2023-04-26 PROCEDURE — 85025 COMPLETE CBC W/AUTO DIFF WBC: CPT | Mod: HCNC | Performed by: INTERNAL MEDICINE

## 2023-04-26 PROCEDURE — 1159F PR MEDICATION LIST DOCUMENTED IN MEDICAL RECORD: ICD-10-PCS | Mod: HCNC,CPTII,S$GLB, | Performed by: INTERNAL MEDICINE

## 2023-04-26 PROCEDURE — 3288F PR FALLS RISK ASSESSMENT DOCUMENTED: ICD-10-PCS | Mod: HCNC,CPTII,S$GLB, | Performed by: INTERNAL MEDICINE

## 2023-04-26 PROCEDURE — 1101F PT FALLS ASSESS-DOCD LE1/YR: CPT | Mod: HCNC,CPTII,S$GLB, | Performed by: INTERNAL MEDICINE

## 2023-04-26 PROCEDURE — 99214 OFFICE O/P EST MOD 30 MIN: CPT | Mod: HCNC,S$GLB,, | Performed by: INTERNAL MEDICINE

## 2023-04-26 PROCEDURE — 3288F FALL RISK ASSESSMENT DOCD: CPT | Mod: HCNC,CPTII,S$GLB, | Performed by: INTERNAL MEDICINE

## 2023-04-26 PROCEDURE — 86880 COOMBS TEST DIRECT: CPT | Mod: HCNC | Performed by: INTERNAL MEDICINE

## 2023-04-26 PROCEDURE — 82728 ASSAY OF FERRITIN: CPT | Mod: HCNC | Performed by: INTERNAL MEDICINE

## 2023-04-26 PROCEDURE — 1159F MED LIST DOCD IN RCRD: CPT | Mod: HCNC,CPTII,S$GLB, | Performed by: INTERNAL MEDICINE

## 2023-04-26 RX ORDER — SODIUM CHLORIDE 0.9 % (FLUSH) 0.9 %
10 SYRINGE (ML) INJECTION
Status: CANCELLED | OUTPATIENT
Start: 2024-01-19

## 2023-04-26 RX ORDER — HEPARIN 100 UNIT/ML
500 SYRINGE INTRAVENOUS
Status: CANCELLED | OUTPATIENT
Start: 2023-07-09

## 2023-04-26 RX ORDER — HEPARIN 100 UNIT/ML
500 SYRINGE INTRAVENOUS
Status: CANCELLED | OUTPATIENT
Start: 2023-11-17

## 2023-04-26 RX ORDER — HEPARIN 100 UNIT/ML
500 SYRINGE INTRAVENOUS
Status: CANCELLED | OUTPATIENT
Start: 2023-05-09

## 2023-04-26 RX ORDER — HEPARIN 100 UNIT/ML
500 SYRINGE INTRAVENOUS
Status: CANCELLED | OUTPATIENT
Start: 2023-12-22

## 2023-04-26 RX ORDER — HEPARIN 100 UNIT/ML
500 SYRINGE INTRAVENOUS
Status: CANCELLED | OUTPATIENT
Start: 2023-06-10

## 2023-04-26 RX ORDER — SODIUM CHLORIDE 0.9 % (FLUSH) 0.9 %
10 SYRINGE (ML) INJECTION
Status: CANCELLED | OUTPATIENT
Start: 2023-07-09

## 2023-04-26 RX ORDER — SODIUM CHLORIDE 0.9 % (FLUSH) 0.9 %
10 SYRINGE (ML) INJECTION
Status: CANCELLED | OUTPATIENT
Start: 2023-12-22

## 2023-04-26 RX ORDER — SODIUM CHLORIDE 0.9 % (FLUSH) 0.9 %
10 SYRINGE (ML) INJECTION
Status: CANCELLED | OUTPATIENT
Start: 2023-05-09

## 2023-04-26 RX ORDER — HEPARIN 100 UNIT/ML
500 SYRINGE INTRAVENOUS
Status: CANCELLED | OUTPATIENT
Start: 2023-08-05

## 2023-04-26 RX ORDER — SODIUM CHLORIDE 0.9 % (FLUSH) 0.9 %
10 SYRINGE (ML) INJECTION
Status: CANCELLED | OUTPATIENT
Start: 2023-11-17

## 2023-04-26 RX ORDER — SODIUM CHLORIDE 0.9 % (FLUSH) 0.9 %
10 SYRINGE (ML) INJECTION
Status: CANCELLED | OUTPATIENT
Start: 2023-09-09

## 2023-04-26 RX ORDER — HEPARIN 100 UNIT/ML
500 SYRINGE INTRAVENOUS
Status: CANCELLED | OUTPATIENT
Start: 2023-09-09

## 2023-04-26 RX ORDER — HEPARIN 100 UNIT/ML
500 SYRINGE INTRAVENOUS
Status: CANCELLED | OUTPATIENT
Start: 2024-01-19

## 2023-04-26 RX ORDER — SODIUM CHLORIDE 0.9 % (FLUSH) 0.9 %
10 SYRINGE (ML) INJECTION
Status: CANCELLED | OUTPATIENT
Start: 2023-06-10

## 2023-04-26 RX ORDER — SODIUM CHLORIDE 0.9 % (FLUSH) 0.9 %
10 SYRINGE (ML) INJECTION
Status: CANCELLED | OUTPATIENT
Start: 2023-08-05

## 2023-05-02 ENCOUNTER — DOCUMENT SCAN (OUTPATIENT)
Dept: HOME HEALTH SERVICES | Facility: HOSPITAL | Age: 88
End: 2023-05-02
Payer: MEDICARE

## 2023-05-02 ENCOUNTER — TELEPHONE (OUTPATIENT)
Dept: INFUSION THERAPY | Facility: HOSPITAL | Age: 88
End: 2023-05-02
Payer: MEDICARE

## 2023-05-02 NOTE — TELEPHONE ENCOUNTER
Confirmed upcoming monthly infusion appointments with the patient's sister.  6/9 at 10a  7/7 at 10a

## 2023-05-03 ENCOUNTER — DOCUMENT SCAN (OUTPATIENT)
Dept: HOME HEALTH SERVICES | Facility: HOSPITAL | Age: 88
End: 2023-05-03
Payer: MEDICARE

## 2023-05-03 ENCOUNTER — OFFICE VISIT (OUTPATIENT)
Dept: PRIMARY CARE CLINIC | Facility: CLINIC | Age: 88
End: 2023-05-03
Payer: MEDICARE

## 2023-05-03 DIAGNOSIS — R53.81 DEBILITY: Primary | ICD-10-CM

## 2023-05-03 DIAGNOSIS — D50.9 IRON DEFICIENCY ANEMIA, UNSPECIFIED IRON DEFICIENCY ANEMIA TYPE: ICD-10-CM

## 2023-05-03 DIAGNOSIS — I10 HYPERTENSION, UNSPECIFIED TYPE: ICD-10-CM

## 2023-05-03 DIAGNOSIS — F41.9 ANXIETY: ICD-10-CM

## 2023-05-03 DIAGNOSIS — E03.9 HYPOTHYROIDISM, UNSPECIFIED TYPE: ICD-10-CM

## 2023-05-03 DIAGNOSIS — Z86.73 HISTORY OF CVA (CEREBROVASCULAR ACCIDENT): ICD-10-CM

## 2023-05-03 PROCEDURE — 99214 PR OFFICE/OUTPT VISIT, EST, LEVL IV, 30-39 MIN: ICD-10-PCS | Mod: HCNC,95,, | Performed by: INTERNAL MEDICINE

## 2023-05-03 PROCEDURE — 99214 OFFICE O/P EST MOD 30 MIN: CPT | Mod: HCNC,95,, | Performed by: INTERNAL MEDICINE

## 2023-05-03 PROCEDURE — 1157F PR ADVANCE CARE PLAN OR EQUIV PRESENT IN MEDICAL RECORD: ICD-10-PCS | Mod: HCNC,CPTII,95, | Performed by: INTERNAL MEDICINE

## 2023-05-03 PROCEDURE — 1157F ADVNC CARE PLAN IN RCRD: CPT | Mod: HCNC,CPTII,95, | Performed by: INTERNAL MEDICINE

## 2023-05-03 RX ORDER — BUSPIRONE HYDROCHLORIDE 10 MG/1
TABLET ORAL
Qty: 180 TABLET | Refills: 1 | Status: SHIPPED | OUTPATIENT
Start: 2023-05-03 | End: 2024-01-03 | Stop reason: SDUPTHER

## 2023-05-03 RX ORDER — MIRABEGRON 50 MG/1
1 TABLET, FILM COATED, EXTENDED RELEASE ORAL DAILY
Qty: 90 TABLET | Refills: 1 | Status: SHIPPED | OUTPATIENT
Start: 2023-05-03 | End: 2023-11-17

## 2023-05-03 RX ORDER — LEVOTHYROXINE SODIUM 100 UG/1
100 TABLET ORAL DAILY
Qty: 90 TABLET | Refills: 1 | Status: SHIPPED | OUTPATIENT
Start: 2023-05-03 | End: 2023-12-29

## 2023-05-03 NOTE — PROGRESS NOTES
"[facility] Note    Chief Complaint    No chief complaint on file.      History of Present Illness      Nickie Seugra is a 88 y.o.  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  Last visit - 12/28/22    The patient location is: home  The chief complaint leading to consultation is: "RTA form"    Visit type: audiovisual    Face to Face time with patient: 17 min  17 minutes of total time spent on the encounter, which includes face to face time and non-face to face time preparing to see the patient (eg, review of tests), Obtaining and/or reviewing separately obtained history, Documenting clinical information in the electronic or other health record, Independently interpreting results (not separately reported) and communicating results to the patient/family/caregiver, or Care coordination (not separately reported).         Each patient to whom he or she provides medical services by telemedicine is:  (1) informed of the relationship between the physician and patient and the respective role of any other health care provider with respect to management of the patient; and (2) notified that he or she may decline to receive medical services by telemedicine and may withdraw from such care at any time.    Notes:       Debility - Needs me to fill out RTA form - Pt is wheel chair bound. She requires assistance. She is in PT/OT/Speech Tx with [facility] s/p CVA. She is able feed herself but can sometimes make a mess due to her visual impairment.        H/o COVID 19 - 9/2022.     Transaminitis - AST/ALT - 235/368. H/O  ordered an ultrasound and put in referral to liver specialist. Abd U/S 8/9/22 - Liver normal in size.  Incidental right renal cysts. Lgst - 2.5 cm.     Iron deficiency Anemia - mixed picture of Iron deficiency and anemia of chronic inflammation.   " "Fu by H/O.  FOBT - neg.  Her vitamin b12 is slightly low at 373.  Pt is on OTC Vitamin B12 1000 mcg daily. Her folate was normal.  Did not mary jo Oral iron and now on  IV iron (3/10/22, 3/21/22, 1/6/23, 1/13/23, 1/20/23, 2/3/23). Hosp fu - she was sent to ED for critical labs.  Now fu by Dr. Gibson.  5/23/22 s/p 2 uPRBC's and iron IV.  Recent  Hb - 6.  So she got transfused on 12/23/22.  She does not feel symptomatic. Has fu in Oct.  Hb improved. Pt on IV iron once monthly.      Leukopenia  Resolved. 5.74 up from prev  1.75 - F/u H/O.  She declined a Bone Marrow Bx. Has fu in Oct.      H/o CVA - ED 11/5/21 - facial droop, RT arm, and leg weakness - stroke vs TIA.  She left ER before an MRI brain was done.  So unsure if had a stroke vs TIA. She is on ASA, statin.  She has residual Rt facial droop, numbness in her RT fingers, and RLE weakness.       Recurrent UTI-Fu by Dr. Smith.  Stable on vaginal cream. Pt  on Myrbetriq and  is off Toviaz for OAB. Doing well.       OAB - Pt on Myrbetriq. Toviaz too expensive.      HTN- Controlled off meds.  Continue low-sodium diet.     HLD - Controlled on Crestor 20 mg QHS.  Her cholesterol is controlled - TC 96 TG 59 HDL 45 LDL 39 - 1/4/23.      Hypothyroidism-  Controlled on levothyroxine 100 mcg daily. Repeat TFT's.     Anxiety-When on Wellbutrin she noticed her skin is sensitive to touch. Pt started noticing this after 2-3 day on the medication. Pt on Buspar 10 mg BID, and Celexa 30 mg/d.   Pt takes Ativan 0.5 mg rarely prn but feels it is the only thing that helps. She saw DAGOBERTOWMikaela, 1/10/22 and1/18/22. She found this helpful.  We recently inc to 10 mg BID  As d/w Dr. Glass. She has started going to lunch again  But it makes her anxious because she can't see and there are 60 people in the lunch room. She gets anxious talking about hurricanes. "The Ativan is a lifesaver and she is getting by with this".      Depression- Pt has always struggled with depression and " "anxiety.  Not worse due to pandemic or recent stroke She is upset about her failing eye sight.   Pt on Celexa 30 mg daily and buspar 10 mg po BID.  "I'm just down and am not motivated to do anything like get dressed or pay my bills on time". Tried adding Trazodone 50 mg 1/2 QHS - no help and felt more anxious on it.  She did not tolerate Bupropion 75 mg BID.  Her vision and memory are worsening which concerns her. "I think it's better and so does Kary".      Wheezing-Pt has ProAir which she uses prn - infrequently - has not used in several mos.  PFTs-WNL-07/14/2015. Doing well with getting rid of tobacco.     Nicotine dependence - Pt smokes E-cigarette for the past 3 yrs without tobacco.  Pt aware of the dangers.  She quit cigarette 07/05/2016.  She still vapes but less frequently. "It's the only thing she gets enjoyment from".      Hiatal hernia-Stable on G[place]con daily - doing well.     Colon polyps - Pt is s/p partial colon resection '09  CRS Dr. Gibson.  GI -Dr. Giron.  Last C scope-'13.      Hyperkalemia - Resolved - 4.5. Rec low potassium diet. She is not taking any Potassium supplementation.   Do not feel pt would tolerate even low dose furosemide 10 mg Q M,W,F.       IFG - Ha1c -4.8 - normal in Jan.         Hepatomegaly - 23 cm.  Fu by Dr. Giron.  Resolved.      OA - Preston knees - RT > Left. Rec glucosamine or turmeric. Rt Knee gives out.  She uses a wheelchair.      Renal cysts - + fam h/o PCKD.  ? Angiomyolipoma on Left Kidney.  Fu by Dr. Smith.      Obesity - BMI - 28.54 - Rec low carb diet. She "tries". She eats TID meals most days and if misses a meal supplements with ensure.      Elev IGE - 742 ? Etiol.  No allergic Sx's.  ? Eos Esophagitis.      Aortic atherosclerosis - Tortuosity of the thoracic aorta with aortic atherosclerosis seen on CXR in Oct.  Prev fu by Dr. Nguyen Melgar. Has appt on 1/31/21 to est with new [facility], Dr. Andrew. She had a recent  is due for fu. Cont ASA and Crestor.   " Echo - 2/21/22 - EF - 65%; Mild LAE, Mod AR, Mod Aortic stenosis     +LORNA - neg [place].      Acc by friend, Kary and sister Brittaney     HCM - Flu - 10/4/21; Td - > 10 yrs ago; PCV 13 - 2/21/17;  PVX 23 - 1/8/15;  Shingrix - ?- pt defers; COVID -19 Vaccine -#1 - 2/5/21; #2 - 3/5/21; #3 - 11/7/21; # 4 ;  MGM - refuses;  DEXA - declined; C-scope - '13; GI - Dr. Giron; Retina Sp - Dr. Gardner; Derm - Dr. Joseph; Ophtho - Dr. Calderon/Dr. Melendrez; Cards - Dr. Manley; H/O - Dr. Denton      Patient Care Team:  Perla Hopper MD as PCP - General (Internal Medicine)  Markus Giron MD as Consulting Physician (Gastroenterology)  Deepali Joseph MD as Consulting Physician (Dermatology)  Opal Aburto II, MD (Ophthalmology)  Edith Vega MA as Care Coordinator  Carlene Youngblood LCSW as  (Licensed Clinical )     Health Maintenance:  Immunization History   Administered Date(s) Administered    COVID-19, MRNA, LN-S, PF (MODERNA FULL 0.5 ML DOSE) 02/05/2021, 03/05/2021, 11/07/2021    Influenza 12/03/2004, 09/30/2007, 10/06/2009, 10/01/2010, 11/28/2015, 11/15/2016    Influenza (FLUAD) - Quadrivalent - Adjuvanted - PF *Preferred* (65+) 09/23/2020    Influenza (FLUAD) - Trivalent - Adjuvanted - PF (65+) 09/04/2019    Influenza - Quadrivalent - High Dose - PF (65 years and older) 10/04/2021    Influenza - Trivalent (ADULT) 12/03/2004, 10/06/2009, 10/01/2010    Pneumococcal Conjugate - 13 Valent 02/21/2017    Pneumococcal Polysaccharide - 23 Valent 09/30/2007, 01/08/2015      Health Maintenance   Topic Date Due    TETANUS VACCINE  Never done    Lipid Panel  01/04/2028        Past Medical History:  Past Medical History:   Diagnosis Date    LORNA positive     Anxiety     Bilateral cataracts     Colitis     Colon polyp     Depression     Elevated IgE level     Hepatomegaly     Hiatal hernia     Hypothyroidism     IFG (impaired fasting glucose)     Iron deficiency anemia     Macular degeneration      Nicotine dependence     Osteoarthritis     Recurrent UTI     Renal cyst     Skin cancer     Tuberculosis of bladder     Urinary incontinence     Vitamin B12 deficiency     Wheezing        Past Surgical History:   has a past surgical history that includes Cholecystectomy.    Family History:  family history includes Atrial fibrillation in her sister; Diabetes in her mother; Heart disease in her father and mother; Polycystic kidney disease in her father.     Social History:  Social History     Tobacco Use    Smoking status: Former     Types: Cigarettes, Vaping with nicotine    Smokeless tobacco: Current   Substance Use Topics    Alcohol use: Yes    Drug use: Never       Review of Systems   Constitutional:  Positive for activity change.   HENT:  Positive for hearing loss. Negative for trouble swallowing.    Eyes:  Negative for discharge.   Respiratory:  Negative for chest tightness and wheezing.    Cardiovascular:  Negative for chest pain and palpitations.   Gastrointestinal:  Negative for constipation, diarrhea and vomiting.   Genitourinary:  Negative for difficulty urinating and hematuria.   Neurological:  Negative for headaches.   Psychiatric/Behavioral:  Negative for dysphoric mood.       Medications:    Current Outpatient Medications:     Al hyd-Mg tr-alg ac-sod bicarb (GAVISCON) 80-14.2 mg Chew, Take 1 tablet by mouth once daily., Disp: , Rfl:     albuterol (PROVENTIL/VENTOLIN HFA) 90 mcg/actuation inhaler, Inhale 2 puffs into the lungs every 6 (six) hours as needed. Rescue, Disp: , Rfl:     albuterol (VENTOLIN HFA) 90 mcg/actuation inhaler, Inhale 1-2 puffs into the lungs every 6 (six) hours as needed for Wheezing or Shortness of Breath. Rescue, Disp: 18 g, Rfl: 0    aspirin (ECOTRIN) 81 MG EC tablet, Take 81 mg by mouth once daily., Disp: , Rfl:     busPIRone (BUSPAR) 10 MG tablet, TAKE 1 TABLET BY MOUTH TWICE DAILY, Disp: 180 tablet, Rfl: 1    cetirizine (ZYRTEC) 10 MG tablet, 1 tablet DAILY (route: oral),  Disp: , Rfl:     citalopram (CELEXA) 20 MG tablet, TAKE 1 AND 1/2 TABLETS BY MOUTH DAILY, Disp: 135 tablet, Rfl: 2    erythromycin (ROMYCIN) ophthalmic ointment, SMARTSI Inch(es) Left Eye Every Night, Disp: , Rfl:     estradioL (ESTRACE) 0.01 % (0.1 mg/gram) vaginal cream, Place 1 g vaginally 3 (three) times a week., Disp: 12 g, Rfl: 2    fluticasone propionate (FLONASE ALLERGY RELIEF NASL), by Each Nostril route as needed., Disp: , Rfl:     levothyroxine (SYNTHROID) 100 MCG tablet, Take 1 tablet (100 mcg total) by mouth once daily., Disp: 90 tablet, Rfl: 1    LORazepam (ATIVAN) 0.5 MG tablet, TAKE 1 TABLET(0.5 MG) BY MOUTH DAILY AS NEEDED FOR ANXIETY, Disp: 30 tablet, Rfl: 0    mag/aluminum/sod bicarb/alginc (GAVISCON ORAL), Take 1 tablet by mouth daily as needed., Disp: , Rfl:     mirabegron (MYRBETRIQ) 50 mg Tb24, Take 1 tablet (50 mg total) by mouth once daily., Disp: 90 tablet, Rfl: 1    neomycin-polymyxin-dexamethasone (DEXACINE) 3.5 mg/g-10,000 unit/g-0.1 % Oint, APPLY A SMALL AMOUNT IN BOTH EYES TWICE DAILY, Disp: , Rfl:     rosuvastatin (CRESTOR) 20 MG tablet, TAKE 1 TABLET(20 MG) BY MOUTH EVERY DAY, Disp: 90 tablet, Rfl: 3    sodium chloride (OCEAN) 0.65 % nasal spray, 1 spray DAILY (route: nasal), Disp: , Rfl:     vit C/E/zinc ox/amy/lut/zeax (ICAPS AREDS2 ORAL), Take by mouth Daily., Disp: , Rfl:      Allergies:  Review of patient's allergies indicates:   Allergen Reactions    Penicillins      rash       Physical Exam                    Physical Exam  Constitutional:       General: She is not in acute distress.     Appearance: Normal appearance. She is not ill-appearing, toxic-appearing or diaphoretic.      Comments: Seated in chair.  ? Head cocked to the to RT   HENT:      Head: Normocephalic and atraumatic.      Mouth/Throat:      Comments: RT facial droop  Pulmonary:      Effort: No respiratory distress.   Neurological:      General: No focal deficit present.      Mental Status: She is alert and  oriented to person, place, and time.   Psychiatric:         Mood and Affect: Mood normal.         Behavior: Behavior normal.        Laboratory:  CBC:  Recent Labs   Lab 01/04/23  0954 02/09/23  0958 04/26/23  1040   WBC 2.90 L 5.03 5.74   RBC 3.87 L 4.56 3.79 L   Hemoglobin 8.7 L 10.9 L 10.4 L   Hematocrit 30.7 L 37.1 33.5 L   Platelets 229 198 198   MCV 79 L 81 L 88   MCH 22.5 L 23.9 L 27.4   MCHC 28.3 L 29.4 L 31.0 L       CMP:  Recent Labs   Lab 08/13/22  1250 09/22/22  1124 01/04/23  0954   Glucose 80 98 87   Calcium 9.2 9.2 9.3   Albumin 3.5 3.2 L 3.1 L   Total Protein 6.7 6.5 6.8   Sodium 140 142 139   Potassium 4.4 3.7 4.5   CO2 25 26 24   Chloride 105 106 107   BUN 17 15 14   Creatinine 0.7 0.8 0.8   Alkaline Phosphatase 161 H 130 118    H 120 H 235 H    H 200 H 368 H   Total Bilirubin 0.3 0.3 0.4     URINALYSIS:  Recent Labs   Lab 05/19/22  1046 08/13/22  1349 04/05/23  0938   Color, UA Colorless A Yellow Yellow   Specific Gravity, UA 1.005 1.010 1.010   pH, UA 7.0 8.0 7.0   Protein, UA Negative Negative Negative   Bacteria Rare  --   --    Nitrite, UA Negative Negative Negative   Leukocytes, UA Negative Negative Negative   Urobilinogen, UA Negative Negative  --         LIPIDS:  Recent Labs   Lab 07/17/20  0909 09/23/20  0948 11/05/21  1825 01/31/22  1225 08/13/22  1250 01/04/23  0954   TSH  --  1.704 2.074  --  0.928  --    HDL 72  --   --  55  --  45   Cholesterol 158  --   --  129  --  96 L   Triglycerides 91  --   --  86  --  59   LDL Cholesterol 67.8  --   --  56.8 L  --  39.2 L   HDL/Cholesterol Ratio 45.6  --   --  42.6  --  46.9   Non-HDL Cholesterol 86  --   --  74  --  51   Total Cholesterol/HDL Ratio 2.2  --   --  2.3  --  2.1       TSH:  Recent Labs   Lab 09/23/20  0948 11/05/21  1825 08/13/22  1250   TSH 1.704 2.074 0.928       A1C:  Recent Labs   Lab 09/23/20  0948 01/31/22  1225 01/04/23  0954   Hemoglobin A1C 5.3 4.8 4.8       Other:   Recent Labs   Lab 05/18/22  0627  05/23/22  1651 04/26/23  1040   Vitamin B-12 1925 H  --   --    Ferritin  --    < > 138   Iron  --    < > 37   Transferrin  --    < > 280   TIBC  --    < > 414   Saturated Iron  --    < > 9 L    < > = values in this interval not displayed.           Assessment/Plan     Nickie Segura is a 88 y.o.female with:    Debility  - Filled out RTA form.    Anxiety  -     busPIRone (BUSPAR) 10 MG tablet; TAKE 1 TABLET BY MOUTH TWICE DAILY  Dispense: 180 tablet; Refill: 1  - Stable.  Cont current regimen.    Hypothyroidism, unspecified type  -     levothyroxine (SYNTHROID) 100 MCG tablet; Take 1 tablet (100 mcg total) by mouth once daily.  Dispense: 90 tablet; Refill: 1  -     TSH; Future; Expected date: 05/03/2023  - Stable.  Cont current regimen. Repeat TSH with next labs.     Hypertension, unspecified type  -     Comprehensive Metabolic Panel; Future; Expected date: 05/03/2023  - Controlled.  Cont current.     Iron deficiency anemia, unspecified iron deficiency anemia type  - Stable. Pt on IV Iron per H/O.     History of CVA (cerebrovascular accident)  - Pt working with PT/OT/ST via [facility].     Other orders  -     mirabegron (MYRBETRIQ) 50 mg Tb24; Take 1 tablet (50 mg total) by mouth once daily.  Dispense: 90 tablet; Refill: 1         Chronic conditions status updated as per HPI.  Other than changes above, cont current medications and maintain follow up with specialists.   Follow up in about 30 days (around 6/2/2023) for as prev sched or sooner if needed.      Perla Hopper MD  [facility]

## 2023-05-04 ENCOUNTER — DOCUMENT SCAN (OUTPATIENT)
Dept: HOME HEALTH SERVICES | Facility: HOSPITAL | Age: 88
End: 2023-05-04
Payer: MEDICARE

## 2023-05-08 ENCOUNTER — EXTERNAL HOME HEALTH (OUTPATIENT)
Dept: HOME HEALTH SERVICES | Facility: HOSPITAL | Age: 88
End: 2023-05-08
Payer: MEDICARE

## 2023-05-09 ENCOUNTER — DOCUMENT SCAN (OUTPATIENT)
Dept: HOME HEALTH SERVICES | Facility: HOSPITAL | Age: 88
End: 2023-05-09
Payer: MEDICARE

## 2023-05-11 ENCOUNTER — TELEPHONE (OUTPATIENT)
Dept: FAMILY MEDICINE | Facility: CLINIC | Age: 88
End: 2023-05-11
Payer: MEDICARE

## 2023-05-15 DIAGNOSIS — F41.9 ANXIETY: ICD-10-CM

## 2023-05-15 RX ORDER — LORAZEPAM 0.5 MG/1
TABLET ORAL
Qty: 30 TABLET | Refills: 1 | Status: SHIPPED | OUTPATIENT
Start: 2023-05-15 | End: 2023-07-20

## 2023-05-15 NOTE — TELEPHONE ENCOUNTER
Care Due:                  Date            Visit Type   Department     Provider  --------------------------------------------------------------------------------                                ESTABLISHED                              PATIENT -    [facility] PRIMARY  Last Visit: 05-      [facility]      JOSE Hopper                               -                              PRIMARY      [facility]  Next Visit: 06-      CARE ([facility])   CARE           Perla Hopper                                                            Last  Test          Frequency    Reason                     Performed    Due Date  --------------------------------------------------------------------------------    TSH.........  12 months..  levothyroxine............  08- 08-    [facility] Embedded Care Due Messages. Reference number: [id number].   5/15/2023 9:02:57 AM CDT

## 2023-05-18 ENCOUNTER — DOCUMENT SCAN (OUTPATIENT)
Dept: HOME HEALTH SERVICES | Facility: HOSPITAL | Age: 88
End: 2023-05-18
Payer: MEDICARE

## 2023-05-24 ENCOUNTER — DOCUMENT SCAN (OUTPATIENT)
Dept: HOME HEALTH SERVICES | Facility: HOSPITAL | Age: 88
End: 2023-05-24
Payer: MEDICARE

## 2023-06-02 ENCOUNTER — OFFICE VISIT (OUTPATIENT)
Dept: PRIMARY CARE CLINIC | Facility: CLINIC | Age: 88
End: 2023-06-02
Payer: MEDICARE

## 2023-06-02 ENCOUNTER — LAB VISIT (OUTPATIENT)
Dept: LAB | Facility: HOSPITAL | Age: 88
End: 2023-06-02
Attending: INTERNAL MEDICINE
Payer: MEDICARE

## 2023-06-02 VITALS
DIASTOLIC BLOOD PRESSURE: 64 MMHG | TEMPERATURE: 98 F | OXYGEN SATURATION: 98 % | SYSTOLIC BLOOD PRESSURE: 110 MMHG | HEART RATE: 78 BPM

## 2023-06-02 DIAGNOSIS — J30.9 ALLERGIC RHINITIS, UNSPECIFIED SEASONALITY, UNSPECIFIED TRIGGER: ICD-10-CM

## 2023-06-02 DIAGNOSIS — R53.81 DEBILITY: ICD-10-CM

## 2023-06-02 DIAGNOSIS — I10 HYPERTENSION, UNSPECIFIED TYPE: ICD-10-CM

## 2023-06-02 DIAGNOSIS — F17.290 OTHER TOBACCO PRODUCT NICOTINE DEPENDENCE, UNCOMPLICATED: ICD-10-CM

## 2023-06-02 DIAGNOSIS — E03.9 HYPOTHYROIDISM, UNSPECIFIED TYPE: Primary | ICD-10-CM

## 2023-06-02 DIAGNOSIS — E03.9 HYPOTHYROIDISM, UNSPECIFIED TYPE: ICD-10-CM

## 2023-06-02 DIAGNOSIS — F41.9 ANXIETY: ICD-10-CM

## 2023-06-02 DIAGNOSIS — E78.5 HYPERLIPIDEMIA, UNSPECIFIED HYPERLIPIDEMIA TYPE: ICD-10-CM

## 2023-06-02 DIAGNOSIS — D50.9 IRON DEFICIENCY ANEMIA, UNSPECIFIED IRON DEFICIENCY ANEMIA TYPE: ICD-10-CM

## 2023-06-02 DIAGNOSIS — N32.81 OAB (OVERACTIVE BLADDER): ICD-10-CM

## 2023-06-02 DIAGNOSIS — Z86.73 HISTORY OF CVA (CEREBROVASCULAR ACCIDENT): ICD-10-CM

## 2023-06-02 DIAGNOSIS — R74.01 TRANSAMINITIS: ICD-10-CM

## 2023-06-02 LAB
ALBUMIN SERPL BCP-MCNC: 3.1 G/DL (ref 3.5–5.2)
ALP SERPL-CCNC: 109 U/L (ref 55–135)
ALT SERPL W/O P-5'-P-CCNC: 66 U/L (ref 10–44)
ANION GAP SERPL CALC-SCNC: 8 MMOL/L (ref 8–16)
AST SERPL-CCNC: 67 U/L (ref 10–40)
BILIRUB SERPL-MCNC: 0.2 MG/DL (ref 0.1–1)
BUN SERPL-MCNC: 21 MG/DL (ref 8–23)
CALCIUM SERPL-MCNC: 9.3 MG/DL (ref 8.7–10.5)
CHLORIDE SERPL-SCNC: 105 MMOL/L (ref 95–110)
CO2 SERPL-SCNC: 25 MMOL/L (ref 23–29)
CREAT SERPL-MCNC: 0.8 MG/DL (ref 0.5–1.4)
EST. GFR  (NO RACE VARIABLE): >60 ML/MIN/1.73 M^2
GLUCOSE SERPL-MCNC: 94 MG/DL (ref 70–110)
POTASSIUM SERPL-SCNC: 4.4 MMOL/L (ref 3.5–5.1)
PROT SERPL-MCNC: 6.6 G/DL (ref 6–8.4)
SODIUM SERPL-SCNC: 138 MMOL/L (ref 136–145)
TSH SERPL DL<=0.005 MIU/L-ACNC: 2.42 UIU/ML (ref 0.4–4)

## 2023-06-02 PROCEDURE — 99214 OFFICE O/P EST MOD 30 MIN: CPT | Mod: S$GLB,,, | Performed by: INTERNAL MEDICINE

## 2023-06-02 PROCEDURE — 1101F PT FALLS ASSESS-DOCD LE1/YR: CPT | Mod: CPTII,S$GLB,, | Performed by: INTERNAL MEDICINE

## 2023-06-02 PROCEDURE — 3288F FALL RISK ASSESSMENT DOCD: CPT | Mod: CPTII,S$GLB,, | Performed by: INTERNAL MEDICINE

## 2023-06-02 PROCEDURE — 80053 COMPREHEN METABOLIC PANEL: CPT | Performed by: INTERNAL MEDICINE

## 2023-06-02 PROCEDURE — 1159F PR MEDICATION LIST DOCUMENTED IN MEDICAL RECORD: ICD-10-PCS | Mod: CPTII,S$GLB,, | Performed by: INTERNAL MEDICINE

## 2023-06-02 PROCEDURE — 1157F PR ADVANCE CARE PLAN OR EQUIV PRESENT IN MEDICAL RECORD: ICD-10-PCS | Mod: CPTII,S$GLB,, | Performed by: INTERNAL MEDICINE

## 2023-06-02 PROCEDURE — 1101F PR PT FALLS ASSESS DOC 0-1 FALLS W/OUT INJ PAST YR: ICD-10-PCS | Mod: CPTII,S$GLB,, | Performed by: INTERNAL MEDICINE

## 2023-06-02 PROCEDURE — 99999 PR PBB SHADOW E&M-EST. PATIENT-LVL IV: CPT | Mod: PBBFAC,,, | Performed by: INTERNAL MEDICINE

## 2023-06-02 PROCEDURE — 1160F PR REVIEW ALL MEDS BY PRESCRIBER/CLIN PHARMACIST DOCUMENTED: ICD-10-PCS | Mod: CPTII,S$GLB,, | Performed by: INTERNAL MEDICINE

## 2023-06-02 PROCEDURE — 1157F ADVNC CARE PLAN IN RCRD: CPT | Mod: CPTII,S$GLB,, | Performed by: INTERNAL MEDICINE

## 2023-06-02 PROCEDURE — 99214 PR OFFICE/OUTPT VISIT, EST, LEVL IV, 30-39 MIN: ICD-10-PCS | Mod: S$GLB,,, | Performed by: INTERNAL MEDICINE

## 2023-06-02 PROCEDURE — 1126F AMNT PAIN NOTED NONE PRSNT: CPT | Mod: CPTII,S$GLB,, | Performed by: INTERNAL MEDICINE

## 2023-06-02 PROCEDURE — 36415 COLL VENOUS BLD VENIPUNCTURE: CPT | Mod: PN | Performed by: INTERNAL MEDICINE

## 2023-06-02 PROCEDURE — 84443 ASSAY THYROID STIM HORMONE: CPT | Performed by: INTERNAL MEDICINE

## 2023-06-02 PROCEDURE — 1126F PR PAIN SEVERITY QUANTIFIED, NO PAIN PRESENT: ICD-10-PCS | Mod: CPTII,S$GLB,, | Performed by: INTERNAL MEDICINE

## 2023-06-02 PROCEDURE — 1159F MED LIST DOCD IN RCRD: CPT | Mod: CPTII,S$GLB,, | Performed by: INTERNAL MEDICINE

## 2023-06-02 PROCEDURE — 1160F RVW MEDS BY RX/DR IN RCRD: CPT | Mod: CPTII,S$GLB,, | Performed by: INTERNAL MEDICINE

## 2023-06-02 PROCEDURE — 3288F PR FALLS RISK ASSESSMENT DOCUMENTED: ICD-10-PCS | Mod: CPTII,S$GLB,, | Performed by: INTERNAL MEDICINE

## 2023-06-02 PROCEDURE — 99999 PR PBB SHADOW E&M-EST. PATIENT-LVL IV: ICD-10-PCS | Mod: PBBFAC,,, | Performed by: INTERNAL MEDICINE

## 2023-06-02 RX ORDER — AZELASTINE 1 MG/ML
2 SPRAY, METERED NASAL 2 TIMES DAILY
Qty: 30 ML | Refills: 1 | Status: SHIPPED | OUTPATIENT
Start: 2023-06-02 | End: 2023-08-30 | Stop reason: SDUPTHER

## 2023-06-02 NOTE — PROGRESS NOTES
[facility] Note    Chief Complaint      Chief Complaint   Patient presents with    Follow-up       History of Present Illness      Nickie Segura is a 88 y.o.  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  Last virtual visit - 5/3/23     Labs drawn today (CMP, TSH)- pending.    Debility - Needs me to fill out RTA form - Pt is wheel chair bound. She requires assistance. She is in PT/OT/Speech Tx with [facility] s/p CVA. She is able feed herself but can sometimes make a mess due to her visual impairment.       H/o COVID 19 - 9/2022.     Transaminitis - AST/ALT - 235/368. H/O  ordered an ultrasound and put in referral to liver specialist. Abd U/S 8/9/22 - Liver normal in size.  Incidental right renal cysts. Lgst - 2.5 cm.     Iron deficiency Anemia - mixed picture of Iron deficiency and anemia of chronic inflammation.   Fu by H/O.  FOBT - neg.  Her vitamin b12 is slightly low at 373.  Pt is on OTC Vitamin B12 1000 mcg daily. Her folate was normal.  Did not mary jo Oral iron and now on  IV iron (3/10/22, 3/21/22, 1/6/23, 1/13/23, 1/20/23, 2/3/23). Hosp fu - she was sent to ED for critical labs.  Now fu by Dr. Gibson.  5/23/22 s/p 2 uPRBC's and iron IV.  Recent  Hb -10.4/33.5.   So she got transfused on 12/23/22.  She does not feel symptomatic. Has fu in Oct.  Pt on IV iron once monthly.     AR - Rec resume allegra and  Astelin     Leukopenia  Resolved. 5.74 up from prev  1.75 - F/u H/O.  She declined a Bone Marrow Bx. Has fu in Oct.      H/o CVA - ED 11/5/21 - facial droop, RT arm, and leg weakness - stroke vs TIA.  She left ER before an MRI brain was done.  So unsure if had a stroke vs TIA. She is on ASA, statin.  She has residual Rt facial droop, numbness in her RT fingers, and RLE weakness.       Recurrent UTI- Prev Fu by Dr. Smith.  Stable on vaginal  "cream. Pt  on Myrbetriq and  is off Toviaz for OAB. Doing well.       OAB - Pt on Myrbetriq. Toviaz too expensive.      HTN- Controlled off meds.  Continue low-sodium diet.     HLD - Controlled on Crestor 20 mg QHS.  Her cholesterol is controlled - TC 96 TG 59 HDL 45 LDL 39 - 1/4/23.      Hypothyroidism-  Controlled on levothyroxine 100 mcg daily. Repeat TFT's.     Anxiety-When on Wellbutrin she noticed her skin is sensitive to touch. Pt started noticing this after 2-3 day on the medication. Pt on Buspar 10 mg BID, and Celexa 30 mg/d.   Pt takes Ativan 0.5 mg rarely prn but feels it is the only thing that helps. She saw LCSWMikaela, 1/10/22 and1/18/22. She found this helpful.  We recently inc to 10 mg BID  As d/w Dr. Glass. She has started going to lunch again  But it makes her anxious because she can't see and there are 60 people in the lunch room. She gets anxious talking about hurricanes. "The Ativan is a lifesaver and she is getting by with this".      Depression- Pt has always struggled with depression and anxiety.  Not worse due to pandemic or recent stroke She is upset about her failing eye sight.   Pt on Celexa 30 mg daily and buspar 10 mg po BID.  "I'm just down and am not motivated to do anything like get dressed or pay my bills on time". Tried adding Trazodone 50 mg 1/2 QHS - no help and felt more anxious on it.  She did not tolerate Bupropion 75 mg BID.  Her vision and memory are worsening which concerns her.  Stable.      Wheezing-Pt has ProAir which she uses prn - infrequently - has not used in several mos.  PFTs-WNL-07/14/2015. Doing well with getting rid of tobacco.     Nicotine dependence - Pt smokes E-cigarette for the past 3 yrs without tobacco.  Pt aware of the dangers.  She quit cigarette 07/05/2016.  She still vapes but less frequently. "It's the only thing she gets enjoyment from".      Hiatal hernia-Stable on Gavescon daily - doing well.     Colon polyps - Pt is s/p partial colon " "resection '09  CRS Dr. Gibson.  GI -Dr. Giron.  Last C scope-'13.      Hyperkalemia - Resolved - 4.5. Rec low potassium diet. She is not taking any Potassium supplementation.   Do not feel pt would tolerate even low dose furosemide 10 mg Q M,W,F.       IFG - Ha1c -4.8 - normal in Jan.         Hepatomegaly - 23 cm.  Fu by Dr. Giron.  Resolved. Await CMP.     OA - Preston knees - RT > Left. Rec glucosamine or turmeric. Rt Knee gives out.  She uses a wheelchair.      Renal cysts - + fam h/o PCKD.  ? Angiomyolipoma on Left Kidney.  Prev fu by Dr. Smith.      Obesity - BMI - 28.54 - Rec low carb diet. She "tries". She eats TID meals most days and if misses a meal supplements with ensure.      Elev IGE - 742 ? Etiol.  No allergic Sx's.  ? Eos Esophagitis.      Aortic atherosclerosis - Tortuosity of the thoracic aorta with aortic atherosclerosis seen on CXR in Oct.  Prev fu by Dr. Nguyen Melgar. Fu by Dr. Kamran Melgar. She had a recent  is due for fu. Cont ASA and Crestor.   Echo - 2/21/22 - EF - 65%; Mild LAE, Mod AR, Mod Aortic stenosis     +LORNA - neg [place].      Acc by sister Brittaney     HCM - Flu - 10/4/21; Td - > 10 yrs ago; PCV 13 - 2/21/17;  PVX 23 - 1/8/15;  Shingrix - ?- pt defers; COVID -19 Vaccine -#1 - 2/5/21; #2 - 3/5/21; #3 - 11/7/21; # 4 ;  MGM - refuses;  DEXA - declined; C-scope - '13; GI - Dr. Giron; Retina Sp - Dr. Gardner; Derm - Dr. Joseph; Ophtho - Dr. Calderon/Dr. Melendrez; Cards - Dr. Manley;  H/O - Dr. Gibson    Patient Care Team:  Perla Hopper MD as PCP - General (Internal Medicine)  Markus Giron MD as Consulting Physician (Gastroenterology)  Deepali Joseph MD as Consulting Physician (Dermatology)  Opal Aburto II, MD (Ophthalmology)  Edith Vega MA as Care Coordinator  Carlene Youngblood LCSW as  (Licensed Clinical )     Health Maintenance:  Immunization History   Administered Date(s) Administered    COVID-19, MRNA, LN-S, PF (MODERNA FULL 0.5 ML " "DOSE) 02/05/2021, 03/05/2021, 11/07/2021    Influenza 12/03/2004, 09/30/2007, 10/06/2009, 10/01/2010, 11/28/2015, 11/15/2016    Influenza (FLUAD) - Quadrivalent - Adjuvanted - PF *Preferred* (65+) 09/23/2020    Influenza (FLUAD) - Trivalent - Adjuvanted - PF (65+) 09/04/2019    Influenza - Quadrivalent - High Dose - PF (65 years and older) 10/04/2021    Influenza - Trivalent (ADULT) 12/03/2004, 10/06/2009, 10/01/2010    Pneumococcal Conjugate - 13 Valent 02/21/2017    Pneumococcal Polysaccharide - 23 Valent 09/30/2007, 01/08/2015      Health Maintenance   Topic Date Due    TETANUS VACCINE  Never done    Lipid Panel  01/04/2028        Past Medical History:  Past Medical History:   Diagnosis Date    LORNA positive     Anxiety     Bilateral cataracts     Colitis     Colon polyp     Depression     Elevated IgE level     Hepatomegaly     Hiatal hernia     Hypothyroidism     IFG (impaired fasting glucose)     Iron deficiency anemia     Macular degeneration     Nicotine dependence     Osteoarthritis     Recurrent UTI     Renal cyst     Skin cancer     Tuberculosis of bladder     Urinary incontinence     Vitamin B12 deficiency     Wheezing        Past Surgical History:   has a past surgical history that includes Cholecystectomy.    Family History:  family history includes Atrial fibrillation in her sister; Diabetes in her mother; Heart disease in her father and mother; Polycystic kidney disease in her father.     Social History:  Social History     Tobacco Use    Smoking status: Every Day     Types: Vaping with nicotine    Smokeless tobacco: Never    Tobacco comments:     Formerly smoked cigarettes.   Substance Use Topics    Alcohol use: Never    Drug use: Never       Review of Systems   Constitutional:  Negative for chills and fever.   HENT:  Positive for hearing loss, rhinorrhea and sneezing.         Uses hearing aids   Eyes:  Positive for visual disturbance.        "I can barely see right now".  Wears glasses. " "  Respiratory:  Positive for cough and shortness of breath. Negative for chest tightness.         SCHILLING - only with exercise   Cardiovascular:  Negative for chest pain.   Gastrointestinal:  Positive for constipation. Negative for diarrhea, nausea and vomiting.        Does well on miralax   Endocrine: Positive for cold intolerance.   Genitourinary:  Positive for bladder incontinence.        " A little damp in the AM".  Wears depends at night and pullups/pads during the day.    Musculoskeletal:  Positive for back pain.        Back pain better now. Comes and goes with certain movements.    Neurological:  Negative for dizziness and headaches.   Psychiatric/Behavioral:  Negative for dysphoric mood. The patient is nervous/anxious.       Medications:    Current Outpatient Medications:     Al hyd-Mg tr-alg ac-sod bicarb (GAVISCON) 80-14.2 mg Chew, Take 1 tablet by mouth once daily. (Patient taking differently: Take 2 tablets by mouth once daily.), Disp: , Rfl:     albuterol (VENTOLIN HFA) 90 mcg/actuation inhaler, Inhale 1-2 puffs into the lungs every 6 (six) hours as needed for Wheezing or Shortness of Breath. Rescue, Disp: 18 g, Rfl: 0    aspirin (ECOTRIN) 81 MG EC tablet, Take 81 mg by mouth once daily., Disp: , Rfl:     busPIRone (BUSPAR) 10 MG tablet, TAKE 1 TABLET BY MOUTH TWICE DAILY, Disp: 180 tablet, Rfl: 1    citalopram (CELEXA) 20 MG tablet, TAKE 1 AND 1/2 TABLETS BY MOUTH DAILY, Disp: 135 tablet, Rfl: 2    estradioL (ESTRACE) 0.01 % (0.1 mg/gram) vaginal cream, Place 1 g vaginally 3 (three) times a week. (Patient taking differently: Place 1 g vaginally twice a week.), Disp: 12 g, Rfl: 2    fluticasone propionate (FLONASE ALLERGY RELIEF NASL), by Each Nostril route as needed., Disp: , Rfl:     levothyroxine (SYNTHROID) 100 MCG tablet, Take 1 tablet (100 mcg total) by mouth once daily., Disp: 90 tablet, Rfl: 1    LORazepam (ATIVAN) 0.5 MG tablet, TAKE 1 TABLET(0.5 MG) BY MOUTH DAILY AS NEEDED FOR ANXIETY (Patient " taking differently: Take 0.5 mg by mouth once daily.), Disp: 30 tablet, Rfl: 1    mirabegron (MYRBETRIQ) 50 mg Tb24, Take 1 tablet (50 mg total) by mouth once daily., Disp: 90 tablet, Rfl: 1    rosuvastatin (CRESTOR) 20 MG tablet, TAKE 1 TABLET(20 MG) BY MOUTH EVERY DAY, Disp: 90 tablet, Rfl: 3    sodium chloride (OCEAN) 0.65 % nasal spray, 1 spray DAILY (route: nasal), Disp: , Rfl:     vit C/E/zinc ox/amy/lut/zeax (ICAPS AREDS2 ORAL), Take by mouth Daily., Disp: , Rfl:     azelastine (ASTELIN) 137 mcg (0.1 %) nasal spray, 2 sprays (274 mcg total) by Nasal route 2 (two) times daily., Disp: 30 mL, Rfl: 1    cetirizine (ZYRTEC) 10 MG tablet, Take 10 mg by mouth once daily., Disp: , Rfl:     neomycin-polymyxin-dexamethasone (DEXACINE) 3.5 mg/g-10,000 unit/g-0.1 % Oint, APPLY A SMALL AMOUNT IN BOTH EYES TWICE DAILY, Disp: , Rfl:      Allergies:  Review of patient's allergies indicates:   Allergen Reactions    Penicillins      rash       Physical Exam      Vital Signs  Temp: 98.2 °F (36.8 °C)  Temp Source: Oral  Pulse: 78  SpO2: 98 %  BP: 110/64  BP Location: Left arm  Patient Position: Sitting  Pain Score: 0-No pain  Height and Weight  Height:  (Unable to Stand)  Weight:  (Unable to weight)      Patient Position: Sitting      Physical Exam  Constitutional:       General: She is not in acute distress.     Appearance: She is obese. She is not ill-appearing, toxic-appearing or diaphoretic.      Comments: Examined in wheel chair   HENT:      Head: Normocephalic and atraumatic.      Ears:      Comments: +hard of hearing. [place] hearing aids     Mouth/Throat:      Comments: +Rt facial droop  Eyes:      Comments: Left lower lid inverted   Cardiovascular:      Rate and Rhythm: Normal rate and regular rhythm.      Pulses: Normal pulses.      Heart sounds: Normal heart sounds.      Comments: Slight edema to distal BLE. III/VI sys murmur   Pulmonary:      Effort: Pulmonary effort is normal. No respiratory distress.      Breath  sounds: No wheezing.      Comments: dec BS in r> L Base. No wheezing.  Abdominal:      General: Bowel sounds are normal.      Palpations: Abdomen is soft.      Tenderness: There is no abdominal tenderness.   Musculoskeletal:      Comments: Pt in wheel chair. Kyphosis. Pt sits slumped with head down    Skin:     Comments: Ecchymosis to Left inner calf   Neurological:      Mental Status: She is alert.      Comments: Slow speech        Laboratory:  CBC:  Recent Labs   Lab 01/04/23  0954 02/09/23  0958 04/26/23  1040   WBC 2.90 L 5.03 5.74   RBC 3.87 L 4.56 3.79 L   Hemoglobin 8.7 L 10.9 L 10.4 L   Hematocrit 30.7 L 37.1 33.5 L   Platelets 229 198 198   MCV 79 L 81 L 88   MCH 22.5 L 23.9 L 27.4   MCHC 28.3 L 29.4 L 31.0 L       CMP:  Recent Labs   Lab 08/13/22  1250 09/22/22  1124 01/04/23  0954   Glucose 80 98 87   Calcium 9.2 9.2 9.3   Albumin 3.5 3.2 L 3.1 L   Total Protein 6.7 6.5 6.8   Sodium 140 142 139   Potassium 4.4 3.7 4.5   CO2 25 26 24   Chloride 105 106 107   BUN 17 15 14   Creatinine 0.7 0.8 0.8   Alkaline Phosphatase 161 H 130 118    H 120 H 235 H    H 200 H 368 H   Total Bilirubin 0.3 0.3 0.4           URINALYSIS:  Recent Labs   Lab 05/19/22  1046 08/13/22  1349 04/05/23  0938   Color, UA Colorless A Yellow Yellow   Specific Gravity, UA 1.005 1.010 1.010   pH, UA 7.0 8.0 7.0   Protein, UA Negative Negative Negative   Bacteria Rare  --   --    Nitrite, UA Negative Negative Negative   Leukocytes, UA Negative Negative Negative   Urobilinogen, UA Negative Negative  --         LIPIDS:  Recent Labs   Lab 07/17/20  0909 09/23/20  0948 11/05/21  1825 01/31/22  1225 08/13/22  1250 01/04/23  0954   TSH  --  1.704 2.074  --  0.928  --    HDL 72  --   --  55  --  45   Cholesterol 158  --   --  129  --  96 L   Triglycerides 91  --   --  86  --  59   LDL Cholesterol 67.8  --   --  56.8 L  --  39.2 L   HDL/Cholesterol Ratio 45.6  --   --  42.6  --  46.9   Non-HDL Cholesterol 86  --   --  74  --  51    Total Cholesterol/HDL Ratio 2.2  --   --  2.3  --  2.1       TSH:  Recent Labs   Lab 09/23/20  0948 11/05/21  1825 08/13/22  1250   TSH 1.704 2.074 0.928       A1C:  Recent Labs   Lab 09/23/20  0948 01/31/22  1225 01/04/23  0954   Hemoglobin A1C 5.3 4.8 4.8         Other:   Recent Labs   Lab 05/18/22  0627 05/23/22  1651 04/26/23  1040   Vitamin B-12 1925 H  --   --    Ferritin  --    < > 138   Iron  --    < > 37   Transferrin  --    < > 280   TIBC  --    < > 414   Saturated Iron  --    < > 9 L    < > = values in this interval not displayed.           Assessment/Plan     Nickie Segura is a 88 y.o.female with:    Hypothyroidism, unspecified type  - Controlled.  Cont current.     Hyperlipidemia, unspecified hyperlipidemia type  - Controlled.  Cont current.     Anxiety  - Stable.  Cont current regimen.     Iron deficiency anemia, unspecified iron deficiency anemia type  - Fu by H/O, Dr. Gibson, on IV iron.     OAB (overactive bladder)  - Stable.  Cont current regimen.    Other tobacco product nicotine dependence, uncomplicated  - Enc cessation but pt not interested at this time.     Debility  - Pt has a sitter.     Allergic rhinitis, unspecified seasonality, unspecified trigger  -     azelastine (ASTELIN) 137 mcg (0.1 %) nasal spray; 2 sprays (274 mcg total) by Nasal route 2 (two) times daily.  Dispense: 30 mL; Refill: 1  - Rec Asteline and Claritin 5-10 mg/d prn.     Transaminitis  - Await CMP.     History of CVA (cerebrovascular accident)  - Stable.  Cont current regimen.           Chronic conditions status updated as per HPI.  Other than changes above, cont current medications and maintain follow up with specialists.    Follow up in about 3 months (around 9/2/2023).      Perla Hopper MD  [facility]

## 2023-06-05 NOTE — PROGRESS NOTES
I sent pt a my chart message -  I reviewed your labs.  Your thyroid functions are normal. Continue your current dose of Levothyroxine. Your kidney function and liver functions looked good. It does appear you could increase your hydration. No further recommendations at this time.    Dr. GARG

## 2023-06-06 ENCOUNTER — DOCUMENT SCAN (OUTPATIENT)
Dept: HOME HEALTH SERVICES | Facility: HOSPITAL | Age: 88
End: 2023-06-06
Payer: MEDICARE

## 2023-06-09 ENCOUNTER — INFUSION (OUTPATIENT)
Dept: INFUSION THERAPY | Facility: HOSPITAL | Age: 88
End: 2023-06-09
Attending: INTERNAL MEDICINE
Payer: MEDICARE

## 2023-06-09 VITALS
OXYGEN SATURATION: 95 % | DIASTOLIC BLOOD PRESSURE: 59 MMHG | SYSTOLIC BLOOD PRESSURE: 129 MMHG | TEMPERATURE: 98 F | HEART RATE: 71 BPM | RESPIRATION RATE: 18 BRPM

## 2023-06-09 DIAGNOSIS — D50.8 OTHER IRON DEFICIENCY ANEMIA: Primary | ICD-10-CM

## 2023-06-09 PROCEDURE — A4216 STERILE WATER/SALINE, 10 ML: HCPCS | Performed by: INTERNAL MEDICINE

## 2023-06-09 PROCEDURE — 25000003 PHARM REV CODE 250: Performed by: INTERNAL MEDICINE

## 2023-06-09 PROCEDURE — 96365 THER/PROPH/DIAG IV INF INIT: CPT

## 2023-06-09 PROCEDURE — 63600175 PHARM REV CODE 636 W HCPCS: Performed by: INTERNAL MEDICINE

## 2023-06-09 RX ORDER — SODIUM CHLORIDE 0.9 % (FLUSH) 0.9 %
10 SYRINGE (ML) INJECTION
Status: DISCONTINUED | OUTPATIENT
Start: 2023-06-09 | End: 2023-06-09 | Stop reason: HOSPADM

## 2023-06-09 RX ADMIN — Medication 10 ML: at 11:06

## 2023-06-09 RX ADMIN — SODIUM CHLORIDE: 9 INJECTION, SOLUTION INTRAVENOUS at 10:06

## 2023-06-09 RX ADMIN — IRON SUCROSE 200 MG: 20 INJECTION, SOLUTION INTRAVENOUS at 10:06

## 2023-06-20 ENCOUNTER — HOSPITAL ENCOUNTER (EMERGENCY)
Facility: HOSPITAL | Age: 88
Discharge: HOME | End: 2023-06-21
Attending: EMERGENCY MEDICINE
Payer: MEDICARE

## 2023-06-20 ENCOUNTER — TELEPHONE (OUTPATIENT)
Dept: PRIMARY CARE CLINIC | Facility: CLINIC | Age: 88
End: 2023-06-20
Payer: MEDICARE

## 2023-06-20 VITALS
BODY MASS INDEX: 28.99 KG/M2 | WEIGHT: 174 LBS | HEIGHT: 65 IN | TEMPERATURE: 98 F | DIASTOLIC BLOOD PRESSURE: 67 MMHG | RESPIRATION RATE: 16 BRPM | SYSTOLIC BLOOD PRESSURE: 158 MMHG | HEART RATE: 75 BPM | OXYGEN SATURATION: 99 %

## 2023-06-20 DIAGNOSIS — R79.89 TROPONIN LEVEL ELEVATED: ICD-10-CM

## 2023-06-20 DIAGNOSIS — S80.12XS: ICD-10-CM

## 2023-06-20 DIAGNOSIS — M79.89 LEG SWELLING: Primary | ICD-10-CM

## 2023-06-20 LAB
ALBUMIN SERPL BCP-MCNC: 3.4 G/DL (ref 3.5–5.2)
ALP SERPL-CCNC: 126 U/L (ref 55–135)
ALT SERPL W/O P-5'-P-CCNC: 51 U/L (ref 10–44)
ANION GAP SERPL CALC-SCNC: 6 MMOL/L (ref 8–16)
AST SERPL-CCNC: 57 U/L (ref 10–40)
BASOPHILS # BLD AUTO: 0.04 K/UL (ref 0–0.2)
BASOPHILS NFR BLD: 0.7 % (ref 0–1.9)
BILIRUB SERPL-MCNC: 0.3 MG/DL (ref 0.1–1)
BNP SERPL-MCNC: 32 PG/ML (ref 0–99)
BUN SERPL-MCNC: 19 MG/DL (ref 8–23)
CALCIUM SERPL-MCNC: 9.8 MG/DL (ref 8.7–10.5)
CHLORIDE SERPL-SCNC: 105 MMOL/L (ref 95–110)
CO2 SERPL-SCNC: 29 MMOL/L (ref 23–29)
CREAT SERPL-MCNC: 0.8 MG/DL (ref 0.5–1.4)
DIFFERENTIAL METHOD: ABNORMAL
EOSINOPHIL # BLD AUTO: 0.2 K/UL (ref 0–0.5)
EOSINOPHIL NFR BLD: 3 % (ref 0–8)
ERYTHROCYTE [DISTWIDTH] IN BLOOD BY AUTOMATED COUNT: 17 % (ref 11.5–14.5)
EST. GFR  (NO RACE VARIABLE): >60 ML/MIN/1.73 M^2
GLUCOSE SERPL-MCNC: 102 MG/DL (ref 70–110)
HCT VFR BLD AUTO: 32.9 % (ref 37–48.5)
HGB BLD-MCNC: 10.3 G/DL (ref 12–16)
IMM GRANULOCYTES # BLD AUTO: 0.01 K/UL (ref 0–0.04)
IMM GRANULOCYTES NFR BLD AUTO: 0.2 % (ref 0–0.5)
INR PPP: 1 (ref 0.8–1.2)
LYMPHOCYTES # BLD AUTO: 1.2 K/UL (ref 1–4.8)
LYMPHOCYTES NFR BLD: 20.5 % (ref 18–48)
MCH RBC QN AUTO: 28.5 PG (ref 27–31)
MCHC RBC AUTO-ENTMCNC: 31.3 G/DL (ref 32–36)
MCV RBC AUTO: 91 FL (ref 82–98)
MONOCYTES # BLD AUTO: 0.6 K/UL (ref 0.3–1)
MONOCYTES NFR BLD: 10.4 % (ref 4–15)
NEUTROPHILS # BLD AUTO: 3.7 K/UL (ref 1.8–7.7)
NEUTROPHILS NFR BLD: 65.2 % (ref 38–73)
NRBC BLD-RTO: 0 /100 WBC
PLATELET # BLD AUTO: 201 K/UL (ref 150–450)
PMV BLD AUTO: 9.5 FL (ref 9.2–12.9)
POTASSIUM SERPL-SCNC: 4.5 MMOL/L (ref 3.5–5.1)
PROT SERPL-MCNC: 7 G/DL (ref 6–8.4)
PROTHROMBIN TIME: 10.9 SEC (ref 9–12.5)
RBC # BLD AUTO: 3.62 M/UL (ref 4–5.4)
SODIUM SERPL-SCNC: 140 MMOL/L (ref 136–145)
TROPONIN I SERPL DL<=0.01 NG/ML-MCNC: 0.08 NG/ML (ref 0–0.03)
TROPONIN I SERPL DL<=0.01 NG/ML-MCNC: 0.08 NG/ML (ref 0–0.03)
WBC # BLD AUTO: 5.65 K/UL (ref 3.9–12.7)

## 2023-06-20 PROCEDURE — 99285 EMERGENCY DEPT VISIT HI MDM: CPT | Mod: 25

## 2023-06-20 PROCEDURE — 84484 ASSAY OF TROPONIN QUANT: CPT | Mod: 91 | Performed by: NURSE PRACTITIONER

## 2023-06-20 PROCEDURE — 80053 COMPREHEN METABOLIC PANEL: CPT | Performed by: NURSE PRACTITIONER

## 2023-06-20 PROCEDURE — 25000003 PHARM REV CODE 250: Performed by: EMERGENCY MEDICINE

## 2023-06-20 PROCEDURE — 85025 COMPLETE CBC W/AUTO DIFF WBC: CPT | Performed by: NURSE PRACTITIONER

## 2023-06-20 PROCEDURE — 84484 ASSAY OF TROPONIN QUANT: CPT | Performed by: EMERGENCY MEDICINE

## 2023-06-20 PROCEDURE — 83880 ASSAY OF NATRIURETIC PEPTIDE: CPT | Performed by: NURSE PRACTITIONER

## 2023-06-20 PROCEDURE — 85610 PROTHROMBIN TIME: CPT | Performed by: NURSE PRACTITIONER

## 2023-06-20 RX ORDER — CEPHALEXIN 500 MG/1
500 CAPSULE ORAL
Status: COMPLETED | OUTPATIENT
Start: 2023-06-20 | End: 2023-06-20

## 2023-06-20 RX ORDER — CEPHALEXIN 500 MG/1
500 CAPSULE ORAL 4 TIMES DAILY
Qty: 20 CAPSULE | Refills: 0 | Status: SHIPPED | OUTPATIENT
Start: 2023-06-20 | End: 2023-06-25

## 2023-06-20 RX ADMIN — CEPHALEXIN 500 MG: 500 CAPSULE ORAL at 09:06

## 2023-06-20 NOTE — TELEPHONE ENCOUNTER
I sw pts sister/FORREST Quispe.  They were getting patient in the car on 6/12. She was trying to help them get in. She was pressing the against the siding of the car. She started with a bruise that night,swelling to her calf. They applied ice. Pts caregiver is seeing her today for the first time since and reports redness to the ankle,tightness and hot to touch. Also reports some swelling. No Sob,fever,cp,n/v.  They will call 911 and bring her to ER

## 2023-06-20 NOTE — TELEPHONE ENCOUNTER
----- Message from Mera Kellogg sent at 6/20/2023  1:26 PM CDT -----  Contact: Ms. Barksdale Mobile# [phone]  Patient sister Ms. Barksdale said that the patient have a bruised reddish colored right leg and she would like to speak with you about this.

## 2023-06-20 NOTE — FIRST PROVIDER EVALUATION
Emergency Department TeleTriage Encounter Note      CHIEF COMPLAINT    Chief Complaint   Patient presents with    Leg Swelling     Left lower leg pain, redness and swelling started 6 days ago, warm to touch, +pedal pulse noted. Patient is wheel chair bound and arrived via  EMS from home       VITAL SIGNS   Initial Vitals [06/20/23 1702]   BP Pulse Resp Temp SpO2   (!) 128/58 77 16 98.1 °F (36.7 °C) 97 %      MAP       --            ALLERGIES    Review of patient's allergies indicates:   Allergen Reactions    Penicillins      rash       PROVIDER TRIAGE NOTE  Verbal consent for the teletriage evaluation was given by the patient at the start of the evaluation.  All efforts will be made to maintain patient's privacy during the evaluation.      This is a teletriage evaluation of a 88 y.o. female presenting to the ED per sister with c/o left lower leg pain for 6 days.  States she bruised it on this past Saturday and then started with the redness, warmth to touch, and swelling noticed today. Limited physical exam via telehealth: The patient is awake, alert, answering questions appropriately and is not in respiratory distress.  As the Teletriage provider, I performed an initial assessment and ordered appropriate labs and imaging studies, if any, to facilitate the patient's care once placed in the ED. Once a room is available, care and a full evaluation will be completed by an alternate ED provider.  Any additional orders and the final disposition will be determined by that provider.  All imaging and labs will not be followed-up by the Teletriage Team, including myself.          ORDERS  Labs Reviewed   CBC W/ AUTO DIFFERENTIAL   COMPREHENSIVE METABOLIC PANEL   TROPONIN I   B-TYPE NATRIURETIC PEPTIDE   PROTIME-INR       ED Orders (720h ago, onward)      Start Ordered     Status Ordering Provider    06/20/23 1714 06/20/23 1713  Protime-INR  STAT         Ordered ROGER TAN    06/20/23 1714 06/20/23 1713  [facility] [place]  1 time imaging         Ordered ROGER TAN    06/20/23 1714 06/20/23 1713  Saline lock IV  Once         Ordered ROGER TAN    06/20/23 1714 06/20/23 1713  Cardiac Monitoring - Adult  Continuous        Comments: Notify Physician If:    Ordered ROGER TAN    06/20/23 1714 06/20/23 1713  Pulse Oximetry Continuous  Continuous         Ordered ROGER TAN    06/20/23 1713 06/20/23 1713  CBC Auto Differential  STAT         Ordered ROGER TAN    06/20/23 1713 06/20/23 1713  Comprehensive Metabolic Panel  STAT         Ordered ROGER TAN    06/20/23 1713 06/20/23 1713  Troponin I  STAT         Ordered ROGER TAN    06/20/23 1713 06/20/23 1713  BNP  STAT         Ordered ROGER TAN    06/20/23 1713 06/20/23 1713  X-Ray Chest AP Portable  1 time imaging         Ordered ROGER TAN              Virtual Visit Note: The provider triage portion of this emergency department evaluation and documentation was performed via [employer], a HIPAA-compliant telemedicine application, in concert with a tele-presenter in the room. A face to face patient evaluation with one of my colleagues will occur once the patient is placed in an emergency department room.      DISCLAIMER: This note was prepared with [employer] voice recognition transcription software. Garbled syntax, mangled pronouns, and other bizarre constructions may be attributed to that software system.
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
## 2023-06-21 NOTE — ED PROVIDER NOTES
Encounter Date: 6/20/2023       History     Chief Complaint   Patient presents with    Leg Swelling     Left lower leg pain, redness and swelling started 6 days ago, warm to touch, +pedal pulse noted. Patient is wheel chair bound and arrived via  EMS from home     Patient is an 88-year-old female who came to the emergency department with a red swollen left leg.  The patient struck her left lateral leg 8 days ago while getting into a car.  She denies any fever.  The patient states the leg is starting to look red and the sister was concerned that it might be cellulitis.          Review of patient's allergies indicates:   Allergen Reactions    Penicillins      rash     Past Medical History:   Diagnosis Date    LORNA positive     Anxiety     Bilateral cataracts     Colitis     Colon polyp     Depression     Elevated IgE level     Hepatomegaly     Hiatal hernia     Hypothyroidism     IFG (impaired fasting glucose)     Iron deficiency anemia     Macular degeneration     Nicotine dependence     Osteoarthritis     Recurrent UTI     Renal cyst     Skin cancer     Tuberculosis of bladder     Urinary incontinence     Vitamin B12 deficiency     Wheezing      Past Surgical History:   Procedure Laterality Date    CHOLECYSTECTOMY       Family History   Problem Relation Age of Onset    Heart disease Mother     Diabetes Mother     Heart disease Father     Polycystic kidney disease Father     Atrial fibrillation Sister      Social History     Tobacco Use    Smoking status: Every Day     Types: Vaping with nicotine    Smokeless tobacco: Never    Tobacco comments:     Formerly smoked cigarettes.   Substance Use Topics    Alcohol use: Never    Drug use: Never     Review of Systems   Constitutional:  Negative for fever.   HENT:  Negative for sore throat.    Respiratory:  Negative for shortness of breath.    Cardiovascular:  Negative for chest pain.   Gastrointestinal:  Negative for nausea.   Genitourinary:  Negative for dysuria.    Musculoskeletal:  Negative for back pain.   Skin:  Negative for rash.   Neurological:  Negative for weakness.   Hematological:  Does not bruise/bleed easily.     Physical Exam     Initial Vitals [06/20/23 1702]   BP Pulse Resp Temp SpO2   (!) 128/58 77 16 98.1 °F (36.7 °C) 97 %      MAP       --         Physical Exam    Nursing note and vitals reviewed.  Constitutional: She appears well-developed and well-nourished.   HENT:   Head: Normocephalic and atraumatic.   Neck: Neck supple.   Normal range of motion.  Pulmonary/Chest: Breath sounds normal.   Abdominal: Abdomen is soft.   Musculoskeletal:      Cervical back: Normal range of motion and neck supple.      Comments: Left lower leg with resolving ecchymosis and slight erythema.  There is 1+ pitting edema to the left lower extremity.  Foot with full range of motion and neurovascularly intact.  Both feet are warm and pink     Neurological: She is alert and oriented to person, place, and time.   Skin: Skin is warm and dry.   Psychiatric: She has a normal mood and affect. Her behavior is normal. Judgment and thought content normal.       ED Course   Procedures  Labs Reviewed   CBC W/ AUTO DIFFERENTIAL - Abnormal; Notable for the following components:       Result Value    RBC 3.62 (*)     Hemoglobin 10.3 (*)     Hematocrit 32.9 (*)     MCHC 31.3 (*)     RDW 17.0 (*)     All other components within normal limits   COMPREHENSIVE METABOLIC PANEL - Abnormal; Notable for the following components:    Albumin 3.4 (*)     AST 57 (*)     ALT 51 (*)     Anion Gap 6 (*)     All other components within normal limits   TROPONIN I - Abnormal; Notable for the following components:    Troponin I 0.084 (*)     All other components within normal limits   TROPONIN I - Abnormal; Notable for the following components:    Troponin I 0.077 (*)     All other components within normal limits   B-TYPE NATRIURETIC PEPTIDE   PROTIME-INR          Imaging Results              US Lower Extremity  Veins Left (Final result)  Result time 06/20/23 20:29:18      Final result by Markus Patel MD (06/20/23 20:29:18)                   Impression:      No evidence of deep venous thrombosis in the left lower extremity.      Electronically signed by: Markus Patel  Date:    06/20/2023  Time:    20:29               Narrative:    EXAMINATION:  US LOWER EXTREMITY VEINS LEFT    CLINICAL HISTORY:  leg swelling;    TECHNIQUE:  Duplex and color flow Doppler evaluation and graded compression of the left lower extremity veins was performed.    COMPARISON:  None    FINDINGS:  Left thigh veins: The common femoral, femoral, popliteal, upper greater saphenous, and deep femoral veins are patent and free of thrombus. The veins are normally compressible and have normal phasic flow and augmentation response.    Left calf veins: The visualized calf veins are patent.    Contralateral CFV: The contralateral (right) common femoral vein is patent and free of thrombus.    Miscellaneous: None                                       X-Ray Chest AP Portable (Final result)  Result time 06/20/23 18:08:24      Final result by Edmar Max MD (06/20/23 18:08:24)                   Impression:      1. Stable chronic appearing interstitial findings, no large focal consolidation.      Electronically signed by: Edmar Max MD  Date:    06/20/2023  Time:    18:08               Narrative:    EXAMINATION:  XR CHEST AP PORTABLE    CLINICAL HISTORY:  leg swelling;    TECHNIQUE:  Single frontal view of the chest was performed.    COMPARISON:  08/13/2022    FINDINGS:  The cardiomediastinal silhouette is not enlarged noting calcification of the aorta..  There is no pleural effusion.  The trachea is midline.  The lungs are symmetrically expanded bilaterally with coarse interstitial attenuation bilaterally..  No large focal consolidation seen.  There is no pneumothorax.  The osseous structures are remarkable for degenerative changes noting  osteopenia..                                       Medications   cephALEXin capsule 500 mg (500 mg Oral Given 6/20/23 2145)     Medical Decision Making:   Clinical Tests:   Lab Tests: Ordered  Medical Tests: Ordered  ED Management:  Patient is an 88-year-old female who came to the emergency department with a red swollen left leg.  The patient struck her left lateral leg 8 days ago while getting into a car.  She denies any fever.  The patient states the leg is starting to look red and the sister was concerned that it might be cellulitis.      The patient has a mildly elevated troponin.  The patient has no chest pain, no shortness of breath.  This will be repeated prior to discharge    The troponin is trending down.  The patient again has no chest pain or shortness of breath and a normal EKG.  The troponin was ordered from triage I tele triage to rule out DVT/PE.  This was discussed with the patient and her sister.  Patient again states she has had no chest pain or shortness of breath.  She is ready to be discharged.           ED Course as of 06/20/23 2337   Tue Jun 20, 2023 2139 Troponin I(!): 0.084 [ST]   2236 Awaiting repeat troponin [ST]      ED Course User Index  [ST] Ethel Garcia MD                   Clinical Impression:   Final diagnoses:  [M79.89] Leg swelling (Primary)  [S80.12XS] Traumatic ecchymosis of lower leg, left, sequela  [R77.8] Troponin level elevated        ED Disposition Condition    Discharge Stable          ED Prescriptions       Medication Sig Dispense Start Date End Date Auth. Provider    cephALEXin (KEFLEX) 500 MG capsule Take 1 capsule (500 mg total) by mouth 4 (four) times daily. for 5 days 20 capsule 6/20/2023 6/25/2023 Ethel Garcia MD          Follow-up Information       Follow up With Specialties Details Why Contact Info    Perla Hopper MD Internal Medicine Schedule an appointment as soon as possible for a visit  As needed [id number]  [facility]  [id number]  [phone]               Ethel Garcia MD  06/20/23 3693

## 2023-07-07 ENCOUNTER — INFUSION (OUTPATIENT)
Dept: INFUSION THERAPY | Facility: HOSPITAL | Age: 88
End: 2023-07-07
Attending: INTERNAL MEDICINE
Payer: MEDICARE

## 2023-07-07 VITALS
RESPIRATION RATE: 16 BRPM | OXYGEN SATURATION: 99 % | BODY MASS INDEX: 28.99 KG/M2 | TEMPERATURE: 98 F | DIASTOLIC BLOOD PRESSURE: 63 MMHG | SYSTOLIC BLOOD PRESSURE: 144 MMHG | HEIGHT: 65 IN | WEIGHT: 174 LBS | HEART RATE: 70 BPM

## 2023-07-07 DIAGNOSIS — D50.8 OTHER IRON DEFICIENCY ANEMIA: Primary | ICD-10-CM

## 2023-07-07 PROCEDURE — 96374 THER/PROPH/DIAG INJ IV PUSH: CPT | Mod: HCNC

## 2023-07-07 PROCEDURE — A4216 STERILE WATER/SALINE, 10 ML: HCPCS | Mod: HCNC | Performed by: INTERNAL MEDICINE

## 2023-07-07 PROCEDURE — 25000003 PHARM REV CODE 250: Mod: HCNC | Performed by: INTERNAL MEDICINE

## 2023-07-07 PROCEDURE — 63600175 PHARM REV CODE 636 W HCPCS: Mod: HCNC | Performed by: INTERNAL MEDICINE

## 2023-07-07 RX ORDER — SODIUM CHLORIDE 0.9 % (FLUSH) 0.9 %
10 SYRINGE (ML) INJECTION
Status: DISCONTINUED | OUTPATIENT
Start: 2023-07-07 | End: 2023-07-07 | Stop reason: HOSPADM

## 2023-07-07 RX ADMIN — Medication 10 ML: at 10:07

## 2023-07-07 RX ADMIN — SODIUM CHLORIDE: 9 INJECTION, SOLUTION INTRAVENOUS at 09:07

## 2023-07-07 RX ADMIN — IRON SUCROSE 200 MG: 20 INJECTION, SOLUTION INTRAVENOUS at 09:07

## 2023-07-07 NOTE — NURSING
Pt tolerated venofer IVP well.  No adverse reaction noted.   IV flushed with NS and D/C per protocol.  Patient left clinic in no acute distress.

## 2023-07-10 ENCOUNTER — TELEPHONE (OUTPATIENT)
Dept: INFUSION THERAPY | Facility: HOSPITAL | Age: 88
End: 2023-07-10
Payer: MEDICARE

## 2023-07-10 NOTE — TELEPHONE ENCOUNTER
Returned pt's sister's call. Confirmed upcoming infusion appointments with the patient  8/4 at 10a  9/1 at 10a

## 2023-08-04 ENCOUNTER — INFUSION (OUTPATIENT)
Dept: INFUSION THERAPY | Facility: HOSPITAL | Age: 88
End: 2023-08-04
Attending: INTERNAL MEDICINE
Payer: MEDICARE

## 2023-08-04 VITALS
HEART RATE: 64 BPM | DIASTOLIC BLOOD PRESSURE: 61 MMHG | RESPIRATION RATE: 16 BRPM | OXYGEN SATURATION: 98 % | SYSTOLIC BLOOD PRESSURE: 138 MMHG | TEMPERATURE: 98 F

## 2023-08-04 DIAGNOSIS — D50.8 OTHER IRON DEFICIENCY ANEMIA: Primary | ICD-10-CM

## 2023-08-04 PROCEDURE — 25000003 PHARM REV CODE 250: Mod: HCNC | Performed by: INTERNAL MEDICINE

## 2023-08-04 PROCEDURE — 63600175 PHARM REV CODE 636 W HCPCS: Mod: HCNC | Performed by: INTERNAL MEDICINE

## 2023-08-04 PROCEDURE — 96374 THER/PROPH/DIAG INJ IV PUSH: CPT | Mod: HCNC

## 2023-08-04 PROCEDURE — A4216 STERILE WATER/SALINE, 10 ML: HCPCS | Mod: HCNC | Performed by: INTERNAL MEDICINE

## 2023-08-04 RX ORDER — SODIUM CHLORIDE 0.9 % (FLUSH) 0.9 %
10 SYRINGE (ML) INJECTION
Status: DISCONTINUED | OUTPATIENT
Start: 2023-08-04 | End: 2023-08-04 | Stop reason: HOSPADM

## 2023-08-04 RX ADMIN — IRON SUCROSE 200 MG: 20 INJECTION, SOLUTION INTRAVENOUS at 09:08

## 2023-08-04 RX ADMIN — SODIUM CHLORIDE: 9 INJECTION, SOLUTION INTRAVENOUS at 09:08

## 2023-08-04 RX ADMIN — Medication 10 ML: at 09:08

## 2023-08-04 NOTE — NURSING
Pt tolerated Venofer 200mg IVP over 5 min well.  No adverse reaction noted. No complaints at this time. PIV flushed with NS and D/C per protocol. AVS printed and reviewed. Patient left clinic in no acute distress.

## 2023-08-13 ENCOUNTER — PATIENT MESSAGE (OUTPATIENT)
Dept: HEMATOLOGY/ONCOLOGY | Facility: CLINIC | Age: 88
End: 2023-08-13
Payer: MEDICARE

## 2023-08-16 ENCOUNTER — TELEPHONE (OUTPATIENT)
Dept: PRIMARY CARE CLINIC | Facility: CLINIC | Age: 88
End: 2023-08-16
Payer: MEDICARE

## 2023-08-16 DIAGNOSIS — K11.7 SIALORRHEA: Primary | ICD-10-CM

## 2023-08-16 RX ORDER — GLYCOPYRROLATE 1 MG/1
1 TABLET ORAL 3 TIMES DAILY
Qty: 90 TABLET | Refills: 0 | Status: SHIPPED | OUTPATIENT
Start: 2023-08-16 | End: 2023-11-16

## 2023-08-16 NOTE — TELEPHONE ENCOUNTER
I sw Brittaney. She states Karina pts speech therapist contacted us. I contacted Karina regarding. She report patient drooling on right side(not new). She is doing exercises with us which are helping. States that patient wants the drooling to stop.   Karina states that patient was interested in medications to stop. Karina reports that the drooling is mild. They are under the impression that there is some sort of patch and also a s/l tablet that can help with this. Would this be something you are aware of or would be ok with prescribing? Karina also informed patient that these medication are not commonly used and can dry you out. Pt insisted that someone contact you to request a rx.

## 2023-08-16 NOTE — TELEPHONE ENCOUNTER
This isn't something I often come across but I suspect they are referring to glycopyrrolate. I sent a 30d supply to try to see if it helps. They should let me know either way      Dr. GARG

## 2023-08-16 NOTE — TELEPHONE ENCOUNTER
----- Message from Priyanka Love sent at 8/16/2023 11:42 AM CDT -----  Contact: P. [phone]  Pt Therapist is calling to advise that patient would like a phone call from the staff discuss some health issues.     Please call and advise.

## 2023-08-21 DIAGNOSIS — F41.9 ANXIETY: ICD-10-CM

## 2023-08-21 NOTE — TELEPHONE ENCOUNTER
No care due was identified.  [facility] Embedded Care Due Messages. Reference number: [id number].   8/21/2023 4:53:20 PM CDT

## 2023-08-22 RX ORDER — LORAZEPAM 0.5 MG/1
TABLET ORAL
Qty: 30 TABLET | Refills: 1 | Status: SHIPPED | OUTPATIENT
Start: 2023-08-22 | End: 2023-10-17 | Stop reason: SDUPTHER

## 2023-08-22 NOTE — TELEPHONE ENCOUNTER
----- Message from Felipa Paz sent at 8/22/2023 11:38 AM CDT -----  Contact: [phone]  [facility] is call to check status of paperwork fax on 8/15/23.

## 2023-08-22 NOTE — TELEPHONE ENCOUNTER
I received a fax this morning from [facility](first that I have seen) and placed on 's desk for review/signature

## 2023-08-27 NOTE — PROGRESS NOTES
"[facility] Note    Chief Complaint    No chief complaint on file.      History of Present Illness      Nickie Segura is a 88 y.o. 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  Last virtual visit - 6/2/23    The patient location is: home  The chief complaint leading to consultation is: "fu chronic issues"    Visit type: audiovisual    Face to Face time with patient: 20 min  20 minutes of total time spent on the encounter, which includes face to face time and non-face to face time preparing to see the patient (eg, review of tests), Obtaining and/or reviewing separately obtained history, Documenting clinical information in the electronic or other health record, Independently interpreting results (not separately reported) and communicating results to the patient/family/caregiver, or Care coordination (not separately reported).         Each patient to whom he or she provides medical services by telemedicine is:  (1) informed of the relationship between the physician and patient and the respective role of any other health care provider with respect to management of the patient; and (2) notified that he or she may decline to receive medical services by telemedicine and may withdraw from such care at any time.    Notes:     "I feel pretty good but would like to be better".     ED - 6/20/23- Leg swelling, elevated Tn. Tx with keflex for suspected cellulitis.     Inc Drooling - sent in Rx for glycopyrrloate. She uses it once a day and has helped. TID made her too dried out.      Debility - Needs me to fill out RTA form - Pt is wheel chair bound. She requires assistance. She is in Speech Tx with [facility] s/p CVA and is doing better. She is able feed herself but can sometimes make a mess due to her visual impairment.  Needs to continue HEP.    " "  Transaminitis - Improved. AST/ALT -67/66 down from prev  368/235. H/O  ordered an ultrasound and put in referral to liver specialist. Abd U/S 8/9/22 - Liver normal in size.  Incidental right renal cysts. Lgst - 2.5 cm.     Iron deficiency Anemia - mixed picture of Iron deficiency and anemia of chronic inflammation.   Fu by H/O.  FOBT - neg.  Her vitamin b12 is slightly low at 373.  Pt is on OTC Vitamin B12 1000 mcg daily. Her folate was normal.  Did not mary jo Oral iron and now on  IV iron (3/10/22, 3/21/22, 1/6/23, 1/13/23, 1/20/23, 2/3/23; 6/9/23; 7/7/23; 8/4/23). Hosp fu - she was sent to ED for critical labs.  Now fu by Dr. Gibson.  5/23/22 s/p 2 uPRBC's and iron IV.  Recent  Hb -10.6/34.3 - 8/4/23.   So she got transfused on 12/23/22.  She does not feel symptomatic. Has fu in Oct.  Pt on IV iron once monthly. "I feel like I've been feeling better".      AR - Rec resume Allegra and Astelin. If astelin not covered can try ipratropium.      Leukopenia  Resolved. 4.51 up from prev  1.75 - F/u H/O.  She declined a Bone Marrow Bx. Has fu in Oct.      H/o CVA - ED 11/5/21 - facial droop, RT arm, and leg weakness - stroke vs TIA.  She left ER before an MRI brain was done.  So unsure if had a stroke vs TIA. She is on ASA, statin.  She has residual Rt facial droop, numbness in her RT fingers, and RLE weakness.       Recurrent UTI- Prev Fu by Dr. Smith.  Stable on vaginal cream. Pt  on Myrbetriq and  is off Toviaz for OAB. Doing well.       OAB - Pt on Myrbetriq. Toviaz too expensive.      HTN- Controlled off meds.  Continue low-sodium diet.     HLD - Controlled on Crestor 20 mg QHS.  Her cholesterol is controlled - TC 96 TG 59 HDL 45 LDL 39 - 1/4/23.      Hypothyroidism-  Controlled on levothyroxine 100 mcg daily. Repeat TFT's in June     Anxiety-When on Wellbutrin she noticed her skin is sensitive to touch. Pt started noticing this after 2-3 day on the medication. Pt on Buspar 10 mg BID, and Celexa 30 mg/d.   Pt takes " "Ativan 0.5 mg rarely prn but feels it is the only thing that helps. She saw LCSW, Mikaela Emilezay, 1/10/22 and1/18/22. She found this helpful.  We recently inc to 10 mg BID  As d/w Dr. Glass. She has started going to lunch again  But it makes her anxious because she can't see and there are 60 people in the lunch room. She gets anxious talking about hurricanes. "The Ativan is a lifesaver and she is getting by with this".      Depression- Pt has always struggled with depression and anxiety.  Not worse due to pandemic or recent stroke She is upset about her failing eye sight.   Pt on Celexa 30 mg daily and buspar 10 mg po BID.  "I'm just down and am not motivated to do anything like get dressed or pay my bills on time". Tried adding Trazodone 50 mg 1/2 QHS - no help and felt more anxious on it.  She did not tolerate Bupropion 75 mg BID.  Her vision and memory are worsening which concerns her.  "Maybe a little better".      Wheezing-Pt has ProAir which she uses prn - infrequently - has not used in several mos.  PFTs-WNL-7/14/2015. Doing well with getting rid of tobacco.     Nicotine dependence - Pt smokes E-cigarette for the past 3 yrs without tobacco.  Pt aware of the dangers.  She quit cigarette 07/05/2016.  She still vapes but less frequently. "It's the only thing she gets enjoyment from".      Hiatal hernia-Stable on Gavescon daily - doing well.     Colon polyps - Pt is s/p partial colon resection '09  CRS Dr. Gibson.  GI -Dr. Giron.  Last C scope-'13.      Hyperkalemia - Resolved - 4.5. Rec low potassium diet. She is not taking any Potassium supplementation.   Do not feel pt would tolerate even low dose furosemide 10 mg Q M,W,F.       IFG - Ha1c -4.8 - normal in Jan.         Hepatomegaly - 23 cm.  Fu by Dr. Giron.  Resolved.       OA - Preston knees - RT > Left. Rec glucosamine or turmeric. Rt Knee gives out.  She uses a wheelchair.      Renal cysts - + fam h/o PCKD.  ? Angiomyolipoma on Left Kidney.  Prev fu by  " "[place].      Obesity - BMI - 28.54 - Rec low carb diet. She "tries". She eats TID meals most days and if misses a meal supplements with ensure.      Elev IGE - 742 ? Etiol.  No allergic Sx's.  ? Eos Esophagitis.      Aortic atherosclerosis - Tortuosity of the thoracic aorta with aortic atherosclerosis seen on CXR in Oct.  Prev fu by Dr. Nguyen Melgar. Fu by Dr. Kamran Melgar. She had a recent  is due for fu. Cont ASA and Crestor.   Echo - 2/21/22 - EF - 65%; Mild LAE, Mod AR, Mod Aortic stenosis     +LORNA - neg [place].      Acc by sister Brittaney     HCM - Flu - 10/4/21; Td - > 10 yrs ago; PCV 13 - 2/21/17;  PVX 23 - 1/8/15;  Shingrix - ?- pt defers; COVID -19 Vaccine -#1 - 2/5/21; #2 - 3/5/21; #3 - 11/7/21; # 4 ;  MGM - refuses;  DEXA - declined; C-scope - '13; GI - Dr. Giron; Retina Sp - Dr. Gardner; Derm - Dr. Joseph; Ophtho - Dr. Calderon/Dr. Melendrez; Cards - Dr. Manley;  H/O - Dr. Gibson    Patient Care Team:  Perla Hopper MD as PCP - General (Internal Medicine)  Markus Giron MD as Consulting Physician (Gastroenterology)  Deepali Joseph MD as Consulting Physician (Dermatology)  Opal Aburto II, MD (Ophthalmology)  Edith Vega MA as Care Coordinator  Carlene Youngblood LCSW as  (Licensed Clinical )     Health Maintenance:  Immunization History   Administered Date(s) Administered    COVID-19, MRNA, LN-S, PF (MODERNA FULL 0.5 ML DOSE) 02/05/2021, 03/05/2021, 11/07/2021    Influenza 12/03/2004, 09/30/2007, 10/06/2009, 10/01/2010, 11/28/2015, 11/15/2016    Influenza (FLUAD) - Quadrivalent - Adjuvanted - PF *Preferred* (65+) 09/23/2020    Influenza (FLUAD) - Trivalent - Adjuvanted - PF (65+) 09/04/2019    Influenza - Quadrivalent - High Dose - PF (65 years and older) 10/04/2021    Influenza - Trivalent (ADULT) 12/03/2004, 10/06/2009, 10/01/2010    Pneumococcal Conjugate - 13 Valent 02/21/2017    Pneumococcal Polysaccharide - 23 Valent 09/30/2007, 01/08/2015    "   Health Maintenance   Topic Date Due    TETANUS VACCINE  Never done    Shingles Vaccine (1 of 2) Never done    Lipid Panel  01/04/2028        Past Medical History:  Past Medical History:   Diagnosis Date    LORNA positive     Anxiety     Bilateral cataracts     Colitis     Colon polyp     COVID-19 09/2022    Depression     Elevated IgE level     Hepatomegaly     Hiatal hernia     Hypothyroidism     IFG (impaired fasting glucose)     Iron deficiency anemia     Macular degeneration     Nicotine dependence     Osteoarthritis     Recurrent UTI     Renal cyst     Skin cancer     Tuberculosis of bladder     Urinary incontinence     Vitamin B12 deficiency     Wheezing        Past Surgical History:   has a past surgical history that includes Cholecystectomy.    Family History:  family history includes Atrial fibrillation in her sister; Diabetes in her mother; Heart disease in her father and mother; Polycystic kidney disease in her father.     Social History:  Social History     Tobacco Use    Smoking status: Every Day     Types: Vaping with nicotine    Smokeless tobacco: Never    Tobacco comments:     Formerly smoked cigarettes.   Substance Use Topics    Alcohol use: Never    Drug use: Never       Review of Systems   Constitutional:  Negative for activity change and unexpected weight change.   HENT:  Negative for hearing loss, rhinorrhea and trouble swallowing.    Eyes:  Positive for visual disturbance. Negative for discharge.   Respiratory:  Negative for chest tightness and wheezing.    Cardiovascular:  Negative for chest pain and palpitations.   Gastrointestinal:  Negative for blood in stool, constipation, diarrhea and vomiting.   Endocrine: Negative for polydipsia and polyuria.   Genitourinary:  Negative for difficulty urinating, dysuria, hematuria and menstrual problem.   Musculoskeletal:  Negative for arthralgias, joint swelling and neck pain.   Neurological:  Negative for weakness and headaches.    Psychiatric/Behavioral:  Negative for confusion and dysphoric mood.         Medications:    Current Outpatient Medications:     Al hyd-Mg tr-alg ac-sod bicarb (GAVISCON) 80-14.2 mg Chew, Take 1 tablet by mouth once daily. (Patient taking differently: Take 2 tablets by mouth once daily.), Disp: , Rfl:     aspirin (ECOTRIN) 81 MG EC tablet, Take 81 mg by mouth once daily., Disp: , Rfl:     busPIRone (BUSPAR) 10 MG tablet, TAKE 1 TABLET BY MOUTH TWICE DAILY, Disp: 180 tablet, Rfl: 1    citalopram (CELEXA) 20 MG tablet, TAKE 1 AND 1/2 TABLETS BY MOUTH DAILY, Disp: 135 tablet, Rfl: 2    estradioL (ESTRACE) 0.01 % (0.1 mg/gram) vaginal cream, Place 1 g vaginally 3 (three) times a week. (Patient taking differently: Place 1 g vaginally twice a week.), Disp: 12 g, Rfl: 2    glycopyrrolate (ROBINUL) 1 mg Tab, Take 1 tablet (1 mg total) by mouth 3 (three) times daily., Disp: 90 tablet, Rfl: 0    levothyroxine (SYNTHROID) 100 MCG tablet, Take 1 tablet (100 mcg total) by mouth once daily., Disp: 90 tablet, Rfl: 1    LORazepam (ATIVAN) 0.5 MG tablet, TAKE 1 TABLET(0.5 MG) BY MOUTH DAILY AS NEEDED FOR ANXIETY, Disp: 30 tablet, Rfl: 1    mirabegron (MYRBETRIQ) 50 mg Tb24, Take 1 tablet (50 mg total) by mouth once daily., Disp: 90 tablet, Rfl: 1    neomycin-polymyxin-dexamethasone (DEXACINE) 3.5 mg/g-10,000 unit/g-0.1 % Oint, APPLY A SMALL AMOUNT IN BOTH EYES TWICE DAILY, Disp: , Rfl:     rosuvastatin (CRESTOR) 20 MG tablet, TAKE 1 TABLET(20 MG) BY MOUTH EVERY DAY, Disp: 90 tablet, Rfl: 3    sodium chloride (OCEAN) 0.65 % nasal spray, 1 spray DAILY (route: nasal), Disp: , Rfl:     vit C/E/zinc ox/amy/lut/zeax (ICAPS AREDS2 ORAL), Take by mouth Daily., Disp: , Rfl:     albuterol (VENTOLIN HFA) 90 mcg/actuation inhaler, Inhale 1-2 puffs into the lungs every 6 (six) hours as needed for Wheezing or Shortness of Breath. Rescue, Disp: 18 g, Rfl: 0    azelastine (ASTELIN) 137 mcg (0.1 %) nasal spray, 2 sprays (274 mcg total) by Nasal  route 2 (two) times daily., Disp: 30 mL, Rfl: 1     Allergies:  Review of patient's allergies indicates:   Allergen Reactions    Penicillins      rash       Physical Exam                    Physical Exam  Vitals reviewed.   Constitutional:       General: She is not in acute distress.     Appearance: Normal appearance. She is not ill-appearing, toxic-appearing or diaphoretic.   HENT:      Head: Normocephalic and atraumatic.   Pulmonary:      Effort: No respiratory distress.   Neurological:      General: No focal deficit present.      Mental Status: She is alert and oriented to person, place, and time.   Psychiatric:         Mood and Affect: Mood normal.         Behavior: Behavior normal.          Laboratory:  CBC:  Recent Labs   Lab 04/26/23  1040 06/20/23  1738 08/04/23  0832   WBC 5.74 5.65 4.51   RBC 3.79 L 3.62 L 3.83 L   Hemoglobin 10.4 L 10.3 L 10.6 L   Hematocrit 33.5 L 32.9 L 34.3 L   Platelets 198 201 173   MCV 88 91 90   MCH 27.4 28.5 27.7   MCHC 31.0 L 31.3 L 30.9 L       CMP:  Recent Labs   Lab 01/04/23  0954 06/02/23  1026 06/20/23  1738   Glucose 87 94 102   Calcium 9.3 9.3 9.8   Albumin 3.1 L 3.1 L 3.4 L   Total Protein 6.8 6.6 7.0   Sodium 139 138 140   Potassium 4.5 4.4 4.5   CO2 24 25 29   Chloride 107 105 105   BUN 14 21 19   Creatinine 0.8 0.8 0.8   Alkaline Phosphatase 118 109 126    H 66 H 51 H    H 67 H 57 H   Total Bilirubin 0.4 0.2 0.3           URINALYSIS:  Recent Labs   Lab 05/19/22  1046 08/13/22  1349 04/05/23  0938   Color, UA Colorless A Yellow Yellow   Specific Gravity, UA 1.005 1.010 1.010   pH, UA 7.0 8.0 7.0   Protein, UA Negative Negative Negative   Bacteria Rare  --   --    Nitrite, UA Negative Negative Negative   Leukocytes, UA Negative Negative Negative   Urobilinogen, UA Negative Negative  --         LIPIDS:  Recent Labs   Lab 11/05/21  1825 01/31/22  1225 08/13/22  1250 01/04/23  0954 06/02/23  1026   TSH 2.074  --  0.928  --  2.418   HDL  --  55  --  45  --     Cholesterol  --  129  --  96 L  --    Triglycerides  --  86  --  59  --    LDL Cholesterol  --  56.8 L  --  39.2 L  --    HDL/Cholesterol Ratio  --  42.6  --  46.9  --    Non-HDL Cholesterol  --  74  --  51  --    Total Cholesterol/HDL Ratio  --  2.3  --  2.1  --        TSH:  Recent Labs   Lab 11/05/21  1825 08/13/22  1250 06/02/23  1026   TSH 2.074 0.928 2.418       A1C:  Recent Labs   Lab 09/23/20  0948 01/31/22  1225 01/04/23  0954   Hemoglobin A1C 5.3 4.8 4.8       Other:   Recent Labs   Lab 05/18/22  0627 05/23/22  1651 08/04/23  0832   Vitamin B-12 1925 H  --   --    Ferritin  --    < > 87   Iron  --    < > 48   Transferrin  --    < > 317   TIBC  --    < > 469 H   Saturated Iron  --    < > 10 L    < > = values in this interval not displayed.           Assessment/Plan     Nickie Segura is a 88 y.o.female with:    Iron deficiency anemia, unspecified iron deficiency anemia type  - Stable.  Cont current regimen.    Hyperlipidemia, unspecified hyperlipidemia type  - Controlled.  Cont current.     Hypothyroidism, unspecified type  - Controlled.  Cont current.     Transaminitis  -     Comprehensive Metabolic Panel; Future; Expected date: 09/30/2023  - Improved. Repeat CMP.     OAB (overactive bladder)  - Stable.  Cont current regimen.    Allergic rhinitis, unspecified seasonality, unspecified trigger  -     azelastine (ASTELIN) 137 mcg (0.1 %) nasal spray; 2 sprays (274 mcg total) by Nasal route 2 (two) times daily.  Dispense: 30 mL; Refill: 1    Debility  - Stable.  Cont current regimen. Cont Stx. Rec HEP.     Chronic conditions status updated as per HPI.  Other than changes above, cont current medications and maintain follow up with specialists.   Follow up in about 4 months (around 12/30/2023) for fu chronic issues or sooner if needed.      Perla Hopper MD  [facility]

## 2023-08-30 ENCOUNTER — OFFICE VISIT (OUTPATIENT)
Dept: PRIMARY CARE CLINIC | Facility: CLINIC | Age: 88
End: 2023-08-30
Payer: MEDICARE

## 2023-08-30 DIAGNOSIS — R53.81 DEBILITY: ICD-10-CM

## 2023-08-30 DIAGNOSIS — D50.9 IRON DEFICIENCY ANEMIA, UNSPECIFIED IRON DEFICIENCY ANEMIA TYPE: Primary | ICD-10-CM

## 2023-08-30 DIAGNOSIS — E03.9 HYPOTHYROIDISM, UNSPECIFIED TYPE: ICD-10-CM

## 2023-08-30 DIAGNOSIS — J30.9 ALLERGIC RHINITIS, UNSPECIFIED SEASONALITY, UNSPECIFIED TRIGGER: ICD-10-CM

## 2023-08-30 DIAGNOSIS — E78.5 HYPERLIPIDEMIA, UNSPECIFIED HYPERLIPIDEMIA TYPE: ICD-10-CM

## 2023-08-30 DIAGNOSIS — N32.81 OAB (OVERACTIVE BLADDER): ICD-10-CM

## 2023-08-30 DIAGNOSIS — R74.01 TRANSAMINITIS: ICD-10-CM

## 2023-08-30 PROCEDURE — 1159F MED LIST DOCD IN RCRD: CPT | Mod: CPTII,95,, | Performed by: INTERNAL MEDICINE

## 2023-08-30 PROCEDURE — 99214 OFFICE O/P EST MOD 30 MIN: CPT | Mod: 95,,, | Performed by: INTERNAL MEDICINE

## 2023-08-30 PROCEDURE — 1160F PR REVIEW ALL MEDS BY PRESCRIBER/CLIN PHARMACIST DOCUMENTED: ICD-10-PCS | Mod: CPTII,95,, | Performed by: INTERNAL MEDICINE

## 2023-08-30 PROCEDURE — 1157F ADVNC CARE PLAN IN RCRD: CPT | Mod: CPTII,95,, | Performed by: INTERNAL MEDICINE

## 2023-08-30 PROCEDURE — 1160F RVW MEDS BY RX/DR IN RCRD: CPT | Mod: CPTII,95,, | Performed by: INTERNAL MEDICINE

## 2023-08-30 PROCEDURE — 99214 PR OFFICE/OUTPT VISIT, EST, LEVL IV, 30-39 MIN: ICD-10-PCS | Mod: 95,,, | Performed by: INTERNAL MEDICINE

## 2023-08-30 PROCEDURE — 1159F PR MEDICATION LIST DOCUMENTED IN MEDICAL RECORD: ICD-10-PCS | Mod: CPTII,95,, | Performed by: INTERNAL MEDICINE

## 2023-08-30 PROCEDURE — 1157F PR ADVANCE CARE PLAN OR EQUIV PRESENT IN MEDICAL RECORD: ICD-10-PCS | Mod: CPTII,95,, | Performed by: INTERNAL MEDICINE

## 2023-08-30 RX ORDER — AZELASTINE 1 MG/ML
2 SPRAY, METERED NASAL 2 TIMES DAILY
Qty: 30 ML | Refills: 1 | Status: SHIPPED | OUTPATIENT
Start: 2023-08-30 | End: 2024-01-03 | Stop reason: SDUPTHER

## 2023-09-01 ENCOUNTER — INFUSION (OUTPATIENT)
Dept: INFUSION THERAPY | Facility: HOSPITAL | Age: 88
End: 2023-09-01
Attending: INTERNAL MEDICINE
Payer: MEDICARE

## 2023-09-01 VITALS
SYSTOLIC BLOOD PRESSURE: 149 MMHG | TEMPERATURE: 98 F | HEIGHT: 65 IN | RESPIRATION RATE: 17 BRPM | DIASTOLIC BLOOD PRESSURE: 65 MMHG | WEIGHT: 174 LBS | OXYGEN SATURATION: 97 % | HEART RATE: 68 BPM | BODY MASS INDEX: 28.99 KG/M2

## 2023-09-01 DIAGNOSIS — D50.8 OTHER IRON DEFICIENCY ANEMIA: Primary | ICD-10-CM

## 2023-09-01 PROCEDURE — 96374 THER/PROPH/DIAG INJ IV PUSH: CPT | Mod: HCNC

## 2023-09-01 PROCEDURE — 63600175 PHARM REV CODE 636 W HCPCS: Mod: HCNC | Performed by: INTERNAL MEDICINE

## 2023-09-01 PROCEDURE — A4216 STERILE WATER/SALINE, 10 ML: HCPCS | Mod: HCNC | Performed by: INTERNAL MEDICINE

## 2023-09-01 PROCEDURE — 25000003 PHARM REV CODE 250: Mod: HCNC | Performed by: INTERNAL MEDICINE

## 2023-09-01 RX ORDER — SODIUM CHLORIDE 0.9 % (FLUSH) 0.9 %
10 SYRINGE (ML) INJECTION
Status: DISCONTINUED | OUTPATIENT
Start: 2023-09-01 | End: 2023-09-01 | Stop reason: HOSPADM

## 2023-09-01 RX ADMIN — Medication 10 ML: at 09:09

## 2023-09-01 RX ADMIN — SODIUM CHLORIDE: 9 INJECTION, SOLUTION INTRAVENOUS at 09:09

## 2023-09-01 RX ADMIN — IRON SUCROSE 200 MG: 20 INJECTION, SOLUTION INTRAVENOUS at 09:09

## 2023-09-01 NOTE — NURSING
Pt tolerated Venofer 200 mg IVP over 5 min dose 4 of 4  well.  No adverse reaction noted. No complaints at this time. PIV flushed with NS and D/C per protocol. Patient left clinic in no acute distress.    No complaints

## 2023-09-20 NOTE — Clinical Note
Perla,  Please see my addendum from today's [facility] meeting.  What are your thoughts on this plan?    Saba Glass Detail Level: Detailed Add 89361 Cpt? (Important Note: In 2017 The Use Of 85664 Is Being Tracked By Cms To Determine Future Global Period Reimbursement For Global Periods): no

## 2023-09-25 ENCOUNTER — TELEPHONE (OUTPATIENT)
Dept: HEMATOLOGY/ONCOLOGY | Facility: CLINIC | Age: 88
End: 2023-09-25
Payer: MEDICARE

## 2023-09-25 NOTE — TELEPHONE ENCOUNTER
----- Message from Iona Arreguin sent at 9/25/2023  2:08 PM CDT -----  Type:  Needs Medical Advice    Who Called:  pt's sister Brittaney  Symptoms (please be specific):    How long has patient had these symptoms:    Pharmacy name and phone #:    Would the patient rather a call back or a response via [facility]?   Best Call Back Number: [phone]  Additional Information: wants to speak to the office about her Oct infusion

## 2023-10-03 ENCOUNTER — LAB VISIT (OUTPATIENT)
Dept: LAB | Facility: HOSPITAL | Age: 88
End: 2023-10-03
Attending: INTERNAL MEDICINE
Payer: MEDICARE

## 2023-10-03 DIAGNOSIS — D50.8 OTHER IRON DEFICIENCY ANEMIA: ICD-10-CM

## 2023-10-03 LAB
ABO + RH BLD: NORMAL
BASOPHILS # BLD AUTO: 0.05 K/UL (ref 0–0.2)
BASOPHILS NFR BLD: 0.9 % (ref 0–1.9)
BLD GP AB SCN CELLS X3 SERPL QL: NORMAL
DIFFERENTIAL METHOD: ABNORMAL
EOSINOPHIL # BLD AUTO: 0.1 K/UL (ref 0–0.5)
EOSINOPHIL NFR BLD: 2.4 % (ref 0–8)
ERYTHROCYTE [DISTWIDTH] IN BLOOD BY AUTOMATED COUNT: 17.9 % (ref 11.5–14.5)
FERRITIN SERPL-MCNC: 87 NG/ML (ref 20–300)
HCT VFR BLD AUTO: 36 % (ref 37–48.5)
HGB BLD-MCNC: 11.4 G/DL (ref 12–16)
IMM GRANULOCYTES # BLD AUTO: 0.01 K/UL (ref 0–0.04)
IMM GRANULOCYTES NFR BLD AUTO: 0.2 % (ref 0–0.5)
IRON SERPL-MCNC: 61 UG/DL (ref 30–160)
LYMPHOCYTES # BLD AUTO: 1.2 K/UL (ref 1–4.8)
LYMPHOCYTES NFR BLD: 22.2 % (ref 18–48)
MCH RBC QN AUTO: 28 PG (ref 27–31)
MCHC RBC AUTO-ENTMCNC: 31.7 G/DL (ref 32–36)
MCV RBC AUTO: 89 FL (ref 82–98)
MONOCYTES # BLD AUTO: 0.4 K/UL (ref 0.3–1)
MONOCYTES NFR BLD: 7.7 % (ref 4–15)
NEUTROPHILS # BLD AUTO: 3.5 K/UL (ref 1.8–7.7)
NEUTROPHILS NFR BLD: 66.6 % (ref 38–73)
NRBC BLD-RTO: 0 /100 WBC
PLATELET # BLD AUTO: 158 K/UL (ref 150–450)
PMV BLD AUTO: 9.6 FL (ref 9.2–12.9)
RBC # BLD AUTO: 4.07 M/UL (ref 4–5.4)
SATURATED IRON: 13 % (ref 20–50)
SPECIMEN OUTDATE: NORMAL
TOTAL IRON BINDING CAPACITY: 456 UG/DL (ref 250–450)
TRANSFERRIN SERPL-MCNC: 308 MG/DL (ref 200–375)
WBC # BLD AUTO: 5.31 K/UL (ref 3.9–12.7)

## 2023-10-03 PROCEDURE — 83540 ASSAY OF IRON: CPT | Mod: HCNC | Performed by: INTERNAL MEDICINE

## 2023-10-03 PROCEDURE — 36415 COLL VENOUS BLD VENIPUNCTURE: CPT | Mod: HCNC | Performed by: INTERNAL MEDICINE

## 2023-10-03 PROCEDURE — 86900 BLOOD TYPING SEROLOGIC ABO: CPT | Mod: HCNC | Performed by: INTERNAL MEDICINE

## 2023-10-03 PROCEDURE — 82728 ASSAY OF FERRITIN: CPT | Mod: HCNC | Performed by: INTERNAL MEDICINE

## 2023-10-03 PROCEDURE — 84466 ASSAY OF TRANSFERRIN: CPT | Mod: HCNC | Performed by: INTERNAL MEDICINE

## 2023-10-03 PROCEDURE — 85025 COMPLETE CBC W/AUTO DIFF WBC: CPT | Mod: HCNC | Performed by: INTERNAL MEDICINE

## 2023-10-12 ENCOUNTER — PATIENT MESSAGE (OUTPATIENT)
Dept: HEMATOLOGY/ONCOLOGY | Facility: CLINIC | Age: 88
End: 2023-10-12
Payer: MEDICARE

## 2023-10-16 ENCOUNTER — TELEPHONE (OUTPATIENT)
Dept: INFUSION THERAPY | Facility: HOSPITAL | Age: 88
End: 2023-10-16
Payer: MEDICARE

## 2023-10-16 NOTE — TELEPHONE ENCOUNTER
Confirmed upcoming monthly iron infusion with the patient's sister Brittaney. States she would prefer mornings  11/16 at 9a

## 2023-10-17 DIAGNOSIS — F41.9 ANXIETY: ICD-10-CM

## 2023-10-17 RX ORDER — LORAZEPAM 0.5 MG/1
0.5 TABLET ORAL DAILY PRN
Qty: 30 TABLET | Refills: 1 | Status: SHIPPED | OUTPATIENT
Start: 2023-10-17 | End: 2023-12-13

## 2023-10-17 NOTE — TELEPHONE ENCOUNTER
----- Message from Emheladio Farnsworth sent at 10/17/2023  3:53 PM CDT -----  Contact: [phone]  Requesting an RX refill or new RX.  Is this a refill or new RX: Refill l 1  RX name and strength (copy/paste from chart):  LORazepam (ATIVAN) 0.5 MG tablet  Is this a 30 day or 90 day RX:   Pharmacy name and phone # (copy/paste from chart):  [id number] - [place], [place] - [address] [facility] [address]   Phone:  [phone]  Fax:  [phone]        The doctors have asked that we provide their patients with the following 2 reminders -- prescription refills can take up to 72 hours, and a friendly reminder that in the future you can use your [facility] account to request refills:     Patient is been denied the rx. Please call pharmacy. Thank you

## 2023-10-17 NOTE — TELEPHONE ENCOUNTER
No care due was identified.  Health [facility] Embedded Care Due Messages. Reference number: [id number].   10/17/2023 4:08:00 PM CDT

## 2023-10-19 ENCOUNTER — TELEPHONE (OUTPATIENT)
Dept: PRIMARY CARE CLINIC | Facility: CLINIC | Age: 88
End: 2023-10-19
Payer: MEDICARE

## 2023-10-19 NOTE — TELEPHONE ENCOUNTER
----- Message from Syl Wade sent at 10/19/2023  9:01 AM CDT -----  Contact: Sister in law/Brittaney [phone]  Pt's sister in law calling b/c pt's HH aid attempted to  a script for LORazepam (ATIVAN) 0.5 MG tablet at [facility] and aid was told her account has been closed and she cannot  the medication. Pt now took her last pill today and is worried b/c she is out of medication. Brittaney states this has happened before and Kylie is able to get it corrected so she was would like to speak with her.

## 2023-11-14 DIAGNOSIS — K59.00 CONSTIPATION, UNSPECIFIED CONSTIPATION TYPE: Primary | ICD-10-CM

## 2023-11-14 RX ORDER — POLYETHYLENE GLYCOL 3350 17 G/17G
17 POWDER, FOR SOLUTION ORAL DAILY
Qty: 510 G | Refills: 3 | Status: SHIPPED | OUTPATIENT
Start: 2023-11-14

## 2023-11-14 NOTE — TELEPHONE ENCOUNTER
----- Message from Felipa Paz sent at 11/14/2023  9:09 AM CST -----  Contact: [phone]  Charla patient's nurse states please prescribe polyethylene glycol  ( Miralax) . Nurse states that OTC has gotten to be to expensive for patient. .Please call and advise.    Thank you and have a great day.

## 2023-11-14 NOTE — TELEPHONE ENCOUNTER
No care due was identified.  Health [facility] Embedded Care Due Messages. Reference number: [id number].   11/14/2023 9:35:17 AM CST

## 2023-11-14 NOTE — TELEPHONE ENCOUNTER
Upon chart review, I do not show anywhere in 's note a mention of patient taking Miralax. Lvm requesting a call back to find out if this is being take daily or just prn constipation. Will route a rx request to  once I receive this information

## 2023-11-16 ENCOUNTER — INFUSION (OUTPATIENT)
Dept: INFUSION THERAPY | Facility: HOSPITAL | Age: 88
End: 2023-11-16
Attending: INTERNAL MEDICINE
Payer: MEDICARE

## 2023-11-16 VITALS
WEIGHT: 174 LBS | DIASTOLIC BLOOD PRESSURE: 56 MMHG | BODY MASS INDEX: 28.99 KG/M2 | OXYGEN SATURATION: 98 % | HEART RATE: 66 BPM | RESPIRATION RATE: 16 BRPM | SYSTOLIC BLOOD PRESSURE: 116 MMHG | HEIGHT: 65 IN | TEMPERATURE: 98 F

## 2023-11-16 DIAGNOSIS — D50.8 OTHER IRON DEFICIENCY ANEMIA: Primary | ICD-10-CM

## 2023-11-16 PROCEDURE — 96374 THER/PROPH/DIAG INJ IV PUSH: CPT | Mod: HCNC

## 2023-11-16 PROCEDURE — A4216 STERILE WATER/SALINE, 10 ML: HCPCS | Mod: HCNC | Performed by: INTERNAL MEDICINE

## 2023-11-16 PROCEDURE — 25000003 PHARM REV CODE 250: Mod: HCNC | Performed by: INTERNAL MEDICINE

## 2023-11-16 PROCEDURE — 63600175 PHARM REV CODE 636 W HCPCS: Mod: HCNC | Performed by: INTERNAL MEDICINE

## 2023-11-16 RX ORDER — SODIUM CHLORIDE 0.9 % (FLUSH) 0.9 %
10 SYRINGE (ML) INJECTION
Status: DISCONTINUED | OUTPATIENT
Start: 2023-11-16 | End: 2023-11-16 | Stop reason: HOSPADM

## 2023-11-16 RX ADMIN — IRON SUCROSE 200 MG: 20 INJECTION, SOLUTION INTRAVENOUS at 09:11

## 2023-11-16 RX ADMIN — Medication 10 ML: at 09:11

## 2023-11-16 RX ADMIN — SODIUM CHLORIDE: 9 INJECTION, SOLUTION INTRAVENOUS at 09:11

## 2023-11-17 RX ORDER — MIRABEGRON 50 MG/1
50 TABLET, FILM COATED, EXTENDED RELEASE ORAL DAILY
Qty: 90 TABLET | Refills: 2 | Status: SHIPPED | OUTPATIENT
Start: 2023-11-17

## 2023-11-17 RX ORDER — MIRABEGRON 50 MG/1
50 TABLET, FILM COATED, EXTENDED RELEASE ORAL
Qty: 90 TABLET | Refills: 1 | OUTPATIENT
Start: 2023-11-17

## 2023-11-17 NOTE — TELEPHONE ENCOUNTER
Refill Routing Note   Medication(s) are not appropriate for processing by [facility] for the following reason(s):        Drug-disease interaction: MYRBETRIQ and Transaminitis (2nd request)    [facility] action(s):  Defer      Medication Therapy Plan:       Pharmacist review requested: Yes     Appointments  past 12m or future 3m with PCP    Date Provider   Last Visit   8/30/2023 Perla Hopper MD   Next Visit   1/3/2024 Perla Hopper MD   ED visits in past 90 days: 0        Note composed:12:14 PM 11/17/2023

## 2023-11-17 NOTE — TELEPHONE ENCOUNTER
No care due was identified.  Health [facility] Embedded Care Due Messages. Reference number: [id number].   11/17/2023 9:03:58 AM CST

## 2023-11-17 NOTE — TELEPHONE ENCOUNTER
Refill Decision Note   Nickie Segura  is requesting a refill authorization.  Brief Assessment and Rationale for Refill:  Quick Discontinue     Medication Therapy Plan:       Medication Reconciliation Completed: No   Comments: Duplicate medication request--pended in a previous encounter and awaiting assessment    No Care Gaps recommended.     Duplicate Pended Encounter(s)/ Last Prescribed Details:    Ordering Encounter Report    Associated Reports   View Encounter            Note composed:12:15 PM 11/17/2023

## 2023-12-01 NOTE — TELEPHONE ENCOUNTER
Catheter removed intact. Pt has a hosp f/u on Monday. Pt is having trouble getting around. Pts sister would like to know if you could do her hosp fu virtually instead. Note: I do know if you would like to keep her at 10:30 am for a virtual and I do not have any other slots available

## 2023-12-14 ENCOUNTER — INFUSION (OUTPATIENT)
Dept: INFUSION THERAPY | Facility: HOSPITAL | Age: 88
End: 2023-12-14
Attending: INTERNAL MEDICINE
Payer: MEDICARE

## 2023-12-14 VITALS
WEIGHT: 174 LBS | SYSTOLIC BLOOD PRESSURE: 130 MMHG | OXYGEN SATURATION: 97 % | HEART RATE: 72 BPM | DIASTOLIC BLOOD PRESSURE: 59 MMHG | BODY MASS INDEX: 28.99 KG/M2 | TEMPERATURE: 98 F | HEIGHT: 65 IN | RESPIRATION RATE: 18 BRPM

## 2023-12-14 DIAGNOSIS — E78.00 PURE HYPERCHOLESTEROLEMIA: ICD-10-CM

## 2023-12-14 DIAGNOSIS — Z86.73 OLD CARDIOEMBOLIC STROKE WITHOUT LATE EFFECT: ICD-10-CM

## 2023-12-14 DIAGNOSIS — D50.8 OTHER IRON DEFICIENCY ANEMIA: Primary | ICD-10-CM

## 2023-12-14 PROCEDURE — A4216 STERILE WATER/SALINE, 10 ML: HCPCS | Mod: HCNC | Performed by: INTERNAL MEDICINE

## 2023-12-14 PROCEDURE — 63600175 PHARM REV CODE 636 W HCPCS: Mod: HCNC | Performed by: INTERNAL MEDICINE

## 2023-12-14 PROCEDURE — 96374 THER/PROPH/DIAG INJ IV PUSH: CPT | Mod: HCNC

## 2023-12-14 PROCEDURE — 25000003 PHARM REV CODE 250: Mod: HCNC | Performed by: INTERNAL MEDICINE

## 2023-12-14 RX ORDER — SODIUM CHLORIDE 0.9 % (FLUSH) 0.9 %
10 SYRINGE (ML) INJECTION
Status: DISCONTINUED | OUTPATIENT
Start: 2023-12-14 | End: 2023-12-14 | Stop reason: HOSPADM

## 2023-12-14 RX ORDER — ROSUVASTATIN CALCIUM 20 MG/1
TABLET, COATED ORAL
Qty: 90 TABLET | Refills: 3 | Status: SHIPPED | OUTPATIENT
Start: 2023-12-14

## 2023-12-14 RX ADMIN — SODIUM CHLORIDE: 9 INJECTION, SOLUTION INTRAVENOUS at 09:12

## 2023-12-14 RX ADMIN — IRON SUCROSE 200 MG: 20 INJECTION, SOLUTION INTRAVENOUS at 09:12

## 2023-12-14 RX ADMIN — Medication 10 ML: at 09:12

## 2023-12-14 NOTE — NURSING
Pt tolerated Venofer 200 mg IVP given over 5 minutes well.  No adverse reaction noted.   IV flushed with NS and D/C per protocol.  Patient left clinic in no acute distress.

## 2023-12-14 NOTE — NURSING
Pt arrived for to infusion for venofer IVP, VSS reports cough, feeling fine....pt concerned and afraid she is starting with bronchitis ....Luis RN listened to pt lungs and stated sounds fine, no symptoms as of now....I informed pt and sister we would let Dr. Gibson know and instructed if starts not feeling well and other symptoms arise to notify provider.

## 2023-12-28 ENCOUNTER — TELEPHONE (OUTPATIENT)
Dept: PRIMARY CARE CLINIC | Facility: CLINIC | Age: 88
End: 2023-12-28
Payer: MEDICARE

## 2023-12-28 NOTE — TELEPHONE ENCOUNTER
----- Message from Jelly Guerra sent at 12/28/2023 10:59 AM CST -----  Contact: sister Brittaney   Sister Brittaney would like a call back she has questions about an appt that Nickie has on 01/03/24

## 2023-12-28 NOTE — TELEPHONE ENCOUNTER
Spoke with Pt sister Brittaney Would like Pt appointment 1/3/24  to be Virtual Visit  Pt have to schedule for transportation ride and it was  book         Please Advised

## 2023-12-29 DIAGNOSIS — E03.9 HYPOTHYROIDISM, UNSPECIFIED TYPE: ICD-10-CM

## 2023-12-29 RX ORDER — LEVOTHYROXINE SODIUM 100 UG/1
100 TABLET ORAL
Qty: 90 TABLET | Refills: 1 | Status: SHIPPED | OUTPATIENT
Start: 2023-12-29

## 2023-12-29 NOTE — TELEPHONE ENCOUNTER
No care due was identified.  Health [facility] Embedded Care Due Messages. Reference number: [id number].   12/29/2023 10:56:27 AM CST

## 2023-12-29 NOTE — TELEPHONE ENCOUNTER
Refill Decision Note   Nickie Colton  is requesting a refill authorization.  Brief Assessment and Rationale for Refill:  Approve     Medication Therapy Plan:         Comments:     Note composed:11:04 AM 12/29/2023

## 2023-12-29 NOTE — TELEPHONE ENCOUNTER
Ok to change to virtual since she had an in office in June. Make sure to give her the whole 30 min slot since it takes a while when I se her even virtually.   Dr. GARG

## 2024-01-01 NOTE — PROGRESS NOTES
"[facility] Note    Chief Complaint    No chief complaint on file.      History of Present Illness      Nickie Segura is a 88 y.o.   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  Last virtual visit -8/20/23       The patient location is: "home"  The chief complaint leading to consultation is: Fu chronic issues    Visit type: audiovisual    Face to Face time with patient: 19 min.   19 minutes of total time spent on the encounter, which includes face to face time and non-face to face time preparing to see the patient (eg, review of tests), Obtaining and/or reviewing separately obtained history, Documenting clinical information in the electronic or other health record, Independently interpreting results (not separately reported) and communicating results to the patient/family/caregiver, or Care coordination (not separately reported).         Each patient to whom he or she provides medical services by telemedicine is:  (1) informed of the relationship between the physician and patient and the respective role of any other health care provider with respect to management of the patient; and (2) notified that he or she may decline to receive medical services by telemedicine and may withdraw from such care at any time.    Notes:       Memory deficit - She reports her speech is worse with word finding and memory. Suspect likely vascular dementia.   Will check TSH, B1, B12, RPR.      ED - 6/20/23- Leg swelling, elevated Tn. Tx with keflex for suspected cellulitis.      Inc Drooling - sent in Rx for glycopyrrloate. She uses it once a day and has helped. TID made her too dried out. this is working.      Debility - Needs me to fill out RTA form - Pt is wheel chair bound. She requires assistance. She is in Speech Tx with [facility] s/p CVA and is doing better. " She is able feed herself but can sometimes make a mess due to her visual impairment.  Needs to continue HEP. She uses a wheel chair and is unable to ambulate by herself. Her vision is poor. Rec she fu with Ophtho but she declines.      Transaminitis - Improved. AST/ALT -51/57 down from 67/66 down  and from prev  368/235. H/O  ordered an ultrasound and put in referral to liver specialist. Abd U/S 8/9/22 - Liver normal in size.  Incidental right renal cysts. Lgst - 2.5 cm. Repeat FLP.      Iron deficiency Anemia - mixed picture of Iron deficiency and anemia of chronic inflammation.   Fu by H/O.  FOBT - neg.  Her vitamin b12 is slightly low at 373.  Pt is on OTC Vitamin B12 1000 mcg daily. Her folate was normal.  Did not mary jo Oral iron and now on  IV iron (3/10/22, 3/21/22, 1/6/23, 1/13/23, 1/20/23, 2/3/23; 6/9/23; 7/7/23; 8/4/23; 9/1/23; 11/16/23; 12/14/23). Hosp fu - she was sent to ED for critical labs.  Now fu by Dr. Gibson.  5/23/22 s/p 2 uPRBC's and iron IV.  Recent  Hb -10.6/34.3 - 8/4/23.   So she got transfused on 12/23/22.  She does not feel symptomatic. Has upcoming labs in 1 wk.  Pt on IV iron once monthly.      AR - Rec resume Allegra and Astelin. Rec resume astelin.  If astelin not covered can try ipratropium.      Leukopenia  Resolved. 5.31 up from prev  1.75 - F/u H/O.  She declined a Bone Marrow Bx. Has fu in Oct.      H/o CVA - ED 11/5/21 - facial droop, RT arm, and leg weakness - stroke vs TIA.  She left ER before an MRI brain was done.  So unsure if had a stroke vs TIA. She is on ASA, statin.  She has residual Rt facial droop, numbness in her RT fingers, and RLE weakness.       Recurrent UTI- Prev Fu by Dr. Smith.  Stable on vaginal cream. Pt  on Myrbetriq and  is off Toviaz for OAB. Doing well.       OAB - Pt on Myrbetriq. Toviaz too expensive.      HTN- Controlled off meds.  Continue low-sodium diet.     HLD - Controlled on Crestor 20 mg QHS.  Her cholesterol is controlled - TC 96 TG 59 HDL 45 LDL  "39 - 1/4/23.      Hypothyroidism-  Controlled on levothyroxine 100 mcg daily. Repeat TFT's in June     Anxiety-When on Wellbutrin she noticed her skin is sensitive to touch. Pt started noticing this after 2-3 day on the medication. Pt on Buspar 10 mg BID, and Celexa 30 mg/d.   Pt takes Ativan 0.5 mg rarely prn but feels it is the only thing that helps. She saw LCSW, Mikaela Jmienez, 1/10/22 and1/18/22. She found this helpful.  We recently inc to 10 mg BID  As d/w Dr. Glass. She has started going to lunch again  But it makes her anxious because she can't see and there are 60 people in the lunch room. She gets anxious talking about hurricanes. "The Ativan is a lifesaver and she is getting by with this".      Depression- Pt has always struggled with depression and anxiety.  Not worse due to pandemic or recent stroke She is upset about her failing eye sight.   Pt on Celexa 30 mg daily and buspar 10 mg po BID.  "I'm just down and am not motivated to do anything like get dressed or pay my bills on time". Tried adding Trazodone 50 mg 1/2 QHS - no help and felt more anxious on it.  She did not tolerate Bupropion 75 mg BID.  Her vision and memory are worsening which concerns her.  "Maybe a little better".      Wheezing-Pt has ProAir which she uses prn - infrequently - has not used in several mos.  PFTs-WNL-7/14/2015. Doing well with getting rid of tobacco.     Nicotine dependence - Pt smokes E-cigarette for the past 3 yrs without tobacco.  Pt aware of the dangers.  She quit cigarette 07/05/2016.  She still vapes but less frequently. "It's the only thing she gets enjoyment from".      Hiatal hernia-Stable on [place]con daily - doing well.     Colon polyps - Pt is s/p partial colon resection '09  CRS Dr. Gibson.  GI -Dr. Giron.  Last C scope-'13.      Hyperkalemia - Resolved - 4.8. Rec low potassium diet. She is not taking any Potassium supplementation.   Do not feel pt would tolerate even low dose furosemide 10 mg Q M,W,F.     " "  IFG - Ha1c -4.8 - normal in Jan.         Hepatomegaly - 23 cm.  Fu by Dr. Giron.  Resolved.       OA - Preston knees - RT > Left. Rec glucosamine or turmeric. Rt Knee gives out.  She uses a wheelchair.      Renal cysts - + fam h/o PCKD.  ? Angiomyolipoma on Left Kidney.  Prev fu by Dr. Smith.      Obesity - BMI - 28.96 - Rec low carb diet. She "tries". She eats TID meals most days and if misses a meal supplements with ensure.      Elev IGE - 742 ? Etiol.  No allergic Sx's.  ? Eos Esophagitis.      Aortic atherosclerosis - Tortuosity of the thoracic aorta with aortic atherosclerosis seen on CXR in Oct.  Prev fu by Dr. Nguyen Melgar. Fu by Dr. Kamran Melgar. She had a recent  is due for fu. Cont ASA and Crestor.   Echo - 2/21/22 - EF - 65%; Mild LAE, Mod AR, Mod Aortic stenosis     +LORNA - neg [place].      Acc by sister Brittaney and Best Friend Kary     HCM - Flu - 10/12/23; Td - > 10 yrs ago; PCV 13 - 2/21/17;  PVX 23 - 1/8/15;  Shingrix - ?- pt defers; COVID -19 Vaccine -#1 - 2/5/21; #2 - 3/5/21; #3 - 11/7/21; # 4 10/12/23;  MGM - refuses;  DEXA - declined; C-scope - '13- no longer gets. ; GI - Dr. Giron; Retina Sp - Dr. Gardner; Derm - Dr. Joseph; Ophtho - Dr. Calderon/Dr. Melendrez; Cards - Dr. Manley;  H/O - Dr. Gibson    Patient Care Team:  Perla Hopper MD as PCP - General (Internal Medicine)  Markus Giron MD as Consulting Physician (Gastroenterology)  Deepali Joseph MD as Consulting Physician (Dermatology)  Opal Aburto II, MD (Ophthalmology)  Edith Vega MA as Care Coordinator  Carlene Youngblood LCSW as  (Licensed Clinical )     Health Maintenance:  Immunization History   Administered Date(s) Administered    COVID-19, MRNA, LN-S, PF (MODERNA FULL 0.5 ML DOSE) 02/05/2021, 03/05/2021, 11/07/2021    COVID-19, mRNA, LNP-S, PF, ludy-sucrose, 30 mcg/0.3 mL (Pfizer 2023 Ages 12+) 10/12/2023    Influenza 12/03/2004, 09/30/2007, 10/06/2009, 10/01/2010, 11/28/2015, " 11/15/2016    Influenza (FLUAD) - Quadrivalent - Adjuvanted - PF *Preferred* (65+) 09/23/2020    Influenza (FLUAD) - Trivalent - Adjuvanted - PF (65+) 09/04/2019    Influenza - Quadrivalent - High Dose - PF (65 years and older) 10/04/2021    Influenza - Trivalent (ADULT) 12/03/2004, 10/06/2009, 10/01/2010    Pneumococcal Conjugate - 13 Valent 02/21/2017    Pneumococcal Polysaccharide - 23 Valent 09/30/2007, 01/08/2015      Health Maintenance   Topic Date Due    TETANUS VACCINE  Never done    Shingles Vaccine (1 of 2) Never done    Lipid Panel  01/04/2028        Past Medical History:  Past Medical History:   Diagnosis Date    LORNA positive     Anxiety     Bilateral cataracts     Colitis     Colon polyp     COVID-19 09/2022    Depression     Elevated IgE level     Hepatomegaly     Hiatal hernia     Hypothyroidism     IFG (impaired fasting glucose)     Iron deficiency anemia     Macular degeneration     Nicotine dependence     Osteoarthritis     Recurrent UTI     Renal cyst     Skin cancer     Tuberculosis of bladder     Urinary incontinence     Vitamin B12 deficiency     Wheezing        Past Surgical History:   has a past surgical history that includes Cholecystectomy.    Family History:  family history includes Atrial fibrillation in her sister; Diabetes in her mother; Heart disease in her father and mother; Polycystic kidney disease in her father.     Social History:  Social History     Tobacco Use    Smoking status: Every Day     Types: Vaping with nicotine    Smokeless tobacco: Never    Tobacco comments:     Formerly smoked cigarettes.   Substance Use Topics    Alcohol use: Never    Drug use: Never       Review of Systems   Constitutional:  Negative for activity change and unexpected weight change.   HENT:  Positive for postnasal drip and rhinorrhea. Negative for hearing loss, sore throat and trouble swallowing.         Clear rhinorrhea -chronic   Eyes:  Positive for visual disturbance. Negative for discharge.    Respiratory:  Positive for cough. Negative for chest tightness, shortness of breath and wheezing.         Slight cough. Hoarseness. Rec resume astelin.    Cardiovascular:  Negative for chest pain and palpitations.   Gastrointestinal:  Negative for blood in stool, constipation, diarrhea and vomiting.   Endocrine: Negative for polydipsia and polyuria.   Genitourinary:  Negative for difficulty urinating, dysuria, hematuria and menstrual problem.   Musculoskeletal:  Negative for arthralgias, joint swelling and neck pain.   Neurological:  Negative for weakness and headaches.   Psychiatric/Behavioral:  Negative for confusion and dysphoric mood.         Medications:    Current Outpatient Medications:     Al hyd-Mg tr-alg ac-sod bicarb (GAVISCON) 80-14.2 mg Chew, Take 1 tablet by mouth once daily. (Patient taking differently: Take 2 tablets by mouth once daily.), Disp: , Rfl:     albuterol (VENTOLIN HFA) 90 mcg/actuation inhaler, Inhale 1-2 puffs into the lungs every 6 (six) hours as needed for Wheezing or Shortness of Breath. Rescue, Disp: 18 g, Rfl: 0    aspirin (ECOTRIN) 81 MG EC tablet, Take 81 mg by mouth once daily., Disp: , Rfl:     estradioL (ESTRACE) 0.01 % (0.1 mg/gram) vaginal cream, Place 1 g vaginally 3 (three) times a week. (Patient taking differently: Place 1 g vaginally twice a week.), Disp: 12 g, Rfl: 2    levothyroxine (SYNTHROID) 100 MCG tablet, TAKE 1 TABLET(100 MCG) BY MOUTH EVERY DAY, Disp: 90 tablet, Rfl: 1    LORazepam (ATIVAN) 0.5 MG tablet, TAKE 1 TABLET(0.5 MG) BY MOUTH DAILY AS NEEDED FOR ANXIETY, Disp: 30 tablet, Rfl: 1    mirabegron (MYRBETRIQ) 50 mg Tb24, Take 1 tablet (50 mg total) by mouth once daily., Disp: 90 tablet, Rfl: 2    polyethylene glycol (GLYCOLAX) 17 gram/dose powder, Take 17 g by mouth once daily., Disp: 510 g, Rfl: 3    rosuvastatin (CRESTOR) 20 MG tablet, TAKE 1 TABLET(20 MG) BY MOUTH EVERY DAY, Disp: 90 tablet, Rfl: 3    sodium chloride (OCEAN) 0.65 % nasal spray, 1 spray  DAILY (route: nasal), Disp: , Rfl:     vit C/E/zinc ox/amy/lut/zeax (ICAPS AREDS2 ORAL), Take by mouth Daily., Disp: , Rfl:     azelastine (ASTELIN) 137 mcg (0.1 %) nasal spray, 2 sprays (274 mcg total) by Nasal route 2 (two) times daily., Disp: 90 mL, Rfl: 1    busPIRone (BUSPAR) 10 MG tablet, TAKE 1 TABLET BY MOUTH TWICE DAILY, Disp: 180 tablet, Rfl: 1    citalopram (CELEXA) 20 MG tablet, Take 1.5 tablets (30 mg total) by mouth once daily., Disp: 135 tablet, Rfl: 2    glycopyrrolate (ROBINUL) 1 mg Tab, TAKE 1 TABLET(1 MG) BY MOUTH THREE TIMES DAILY, Disp: 90 tablet, Rfl: 2    neomycin-polymyxin-dexamethasone (DEXACINE) 3.5 mg/g-10,000 unit/g-0.1 % Oint, APPLY A SMALL AMOUNT IN BOTH EYES TWICE DAILY, Disp: , Rfl:      Allergies:  Review of patient's allergies indicates:   Allergen Reactions    Penicillins      rash       Physical Exam                    Physical Exam  Constitutional:       General: She is not in acute distress.     Appearance: Normal appearance. She is not ill-appearing, toxic-appearing or diaphoretic.   HENT:      Head: Normocephalic and atraumatic.   Pulmonary:      Effort: No respiratory distress.   Neurological:      General: No focal deficit present.      Mental Status: She is alert and oriented to person, place, and time.   Psychiatric:         Mood and Affect: Mood normal.         Behavior: Behavior normal.          Laboratory:  CBC:  Recent Labs   Lab 06/20/23  1738 08/04/23  0832 10/03/23  0926   WBC 5.65 4.51 5.31   RBC 3.62 L 3.83 L 4.07   Hemoglobin 10.3 L 10.6 L 11.4 L   Hematocrit 32.9 L 34.3 L 36.0 L   Platelets 201 173 158   MCV 91 90 89   MCH 28.5 27.7 28.0   MCHC 31.3 L 30.9 L 31.7 L       CMP:  Recent Labs   Lab 01/04/23  0954 06/02/23  1026 06/20/23  1738   Glucose 87 94 102   Calcium 9.3 9.3 9.8   Albumin 3.1 L 3.1 L 3.4 L   Total Protein 6.8 6.6 7.0   Sodium 139 138 140   Potassium 4.5 4.4 4.5   CO2 24 25 29   Chloride 107 105 105   BUN 14 21 19   Creatinine 0.8 0.8 0.8    Alkaline Phosphatase 118 109 126    H 66 H 51 H    H 67 H 57 H   Total Bilirubin 0.4 0.2 0.3           URINALYSIS:  Recent Labs   Lab 05/19/22  1046 08/13/22  1349 04/05/23  0938   Color, UA Colorless A Yellow Yellow   Specific Gravity, UA 1.005 1.010 1.010   pH, UA 7.0 8.0 7.0   Protein, UA Negative Negative Negative   Bacteria Rare  --   --    Nitrite, UA Negative Negative Negative   Leukocytes, UA Negative Negative Negative   Urobilinogen, UA Negative Negative  --         LIPIDS:  Recent Labs   Lab 11/05/21  1825 01/31/22  1225 08/13/22  1250 01/04/23  0954 06/02/23  1026   TSH 2.074  --  0.928  --  2.418   HDL  --  55  --  45  --    Cholesterol  --  129  --  96 L  --    Triglycerides  --  86  --  59  --    LDL Cholesterol  --  56.8 L  --  39.2 L  --    HDL/Cholesterol Ratio  --  42.6  --  46.9  --    Non-HDL Cholesterol  --  74  --  51  --    Total Cholesterol/HDL Ratio  --  2.3  --  2.1  --        TSH:  Recent Labs   Lab 11/05/21  1825 08/13/22  1250 06/02/23  1026   TSH 2.074 0.928 2.418       A1C:  Recent Labs   Lab 01/31/22  1225 01/04/23  0954   Hemoglobin A1C 4.8 4.8          Other:   Recent Labs   Lab 05/18/22  0627 05/23/22  1651 10/03/23  0926   Vitamin B-12 1925 H  --   --    Ferritin  --    < > 87   Iron  --    < > 61   Transferrin  --    < > 308   TIBC  --    < > 456 H   Saturated Iron  --    < > 13 L    < > = values in this interval not displayed.           Assessment/Plan     Nickie Segura is a 88 y.o.female with:    Memory deficit  -     RPR; Future; Expected date: 01/03/2024  -     TSH; Future; Expected date: 01/03/2024  -     Vitamin B12; Future; Expected date: 01/03/2024  -     Vitamin B1; Future; Expected date: 01/03/2024  -  Suspect likely vascular dementia.   Will check TSH, B1, B12, RPR.     Transaminitis  -     Comprehensive Metabolic Panel; Future; Expected date: 01/03/2024  - Repeat CMP.     Allergic rhinitis, unspecified seasonality, unspecified trigger  -     azelastine  (ASTELIN) 137 mcg (0.1 %) nasal spray; 2 sprays (274 mcg total) by Nasal route 2 (two) times daily.  Dispense: 90 mL; Refill: 1  - Rec resume astelin. Can add back NS irrigation if needed.     Anxiety  -     busPIRone (BUSPAR) 10 MG tablet; TAKE 1 TABLET BY MOUTH TWICE DAILY  Dispense: 180 tablet; Refill: 1  - Stable.  Cont current regimen.    Episode of recurrent major depressive disorder, unspecified depression episode severity   -     citalopram (CELEXA) 20 MG tablet; Take 1.5 tablets (30 mg total) by mouth once daily.  Dispense: 135 tablet; Refill: 2  - Stable.  Cont current regimen.    Sialorrhea  -     glycopyrrolate (ROBINUL) 1 mg Tab; TAKE 1 TABLET(1 MG) BY MOUTH THREE TIMES DAILY  Dispense: 90 tablet; Refill: 2  - Stable.  Cont current regimen.    Iron deficiency anemia, unspecified iron deficiency anemia type  - Stable. Has upcoming labs for H/O.    Hyperlipidemia, unspecified hyperlipidemia type  - Controlled.  Cont current.     Hypothyroidism, unspecified type  - Controlled.  Cont current.     OAB (overactive bladder)  - Stable.  Cont current regimen.    Hypertension, unspecified type  - Controlled.  Cont current.     IFG (impaired fasting glucose)  -     Hemoglobin A1C; Future; Expected date: 01/03/2024  - Stable.  Repeat Ha1c.     History of CVA (cerebrovascular accident)  - Stable.  Cont current regimen.  She uses a wheel chair and is unable to ambulate by herself. Her vision is poor. Rec she fu with Ophtho but she declines.        Chronic conditions status updated as per HPI.  Other than changes above, cont current medications and maintain follow up with specialists.   Follow up in about 4 months (around 5/3/2024) for fu chronic issues or sooner if needed.      Perla Hopper MD  [facility]

## 2024-01-03 ENCOUNTER — OFFICE VISIT (OUTPATIENT)
Dept: PRIMARY CARE CLINIC | Facility: CLINIC | Age: 89
End: 2024-01-03
Payer: MEDICARE

## 2024-01-03 DIAGNOSIS — R41.3 MEMORY DEFICIT: Primary | ICD-10-CM

## 2024-01-03 DIAGNOSIS — F33.9 EPISODE OF RECURRENT MAJOR DEPRESSIVE DISORDER, UNSPECIFIED DEPRESSION EPISODE SEVERITY: ICD-10-CM

## 2024-01-03 DIAGNOSIS — E03.9 HYPOTHYROIDISM, UNSPECIFIED TYPE: ICD-10-CM

## 2024-01-03 DIAGNOSIS — J30.9 ALLERGIC RHINITIS, UNSPECIFIED SEASONALITY, UNSPECIFIED TRIGGER: ICD-10-CM

## 2024-01-03 DIAGNOSIS — F41.9 ANXIETY: ICD-10-CM

## 2024-01-03 DIAGNOSIS — N32.81 OAB (OVERACTIVE BLADDER): ICD-10-CM

## 2024-01-03 DIAGNOSIS — D50.9 IRON DEFICIENCY ANEMIA, UNSPECIFIED IRON DEFICIENCY ANEMIA TYPE: ICD-10-CM

## 2024-01-03 DIAGNOSIS — K11.7 SIALORRHEA: ICD-10-CM

## 2024-01-03 DIAGNOSIS — Z86.73 HISTORY OF CVA (CEREBROVASCULAR ACCIDENT): ICD-10-CM

## 2024-01-03 DIAGNOSIS — R74.01 TRANSAMINITIS: ICD-10-CM

## 2024-01-03 DIAGNOSIS — E78.5 HYPERLIPIDEMIA, UNSPECIFIED HYPERLIPIDEMIA TYPE: ICD-10-CM

## 2024-01-03 DIAGNOSIS — R73.01 IFG (IMPAIRED FASTING GLUCOSE): ICD-10-CM

## 2024-01-03 DIAGNOSIS — I10 HYPERTENSION, UNSPECIFIED TYPE: ICD-10-CM

## 2024-01-03 PROBLEM — D70.9 NEUTROPENIA: Status: RESOLVED | Noted: 2022-05-17 | Resolved: 2024-01-03

## 2024-01-03 PROCEDURE — 99214 OFFICE O/P EST MOD 30 MIN: CPT | Mod: 95,,, | Performed by: INTERNAL MEDICINE

## 2024-01-03 RX ORDER — AZELASTINE 1 MG/ML
2 SPRAY, METERED NASAL 2 TIMES DAILY
Qty: 90 ML | Refills: 1 | Status: SHIPPED | OUTPATIENT
Start: 2024-01-03 | End: 2025-01-02

## 2024-01-03 RX ORDER — GLYCOPYRROLATE 1 MG/1
TABLET ORAL
Qty: 90 TABLET | Refills: 2 | Status: SHIPPED | OUTPATIENT
Start: 2024-01-03

## 2024-01-03 RX ORDER — BUSPIRONE HYDROCHLORIDE 10 MG/1
TABLET ORAL
Qty: 180 TABLET | Refills: 1 | Status: SHIPPED | OUTPATIENT
Start: 2024-01-03

## 2024-01-03 RX ORDER — CITALOPRAM 20 MG/1
30 TABLET, FILM COATED ORAL DAILY
Qty: 135 TABLET | Refills: 2 | Status: SHIPPED | OUTPATIENT
Start: 2024-01-03

## 2024-01-03 NOTE — PATIENT INSTRUCTIONS
Can yo please link my labs to her upcoming labss fro Dr. Gibson next wk  Also, I don;t see her insurance info.  We likely need to update this.

## 2024-01-11 ENCOUNTER — INFUSION (OUTPATIENT)
Dept: INFUSION THERAPY | Facility: HOSPITAL | Age: 89
End: 2024-01-11
Attending: INTERNAL MEDICINE
Payer: MEDICARE

## 2024-01-11 VITALS
SYSTOLIC BLOOD PRESSURE: 145 MMHG | TEMPERATURE: 98 F | HEART RATE: 71 BPM | DIASTOLIC BLOOD PRESSURE: 63 MMHG | OXYGEN SATURATION: 98 % | RESPIRATION RATE: 18 BRPM

## 2024-01-11 DIAGNOSIS — D50.8 OTHER IRON DEFICIENCY ANEMIA: Primary | ICD-10-CM

## 2024-01-11 PROCEDURE — A4216 STERILE WATER/SALINE, 10 ML: HCPCS | Mod: HCNC | Performed by: INTERNAL MEDICINE

## 2024-01-11 PROCEDURE — 96374 THER/PROPH/DIAG INJ IV PUSH: CPT | Mod: HCNC

## 2024-01-11 PROCEDURE — 25000003 PHARM REV CODE 250: Mod: HCNC | Performed by: INTERNAL MEDICINE

## 2024-01-11 PROCEDURE — 63600175 PHARM REV CODE 636 W HCPCS: Mod: HCNC | Performed by: INTERNAL MEDICINE

## 2024-01-11 RX ORDER — SODIUM CHLORIDE 0.9 % (FLUSH) 0.9 %
10 SYRINGE (ML) INJECTION
Status: DISCONTINUED | OUTPATIENT
Start: 2024-01-11 | End: 2024-01-11 | Stop reason: HOSPADM

## 2024-01-11 RX ADMIN — IRON SUCROSE 200 MG: 20 INJECTION, SOLUTION INTRAVENOUS at 09:01

## 2024-01-11 RX ADMIN — SODIUM CHLORIDE: 9 INJECTION, SOLUTION INTRAVENOUS at 09:01

## 2024-01-11 RX ADMIN — Medication 10 ML: at 09:01

## 2024-01-11 NOTE — PLAN OF CARE
Pt tolerated Venofer 200mg IV push over 5 min dose 3 of 4 well.  No adverse reaction noted. No complaints at this time. PIV flushed with NS and D/C per protocol. Patient left clinic in no acute distress.

## 2024-01-11 NOTE — PROGRESS NOTES
PATIENT: Nickie Segura  MRN: [id number]  DATE: 1/12/2024    Diagnosis:   1. Other iron deficiency anemia    2. Other neutropenia      Chief Complaint: Anemia    Oncologic History:      Oncologic History     Oncologic Treatment     Pathology       Subjective:    History of Present Illness: Ms. Segura is a 88 y.o. female who presents for evaluation and management of iron deficiency anemia and neutropenia. She had previously seen Dr. Denton.    - she received iron sucrose x 2 doses in February/March 2022.  - she was hospitalized in mid-May 2022 for symptomatic anemia and elevated liver enzymes. She responded to blood transfusion. She received two doses of iron sucrose during the hospitalization.  - she received iron sucrose x 4 doses in May/June 2022.  - she got labs with home health on 12/20/22 that revealed a hemoglobin of 6 g/dL.  - she received iron sucrose x 4 doses in December 2022 / January 2023.      Interval history:  - she presents for a follow-up appointment for her iron deficiency anemia and neutropenia  - she received iron sucrose monthly since June 2023.  - overall, she is doing okay. She endorses fatigue, weakness. She denies shortness of breath, chest pain, nausea, vomiting, diarrhea.      Past medical, surgical, family, and social histories have been reviewed and updated below.    Past Medical History:   Past Medical History:   Diagnosis Date    LORNA positive     Anxiety     Bilateral cataracts     Colitis     Colon polyp     COVID-19 09/2022    Depression     Elevated IgE level     Hepatomegaly     Hiatal hernia     Hypothyroidism     IFG (impaired fasting glucose)     Iron deficiency anemia     Macular degeneration     Nicotine dependence     Osteoarthritis     Recurrent UTI     Renal cyst     Skin cancer     Tuberculosis of bladder     Urinary incontinence     Vitamin B12 deficiency     Wheezing        Past Surgical History:   Past Surgical History:   Procedure Laterality Date    CHOLECYSTECTOMY          Family History:   Family History   Problem Relation Age of Onset    Heart disease Mother     Diabetes Mother     Heart disease Father     Polycystic kidney disease Father     Atrial fibrillation Sister        Social History:  reports that she has been smoking vaping with nicotine. She has never used smokeless tobacco. She reports that she does not drink alcohol and does not use drugs.    Allergies:  Review of patient's allergies indicates:   Allergen Reactions    Penicillins      rash       Medications:  Current Outpatient Medications   Medication Sig Dispense Refill    Al hyd-Mg tr-alg ac-sod bicarb (GAVISCON) 80-14.2 mg Chew Take 1 tablet by mouth once daily. (Patient taking differently: Take 2 tablets by mouth once daily.)      albuterol (VENTOLIN HFA) 90 mcg/actuation inhaler Inhale 1-2 puffs into the lungs every 6 (six) hours as needed for Wheezing or Shortness of Breath. Rescue 18 g 0    aspirin (ECOTRIN) 81 MG EC tablet Take 81 mg by mouth once daily.      azelastine (ASTELIN) 137 mcg (0.1 %) nasal spray 2 sprays (274 mcg total) by Nasal route 2 (two) times daily. 90 mL 1    busPIRone (BUSPAR) 10 MG tablet TAKE 1 TABLET BY MOUTH TWICE DAILY 180 tablet 1    citalopram (CELEXA) 20 MG tablet Take 1.5 tablets (30 mg total) by mouth once daily. 135 tablet 2    estradioL (ESTRACE) 0.01 % (0.1 mg/gram) vaginal cream Place 1 g vaginally 3 (three) times a week. (Patient taking differently: Place 1 g vaginally twice a week.) 12 g 2    glycopyrrolate (ROBINUL) 1 mg Tab TAKE 1 TABLET(1 MG) BY MOUTH THREE TIMES DAILY 90 tablet 2    levothyroxine (SYNTHROID) 100 MCG tablet TAKE 1 TABLET(100 MCG) BY MOUTH EVERY DAY 90 tablet 1    LORazepam (ATIVAN) 0.5 MG tablet TAKE 1 TABLET(0.5 MG) BY MOUTH DAILY AS NEEDED FOR ANXIETY 30 tablet 1    mirabegron (MYRBETRIQ) 50 mg Tb24 Take 1 tablet (50 mg total) by mouth once daily. 90 tablet 2    neomycin-polymyxin-dexamethasone (DEXACINE) 3.5 mg/g-10,000 unit/g-0.1 % Oint APPLY A  SMALL AMOUNT IN BOTH EYES TWICE DAILY      polyethylene glycol (GLYCOLAX) 17 gram/dose powder Take 17 g by mouth once daily. 510 g 3    rosuvastatin (CRESTOR) 20 MG tablet TAKE 1 TABLET(20 MG) BY MOUTH EVERY DAY 90 tablet 3    sodium chloride (OCEAN) 0.65 % nasal spray 1 spray DAILY (route: nasal)      vit C/E/zinc ox/amy/lut/zeax (ICAPS AREDS2 ORAL) Take by mouth Daily.       No current facility-administered medications for this visit.       Review of Systems   Constitutional:  Positive for fatigue.   HENT:  Negative for sore throat.    Eyes:  Positive for visual disturbance.   Respiratory:  Negative for cough and shortness of breath.    Cardiovascular:  Negative for chest pain.   Gastrointestinal:  Negative for abdominal pain, diarrhea, nausea and vomiting.   Genitourinary:  Negative for dysuria.   Musculoskeletal:  Negative for back pain.   Skin:  Negative for rash.   Neurological:  Positive for weakness. Negative for headaches.   Hematological:  Negative for adenopathy.   Psychiatric/Behavioral:  The patient is not nervous/anxious.        ECOG Performance Status:   ECOG SCORE 1            Objective:      Vitals:   Vitals:    01/12/24 1022   BP: 132/62   Pulse: 75   SpO2: (!) 93%     BMI: There is no height or weight on file to calculate BMI.    Physical Exam  Vitals and nursing note reviewed.   Constitutional:       Appearance: She is well-developed.   HENT:      Head: Normocephalic and atraumatic.   Eyes:      Pupils: Pupils are equal, round, and reactive to light.   Cardiovascular:      Rate and Rhythm: Normal rate and regular rhythm.   Pulmonary:      Effort: Pulmonary effort is normal.      Breath sounds: Normal breath sounds.   Abdominal:      General: Bowel sounds are normal.      Palpations: Abdomen is soft.   Musculoskeletal:         General: Normal range of motion.      Cervical back: Normal range of motion and neck supple.   Skin:     General: Skin is warm and dry.   Neurological:      Mental Status:  She is alert and oriented to person, place, and time.   Psychiatric:         Behavior: Behavior normal.         Thought Content: Thought content normal.         Judgment: Judgment normal.         Laboratory Data:  Labs have been reviewed.    Lab Results   Component Value Date    WBC 3.38 (L) 01/12/2024    HGB 11.6 (L) 01/12/2024    HCT 37.8 01/12/2024    MCV 92 01/12/2024     01/12/2024           Imaging:    Assessment:       1. Other iron deficiency anemia    2. Other neutropenia           Plan:     1. Other iron deficiency anemia  - I have reviewed her chart  - she received iron sucrose x 2 doses in February/March 2022  - Labs have been reviewed. Iron studies on 5/16/22 revealed an elevated total iron binding capacity, consistent with recurrent iron deficiency anemia.  - she was hospitalized in mid-May 2022 for symptomatic anemia and elevated liver enzymes. She responded to blood transfusion. She received two doses of iron sucrose during the hospitalization.  - labs (5/23/22) revealed an elevated total iron binding capacity, suggesting continued iron deficiency  - she received iron sucrose x 4 doses in May/June 2022.  - clinically, she did not notice a significant improvement in her symptoms.  - she got labs with home health on 12/20/22 that revealed a hemoglobin of 6 g/dL.  - iron studies (12/21/22) revealed an elevated total iron binding capacity, suggesting continued iron deficiency  - she received iron sucrose x 4 doses in December 2022 / January 2023. she received iron sucrose monthly since June 2023.  - Labs have been reviewed. Hemoglobin is 11.6 g/dL. Follow up iron studies.  - we will change frequency of iron infusions to every 2 months.  - repeat labs in 4 months  - return to clinic in 8 months with repeat labs.    2. Other neutropenia  - unclear etiology  - we discussed a bone marrow biopsy during her hospitalization previously. After discussion, she declined to proceed with it.    - repeat labs  in 4 months  - return to clinic in 8 months with repeat labs.    Louis Gibson M.D.  Hematology/Oncology  [facility] - [address], [address]  [place] [address]  Phone: [phone]  Fax: [phone]

## 2024-01-12 ENCOUNTER — OFFICE VISIT (OUTPATIENT)
Dept: HEMATOLOGY/ONCOLOGY | Facility: CLINIC | Age: 89
End: 2024-01-12
Payer: MEDICARE

## 2024-01-12 ENCOUNTER — LAB VISIT (OUTPATIENT)
Dept: LAB | Facility: HOSPITAL | Age: 89
End: 2024-01-12
Attending: INTERNAL MEDICINE
Payer: MEDICARE

## 2024-01-12 VITALS — DIASTOLIC BLOOD PRESSURE: 62 MMHG | SYSTOLIC BLOOD PRESSURE: 132 MMHG | OXYGEN SATURATION: 93 % | HEART RATE: 75 BPM

## 2024-01-12 DIAGNOSIS — D70.8 OTHER NEUTROPENIA: ICD-10-CM

## 2024-01-12 DIAGNOSIS — D50.8 OTHER IRON DEFICIENCY ANEMIA: ICD-10-CM

## 2024-01-12 DIAGNOSIS — D50.8 OTHER IRON DEFICIENCY ANEMIA: Primary | ICD-10-CM

## 2024-01-12 LAB
BASOPHILS # BLD AUTO: 0.05 K/UL (ref 0–0.2)
BASOPHILS NFR BLD: 1.5 % (ref 0–1.9)
DIFFERENTIAL METHOD BLD: ABNORMAL
EOSINOPHIL # BLD AUTO: 0.1 K/UL (ref 0–0.5)
EOSINOPHIL NFR BLD: 3 % (ref 0–8)
ERYTHROCYTE [DISTWIDTH] IN BLOOD BY AUTOMATED COUNT: 17.2 % (ref 11.5–14.5)
FERRITIN SERPL-MCNC: 245 NG/ML (ref 20–300)
HCT VFR BLD AUTO: 37.8 % (ref 37–48.5)
HGB BLD-MCNC: 11.6 G/DL (ref 12–16)
IMM GRANULOCYTES # BLD AUTO: 0.01 K/UL (ref 0–0.04)
IMM GRANULOCYTES NFR BLD AUTO: 0.3 % (ref 0–0.5)
IRON SERPL-MCNC: 217 UG/DL (ref 30–160)
LYMPHOCYTES # BLD AUTO: 1.1 K/UL (ref 1–4.8)
LYMPHOCYTES NFR BLD: 32 % (ref 18–48)
MCH RBC QN AUTO: 28.3 PG (ref 27–31)
MCHC RBC AUTO-ENTMCNC: 30.7 G/DL (ref 32–36)
MCV RBC AUTO: 92 FL (ref 82–98)
MONOCYTES # BLD AUTO: 0.4 K/UL (ref 0.3–1)
MONOCYTES NFR BLD: 11.8 % (ref 4–15)
NEUTROPHILS # BLD AUTO: 1.7 K/UL (ref 1.8–7.7)
NEUTROPHILS NFR BLD: 51.4 % (ref 38–73)
NRBC BLD-RTO: 0 /100 WBC
PLATELET # BLD AUTO: 173 K/UL (ref 150–450)
PMV BLD AUTO: 9.7 FL (ref 9.2–12.9)
RBC # BLD AUTO: 4.1 M/UL (ref 4–5.4)
SATURATED IRON: 48 % (ref 20–50)
TOTAL IRON BINDING CAPACITY: 450 UG/DL (ref 250–450)
TRANSFERRIN SERPL-MCNC: 304 MG/DL (ref 200–375)
WBC # BLD AUTO: 3.38 K/UL (ref 3.9–12.7)

## 2024-01-12 PROCEDURE — 83540 ASSAY OF IRON: CPT | Mod: HCNC | Performed by: INTERNAL MEDICINE

## 2024-01-12 PROCEDURE — 99999 PR PBB SHADOW E&M-EST. PATIENT-LVL III: CPT | Mod: PBBFAC,HCNC,, | Performed by: INTERNAL MEDICINE

## 2024-01-12 PROCEDURE — 85025 COMPLETE CBC W/AUTO DIFF WBC: CPT | Mod: HCNC | Performed by: INTERNAL MEDICINE

## 2024-01-12 PROCEDURE — 3288F FALL RISK ASSESSMENT DOCD: CPT | Mod: HCNC,CPTII,S$GLB, | Performed by: INTERNAL MEDICINE

## 2024-01-12 PROCEDURE — 1157F ADVNC CARE PLAN IN RCRD: CPT | Mod: HCNC,CPTII,S$GLB, | Performed by: INTERNAL MEDICINE

## 2024-01-12 PROCEDURE — 1160F RVW MEDS BY RX/DR IN RCRD: CPT | Mod: HCNC,CPTII,S$GLB, | Performed by: INTERNAL MEDICINE

## 2024-01-12 PROCEDURE — 36415 COLL VENOUS BLD VENIPUNCTURE: CPT | Mod: HCNC | Performed by: INTERNAL MEDICINE

## 2024-01-12 PROCEDURE — 99214 OFFICE O/P EST MOD 30 MIN: CPT | Mod: HCNC,S$GLB,, | Performed by: INTERNAL MEDICINE

## 2024-01-12 PROCEDURE — 1159F MED LIST DOCD IN RCRD: CPT | Mod: HCNC,CPTII,S$GLB, | Performed by: INTERNAL MEDICINE

## 2024-01-12 PROCEDURE — 82728 ASSAY OF FERRITIN: CPT | Mod: HCNC | Performed by: INTERNAL MEDICINE

## 2024-01-12 PROCEDURE — 1101F PT FALLS ASSESS-DOCD LE1/YR: CPT | Mod: HCNC,CPTII,S$GLB, | Performed by: INTERNAL MEDICINE

## 2024-01-12 RX ORDER — HEPARIN 100 UNIT/ML
500 SYRINGE INTRAVENOUS
OUTPATIENT
Start: 2024-03-15

## 2024-01-12 RX ORDER — HEPARIN 100 UNIT/ML
500 SYRINGE INTRAVENOUS
OUTPATIENT
Start: 2024-03-01

## 2024-01-12 RX ORDER — EPINEPHRINE 0.3 MG/.3ML
0.3 INJECTION SUBCUTANEOUS ONCE AS NEEDED
OUTPATIENT
Start: 2024-04-19

## 2024-01-12 RX ORDER — DIPHENHYDRAMINE HYDROCHLORIDE 50 MG/ML
50 INJECTION, SOLUTION INTRAMUSCULAR; INTRAVENOUS ONCE AS NEEDED
OUTPATIENT
Start: 2024-04-05

## 2024-01-12 RX ORDER — EPINEPHRINE 0.3 MG/.3ML
0.3 INJECTION SUBCUTANEOUS ONCE AS NEEDED
OUTPATIENT
Start: 2024-03-15

## 2024-01-12 RX ORDER — SODIUM CHLORIDE 0.9 % (FLUSH) 0.9 %
10 SYRINGE (ML) INJECTION
OUTPATIENT
Start: 2024-03-08

## 2024-01-12 RX ORDER — EPINEPHRINE 0.3 MG/.3ML
0.3 INJECTION SUBCUTANEOUS ONCE AS NEEDED
OUTPATIENT
Start: 2024-04-05

## 2024-01-12 RX ORDER — HEPARIN 100 UNIT/ML
500 SYRINGE INTRAVENOUS
OUTPATIENT
Start: 2024-04-19

## 2024-01-12 RX ORDER — SODIUM CHLORIDE 0.9 % (FLUSH) 0.9 %
10 SYRINGE (ML) INJECTION
OUTPATIENT
Start: 2024-04-12

## 2024-01-12 RX ORDER — EPINEPHRINE 0.3 MG/.3ML
0.3 INJECTION SUBCUTANEOUS ONCE AS NEEDED
OUTPATIENT
Start: 2024-04-12

## 2024-01-12 RX ORDER — SODIUM CHLORIDE 0.9 % (FLUSH) 0.9 %
10 SYRINGE (ML) INJECTION
OUTPATIENT
Start: 2024-04-19

## 2024-01-12 RX ORDER — EPINEPHRINE 0.3 MG/.3ML
0.3 INJECTION SUBCUTANEOUS ONCE AS NEEDED
OUTPATIENT
Start: 2024-03-01

## 2024-01-12 RX ORDER — DIPHENHYDRAMINE HYDROCHLORIDE 50 MG/ML
50 INJECTION, SOLUTION INTRAMUSCULAR; INTRAVENOUS ONCE AS NEEDED
OUTPATIENT
Start: 2024-03-29

## 2024-01-12 RX ORDER — SODIUM CHLORIDE 0.9 % (FLUSH) 0.9 %
10 SYRINGE (ML) INJECTION
OUTPATIENT
Start: 2024-03-29

## 2024-01-12 RX ORDER — DIPHENHYDRAMINE HYDROCHLORIDE 50 MG/ML
50 INJECTION, SOLUTION INTRAMUSCULAR; INTRAVENOUS ONCE AS NEEDED
OUTPATIENT
Start: 2024-04-19

## 2024-01-12 RX ORDER — DIPHENHYDRAMINE HYDROCHLORIDE 50 MG/ML
50 INJECTION, SOLUTION INTRAMUSCULAR; INTRAVENOUS ONCE AS NEEDED
OUTPATIENT
Start: 2024-03-22

## 2024-01-12 RX ORDER — HEPARIN 100 UNIT/ML
500 SYRINGE INTRAVENOUS
OUTPATIENT
Start: 2024-03-08

## 2024-01-12 RX ORDER — HEPARIN 100 UNIT/ML
500 SYRINGE INTRAVENOUS
OUTPATIENT
Start: 2024-03-22

## 2024-01-12 RX ORDER — DIPHENHYDRAMINE HYDROCHLORIDE 50 MG/ML
50 INJECTION, SOLUTION INTRAMUSCULAR; INTRAVENOUS ONCE AS NEEDED
OUTPATIENT
Start: 2024-04-12

## 2024-01-12 RX ORDER — EPINEPHRINE 0.3 MG/.3ML
0.3 INJECTION SUBCUTANEOUS ONCE AS NEEDED
OUTPATIENT
Start: 2024-03-29

## 2024-01-12 RX ORDER — DIPHENHYDRAMINE HYDROCHLORIDE 50 MG/ML
50 INJECTION, SOLUTION INTRAMUSCULAR; INTRAVENOUS ONCE AS NEEDED
OUTPATIENT
Start: 2024-03-01

## 2024-01-12 RX ORDER — SODIUM CHLORIDE 0.9 % (FLUSH) 0.9 %
10 SYRINGE (ML) INJECTION
OUTPATIENT
Start: 2024-03-22

## 2024-01-12 RX ORDER — HEPARIN 100 UNIT/ML
500 SYRINGE INTRAVENOUS
OUTPATIENT
Start: 2024-04-05

## 2024-01-12 RX ORDER — SODIUM CHLORIDE 0.9 % (FLUSH) 0.9 %
10 SYRINGE (ML) INJECTION
OUTPATIENT
Start: 2024-03-01

## 2024-01-12 RX ORDER — SODIUM CHLORIDE 0.9 % (FLUSH) 0.9 %
10 SYRINGE (ML) INJECTION
OUTPATIENT
Start: 2024-03-15

## 2024-01-12 RX ORDER — HEPARIN 100 UNIT/ML
500 SYRINGE INTRAVENOUS
OUTPATIENT
Start: 2024-03-29

## 2024-01-12 RX ORDER — DIPHENHYDRAMINE HYDROCHLORIDE 50 MG/ML
50 INJECTION, SOLUTION INTRAMUSCULAR; INTRAVENOUS ONCE AS NEEDED
OUTPATIENT
Start: 2024-03-08

## 2024-01-12 RX ORDER — DIPHENHYDRAMINE HYDROCHLORIDE 50 MG/ML
50 INJECTION, SOLUTION INTRAMUSCULAR; INTRAVENOUS ONCE AS NEEDED
OUTPATIENT
Start: 2024-03-15

## 2024-01-12 RX ORDER — EPINEPHRINE 0.3 MG/.3ML
0.3 INJECTION SUBCUTANEOUS ONCE AS NEEDED
OUTPATIENT
Start: 2024-03-22

## 2024-01-12 RX ORDER — EPINEPHRINE 0.3 MG/.3ML
0.3 INJECTION SUBCUTANEOUS ONCE AS NEEDED
OUTPATIENT
Start: 2024-03-08

## 2024-01-12 RX ORDER — HEPARIN 100 UNIT/ML
500 SYRINGE INTRAVENOUS
OUTPATIENT
Start: 2024-04-12

## 2024-01-12 RX ORDER — SODIUM CHLORIDE 0.9 % (FLUSH) 0.9 %
10 SYRINGE (ML) INJECTION
OUTPATIENT
Start: 2024-04-05

## 2024-01-12 NOTE — Clinical Note
I'd like to change her iron frequency to every 2 months (from monthly). So can you schedule in March, April, July, September?  Thanks!

## 2024-01-18 ENCOUNTER — TELEPHONE (OUTPATIENT)
Dept: INFUSION THERAPY | Facility: HOSPITAL | Age: 89
End: 2024-01-18
Payer: MEDICARE

## 2024-01-19 ENCOUNTER — TELEPHONE (OUTPATIENT)
Dept: INFUSION THERAPY | Facility: HOSPITAL | Age: 89
End: 2024-01-19
Payer: MEDICARE

## 2024-01-19 NOTE — TELEPHONE ENCOUNTER
(2nd attempt) called the pt's sister to confirm infusion appts. No answer. Left message on voicemail notifying the pt's sister that Dr. Gibson wanted the patient to be scheduled every other month starting in March. First appt on 3/12 at 2:30p. Left my call back number should the pt's sister have questions or need to reschedule

## 2024-01-23 ENCOUNTER — TELEPHONE (OUTPATIENT)
Dept: INFUSION THERAPY | Facility: HOSPITAL | Age: 89
End: 2024-01-23
Payer: MEDICARE

## 2024-01-23 NOTE — TELEPHONE ENCOUNTER
Returned call to the patient's sister Brittaney. Pt's sister requested to move dose 1-3 to Fridays. And keep dose 4 on Thursday 9/12 with labs and office visit. Confirmed rescheduled infusion for   3/15 at 10a  5/17 at 10a  7/12 at 10a

## 2024-02-06 DIAGNOSIS — F41.9 ANXIETY: ICD-10-CM

## 2024-02-06 RX ORDER — LORAZEPAM 0.5 MG/1
0.5 TABLET ORAL DAILY PRN
Qty: 30 TABLET | Refills: 2 | Status: SHIPPED | OUTPATIENT
Start: 2024-02-06 | End: 2024-05-13 | Stop reason: SDUPTHER

## 2024-02-06 NOTE — TELEPHONE ENCOUNTER
No care due was identified.  [facility] Embedded Care Due Messages. Reference number: [id number].   2/06/2024 4:20:34 PM CST

## 2024-03-01 ENCOUNTER — PATIENT MESSAGE (OUTPATIENT)
Dept: PRIMARY CARE CLINIC | Facility: CLINIC | Age: 89
End: 2024-03-01
Payer: MEDICARE

## 2024-03-01 DIAGNOSIS — R53.81 DEBILITY: Primary | ICD-10-CM

## 2024-03-01 NOTE — TELEPHONE ENCOUNTER
Pt sister requesting order for a light weight wheelchair with big wheels and a high seat to assist with transferring and getting  pt around.

## 2024-03-15 ENCOUNTER — INFUSION (OUTPATIENT)
Dept: INFUSION THERAPY | Facility: HOSPITAL | Age: 89
End: 2024-03-15
Attending: INTERNAL MEDICINE
Payer: MEDICARE

## 2024-03-15 VITALS
SYSTOLIC BLOOD PRESSURE: 126 MMHG | OXYGEN SATURATION: 98 % | RESPIRATION RATE: 18 BRPM | HEART RATE: 65 BPM | TEMPERATURE: 99 F | DIASTOLIC BLOOD PRESSURE: 58 MMHG

## 2024-03-15 DIAGNOSIS — D50.8 OTHER IRON DEFICIENCY ANEMIA: Primary | ICD-10-CM

## 2024-03-15 PROCEDURE — 25000003 PHARM REV CODE 250: Mod: HCNC | Performed by: INTERNAL MEDICINE

## 2024-03-15 PROCEDURE — A4216 STERILE WATER/SALINE, 10 ML: HCPCS | Mod: HCNC | Performed by: INTERNAL MEDICINE

## 2024-03-15 PROCEDURE — 96374 THER/PROPH/DIAG INJ IV PUSH: CPT | Mod: HCNC

## 2024-03-15 PROCEDURE — 63600175 PHARM REV CODE 636 W HCPCS: Mod: HCNC | Performed by: INTERNAL MEDICINE

## 2024-03-15 RX ORDER — DIPHENHYDRAMINE HYDROCHLORIDE 50 MG/ML
50 INJECTION INTRAMUSCULAR; INTRAVENOUS ONCE AS NEEDED
Status: DISCONTINUED | OUTPATIENT
Start: 2024-03-15 | End: 2024-03-15 | Stop reason: HOSPADM

## 2024-03-15 RX ORDER — SODIUM CHLORIDE 0.9 % (FLUSH) 0.9 %
10 SYRINGE (ML) INJECTION
Status: DISCONTINUED | OUTPATIENT
Start: 2024-03-15 | End: 2024-03-15 | Stop reason: HOSPADM

## 2024-03-15 RX ORDER — EPINEPHRINE 0.3 MG/.3ML
0.3 INJECTION SUBCUTANEOUS ONCE AS NEEDED
Status: DISCONTINUED | OUTPATIENT
Start: 2024-03-15 | End: 2024-03-15 | Stop reason: HOSPADM

## 2024-03-15 RX ADMIN — Medication 10 ML: at 10:03

## 2024-03-15 RX ADMIN — IRON SUCROSE 200 MG: 20 INJECTION, SOLUTION INTRAVENOUS at 10:03

## 2024-03-15 RX ADMIN — SODIUM CHLORIDE: 9 INJECTION, SOLUTION INTRAVENOUS at 10:03

## 2024-03-15 NOTE — PLAN OF CARE
Problem: Anemia  Goal: Anemia Symptom Improvement  Outcome: Ongoing, Progressing  Intervention: Monitor and Manage Anemia  Flowsheets (Taken 3/15/2024 1004)  Fatigue Management: (venofer) other (see comments)

## 2024-03-15 NOTE — NURSING
Pt tolerated venofer infusion well.  No adverse reaction noted.   IV flushed with NS and D/C per protocol.  Patient left clinic in no acute distress via wheelchair with sister.   unknown

## 2024-04-29 ENCOUNTER — PATIENT MESSAGE (OUTPATIENT)
Dept: PRIMARY CARE CLINIC | Facility: CLINIC | Age: 89
End: 2024-04-29
Payer: MEDICARE

## 2024-05-20 ENCOUNTER — TELEPHONE (OUTPATIENT)
Dept: INFUSION THERAPY | Facility: HOSPITAL | Age: 89
End: 2024-05-20
Payer: MEDICARE

## 2024-05-20 NOTE — TELEPHONE ENCOUNTER
Pt's relative called infusion center to reschedule cancelled iv iron appointment this past Friday 5/17. Pt rescheduled to 5/28/24 at 1:30

## 2024-05-28 ENCOUNTER — INFUSION (OUTPATIENT)
Dept: INFUSION THERAPY | Facility: HOSPITAL | Age: 89
End: 2024-05-28
Attending: INTERNAL MEDICINE
Payer: MEDICARE

## 2024-05-28 ENCOUNTER — TELEPHONE (OUTPATIENT)
Dept: INFUSION THERAPY | Facility: HOSPITAL | Age: 89
End: 2024-05-28

## 2024-05-28 VITALS
TEMPERATURE: 98 F | OXYGEN SATURATION: 98 % | SYSTOLIC BLOOD PRESSURE: 98 MMHG | DIASTOLIC BLOOD PRESSURE: 55 MMHG | RESPIRATION RATE: 18 BRPM | HEART RATE: 70 BPM

## 2024-05-28 DIAGNOSIS — D50.8 OTHER IRON DEFICIENCY ANEMIA: Primary | ICD-10-CM

## 2024-05-28 PROCEDURE — 25000003 PHARM REV CODE 250: Mod: HCNC | Performed by: INTERNAL MEDICINE

## 2024-05-28 PROCEDURE — A4216 STERILE WATER/SALINE, 10 ML: HCPCS | Mod: HCNC | Performed by: INTERNAL MEDICINE

## 2024-05-28 PROCEDURE — 63600175 PHARM REV CODE 636 W HCPCS: Mod: HCNC | Performed by: INTERNAL MEDICINE

## 2024-05-28 PROCEDURE — 96374 THER/PROPH/DIAG INJ IV PUSH: CPT | Mod: HCNC

## 2024-05-28 RX ORDER — SODIUM CHLORIDE 0.9 % (FLUSH) 0.9 %
10 SYRINGE (ML) INJECTION
Status: DISCONTINUED | OUTPATIENT
Start: 2024-05-28 | End: 2024-05-28 | Stop reason: HOSPADM

## 2024-05-28 RX ORDER — DIPHENHYDRAMINE HYDROCHLORIDE 50 MG/ML
50 INJECTION INTRAMUSCULAR; INTRAVENOUS ONCE AS NEEDED
Status: DISCONTINUED | OUTPATIENT
Start: 2024-05-28 | End: 2024-05-28 | Stop reason: HOSPADM

## 2024-05-28 RX ORDER — EPINEPHRINE 0.3 MG/.3ML
0.3 INJECTION SUBCUTANEOUS ONCE AS NEEDED
Status: DISCONTINUED | OUTPATIENT
Start: 2024-05-28 | End: 2024-05-28 | Stop reason: HOSPADM

## 2024-05-28 RX ADMIN — SODIUM CHLORIDE: 9 INJECTION, SOLUTION INTRAVENOUS at 01:05

## 2024-05-28 RX ADMIN — IRON SUCROSE 200 MG: 20 INJECTION, SOLUTION INTRAVENOUS at 01:05

## 2024-05-28 RX ADMIN — Medication 10 ML: at 01:05

## 2024-05-28 NOTE — TELEPHONE ENCOUNTER
Pt's sister called to request office visit Dr. Gibson with labs on same day as iron infusion in July. Confirmed dates and times with Brittaney

## 2024-05-30 ENCOUNTER — PATIENT MESSAGE (OUTPATIENT)
Dept: HEMATOLOGY/ONCOLOGY | Facility: CLINIC | Age: 89
End: 2024-05-30
Payer: MEDICARE

## 2024-05-31 ENCOUNTER — TELEPHONE (OUTPATIENT)
Dept: INFUSION THERAPY | Facility: HOSPITAL | Age: 89
End: 2024-05-31
Payer: MEDICARE

## 2024-05-31 NOTE — TELEPHONE ENCOUNTER
----- Message from Louis Gibson MD sent at 5/30/2024  9:31 PM CDT -----  Good evening,    Can we change her iron infusions to monthly, instead of every two months? Looks like her anemia has worsened.    Thanks!  louis

## 2024-06-09 NOTE — PROGRESS NOTES
[facility] Note    Chief Complaint      Chief Complaint   Patient presents with    Follow-up       History of Present Illness      Nickie Segura is a 89 y.o.   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  Last virtual visit - 1/3/24    Cough x 2 wks - Cont to monitor.  Rec Astelin.  Suspect due to Postnasal drip.      Memory deficit - She reports her speech is worse with word finding and memory. Suspect likely vascular dementia.   Has order to TSH, B1, B12, RPR - will get today.      Inc Drooling - Stable on glycopyrrloate. She uses it once a day and has helped. TID made her too dried out.       Debility -  Pt is wheel chair bound. She requires assistance.  She is able feed herself but can sometimes make a mess due to her visual impairment.  Needs to continue HEP. She uses a wheel chair and is unable to ambulate by herself. She has assistance form an aid 6 times/d to transfer to toilet. Her vision is poor. Rec she fu with Ophtho but she declines.      Transaminitis - Improved. AST/ALT -51/57 down from 67/66 down  and from prev  368/235. H/O  ordered an ultrasound and put in referral to liver specialist. Abd U/S 8/9/22 - Liver normal in size.  Incidental right renal cysts. Lgst - 2.5 cm. Repeat LFt's.      Iron deficiency Anemia - mixed picture of Iron deficiency and anemia of chronic inflammation.   Fu by H/O.  FOBT - neg.  Her vitamin b12  1925 up from 373.  Repeat B12. Pt is on OTC Vitamin B12 1000 mcg daily. Her folate was normal.  Did not mary jo Oral iron and now on  IV iron (3/10/22, 3/21/22, 1/6/23, 1/13/23, 1/20/23, 2/3/23; 6/9/23; 7/7/23; 8/4/23; 9/1/23; 11/16/23; 12/14/23,  1/11/24, 3/15/24, 5/28/24).).  Now fu by Dr. Gibson.  5/23/22 s/p 2 uPRBC's and iron IV.   So she got transfused on 12/23/22.  She does not feel symptomatic. She is  "on Iron IV Q month.  Labs showed dec H/H (8.2/27.5-5/28/24)  so was moved from Q 2 mos to Q month. Fu with H/O - Sept.      AR - Rec resume Allegra and Astelin. +rhinorrhea.      Leukopenia  4.01 - stable - F/u H/O.  She declined a Bone Marrow Bx. Has fu in Oct.      H/o CVA - ED 11/5/21 - facial droop, RT arm, and leg weakness - stroke vs TIA.  She left ER before an MRI brain was done.  So unsure if had a stroke vs TIA. She is on ASA, statin.  She has residual Rt facial droop, numbness in her RT fingers, and RLE weakness.       Recurrent UTI- Prev Fu by Dr. Smith.  Stable on vaginal cream. Pt  on Myrbetriq and  is off Toviaz for OAB. Doing well.       OAB - Pt on Myrbetriq. Toviaz too expensive.      HTN- Controlled off meds.  Continue low-sodium diet.     HLD - Controlled on Crestor 20 mg QHS.  Her cholesterol is controlled - TC 96 TG 59 HDL 45 LDL 39 - 1/4/23.      Hypothyroidism-  Controlled on levothyroxine 100 mcg daily. Repeat TFT's      Anxiety-When on Wellbutrin she noticed her skin is sensitive to touch. Pt started noticing this after 2-3 day on the medication. Pt on Buspar 10 mg BID, and Celexa 30 mg/d.   Pt takes Ativan 0.5 mg rarely prn but feels it is the only thing that helps. She saw LCSWMikaela, 1/10/22 and1/18/22. She found this helpful.  We recently inc to 10 mg BID  As d/w Dr. Glass. She has started going to lunch again  But it makes her anxious because she can't see and there are 60 people in the lunch room. She gets anxious talking about hurricanes. "The Ativan is a lifesaver and she is getting by with this. 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      Depression- Pt has always struggled with depression and anxiety.  Not worse due to pandemic or recent stroke She is upset about her failing eye sight.   Pt on Celexa 30 mg daily and buspar 10 mg po BID.  "I'm just down and am not motivated to do anything like get dressed or pay my bills on time". Tried adding Trazodone 50 mg 1/2 QHS - no help and felt more " "anxious on it.  She did not tolerate Bupropion 75 mg BID.  Her vision and memory are worsening which concerns her.  "Maybe a little better".      Wheezing-Pt has ProAir which she uses prn - infrequently - has not used in several mos.  PFTs-WNL-7/14/2015. Doing well with getting rid of tobacco.     Nicotine dependence - Pt smokes E-cigarette for the past 3 yrs without tobacco.  Pt aware of the dangers.  She quit cigarette 07/05/2016.  She still vapes but less frequently. "It's the only thing she gets enjoyment from". She is not interested in quitting.      Hiatal hernia-Stable on [place] daily prn- doing well.        Colon polyps - Pt is s/p partial colon resection '09  CRS Dr. Gibson.  GI -Dr. Giron.  Last C scope-'13.      Hyperkalemia - Resolved - 4.5. Rec low potassium diet. She is not taking any Potassium supplementation.   Do not feel pt would tolerate even low dose furosemide 10 mg Q M,W,F.       IFG - Ha1c -4.8.  Repeat Ha1c. She has been eating more sweets.       Hepatomegaly - 23 cm.  Fu by Dr. Giron.  Resolved.       OA - Preston knees - RT > Left. Rec glucosamine or turmeric. Rt Knee gives out.  She uses a wheelchair.      Renal cysts - + fam h/o PCKD.  ? Angiomyolipoma on Left Kidney.  Prev fu by Dr. Smith.      Obesity - BMI - 29.31- Rec low carb diet. She "tries". She eats TID meals most days and if misses a meal supplements with ensure.      Elev IGE - 742 ? Etiol.  No allergic Sx's.  ? Eos Esophagitis.      Aortic atherosclerosis - Tortuosity of the thoracic aorta with aortic atherosclerosis seen on CXR in Oct.  Prev fu by Dr. Nguyen Melgar. Fu by Dr. Kamran Melgar.  Cont ASA and Crestor.   Echo - 2/21/22 - EF - 65%; Mild LAE, Mod AR, Mod Aortic stenosis    +LORNA - neg [place].      Acc by sister Brittaney      HCM - Flu - 10/12/23; RSV - ? At [facility]; Td - > 10 yrs ago; PCV 13 - 2/21/17;  PVX 23 - 1/8/15;  Shingrix - ?- pt defers; COVID -19 Vaccine -#1 - 2/5/21; #2 - 3/5/21; #3 - 11/7/21; # 4 " 10/12/23;  MGM - refuses;  DEXA - declined; C-scope - '13- no longer gets. ; GI - Dr. Giron; Retina Sp - Dr. Gardner; Derm - Dr. Joseph; Ophtho - Dr. Calderon/Dr. Melendrez; Cards - Dr. Manley;  H/O - Dr. Gibson     Patient Care Team:  Perla Hopper MD as PCP - General (Internal Medicine)  Markus Giron MD as Consulting Physician (Gastroenterology)  Deepali Joseph MD as Consulting Physician (Dermatology)  Opal Aburto II, MD (Ophthalmology)  Edith Vega MA as Care Coordinator  Carlene Youngblood LCSW as  (Licensed Clinical )     Health Maintenance:  Immunization History   Administered Date(s) Administered    COVID-19, MRNA, LN-S, PF (MODERNA FULL 0.5 ML DOSE) 02/05/2021, 03/05/2021, 11/07/2021    Influenza 12/03/2004, 09/30/2007, 10/06/2009, 10/01/2010, 11/28/2015, 11/15/2016    Influenza (FLUAD) - Quadrivalent - Adjuvanted - PF *Preferred* (65+) 09/23/2020    Influenza (FLUAD) - Trivalent - Adjuvanted - PF (65+) 09/04/2019    Influenza - Quadrivalent - High Dose - PF (65 years and older) 10/04/2021    Influenza - Trivalent (ADULT) 12/03/2004, 10/06/2009, 10/01/2010    Pneumococcal Conjugate - 13 Valent 02/21/2017    Pneumococcal Polysaccharide - 23 Valent 09/30/2007, 01/08/2015      Health Maintenance   Topic Date Due    TETANUS VACCINE  Never done    Shingles Vaccine (1 of 2) Never done    Lipid Panel  01/04/2028        Past Medical History:  Past Medical History:   Diagnosis Date    LORNA positive     Anxiety     Bilateral cataracts     Colitis     Colon polyp     COVID-19 09/2022    Depression     Elevated IgE level     Hepatomegaly     Hiatal hernia     Hypothyroidism     IFG (impaired fasting glucose)     Iron deficiency anemia     Macular degeneration     Nicotine dependence     Osteoarthritis     Recurrent UTI     Renal cyst     Skin cancer     Tuberculosis of bladder     Urinary incontinence     Vitamin B12 deficiency     Wheezing        Past Surgical  History:   has a past surgical history that includes Cholecystectomy.    Family History:  family history includes Atrial fibrillation in her sister; Diabetes in her mother; Heart disease in her father and mother; Polycystic kidney disease in her father.     Social History:  Social History     Tobacco Use    Smoking status: Every Day     Types: Vaping with nicotine    Smokeless tobacco: Never    Tobacco comments:     Formerly smoked cigarettes.   Substance Use Topics    Alcohol use: Never    Drug use: Never       Review of Systems   Constitutional:  Negative for chills, diaphoresis and fever.   HENT:  Positive for postnasal drip. Negative for nasal congestion and sore throat.         She will start using her astelin.    Respiratory:  Positive for cough. Negative for shortness of breath and wheezing.    Gastrointestinal:  Positive for constipation and vomiting. Negative for abdominal pain, diarrhea, nausea and reflux.        Oes well on Miralax. Emesis x 1 3-4 days ago.    Endocrine: Positive for cold intolerance. Negative for heat intolerance and polydipsia.   Genitourinary:  Positive for bladder incontinence. Negative for dysuria and frequency.   Musculoskeletal:  Negative for arthralgias and myalgias.   Neurological:  Negative for dizziness and headaches.   Psychiatric/Behavioral:  Positive for dysphoric mood. The patient is nervous/anxious.         Anxiety > Depression        Medications:    Current Outpatient Medications:     aspirin (ECOTRIN) 81 MG EC tablet, Take 81 mg by mouth once daily., Disp: , Rfl:     calcium carbonate (TUMS) 200 mg calcium (500 mg) chewable tablet, Take 1 tablet by mouth once daily., Disp: , Rfl:     citalopram (CELEXA) 20 MG tablet, Take 1.5 tablets (30 mg total) by mouth once daily., Disp: 135 tablet, Rfl: 2    LORazepam (ATIVAN) 0.5 MG tablet, Take 1 tablet (0.5 mg total) by mouth daily as needed for Anxiety., Disp: 30 tablet, Rfl: 2    neomycin-polymyxin-dexamethasone (DEXACINE) 3.5  "mg/g-10,000 unit/g-0.1 % Oint, APPLY A SMALL AMOUNT IN BOTH EYES TWICE DAILY, Disp: , Rfl:     polyethylene glycol (GLYCOLAX) 17 gram/dose powder, Take 17 g by mouth once daily., Disp: 510 g, Rfl: 3    rosuvastatin (CRESTOR) 20 MG tablet, TAKE 1 TABLET(20 MG) BY MOUTH EVERY DAY, Disp: 90 tablet, Rfl: 3    sodium chloride (OCEAN) 0.65 % nasal spray, 1 spray DAILY (route: nasal), Disp: , Rfl:     vit C/E/zinc ox/amy/lut/zeax (ICAPS AREDS2 ORAL), Take by mouth Daily., Disp: , Rfl:     Al hyd-Mg tr-alg ac-sod bicarb (GAVISCON) 80-14.2 mg Chew, Take 1 tablet by mouth once daily. (Patient taking differently: Take 2 tablets by mouth once daily.), Disp: , Rfl:     albuterol (VENTOLIN HFA) 90 mcg/actuation inhaler, Inhale 1-2 puffs into the lungs every 6 (six) hours as needed for Wheezing or Shortness of Breath. Rescue, Disp: 18 g, Rfl: 0    azelastine (ASTELIN) 137 mcg (0.1 %) nasal spray, 2 sprays (274 mcg total) by Nasal route 2 (two) times daily., Disp: 90 mL, Rfl: 1    busPIRone (BUSPAR) 10 MG tablet, TAKE 1 TABLET BY MOUTH TWICE DAILY, Disp: 180 tablet, Rfl: 1    estradioL (ESTRACE) 0.01 % (0.1 mg/gram) vaginal cream, Place 1 g vaginally twice a week., Disp: 8 g, Rfl: 2    glycopyrrolate (ROBINUL) 1 mg Tab, TAKE 1 TABLET(1 MG) BY MOUTH THREE TIMES DAILY, Disp: 90 tablet, Rfl: 2    levothyroxine (SYNTHROID) 100 MCG tablet, Take 1 tablet (100 mcg total) by mouth before breakfast., Disp: 90 tablet, Rfl: 1    mirabegron (MYRBETRIQ) 50 mg Tb24, Take 1 tablet (50 mg total) by mouth once daily., Disp: 90 tablet, Rfl: 2     Allergies:  Review of patient's allergies indicates:   Allergen Reactions    Penicillins      rash       Physical Exam      Vital Signs  Temp: 98.2 °F (36.8 °C)  Temp Source: Oral  Pulse: 68  Resp: (P) 14  SpO2: 97 %  BP: 130/64  Pain Score: 0-No pain  Height and Weight  Height: 5' 5" (165.1 cm)  Weight: 79.9 kg (176 lb 2.4 oz)  BSA (Calculated - sq m): 1.91 sq meters  BMI (Calculated): 29.3  Weight in " (lb) to have BMI = 25: 149.9             Physical Exam  Vitals reviewed.   Constitutional:       General: She is not in acute distress.     Appearance: Normal appearance. She is obese. She is not ill-appearing, toxic-appearing or diaphoretic.   HENT:      Head: Normocephalic and atraumatic.      Ears:      Comments: Preston hearing aids     Mouth/Throat:      Mouth: Mucous membranes are moist.      Pharynx: No posterior oropharyngeal erythema.   Eyes:      Extraocular Movements: Extraocular movements intact.      Conjunctiva/sclera: Conjunctivae normal.      Pupils: Pupils are equal, round, and reactive to light.      Comments: Left lower lid erythematous and everted slightly   Neck:      Vascular: No carotid bruit.   Cardiovascular:      Rate and Rhythm: Normal rate and regular rhythm.      Pulses: Normal pulses.      Heart sounds: Murmur heard.      Comments: No edema  Pulmonary:      Effort: Pulmonary effort is normal. No respiratory distress.      Breath sounds: Normal breath sounds.   Abdominal:      General: Bowel sounds are normal. There is no distension.      Palpations: Abdomen is soft.      Tenderness: There is no abdominal tenderness. There is no guarding or rebound.   Musculoskeletal:      Cervical back: Neck supple. No tenderness.      Comments: Wheel chair bound;    Kyphosis. Pt sits slumped with head down     Skin:     General: Skin is warm.      Coloration: Skin is pale.      Findings: No rash.   Neurological:      General: No focal deficit present.      Mental Status: She is alert and oriented to person, place, and time.      Comments: Slow speech. Sometimes trying to find her words.    Psychiatric:         Mood and Affect: Mood normal.         Behavior: Behavior normal.          Laboratory:  CBC:  Recent Labs   Lab 10/03/23  0926 01/12/24  0930 05/28/24  1233   WBC 5.31 3.38 L 4.01   RBC 4.07 4.10 3.40 L   Hemoglobin 11.4 L 11.6 L 8.2 L   Hematocrit 36.0 L 37.8 27.5 L   Platelets 158 173 189   MCV 89  92 81 L   MCH 28.0 28.3 24.1 L   MCHC 31.7 L 30.7 L 29.8 L       CMP:  Recent Labs   Lab 01/04/23  0954 06/02/23  1026 06/20/23  1738   Glucose 87 94 102   Calcium 9.3 9.3 9.8   Albumin 3.1 L 3.1 L 3.4 L   Total Protein 6.8 6.6 7.0   Sodium 139 138 140   Potassium 4.5 4.4 4.5   CO2 24 25 29   Chloride 107 105 105   BUN 14 21 19   Creatinine 0.8 0.8 0.8   Alkaline Phosphatase 118 109 126    H 66 H 51 H    H 67 H 57 H   Total Bilirubin 0.4 0.2 0.3           URINALYSIS:  Recent Labs   Lab 05/19/22  1046 08/13/22  1349 04/05/23  0938   Color, UA Colorless A Yellow Yellow   Specific Gravity, UA 1.005 1.010 1.010   pH, UA 7.0 8.0 7.0   Protein, UA Negative Negative Negative   Bacteria Rare  --   --    Nitrite, UA Negative Negative Negative   Leukocytes, UA Negative Negative Negative   Urobilinogen, UA Negative Negative  --         LIPIDS:  Recent Labs   Lab 11/05/21 1825 01/31/22  1225 08/13/22  1250 01/04/23  0954 06/02/23  1026   TSH 2.074  --  0.928  --  2.418   HDL  --  55  --  45  --    Cholesterol  --  129  --  96 L  --    Triglycerides  --  86  --  59  --    LDL Cholesterol  --  56.8 L  --  39.2 L  --    HDL/Cholesterol Ratio  --  42.6  --  46.9  --    Non-HDL Cholesterol  --  74  --  51  --    Total Cholesterol/HDL Ratio  --  2.3  --  2.1  --        TSH:  Recent Labs   Lab 11/05/21  1825 08/13/22  1250 06/02/23  1026   TSH 2.074 0.928 2.418       A1C:  Recent Labs   Lab 01/31/22  1225 01/04/23  0954   Hemoglobin A1C 4.8 4.8            Other:   Recent Labs   Lab 05/18/22  0627 05/23/22  1651 05/28/24  1233   Vitamin B-12 1925 H  --   --    Ferritin  --    < > 139   Iron  --    < > 20 L   Transferrin  --    < > 338   TIBC  --    < > 500 H   Saturated Iron  --    < > 4 L    < > = values in this interval not displayed.           Assessment/Plan     Nickie Segura is a 89 y.o.female with:    Hyperlipidemia, unspecified hyperlipidemia type  -     Lipid Panel; Future; Expected date: 06/10/2024  -  Controlled.  Cont current.  Repeat FLP.     Hypothyroidism, unspecified type  -     levothyroxine (SYNTHROID) 100 MCG tablet; Take 1 tablet (100 mcg total) by mouth before breakfast.  Dispense: 90 tablet; Refill: 1  - Stable.  Cont current regimen. Repeat TSH.     OAB (overactive bladder)  - Stable.  Cont current regimen.    Transaminitis  - Stable.  Cont current regimen.    Memory deficit  - Chck TSH, B12, B1, as prev ordered. Suspect likely due to vascualr dementia given RF and prev CVA.     Hypertension, unspecified type  - Controlled.  Cont current.     Anxiety  -     busPIRone (BUSPAR) 10 MG tablet; TAKE 1 TABLET BY MOUTH TWICE DAILY  Dispense: 180 tablet; Refill: 1  - Stable.  Cont current regimen.     Iron deficiency anemia, unspecified iron deficiency anemia type  - Dec H/H. Fu by H/O.  IV infusions now monthly.     Sialorrhea  - Stable.  Cont current regimen.    Nonrheumatic aortic valve stenosis  -Stable.     Cerebrovascular disease  -Stable. Cont ASA, statin, BP control.     Leukopenia, unspecified type  -Stable. Fuby H/O.     Allergic rhinitis, unspecified seasonality, unspecified trigger  -     azelastine (ASTELIN) 137 mcg (0.1 %) nasal spray; 2 sprays (274 mcg total) by Nasal route 2 (two) times daily.  Dispense: 90 mL; Refill: 1  - Res resume astelin.     Debility  -Stable. Has aid for transportation and transfer. Uses Wheel chair.     Other orders  -     estradioL (ESTRACE) 0.01 % (0.1 mg/gram) vaginal cream; Place 1 g vaginally twice a week.  Dispense: 8 g; Refill: 2         Chronic conditions status updated as per HPI.  Other than changes above, cont current medications and maintain follow up with specialists.   Follow up in about 6 months (around 12/10/2024).      Perla Hopper MD  [facility]

## 2024-06-10 ENCOUNTER — OFFICE VISIT (OUTPATIENT)
Dept: PRIMARY CARE CLINIC | Facility: CLINIC | Age: 89
End: 2024-06-10
Payer: MEDICARE

## 2024-06-10 ENCOUNTER — LAB VISIT (OUTPATIENT)
Dept: LAB | Facility: HOSPITAL | Age: 89
End: 2024-06-10
Attending: INTERNAL MEDICINE
Payer: MEDICARE

## 2024-06-10 VITALS
HEIGHT: 65 IN | TEMPERATURE: 98 F | WEIGHT: 176.13 LBS | HEART RATE: 68 BPM | BODY MASS INDEX: 29.34 KG/M2 | DIASTOLIC BLOOD PRESSURE: 64 MMHG | SYSTOLIC BLOOD PRESSURE: 130 MMHG | OXYGEN SATURATION: 97 %

## 2024-06-10 DIAGNOSIS — F41.9 ANXIETY: ICD-10-CM

## 2024-06-10 DIAGNOSIS — I35.0 NONRHEUMATIC AORTIC VALVE STENOSIS: ICD-10-CM

## 2024-06-10 DIAGNOSIS — R41.3 MEMORY DEFICIT: ICD-10-CM

## 2024-06-10 DIAGNOSIS — R74.01 TRANSAMINITIS: ICD-10-CM

## 2024-06-10 DIAGNOSIS — E78.5 HYPERLIPIDEMIA, UNSPECIFIED HYPERLIPIDEMIA TYPE: Primary | ICD-10-CM

## 2024-06-10 DIAGNOSIS — I67.9 CEREBROVASCULAR DISEASE: ICD-10-CM

## 2024-06-10 DIAGNOSIS — D72.819 LEUKOPENIA, UNSPECIFIED TYPE: ICD-10-CM

## 2024-06-10 DIAGNOSIS — R53.81 DEBILITY: ICD-10-CM

## 2024-06-10 DIAGNOSIS — J30.9 ALLERGIC RHINITIS, UNSPECIFIED SEASONALITY, UNSPECIFIED TRIGGER: ICD-10-CM

## 2024-06-10 DIAGNOSIS — K11.7 SIALORRHEA: ICD-10-CM

## 2024-06-10 DIAGNOSIS — R73.01 IFG (IMPAIRED FASTING GLUCOSE): ICD-10-CM

## 2024-06-10 DIAGNOSIS — E78.5 HYPERLIPIDEMIA, UNSPECIFIED HYPERLIPIDEMIA TYPE: ICD-10-CM

## 2024-06-10 DIAGNOSIS — I10 HYPERTENSION, UNSPECIFIED TYPE: ICD-10-CM

## 2024-06-10 DIAGNOSIS — D50.9 IRON DEFICIENCY ANEMIA, UNSPECIFIED IRON DEFICIENCY ANEMIA TYPE: ICD-10-CM

## 2024-06-10 DIAGNOSIS — N32.81 OAB (OVERACTIVE BLADDER): ICD-10-CM

## 2024-06-10 DIAGNOSIS — E03.9 HYPOTHYROIDISM, UNSPECIFIED TYPE: ICD-10-CM

## 2024-06-10 PROBLEM — I77.1 STRICTURE OF ARTERY: Status: RESOLVED | Noted: 2023-04-25 | Resolved: 2024-06-10

## 2024-06-10 LAB
ALBUMIN SERPL BCP-MCNC: 3.2 G/DL (ref 3.5–5.2)
ALP SERPL-CCNC: 102 U/L (ref 55–135)
ALT SERPL W/O P-5'-P-CCNC: 69 U/L (ref 10–44)
ANION GAP SERPL CALC-SCNC: 11 MMOL/L (ref 8–16)
AST SERPL-CCNC: 81 U/L (ref 10–40)
BILIRUB SERPL-MCNC: 0.3 MG/DL (ref 0.1–1)
BUN SERPL-MCNC: 12 MG/DL (ref 8–23)
CALCIUM SERPL-MCNC: 9.2 MG/DL (ref 8.7–10.5)
CHLORIDE SERPL-SCNC: 108 MMOL/L (ref 95–110)
CHOLEST SERPL-MCNC: 79 MG/DL (ref 120–199)
CHOLEST/HDLC SERPL: 2 {RATIO} (ref 2–5)
CO2 SERPL-SCNC: 21 MMOL/L (ref 23–29)
CREAT SERPL-MCNC: 0.8 MG/DL (ref 0.5–1.4)
EST. GFR  (NO RACE VARIABLE): >60 ML/MIN/1.73 M^2
ESTIMATED AVG GLUCOSE: 94 MG/DL (ref 68–131)
GLUCOSE SERPL-MCNC: 86 MG/DL (ref 70–110)
HBA1C MFR BLD: 4.9 % (ref 4–5.6)
HDLC SERPL-MCNC: 39 MG/DL (ref 40–75)
HDLC SERPL: 49.4 % (ref 20–50)
LDLC SERPL CALC-MCNC: 27.8 MG/DL (ref 63–159)
NONHDLC SERPL-MCNC: 40 MG/DL
POTASSIUM SERPL-SCNC: 4.3 MMOL/L (ref 3.5–5.1)
PROT SERPL-MCNC: 6.6 G/DL (ref 6–8.4)
SODIUM SERPL-SCNC: 140 MMOL/L (ref 136–145)
TRIGL SERPL-MCNC: 61 MG/DL (ref 30–150)
TSH SERPL DL<=0.005 MIU/L-ACNC: 1.28 UIU/ML (ref 0.4–4)
VIT B12 SERPL-MCNC: 511 PG/ML (ref 210–950)

## 2024-06-10 PROCEDURE — 3288F FALL RISK ASSESSMENT DOCD: CPT | Mod: HCNC,CPTII,S$GLB, | Performed by: INTERNAL MEDICINE

## 2024-06-10 PROCEDURE — 80061 LIPID PANEL: CPT | Mod: HCNC | Performed by: INTERNAL MEDICINE

## 2024-06-10 PROCEDURE — 1157F ADVNC CARE PLAN IN RCRD: CPT | Mod: HCNC,CPTII,S$GLB, | Performed by: INTERNAL MEDICINE

## 2024-06-10 PROCEDURE — 83036 HEMOGLOBIN GLYCOSYLATED A1C: CPT | Mod: HCNC | Performed by: INTERNAL MEDICINE

## 2024-06-10 PROCEDURE — 1160F RVW MEDS BY RX/DR IN RCRD: CPT | Mod: HCNC,CPTII,S$GLB, | Performed by: INTERNAL MEDICINE

## 2024-06-10 PROCEDURE — 36415 COLL VENOUS BLD VENIPUNCTURE: CPT | Mod: HCNC,PN | Performed by: INTERNAL MEDICINE

## 2024-06-10 PROCEDURE — 1126F AMNT PAIN NOTED NONE PRSNT: CPT | Mod: HCNC,CPTII,S$GLB, | Performed by: INTERNAL MEDICINE

## 2024-06-10 PROCEDURE — 99214 OFFICE O/P EST MOD 30 MIN: CPT | Mod: HCNC,S$GLB,, | Performed by: INTERNAL MEDICINE

## 2024-06-10 PROCEDURE — 84425 ASSAY OF VITAMIN B-1: CPT | Mod: HCNC | Performed by: INTERNAL MEDICINE

## 2024-06-10 PROCEDURE — 80053 COMPREHEN METABOLIC PANEL: CPT | Mod: HCNC | Performed by: INTERNAL MEDICINE

## 2024-06-10 PROCEDURE — 84443 ASSAY THYROID STIM HORMONE: CPT | Mod: HCNC | Performed by: INTERNAL MEDICINE

## 2024-06-10 PROCEDURE — 82607 VITAMIN B-12: CPT | Mod: HCNC | Performed by: INTERNAL MEDICINE

## 2024-06-10 PROCEDURE — 1159F MED LIST DOCD IN RCRD: CPT | Mod: HCNC,CPTII,S$GLB, | Performed by: INTERNAL MEDICINE

## 2024-06-10 PROCEDURE — 86592 SYPHILIS TEST NON-TREP QUAL: CPT | Mod: HCNC | Performed by: INTERNAL MEDICINE

## 2024-06-10 PROCEDURE — 1101F PT FALLS ASSESS-DOCD LE1/YR: CPT | Mod: HCNC,CPTII,S$GLB, | Performed by: INTERNAL MEDICINE

## 2024-06-10 PROCEDURE — 99999 PR PBB SHADOW E&M-EST. PATIENT-LVL IV: CPT | Mod: PBBFAC,HCNC,, | Performed by: INTERNAL MEDICINE

## 2024-06-10 RX ORDER — ESTRADIOL 0.1 MG/G
1 CREAM VAGINAL
Qty: 8 G | Refills: 2 | Status: SHIPPED | OUTPATIENT
Start: 2024-06-10 | End: 2024-09-08

## 2024-06-10 RX ORDER — LEVOTHYROXINE SODIUM 100 UG/1
100 TABLET ORAL
Qty: 90 TABLET | Refills: 1 | Status: SHIPPED | OUTPATIENT
Start: 2024-06-10

## 2024-06-10 RX ORDER — AZELASTINE 1 MG/ML
2 SPRAY, METERED NASAL 2 TIMES DAILY
Qty: 90 ML | Refills: 1 | Status: SHIPPED | OUTPATIENT
Start: 2024-06-10 | End: 2025-06-10

## 2024-06-10 RX ORDER — CALCIUM CARBONATE 200(500)MG
1 TABLET,CHEWABLE ORAL DAILY
COMMUNITY

## 2024-06-10 RX ORDER — BUSPIRONE HYDROCHLORIDE 10 MG/1
TABLET ORAL
Qty: 180 TABLET | Refills: 1 | Status: SHIPPED | OUTPATIENT
Start: 2024-06-10

## 2024-06-11 ENCOUNTER — TELEPHONE (OUTPATIENT)
Dept: PRIMARY CARE CLINIC | Facility: CLINIC | Age: 89
End: 2024-06-11
Payer: MEDICARE

## 2024-06-11 LAB — RPR SER QL: NORMAL

## 2024-06-11 NOTE — PROGRESS NOTES
I sent pt a my chart message -  I reviewed your labs.  I still await some results.  Thus far, the Ha1c was normal at 4.9 despite eating sweets. Your thyroid function was normal. Your vitamin B12 was low normal at 511. Your kidney function looked good.  Your liver functions remain slightly elevated. Your cholesterol looked good.   Dr. GARG

## 2024-06-14 LAB — VIT B1 BLD-MCNC: 47 UG/L (ref 38–122)

## 2024-06-18 ENCOUNTER — INFUSION (OUTPATIENT)
Dept: INFUSION THERAPY | Facility: HOSPITAL | Age: 89
End: 2024-06-18
Attending: INTERNAL MEDICINE
Payer: MEDICARE

## 2024-06-18 ENCOUNTER — TELEPHONE (OUTPATIENT)
Dept: PRIMARY CARE CLINIC | Facility: CLINIC | Age: 89
End: 2024-06-18
Payer: MEDICARE

## 2024-06-18 VITALS
DIASTOLIC BLOOD PRESSURE: 56 MMHG | HEART RATE: 72 BPM | OXYGEN SATURATION: 96 % | RESPIRATION RATE: 16 BRPM | TEMPERATURE: 98 F | SYSTOLIC BLOOD PRESSURE: 116 MMHG

## 2024-06-18 DIAGNOSIS — D50.8 OTHER IRON DEFICIENCY ANEMIA: Primary | ICD-10-CM

## 2024-06-18 PROCEDURE — 96374 THER/PROPH/DIAG INJ IV PUSH: CPT | Mod: HCNC

## 2024-06-18 PROCEDURE — 63600175 PHARM REV CODE 636 W HCPCS: Mod: HCNC | Performed by: INTERNAL MEDICINE

## 2024-06-18 PROCEDURE — 25000003 PHARM REV CODE 250: Mod: HCNC | Performed by: INTERNAL MEDICINE

## 2024-06-18 RX ORDER — EPINEPHRINE 0.3 MG/.3ML
0.3 INJECTION SUBCUTANEOUS ONCE AS NEEDED
Status: DISCONTINUED | OUTPATIENT
Start: 2024-06-18 | End: 2024-06-18 | Stop reason: HOSPADM

## 2024-06-18 RX ORDER — SODIUM CHLORIDE 0.9 % (FLUSH) 0.9 %
10 SYRINGE (ML) INJECTION
Status: DISCONTINUED | OUTPATIENT
Start: 2024-06-18 | End: 2024-06-18 | Stop reason: HOSPADM

## 2024-06-18 RX ORDER — DIPHENHYDRAMINE HYDROCHLORIDE 50 MG/ML
50 INJECTION INTRAMUSCULAR; INTRAVENOUS ONCE AS NEEDED
Status: DISCONTINUED | OUTPATIENT
Start: 2024-06-18 | End: 2024-06-18 | Stop reason: HOSPADM

## 2024-06-18 RX ADMIN — IRON SUCROSE 200 MG: 20 INJECTION, SOLUTION INTRAVENOUS at 02:06

## 2024-06-18 RX ADMIN — SODIUM CHLORIDE: 9 INJECTION, SOLUTION INTRAVENOUS at 02:06

## 2024-06-18 NOTE — PLAN OF CARE
Problem: Anemia  Goal: Anemia Symptom Improvement  Outcome: Progressing  Intervention: Monitor and Manage Anemia  Flowsheets (Taken 6/18/2024 3584)  Fatigue Management: (Venofer) other (see comments)

## 2024-06-18 NOTE — NURSING
Patient tolerated Iron sucrose ivp well today. PIV removed, catheter tip intact. NAD noted upon discharge. Discharged home, escorted in WC by sister. Plan to rtc 7/12 for 4th dose.

## 2024-07-06 NOTE — PROGRESS NOTES
PATIENT: Nickie Segura  MRN: [id number]  DATE: 7/12/2024    Diagnosis:   1. Other iron deficiency anemia    2. Other neutropenia      Chief Complaint: iron deficiency anemia    Oncologic History:      Oncologic History     Oncologic Treatment     Pathology       Subjective:    History of Present Illness: Ms. Segura is a 89 y.o. female who presents for evaluation and management of iron deficiency anemia and neutropenia. She had previously seen Dr. Denton.    - she received iron sucrose x 2 doses in February/March 2022.  - she was hospitalized in mid-May 2022 for symptomatic anemia and elevated liver enzymes. She responded to blood transfusion. She received two doses of iron sucrose during the hospitalization.  - she received iron sucrose x 4 doses in May/June 2022.  - she got labs with home health on 12/20/22 that revealed a hemoglobin of 6 g/dL.  - she received iron sucrose x 4 doses in December 2022 / January 2023.  - she received iron sucrose monthly since June 2023.    Interval history:  - she presents for a follow-up appointment for her iron deficiency anemia and neutropenia  - she received iron sucrose x 3 doses in March - June 2024. She is getting IV iron monthly.  - overall, she endorses fatigue, weakness, shortness of breath. He denies chest pain, nausea, vomiting, diarrhea.      Past medical, surgical, family, and social histories have been reviewed and updated below.    Past Medical History:   Past Medical History:   Diagnosis Date    LORNA positive     Anxiety     Bilateral cataracts     Colitis     Colon polyp     COVID-19 09/2022    Depression     Elevated IgE level     Hepatomegaly     Hiatal hernia     Hypothyroidism     IFG (impaired fasting glucose)     Iron deficiency anemia     Macular degeneration     Nicotine dependence     Osteoarthritis     Recurrent UTI     Renal cyst     Skin cancer     Tuberculosis of bladder     Urinary incontinence     Vitamin B12 deficiency     Wheezing        Past Surgical  History:   Past Surgical History:   Procedure Laterality Date    CHOLECYSTECTOMY         Family History:   Family History   Problem Relation Name Age of Onset    Heart disease Mother      Diabetes Mother      Heart disease Father      Polycystic kidney disease Father      Atrial fibrillation Sister         Social History:  reports that she has been smoking vaping with nicotine. She has never used smokeless tobacco. She reports that she does not drink alcohol and does not use drugs.    Allergies:  Review of patient's allergies indicates:   Allergen Reactions    Penicillins      rash       Medications:  Current Outpatient Medications   Medication Sig Dispense Refill    Al hyd-Mg tr-alg ac-sod bicarb (GAVISCON) 80-14.2 mg Chew Take 1 tablet by mouth once daily. (Patient taking differently: Take 2 tablets by mouth once daily.)      albuterol (VENTOLIN HFA) 90 mcg/actuation inhaler Inhale 1-2 puffs into the lungs every 6 (six) hours as needed for Wheezing or Shortness of Breath. Rescue 18 g 0    aspirin (ECOTRIN) 81 MG EC tablet Take 81 mg by mouth once daily.      azelastine (ASTELIN) 137 mcg (0.1 %) nasal spray 2 sprays (274 mcg total) by Nasal route 2 (two) times daily. 90 mL 1    busPIRone (BUSPAR) 10 MG tablet TAKE 1 TABLET BY MOUTH TWICE DAILY 180 tablet 1    calcium carbonate (TUMS) 200 mg calcium (500 mg) chewable tablet Take 1 tablet by mouth once daily.      citalopram (CELEXA) 20 MG tablet Take 1.5 tablets (30 mg total) by mouth once daily. 135 tablet 2    estradioL (ESTRACE) 0.01 % (0.1 mg/gram) vaginal cream Place 1 g vaginally twice a week. 8 g 2    glycopyrrolate (ROBINUL) 1 mg Tab TAKE 1 TABLET(1 MG) BY MOUTH THREE TIMES DAILY 90 tablet 2    levothyroxine (SYNTHROID) 100 MCG tablet Take 1 tablet (100 mcg total) by mouth before breakfast. 90 tablet 1    LORazepam (ATIVAN) 0.5 MG tablet Take 1 tablet (0.5 mg total) by mouth daily as needed for Anxiety. 30 tablet 2    mirabegron (MYRBETRIQ) 50 mg Tb24 Take 1  tablet (50 mg total) by mouth once daily. 90 tablet 2    neomycin-polymyxin-dexamethasone (DEXACINE) 3.5 mg/g-10,000 unit/g-0.1 % Oint APPLY A SMALL AMOUNT IN BOTH EYES TWICE DAILY      polyethylene glycol (GLYCOLAX) 17 gram/dose powder Take 17 g by mouth once daily. 510 g 3    rosuvastatin (CRESTOR) 20 MG tablet TAKE 1 TABLET(20 MG) BY MOUTH EVERY DAY 90 tablet 3    sodium chloride (OCEAN) 0.65 % nasal spray 1 spray DAILY (route: nasal)      vit C/E/zinc ox/amy/lut/zeax (ICAPS AREDS2 ORAL) Take by mouth Daily.       No current facility-administered medications for this visit.       Review of Systems   Constitutional:  Positive for fatigue.   HENT:  Negative for sore throat.    Eyes:  Positive for visual disturbance.   Respiratory:  Negative for cough and shortness of breath.    Cardiovascular:  Negative for chest pain.   Gastrointestinal:  Negative for abdominal pain, diarrhea, nausea and vomiting.   Genitourinary:  Negative for dysuria.   Musculoskeletal:  Negative for back pain.   Skin:  Negative for rash.   Neurological:  Positive for weakness. Negative for headaches.   Hematological:  Negative for adenopathy.   Psychiatric/Behavioral:  The patient is not nervous/anxious.        ECOG Performance Status:   ECOG SCORE 1            Objective:      Vitals:   Vitals:    07/12/24 0906   BP: 131/63   BP Location: Left arm   Patient Position: Sitting   BP Method: Medium (Automatic)   Pulse: 80   Resp: 18   SpO2: 95%       BMI: There is no height or weight on file to calculate BMI.    Physical Exam  Vitals and nursing note reviewed.   Constitutional:       Appearance: She is well-developed.   HENT:      Head: Normocephalic and atraumatic.   Eyes:      Pupils: Pupils are equal, round, and reactive to light.   Cardiovascular:      Rate and Rhythm: Normal rate and regular rhythm.   Pulmonary:      Effort: Pulmonary effort is normal.      Breath sounds: Normal breath sounds.   Abdominal:      General: Bowel sounds are  normal.      Palpations: Abdomen is soft.   Musculoskeletal:         General: Normal range of motion.      Cervical back: Normal range of motion and neck supple.   Skin:     General: Skin is warm and dry.   Neurological:      Mental Status: She is alert and oriented to person, place, and time.   Psychiatric:         Behavior: Behavior normal.         Thought Content: Thought content normal.         Judgment: Judgment normal.         Laboratory Data:  Labs have been reviewed.    Lab Results   Component Value Date    WBC 2.37 (L) 07/12/2024    HGB 8.4 (L) 07/12/2024    HCT 29.1 (L) 07/12/2024    MCV 82 07/12/2024     07/12/2024           Imaging:    Assessment:       1. Other iron deficiency anemia    2. Other neutropenia           Plan:     1. Other iron deficiency anemia  - I have reviewed her chart  - she received iron sucrose x 2 doses in February/March 2022  - Labs have been reviewed. Iron studies on 5/16/22 revealed an elevated total iron binding capacity, consistent with recurrent iron deficiency anemia.  - she was hospitalized in mid-May 2022 for symptomatic anemia and elevated liver enzymes. She responded to blood transfusion. She received two doses of iron sucrose during the hospitalization.  - labs (5/23/22) revealed an elevated total iron binding capacity, suggesting continued iron deficiency  - she received iron sucrose x 4 doses in May/June 2022.  - clinically, she did not notice a significant improvement in her symptoms.  - she got labs with home health on 12/20/22 that revealed a hemoglobin of 6 g/dL.  - iron studies (12/21/22) revealed an elevated total iron binding capacity, suggesting continued iron deficiency  - she received iron sucrose x 4 doses in December 2022 / January 2023. she received iron sucrose monthly since June 2023.  - she received iron sucrose x 3 doses in March - June 2024. In May, we changed her infusions to monthly  - Labs have been reviewed. Hemoglobin is 8.4 g/dL. Follow  up iron studies.  - continue iron infusions monthly.  - repeat labs in 2 months  - return to clinic in 4 months with repeat labs.    2. Other neutropenia  - unclear etiology  - we discussed a bone marrow biopsy during her hospitalization previously. After discussion, she declined to proceed with it.    - repeat labs in 2 months  - return to clinic in 4 months with repeat labs.    Louis Gibson M.D.  Hematology/Oncology  [facility] - [address], [address]  [place] [address]  Phone: [phone]  Fax: [phone]

## 2024-07-12 ENCOUNTER — INFUSION (OUTPATIENT)
Dept: INFUSION THERAPY | Facility: HOSPITAL | Age: 89
End: 2024-07-12
Attending: INTERNAL MEDICINE
Payer: MEDICARE

## 2024-07-12 ENCOUNTER — OFFICE VISIT (OUTPATIENT)
Dept: HEMATOLOGY/ONCOLOGY | Facility: CLINIC | Age: 89
End: 2024-07-12
Payer: MEDICARE

## 2024-07-12 VITALS
OXYGEN SATURATION: 95 % | RESPIRATION RATE: 18 BRPM | SYSTOLIC BLOOD PRESSURE: 131 MMHG | HEART RATE: 80 BPM | DIASTOLIC BLOOD PRESSURE: 63 MMHG

## 2024-07-12 DIAGNOSIS — D70.8 OTHER NEUTROPENIA: ICD-10-CM

## 2024-07-12 DIAGNOSIS — D50.8 OTHER IRON DEFICIENCY ANEMIA: Primary | ICD-10-CM

## 2024-07-12 PROCEDURE — 63600175 PHARM REV CODE 636 W HCPCS: Mod: HCNC | Performed by: INTERNAL MEDICINE

## 2024-07-12 PROCEDURE — 99999 PR PBB SHADOW E&M-EST. PATIENT-LVL III: CPT | Mod: PBBFAC,HCNC,, | Performed by: INTERNAL MEDICINE

## 2024-07-12 PROCEDURE — A4216 STERILE WATER/SALINE, 10 ML: HCPCS | Mod: HCNC | Performed by: INTERNAL MEDICINE

## 2024-07-12 PROCEDURE — 25000003 PHARM REV CODE 250: Mod: HCNC | Performed by: INTERNAL MEDICINE

## 2024-07-12 PROCEDURE — 96374 THER/PROPH/DIAG INJ IV PUSH: CPT | Mod: HCNC

## 2024-07-12 RX ORDER — SODIUM CHLORIDE 0.9 % (FLUSH) 0.9 %
10 SYRINGE (ML) INJECTION
Status: DISCONTINUED | OUTPATIENT
Start: 2024-07-12 | End: 2024-07-12 | Stop reason: HOSPADM

## 2024-07-12 RX ORDER — DIPHENHYDRAMINE HYDROCHLORIDE 50 MG/ML
50 INJECTION INTRAMUSCULAR; INTRAVENOUS ONCE AS NEEDED
Status: DISCONTINUED | OUTPATIENT
Start: 2024-07-12 | End: 2024-07-12 | Stop reason: HOSPADM

## 2024-07-12 RX ORDER — EPINEPHRINE 0.3 MG/.3ML
0.3 INJECTION SUBCUTANEOUS ONCE AS NEEDED
Status: DISCONTINUED | OUTPATIENT
Start: 2024-07-12 | End: 2024-07-12 | Stop reason: HOSPADM

## 2024-07-12 RX ADMIN — Medication 10 ML: at 10:07

## 2024-07-12 RX ADMIN — SODIUM CHLORIDE: 9 INJECTION, SOLUTION INTRAVENOUS at 10:07

## 2024-07-12 RX ADMIN — IRON SUCROSE 200 MG: 20 INJECTION, SOLUTION INTRAVENOUS at 10:07

## 2024-07-12 NOTE — NURSING
Pt tolerated venofer4 of 4 infusion well.  No adverse reaction noted. IV flushed with NS and D/C per protocol.  Patient left clinic in no acute distress.

## 2024-07-15 RX ORDER — SODIUM CHLORIDE 0.9 % (FLUSH) 0.9 %
10 SYRINGE (ML) INJECTION
OUTPATIENT
Start: 2024-07-24

## 2024-07-15 RX ORDER — HEPARIN 100 UNIT/ML
500 SYRINGE INTRAVENOUS
OUTPATIENT
Start: 2024-08-14

## 2024-07-15 RX ORDER — HEPARIN 100 UNIT/ML
500 SYRINGE INTRAVENOUS
OUTPATIENT
Start: 2024-08-07

## 2024-07-15 RX ORDER — SODIUM CHLORIDE 0.9 % (FLUSH) 0.9 %
10 SYRINGE (ML) INJECTION
OUTPATIENT
Start: 2024-08-14

## 2024-07-15 RX ORDER — SODIUM CHLORIDE 0.9 % (FLUSH) 0.9 %
10 SYRINGE (ML) INJECTION
OUTPATIENT
Start: 2024-07-31

## 2024-07-15 RX ORDER — HEPARIN 100 UNIT/ML
500 SYRINGE INTRAVENOUS
OUTPATIENT
Start: 2024-07-24

## 2024-07-15 RX ORDER — HEPARIN 100 UNIT/ML
500 SYRINGE INTRAVENOUS
OUTPATIENT
Start: 2024-07-31

## 2024-07-15 RX ORDER — SODIUM CHLORIDE 0.9 % (FLUSH) 0.9 %
10 SYRINGE (ML) INJECTION
OUTPATIENT
Start: 2024-08-07

## 2024-07-23 ENCOUNTER — TELEPHONE (OUTPATIENT)
Dept: INFUSION THERAPY | Facility: HOSPITAL | Age: 89
End: 2024-07-23
Payer: MEDICARE

## 2024-07-23 NOTE — TELEPHONE ENCOUNTER
Confirmed upcoming infusion appointments with the patient's sister.   8/1 at 9:30a  8/30 at 10a  9/27 at 10a  11/1 at 10a

## 2024-08-01 ENCOUNTER — INFUSION (OUTPATIENT)
Dept: INFUSION THERAPY | Facility: HOSPITAL | Age: 89
End: 2024-08-01
Payer: MEDICARE

## 2024-08-01 VITALS
TEMPERATURE: 98 F | DIASTOLIC BLOOD PRESSURE: 64 MMHG | OXYGEN SATURATION: 97 % | HEART RATE: 78 BPM | SYSTOLIC BLOOD PRESSURE: 133 MMHG | RESPIRATION RATE: 18 BRPM

## 2024-08-01 DIAGNOSIS — D50.8 OTHER IRON DEFICIENCY ANEMIA: Primary | ICD-10-CM

## 2024-08-01 PROCEDURE — 96365 THER/PROPH/DIAG IV INF INIT: CPT | Mod: HCNC

## 2024-08-01 PROCEDURE — 25000003 PHARM REV CODE 250: Mod: HCNC | Performed by: INTERNAL MEDICINE

## 2024-08-01 PROCEDURE — 63600175 PHARM REV CODE 636 W HCPCS: Mod: HCNC | Performed by: INTERNAL MEDICINE

## 2024-08-01 PROCEDURE — A4216 STERILE WATER/SALINE, 10 ML: HCPCS | Mod: HCNC | Performed by: INTERNAL MEDICINE

## 2024-08-01 RX ORDER — SODIUM CHLORIDE 0.9 % (FLUSH) 0.9 %
10 SYRINGE (ML) INJECTION
Status: DISCONTINUED | OUTPATIENT
Start: 2024-08-01 | End: 2024-08-01 | Stop reason: HOSPADM

## 2024-08-01 RX ADMIN — SODIUM CHLORIDE 125 MG: 9 INJECTION, SOLUTION INTRAVENOUS at 09:08

## 2024-08-01 RX ADMIN — Medication 10 ML: at 11:08

## 2024-08-01 RX ADMIN — SODIUM CHLORIDE: 9 INJECTION, SOLUTION INTRAVENOUS at 09:08

## 2024-08-01 NOTE — NURSING
Pt tolerated Ferrlecit dose 1/4 well today. Refused 30 minute post observation period. Discharged in NAD.

## 2024-08-02 DIAGNOSIS — F41.9 ANXIETY: ICD-10-CM

## 2024-08-02 DIAGNOSIS — K59.00 CONSTIPATION, UNSPECIFIED CONSTIPATION TYPE: ICD-10-CM

## 2024-08-02 RX ORDER — POLYETHYLENE GLYCOL 3350 17 G/17G
17 POWDER, FOR SOLUTION ORAL DAILY
Qty: 510 G | Refills: 3 | Status: SHIPPED | OUTPATIENT
Start: 2024-08-02

## 2024-08-02 RX ORDER — LORAZEPAM 0.5 MG/1
0.5 TABLET ORAL DAILY PRN
Qty: 30 TABLET | Refills: 2 | Status: SHIPPED | OUTPATIENT
Start: 2024-08-12

## 2024-08-02 NOTE — TELEPHONE ENCOUNTER
No care due was identified.  [facility] Embedded Care Due Messages. Reference number: [id number].   8/02/2024 11:04:31 AM CDT

## 2024-08-23 RX ORDER — MIRABEGRON 50 MG/1
50 TABLET, FILM COATED, EXTENDED RELEASE ORAL
Qty: 90 TABLET | Refills: 3 | Status: SHIPPED | OUTPATIENT
Start: 2024-08-23

## 2024-08-23 NOTE — TELEPHONE ENCOUNTER
Refill Decision Note   Nickie Azarblanc  is requesting a refill authorization.  Brief Assessment and Rationale for Refill:  Approve     Medication Therapy Plan:         Pharmacist review requested: Yes   Extended chart review required: Yes   Comments:     Note composed:1:38 PM 08/23/2024

## 2024-08-23 NOTE — TELEPHONE ENCOUNTER
Refill Routing Note   Medication(s) are not appropriate for processing by [facility] for the following reason(s):        Drug-disease interaction    [facility] action(s):  Defer        Medication Therapy Plan: Drug-Disease: MYRBETRIQ and Transaminitis    Pharmacist review requested: Yes     Appointments  past 12m or future 3m with PCP    Date Provider   Last Visit   6/10/2024 Perla Hopper MD   Next Visit   12/10/2024 Perla Hopper MD   ED visits in past 90 days: 0        Note composed:12:49 PM 08/23/2024

## 2024-08-23 NOTE — TELEPHONE ENCOUNTER
No care due was identified.  [facility] Embedded Care Due Messages. Reference number: [id number].   8/23/2024 9:35:41 AM CDT

## 2024-09-05 ENCOUNTER — INFUSION (OUTPATIENT)
Dept: INFUSION THERAPY | Facility: HOSPITAL | Age: 89
End: 2024-09-05
Payer: MEDICARE

## 2024-09-05 VITALS
RESPIRATION RATE: 18 BRPM | TEMPERATURE: 98 F | HEART RATE: 65 BPM | OXYGEN SATURATION: 96 % | SYSTOLIC BLOOD PRESSURE: 116 MMHG | DIASTOLIC BLOOD PRESSURE: 57 MMHG

## 2024-09-05 DIAGNOSIS — D50.8 OTHER IRON DEFICIENCY ANEMIA: Primary | ICD-10-CM

## 2024-09-05 PROCEDURE — A4216 STERILE WATER/SALINE, 10 ML: HCPCS | Mod: HCNC | Performed by: INTERNAL MEDICINE

## 2024-09-05 PROCEDURE — 25000003 PHARM REV CODE 250: Mod: HCNC | Performed by: INTERNAL MEDICINE

## 2024-09-05 PROCEDURE — 96365 THER/PROPH/DIAG IV INF INIT: CPT | Mod: HCNC

## 2024-09-05 PROCEDURE — 63600175 PHARM REV CODE 636 W HCPCS: Mod: HCNC | Performed by: INTERNAL MEDICINE

## 2024-09-05 RX ORDER — SODIUM CHLORIDE 0.9 % (FLUSH) 0.9 %
10 SYRINGE (ML) INJECTION
Status: DISCONTINUED | OUTPATIENT
Start: 2024-09-05 | End: 2024-09-05 | Stop reason: HOSPADM

## 2024-09-05 RX ADMIN — Medication 10 ML: at 10:09

## 2024-09-05 RX ADMIN — SODIUM CHLORIDE: 9 INJECTION, SOLUTION INTRAVENOUS at 09:09

## 2024-09-05 RX ADMIN — SODIUM CHLORIDE 125 MG: 9 INJECTION, SOLUTION INTRAVENOUS at 09:09

## 2024-09-09 ENCOUNTER — TELEPHONE (OUTPATIENT)
Dept: PRIMARY CARE CLINIC | Facility: CLINIC | Age: 89
End: 2024-09-09
Payer: MEDICARE

## 2024-09-09 NOTE — TELEPHONE ENCOUNTER
Spoke to patient's sister(caretaker)catina Quispe Dr. take omperazole otc, x 1 week, if does not help please call for virtual visit.----- Message from Felipa Paz sent at 9/9/2024  9:08 AM CDT -----  Contact: [phone]  [email or web address] ( Kylie)    Patient is calling for Medical Advice regarding: patient's stomach is feeling different ( burning) no other symptoms    How long has patient had these symptoms: 4 days    Pharmacy name and phone#:   [id number] - [place] - [address] AT [facility] [address]  [address] [id number]  Phone: [phone] Fax: [phone]      Patient wants a call back or thru [facility]: call    Comments: Pepto seems to help, patient is difficult to transport.  If appt is needed please do a virtual appt.     Please advise patient replies from provider may take up to 48 hours.

## 2024-09-25 ENCOUNTER — OFFICE VISIT (OUTPATIENT)
Dept: PRIMARY CARE CLINIC | Facility: CLINIC | Age: 89
End: 2024-09-25
Payer: MEDICARE

## 2024-09-25 VITALS — SYSTOLIC BLOOD PRESSURE: 130 MMHG | DIASTOLIC BLOOD PRESSURE: 74 MMHG

## 2024-09-25 DIAGNOSIS — B37.2 CANDIDAL INTERTRIGO: ICD-10-CM

## 2024-09-25 DIAGNOSIS — F41.9 ANXIETY: ICD-10-CM

## 2024-09-25 DIAGNOSIS — I10 HYPERTENSION, UNSPECIFIED TYPE: ICD-10-CM

## 2024-09-25 DIAGNOSIS — K29.70 GASTRITIS, PRESENCE OF BLEEDING UNSPECIFIED, UNSPECIFIED CHRONICITY, UNSPECIFIED GASTRITIS TYPE: Primary | ICD-10-CM

## 2024-09-25 RX ORDER — NYSTATIN 100000 U/G
CREAM TOPICAL 2 TIMES DAILY
Qty: 30 G | Refills: 2 | Status: SHIPPED | OUTPATIENT
Start: 2024-09-25

## 2024-09-25 RX ORDER — PANTOPRAZOLE SODIUM 40 MG/1
TABLET, DELAYED RELEASE ORAL
Qty: 90 TABLET | Refills: 0 | Status: SHIPPED | OUTPATIENT
Start: 2024-09-25

## 2024-09-25 RX ORDER — BUSPIRONE HYDROCHLORIDE 15 MG/1
TABLET ORAL
Qty: 180 TABLET | Refills: 0 | Status: SHIPPED | OUTPATIENT
Start: 2024-09-25
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
## 2024-09-27 ENCOUNTER — INFUSION (OUTPATIENT)
Dept: INFUSION THERAPY | Facility: HOSPITAL | Age: 89
End: 2024-09-27
Attending: INTERNAL MEDICINE
Payer: MEDICARE

## 2024-09-27 ENCOUNTER — LAB VISIT (OUTPATIENT)
Dept: LAB | Facility: HOSPITAL | Age: 89
End: 2024-09-27
Attending: INTERNAL MEDICINE
Payer: MEDICARE

## 2024-09-27 VITALS
TEMPERATURE: 97 F | OXYGEN SATURATION: 96 % | DIASTOLIC BLOOD PRESSURE: 54 MMHG | RESPIRATION RATE: 17 BRPM | HEART RATE: 75 BPM | SYSTOLIC BLOOD PRESSURE: 114 MMHG

## 2024-09-27 DIAGNOSIS — D50.8 OTHER IRON DEFICIENCY ANEMIA: Primary | ICD-10-CM

## 2024-09-27 DIAGNOSIS — D50.8 OTHER IRON DEFICIENCY ANEMIA: ICD-10-CM

## 2024-09-27 LAB
BASOPHILS # BLD AUTO: 0.04 K/UL (ref 0–0.2)
BASOPHILS NFR BLD: 0.9 % (ref 0–1.9)
DIFFERENTIAL METHOD BLD: ABNORMAL
EOSINOPHIL # BLD AUTO: 0.1 K/UL (ref 0–0.5)
EOSINOPHIL NFR BLD: 1.9 % (ref 0–8)
ERYTHROCYTE [DISTWIDTH] IN BLOOD BY AUTOMATED COUNT: 20.8 % (ref 11.5–14.5)
FERRITIN SERPL-MCNC: 481 NG/ML (ref 20–300)
HCT VFR BLD AUTO: 26.9 % (ref 37–48.5)
HGB BLD-MCNC: 7.6 G/DL (ref 12–16)
IMM GRANULOCYTES # BLD AUTO: 0.01 K/UL (ref 0–0.04)
IMM GRANULOCYTES NFR BLD AUTO: 0.2 % (ref 0–0.5)
IRON SERPL-MCNC: 25 UG/DL (ref 30–160)
LYMPHOCYTES # BLD AUTO: 0.9 K/UL (ref 1–4.8)
LYMPHOCYTES NFR BLD: 21.4 % (ref 18–48)
MCH RBC QN AUTO: 23.2 PG (ref 27–31)
MCHC RBC AUTO-ENTMCNC: 28.3 G/DL (ref 32–36)
MCV RBC AUTO: 82 FL (ref 82–98)
MONOCYTES # BLD AUTO: 0.4 K/UL (ref 0.3–1)
MONOCYTES NFR BLD: 8.2 % (ref 4–15)
NEUTROPHILS # BLD AUTO: 2.9 K/UL (ref 1.8–7.7)
NEUTROPHILS NFR BLD: 67.4 % (ref 38–73)
NRBC BLD-RTO: 0 /100 WBC
PLATELET # BLD AUTO: 214 K/UL (ref 150–450)
PMV BLD AUTO: 9.1 FL (ref 9.2–12.9)
RBC # BLD AUTO: 3.28 M/UL (ref 4–5.4)
SATURATED IRON: 6 % (ref 20–50)
TOTAL IRON BINDING CAPACITY: 413 UG/DL (ref 250–450)
TRANSFERRIN SERPL-MCNC: 279 MG/DL (ref 200–375)
WBC # BLD AUTO: 4.25 K/UL (ref 3.9–12.7)

## 2024-09-27 PROCEDURE — 83540 ASSAY OF IRON: CPT | Mod: HCNC | Performed by: INTERNAL MEDICINE

## 2024-09-27 PROCEDURE — 36415 COLL VENOUS BLD VENIPUNCTURE: CPT | Mod: HCNC | Performed by: INTERNAL MEDICINE

## 2024-09-27 PROCEDURE — 25000003 PHARM REV CODE 250: Mod: HCNC | Performed by: INTERNAL MEDICINE

## 2024-09-27 PROCEDURE — A4216 STERILE WATER/SALINE, 10 ML: HCPCS | Mod: HCNC | Performed by: INTERNAL MEDICINE

## 2024-09-27 PROCEDURE — 96365 THER/PROPH/DIAG IV INF INIT: CPT | Mod: HCNC

## 2024-09-27 PROCEDURE — 85025 COMPLETE CBC W/AUTO DIFF WBC: CPT | Mod: HCNC | Performed by: INTERNAL MEDICINE

## 2024-09-27 PROCEDURE — 82728 ASSAY OF FERRITIN: CPT | Mod: HCNC | Performed by: INTERNAL MEDICINE

## 2024-09-27 PROCEDURE — 63600175 PHARM REV CODE 636 W HCPCS: Mod: HCNC | Performed by: INTERNAL MEDICINE

## 2024-09-27 RX ORDER — SODIUM CHLORIDE 0.9 % (FLUSH) 0.9 %
10 SYRINGE (ML) INJECTION
Status: DISCONTINUED | OUTPATIENT
Start: 2024-09-27 | End: 2024-09-27 | Stop reason: HOSPADM

## 2024-09-27 RX ADMIN — Medication 10 ML: at 11:09

## 2024-09-27 RX ADMIN — SODIUM CHLORIDE 125 MG: 9 INJECTION, SOLUTION INTRAVENOUS at 10:09

## 2024-09-27 RX ADMIN — SODIUM CHLORIDE: 9 INJECTION, SOLUTION INTRAVENOUS at 10:09

## 2024-09-30 ENCOUNTER — PATIENT MESSAGE (OUTPATIENT)
Dept: HEMATOLOGY/ONCOLOGY | Facility: CLINIC | Age: 89
End: 2024-09-30
Payer: MEDICARE

## 2024-10-01 ENCOUNTER — TELEPHONE (OUTPATIENT)
Dept: HEMATOLOGY/ONCOLOGY | Facility: CLINIC | Age: 89
End: 2024-10-01
Payer: MEDICARE

## 2024-10-01 DIAGNOSIS — D64.9 SYMPTOMATIC ANEMIA: Primary | ICD-10-CM

## 2024-10-01 NOTE — TELEPHONE ENCOUNTER
----- Message from Deepali sent at 10/1/2024 12:13 PM CDT -----  Type:  Needs Medical Advice    Who Called: Pt's sister Brittaney   Symptoms (please be specific): caller states she would like to speak to Edith regarding some appts for pt    Would the patient rather a call back or a response via [facility]? Call back   Best Call Back Number: [phone]

## 2024-10-01 NOTE — TELEPHONE ENCOUNTER
Confirmed type and screen appt with pt's sister for tomorrow 10/2 and blood transfusion for 10/3 at 930am. Pt will come to office tomorrow to sign blood consent. Also, Informed pt's sister that Dr Gibson stated pt can try tums and pepto bismul with protonix to help with stomach. Pt's sister verbalized understanding.

## 2024-10-01 NOTE — TELEPHONE ENCOUNTER
Pt's sister stated pt is very sleepy and has been complaining of burning/nausea feeling in stomach for a month so wanted to know if Dr Gibson would like to arrange for blood transfusion and any advice regarding stomach issues. Informed pt's sister that I will let Dr Gibson know.

## 2024-10-01 NOTE — TELEPHONE ENCOUNTER
----- Message from Reeacijessica sent at 10/1/2024 11:20 AM CDT -----  .Type:  Needs Medical Advice    Who Called: pt sister Socrates    Would the patient rather a call back or a response via [facility]? Call back  Best Call Back Number:   Additional Information:     Socrates stated pt is fatigue and not feeling well and would like a call back please

## 2024-10-03 ENCOUNTER — LAB VISIT (OUTPATIENT)
Dept: LAB | Facility: HOSPITAL | Age: 89
End: 2024-10-03
Attending: INTERNAL MEDICINE
Payer: MEDICARE

## 2024-10-03 DIAGNOSIS — D64.9 SYMPTOMATIC ANEMIA: ICD-10-CM

## 2024-10-03 DIAGNOSIS — D64.9 SYMPTOMATIC ANEMIA: Primary | ICD-10-CM

## 2024-10-03 LAB
ABO + RH BLD: NORMAL
BLD GP AB SCN CELLS X3 SERPL QL: NORMAL
SPECIMEN OUTDATE: NORMAL

## 2024-10-03 PROCEDURE — 86901 BLOOD TYPING SEROLOGIC RH(D): CPT | Mod: HCNC | Performed by: INTERNAL MEDICINE

## 2024-10-03 PROCEDURE — 86922 COMPATIBILITY TEST ANTIGLOB: CPT | Mod: HCNC | Performed by: INTERNAL MEDICINE

## 2024-10-03 PROCEDURE — 86850 RBC ANTIBODY SCREEN: CPT | Mod: HCNC | Performed by: INTERNAL MEDICINE

## 2024-10-03 PROCEDURE — 86900 BLOOD TYPING SEROLOGIC ABO: CPT | Mod: HCNC | Performed by: INTERNAL MEDICINE

## 2024-10-03 PROCEDURE — 86902 BLOOD TYPE ANTIGEN DONOR EA: CPT | Mod: HCNC | Performed by: INTERNAL MEDICINE

## 2024-10-03 PROCEDURE — 36415 COLL VENOUS BLD VENIPUNCTURE: CPT | Mod: HCNC | Performed by: INTERNAL MEDICINE

## 2024-10-03 PROCEDURE — P9016 RBC LEUKOCYTES REDUCED: HCPCS | Mod: HCNC | Performed by: INTERNAL MEDICINE

## 2024-10-03 RX ORDER — HYDROCODONE BITARTRATE AND ACETAMINOPHEN 500; 5 MG/1; MG/1
TABLET ORAL ONCE
Status: CANCELLED | OUTPATIENT
Start: 2024-10-03 | End: 2024-10-03

## 2024-10-03 RX ORDER — DIPHENHYDRAMINE HCL 25 MG
25 CAPSULE ORAL
Status: CANCELLED | OUTPATIENT
Start: 2024-10-03

## 2024-10-03 RX ORDER — ACETAMINOPHEN 325 MG/1
650 TABLET ORAL
Status: CANCELLED | OUTPATIENT
Start: 2024-10-03

## 2024-10-04 ENCOUNTER — INFUSION (OUTPATIENT)
Dept: INFUSION THERAPY | Facility: HOSPITAL | Age: 89
End: 2024-10-04
Attending: INTERNAL MEDICINE
Payer: MEDICARE

## 2024-10-04 VITALS
HEART RATE: 72 BPM | TEMPERATURE: 98 F | OXYGEN SATURATION: 96 % | SYSTOLIC BLOOD PRESSURE: 118 MMHG | DIASTOLIC BLOOD PRESSURE: 57 MMHG | RESPIRATION RATE: 18 BRPM

## 2024-10-04 DIAGNOSIS — D64.9 SYMPTOMATIC ANEMIA: ICD-10-CM

## 2024-10-04 LAB
BLD PROD TYP BPU: NORMAL
BLOOD UNIT EXPIRATION DATE: NORMAL
BLOOD UNIT TYPE CODE: 7300
BLOOD UNIT TYPE: NORMAL
CODING SYSTEM: NORMAL
CROSSMATCH INTERPRETATION: NORMAL
DISPENSE STATUS: NORMAL
NUM UNITS TRANS PACKED RBC: NORMAL

## 2024-10-04 PROCEDURE — 36430 TRANSFUSION BLD/BLD COMPNT: CPT | Mod: HCNC

## 2024-10-04 PROCEDURE — 25000003 PHARM REV CODE 250: Mod: HCNC | Performed by: INTERNAL MEDICINE

## 2024-10-04 RX ORDER — DIPHENHYDRAMINE HCL 25 MG
25 CAPSULE ORAL
Status: DISCONTINUED | OUTPATIENT
Start: 2024-10-04 | End: 2024-10-04 | Stop reason: HOSPADM

## 2024-10-04 RX ORDER — ACETAMINOPHEN 325 MG/1
650 TABLET ORAL
Status: DISCONTINUED | OUTPATIENT
Start: 2024-10-04 | End: 2024-10-04 | Stop reason: HOSPADM

## 2024-10-04 RX ORDER — HYDROCODONE BITARTRATE AND ACETAMINOPHEN 500; 5 MG/1; MG/1
TABLET ORAL ONCE
Status: COMPLETED | OUTPATIENT
Start: 2024-10-04 | End: 2024-10-04

## 2024-10-04 RX ADMIN — SODIUM CHLORIDE: 9 INJECTION, SOLUTION INTRAVENOUS at 10:10

## 2024-10-24 ENCOUNTER — TELEPHONE (OUTPATIENT)
Dept: HEMATOLOGY/ONCOLOGY | Facility: CLINIC | Age: 89
End: 2024-10-24
Payer: MEDICARE

## 2024-10-24 NOTE — TELEPHONE ENCOUNTER
----- Message -----  From: [address]  Sent: 10/24/2024  11:16 AM CDT  To: [facility] Staff    Type:  Needs Medical Advice    Who Called: pt sister  Symptoms (please be specific): pt had to reschedule due to her having to stay where she lives to get her flue and covid shots, sister rescheduled for next available which is 12-11 need to know that time and date is fine      Would the patient rather a call back or a response via [facility]? call  Best Call Back Number: [phone]  Additional Information:

## 2024-11-01 ENCOUNTER — INFUSION (OUTPATIENT)
Dept: INFUSION THERAPY | Facility: HOSPITAL | Age: 89
End: 2024-11-01
Attending: INTERNAL MEDICINE
Payer: MEDICARE

## 2024-11-01 ENCOUNTER — TELEPHONE (OUTPATIENT)
Dept: HEMATOLOGY/ONCOLOGY | Facility: CLINIC | Age: 89
End: 2024-11-01
Payer: MEDICARE

## 2024-11-01 VITALS
TEMPERATURE: 100 F | HEART RATE: 70 BPM | RESPIRATION RATE: 16 BRPM | OXYGEN SATURATION: 96 % | SYSTOLIC BLOOD PRESSURE: 131 MMHG | DIASTOLIC BLOOD PRESSURE: 59 MMHG

## 2024-11-01 DIAGNOSIS — D50.8 OTHER IRON DEFICIENCY ANEMIA: Primary | ICD-10-CM

## 2024-11-01 PROCEDURE — 96365 THER/PROPH/DIAG IV INF INIT: CPT | Mod: HCNC

## 2024-11-01 PROCEDURE — A4216 STERILE WATER/SALINE, 10 ML: HCPCS | Mod: HCNC | Performed by: INTERNAL MEDICINE

## 2024-11-01 PROCEDURE — 63600175 PHARM REV CODE 636 W HCPCS: Mod: HCNC | Performed by: INTERNAL MEDICINE

## 2024-11-01 PROCEDURE — 25000003 PHARM REV CODE 250: Mod: HCNC | Performed by: INTERNAL MEDICINE

## 2024-11-01 RX ORDER — SODIUM CHLORIDE 0.9 % (FLUSH) 0.9 %
10 SYRINGE (ML) INJECTION
OUTPATIENT
Start: 2024-11-08

## 2024-11-01 RX ORDER — DIPHENHYDRAMINE HYDROCHLORIDE 50 MG/ML
50 INJECTION INTRAMUSCULAR; INTRAVENOUS ONCE AS NEEDED
OUTPATIENT
Start: 2024-11-08

## 2024-11-01 RX ORDER — SODIUM CHLORIDE 0.9 % (FLUSH) 0.9 %
10 SYRINGE (ML) INJECTION
Status: DISCONTINUED | OUTPATIENT
Start: 2024-11-01 | End: 2024-11-01

## 2024-11-01 RX ORDER — HEPARIN 100 UNIT/ML
500 SYRINGE INTRAVENOUS
OUTPATIENT
Start: 2024-11-29

## 2024-11-01 RX ORDER — DIPHENHYDRAMINE HYDROCHLORIDE 50 MG/ML
50 INJECTION INTRAMUSCULAR; INTRAVENOUS ONCE AS NEEDED
OUTPATIENT
Start: 2024-11-29

## 2024-11-01 RX ORDER — HEPARIN 100 UNIT/ML
500 SYRINGE INTRAVENOUS
OUTPATIENT
Start: 2024-11-15

## 2024-11-01 RX ORDER — EPINEPHRINE 0.3 MG/.3ML
0.3 INJECTION SUBCUTANEOUS ONCE AS NEEDED
OUTPATIENT
Start: 2024-11-22

## 2024-11-01 RX ORDER — DIPHENHYDRAMINE HYDROCHLORIDE 50 MG/ML
50 INJECTION INTRAMUSCULAR; INTRAVENOUS ONCE AS NEEDED
OUTPATIENT
Start: 2024-11-22

## 2024-11-01 RX ORDER — SODIUM CHLORIDE 0.9 % (FLUSH) 0.9 %
10 SYRINGE (ML) INJECTION
OUTPATIENT
Start: 2024-11-29

## 2024-11-01 RX ORDER — EPINEPHRINE 0.3 MG/.3ML
0.3 INJECTION SUBCUTANEOUS ONCE AS NEEDED
OUTPATIENT
Start: 2024-11-29

## 2024-11-01 RX ORDER — EPINEPHRINE 0.3 MG/.3ML
0.3 INJECTION SUBCUTANEOUS ONCE AS NEEDED
OUTPATIENT
Start: 2024-11-08

## 2024-11-01 RX ORDER — HEPARIN 100 UNIT/ML
500 SYRINGE INTRAVENOUS
OUTPATIENT
Start: 2024-11-08

## 2024-11-01 RX ORDER — SODIUM CHLORIDE 0.9 % (FLUSH) 0.9 %
10 SYRINGE (ML) INJECTION
OUTPATIENT
Start: 2024-11-15

## 2024-11-01 RX ORDER — HEPARIN 100 UNIT/ML
500 SYRINGE INTRAVENOUS
OUTPATIENT
Start: 2024-11-22

## 2024-11-01 RX ORDER — EPINEPHRINE 0.3 MG/.3ML
0.3 INJECTION SUBCUTANEOUS ONCE AS NEEDED
OUTPATIENT
Start: 2024-11-15

## 2024-11-01 RX ORDER — DIPHENHYDRAMINE HYDROCHLORIDE 50 MG/ML
50 INJECTION INTRAMUSCULAR; INTRAVENOUS ONCE AS NEEDED
OUTPATIENT
Start: 2024-11-15

## 2024-11-01 RX ORDER — SODIUM CHLORIDE 0.9 % (FLUSH) 0.9 %
10 SYRINGE (ML) INJECTION
OUTPATIENT
Start: 2024-11-22

## 2024-11-01 RX ADMIN — SODIUM CHLORIDE, PRESERVATIVE FREE 10 ML: 5 INJECTION INTRAVENOUS at 10:11

## 2024-11-01 RX ADMIN — SODIUM CHLORIDE, PRESERVATIVE FREE 10 ML: 5 INJECTION INTRAVENOUS at 11:11

## 2024-11-01 RX ADMIN — SODIUM CHLORIDE 125 MG: 9 INJECTION, SOLUTION INTRAVENOUS at 10:11

## 2024-11-04 ENCOUNTER — TELEPHONE (OUTPATIENT)
Dept: INFUSION THERAPY | Facility: HOSPITAL | Age: 89
End: 2024-11-04
Payer: MEDICARE

## 2024-11-07 ENCOUNTER — TELEPHONE (OUTPATIENT)
Dept: PRIMARY CARE CLINIC | Facility: CLINIC | Age: 89
End: 2024-11-07
Payer: MEDICARE

## 2024-11-07 DIAGNOSIS — K11.7 SIALORRHEA: ICD-10-CM

## 2024-11-07 DIAGNOSIS — F33.9 EPISODE OF RECURRENT MAJOR DEPRESSIVE DISORDER, UNSPECIFIED DEPRESSION EPISODE SEVERITY: ICD-10-CM

## 2024-11-07 RX ORDER — CITALOPRAM 20 MG/1
30 TABLET, FILM COATED ORAL DAILY
Qty: 135 TABLET | Refills: 2 | Status: SHIPPED | OUTPATIENT
Start: 2024-11-07

## 2024-11-07 RX ORDER — GLYCOPYRROLATE 1 MG/1
TABLET ORAL
Qty: 90 TABLET | Refills: 2 | Status: SHIPPED | OUTPATIENT
Start: 2024-11-07

## 2024-11-07 NOTE — TELEPHONE ENCOUNTER
Refill sent to provider for approval----- Message from Priyanka sent at 11/7/2024 11:16 AM CST -----  Contact: [phone]  Requesting an RX refill or new RX.    Is this a refill or new RX:     RX name and strength     glycopyrrolate (ROBINUL) 1 mg Tab):    citalopram (CELEXA) 20 MG tablet  Is this a 30 day or 90 day RX: 90    Pharmacy name and phone #       [id number] - [place] - [address] [facility] [address]  [address] [address]  Phone: [phone] Fax: [phone]

## 2024-11-07 NOTE — TELEPHONE ENCOUNTER
No care due was identified.  [facility] Embedded Care Due Messages. Reference number: [id number].   11/07/2024 1:40:32 PM CST

## 2024-11-13 ENCOUNTER — TELEPHONE (OUTPATIENT)
Dept: PRIMARY CARE CLINIC | Facility: CLINIC | Age: 89
End: 2024-11-13
Payer: MEDICARE

## 2024-11-13 DIAGNOSIS — F41.9 ANXIETY: ICD-10-CM

## 2024-11-13 RX ORDER — LORAZEPAM 0.5 MG/1
0.5 TABLET ORAL DAILY PRN
Qty: 30 TABLET | Refills: 1 | OUTPATIENT
Start: 2024-11-13

## 2024-11-13 NOTE — TELEPHONE ENCOUNTER
No care due was identified.  [facility] Embedded Care Due Messages. Reference number: [id number].   11/13/2024 12:13:18 PM CST

## 2024-11-17 ENCOUNTER — PATIENT MESSAGE (OUTPATIENT)
Dept: PRIMARY CARE CLINIC | Facility: CLINIC | Age: 89
End: 2024-11-17
Payer: MEDICARE

## 2024-11-17 DIAGNOSIS — R53.81 DEBILITY: Primary | ICD-10-CM

## 2024-11-19 ENCOUNTER — TELEPHONE (OUTPATIENT)
Dept: PRIMARY CARE CLINIC | Facility: CLINIC | Age: 89
End: 2024-11-19
Payer: MEDICARE

## 2024-11-19 NOTE — TELEPHONE ENCOUNTER
Spoke with patient's sister, moving into facility, would like to get appointment----- Message from Desire sent at 11/19/2024  2:55 PM CST -----  Contact: [phone]  Caller is requesting an earlier appointment then we can schedule.  Caller is requesting a message be sent to the provider.    When is the next available appointment with their provider:  January 2025    Reason for the appointment:  need paperwork filled out, x ray,     Patient preference of timeframe to be scheduled:  ERA after 27th of November    Would the patient like a call back, or a response through their [facility] portal?:   CALL    Comments:

## 2024-11-25 ENCOUNTER — TELEPHONE (OUTPATIENT)
Dept: PRIMARY CARE CLINIC | Facility: CLINIC | Age: 89
End: 2024-11-25
Payer: MEDICARE

## 2024-11-25 NOTE — TELEPHONE ENCOUNTER
Spoke with patient's sister and rescheduled her virtual visit on 12/10/24 at 3:00 pm to be in person because her facility placement paperwork needs to be completed before 12/19/24 when originally scheduled.

## 2024-11-25 NOTE — TELEPHONE ENCOUNTER
----- Message from Misty sent at 11/25/2024  3:30 PM CST -----  Contact: brittaney/sister/ [phone]  Caller is requesting an earlier appointment then we can schedule.  Caller is requesting a message be sent to the provider.    If this is for urgent care symptoms, did you offer other providers at this location, providers at other locations, or [facility]? (yes, no, n/a):  no    If this is for the patients physical, did you offer to schedule next available and put on wait list, or to see NP or PA for their physical?  (yes, no, n/a):  No    When is the next available appointment with their provider:  Dec 19    Reason for the appointment:  facility placement    Patient preference of timeframe to be scheduled:      Would the patient like a call back, or a response through their [facility] portal?:   call    Comments:  Patient need to be seen before Dec 19 however she also need a week notice due to transportation issues.  Is there anyway you can change the virtual appointment that is scheduled on Dec 10, 2024 at 3 pm to an in person visit instead of a virtual? Please call Brittaney to discuss.

## 2024-12-06 ENCOUNTER — PATIENT MESSAGE (OUTPATIENT)
Dept: PRIMARY CARE CLINIC | Facility: CLINIC | Age: 89
End: 2024-12-06
Payer: MEDICARE

## 2024-12-06 ENCOUNTER — TELEPHONE (OUTPATIENT)
Dept: PRIMARY CARE CLINIC | Facility: CLINIC | Age: 89
End: 2024-12-06

## 2024-12-06 DIAGNOSIS — I67.9 CEREBROVASCULAR DISEASE: ICD-10-CM

## 2024-12-06 DIAGNOSIS — M17.0 OSTEOARTHRITIS OF BOTH KNEES, UNSPECIFIED OSTEOARTHRITIS TYPE: Primary | ICD-10-CM

## 2024-12-06 DIAGNOSIS — R53.81 DEBILITY: ICD-10-CM

## 2024-12-06 DIAGNOSIS — I35.0 NONRHEUMATIC AORTIC VALVE STENOSIS: ICD-10-CM

## 2024-12-08 NOTE — PROGRESS NOTES
PATIENT: Nickie Segura  MRN: [id number]  DATE: 12/11/2024    Diagnosis:   1. Other iron deficiency anemia    2. Other neutropenia      Chief Complaint: Anemia    Oncologic History:      Oncologic History     Oncologic Treatment     Pathology       Subjective:    History of Present Illness: Ms. Segura is a 89 y.o. female who presents for evaluation and management of iron deficiency anemia and neutropenia. She had previously seen Dr. Denton.    - she received iron sucrose x 2 doses in February/March 2022.  - she was hospitalized in mid-May 2022 for symptomatic anemia and elevated liver enzymes. She responded to blood transfusion. She received two doses of iron sucrose during the hospitalization.  - she received iron sucrose x 4 doses in May/June 2022.  - she got labs with home health on 12/20/22 that revealed a hemoglobin of 6 g/dL.  - she received iron sucrose x 4 doses in December 2022 / January 2023.  - she received iron sucrose monthly since June 2023.  - she received iron sucrose x 3 doses in March - June 2024. She is getting IV iron monthly.    Interval history:  - she presents for a follow-up appointment for her iron deficiency anemia and neutropenia  - she continues to receive iron sucrose monthly.  - overall, she endorses fatigue, shortness of breath. He denies chest pain, nausea, vomiting, diarrhea.      Past medical, surgical, family, and social histories have been reviewed and updated below.    Past Medical History:   Past Medical History:   Diagnosis Date    LORNA positive     Anxiety     Bilateral cataracts     Colitis     Colon polyp     COVID-19 09/2022    Depression     Elevated IgE level     Hepatomegaly     Hiatal hernia     Hypothyroidism     IFG (impaired fasting glucose)     Iron deficiency anemia     Macular degeneration     Nicotine dependence     Osteoarthritis     Recurrent UTI     Renal cyst     Skin cancer     Tuberculosis of bladder     Urinary incontinence     Vitamin B12 deficiency      Wheezing        Past Surgical History:   Past Surgical History:   Procedure Laterality Date    CHOLECYSTECTOMY         Family History:   Family History   Problem Relation Name Age of Onset    Heart disease Mother      Diabetes Mother      Heart disease Father      Polycystic kidney disease Father      Atrial fibrillation Sister         Social History:  reports that she has been smoking vaping with nicotine. She has never used smokeless tobacco. She reports that she does not drink alcohol and does not use drugs.    Allergies:  Review of patient's allergies indicates:   Allergen Reactions    Penicillins      rash       Medications:  Current Outpatient Medications   Medication Sig Dispense Refill    Al hyd-Mg tr-alg ac-sod bicarb (GAVISCON) 80-14.2 mg Chew Take 1 tablet by mouth once daily. (Patient taking differently: Take 2 tablets by mouth once daily.)      albuterol (VENTOLIN HFA) 90 mcg/actuation inhaler Inhale 1-2 puffs into the lungs every 6 (six) hours as needed for Wheezing or Shortness of Breath. Rescue 18 g 0    aspirin (ECOTRIN) 81 MG EC tablet Take 81 mg by mouth once daily.      azelastine (ASTELIN) 137 mcg (0.1 %) nasal spray 2 sprays (274 mcg total) by Nasal route 2 (two) times daily. 90 mL 1    busPIRone (BUSPAR) 15 MG tablet TAKE 1 TABLET BY MOUTH TWICE DAILY 180 tablet 0    calcium carbonate (TUMS) 200 mg calcium (500 mg) chewable tablet Take 1 tablet by mouth once daily.      citalopram (CELEXA) 20 MG tablet Take 1.5 tablets (30 mg total) by mouth once daily. 135 tablet 2    estradioL (ESTRACE) 0.01 % (0.1 mg/gram) vaginal cream Place 1 g vaginally twice a week. 8 g 2    glycopyrrolate (ROBINUL) 1 mg Tab TAKE 1 TABLET(1 MG) BY MOUTH THREE TIMES DAILY 90 tablet 2    levothyroxine (SYNTHROID) 100 MCG tablet Take 1 tablet (100 mcg total) by mouth before breakfast. 90 tablet 1    LORazepam (ATIVAN) 0.5 MG tablet TAKE 1 TABLET(0.5 MG) BY MOUTH DAILY AS NEEDED FOR ANXIETY 30 tablet 1    MYRBETRIQ 50 mg  Tb24 TAKE 1 TABLET(50 MG) BY MOUTH EVERY DAY 90 tablet 3    neomycin-polymyxin-dexamethasone (DEXACINE) 3.5 mg/g-10,000 unit/g-0.1 % Oint APPLY A SMALL AMOUNT IN BOTH EYES TWICE DAILY      nystatin (MYCOSTATIN) cream Apply topically 2 (two) times daily. 30 g 2    pantoprazole (PROTONIX) 40 MG tablet 1 po daily 90 tablet 0    polyethylene glycol (GLYCOLAX) 17 gram/dose powder Take 17 g by mouth once daily. 510 g 3    rosuvastatin (CRESTOR) 20 MG tablet TAKE 1 TABLET(20 MG) BY MOUTH EVERY DAY 90 tablet 3    sodium chloride (OCEAN) 0.65 % nasal spray 1 spray DAILY (route: nasal)      vit C/E/zinc ox/amy/lut/zeax (ICAPS AREDS2 ORAL) Take by mouth Daily.       No current facility-administered medications for this visit.       Review of Systems   Constitutional:  Positive for fatigue.   HENT:  Negative for sore throat.    Eyes:  Positive for visual disturbance.   Respiratory:  Negative for cough and shortness of breath.    Cardiovascular:  Negative for chest pain.   Gastrointestinal:  Negative for abdominal pain, diarrhea, nausea and vomiting.   Genitourinary:  Negative for dysuria.   Musculoskeletal:  Negative for back pain.   Skin:  Negative for rash.   Neurological:  Positive for weakness. Negative for headaches.   Hematological:  Negative for adenopathy.   Psychiatric/Behavioral:  The patient is nervous/anxious.        ECOG Performance Status:   ECOG SCORE 1            Objective:      Vitals:   Vitals:    12/11/24 1422   BP: 135/60   BP Location: Left arm   Patient Position: Sitting   Pulse: 72   Resp: 18   SpO2: 98%         BMI: There is no height or weight on file to calculate BMI.    Physical Exam  Vitals and nursing note reviewed.   Constitutional:       Appearance: She is well-developed.   HENT:      Head: Normocephalic and atraumatic.   Eyes:      Pupils: Pupils are equal, round, and reactive to light.   Cardiovascular:      Rate and Rhythm: Normal rate and regular rhythm.   Pulmonary:      Effort: Pulmonary  effort is normal.      Breath sounds: Normal breath sounds.   Abdominal:      General: Bowel sounds are normal.      Palpations: Abdomen is soft.   Musculoskeletal:         General: Normal range of motion.      Cervical back: Normal range of motion and neck supple.   Skin:     General: Skin is warm and dry.   Neurological:      Mental Status: She is alert and oriented to person, place, and time.   Psychiatric:         Behavior: Behavior normal.         Thought Content: Thought content normal.         Judgment: Judgment normal.         Laboratory Data:  Labs have been reviewed.    Lab Results   Component Value Date    WBC 5.90 12/11/2024    HGB 8.1 (L) 12/11/2024    HCT 27.7 (L) 12/11/2024    MCV 88 12/11/2024     12/11/2024     Lab Results   Component Value Date    FERRITIN 481 (H) 09/27/2024     Lab Results   Component Value Date    IRON 25 (L) 09/27/2024    TRANSFERRIN 279 09/27/2024    TIBC 413 09/27/2024    FESATURATED 6 (L) 09/27/2024            Imaging:    Assessment:       1. Other iron deficiency anemia    2. Other neutropenia           Plan:     1. Other iron deficiency anemia  - I have reviewed her chart  - she received iron sucrose x 2 doses in February/March 2022  - Labs have been reviewed. Iron studies on 5/16/22 revealed an elevated total iron binding capacity, consistent with recurrent iron deficiency anemia.  - she was hospitalized in mid-May 2022 for symptomatic anemia and elevated liver enzymes. She responded to blood transfusion. She received two doses of iron sucrose during the hospitalization.  - labs (5/23/22) revealed an elevated total iron binding capacity, suggesting continued iron deficiency  - she received iron sucrose x 4 doses in May/June 2022.  - clinically, she did not notice a significant improvement in her symptoms.  - she got labs with home health on 12/20/22 that revealed a hemoglobin of 6 g/dL.  - iron studies (12/21/22) revealed an elevated total iron binding capacity,  suggesting continued iron deficiency  - she received iron sucrose x 4 doses in December 2022 / January 2023. she received iron sucrose monthly since June 2023.  - she received iron sucrose x 3 doses in March - June 2024. In May, we changed her infusions to monthly  - Labs have been reviewed. Hemoglobin is 8.1 g/dL. Follow up iron studies.  - continue iron infusions monthly.  - repeat labs in 3 months  - return to clinic in 6 months with repeat labs.    2. Other neutropenia  - unclear etiology  - we discussed a bone marrow biopsy during her hospitalization previously. After discussion, she declined to proceed with it.    - repeat labs in 3 months  - return to clinic in 6 months with repeat labs.    Louis Gibson M.D.  Hematology/Oncology  [facility] - [address], [address]  [place] [address]  Phone: [phone]  Fax: [phone]

## 2024-12-11 ENCOUNTER — OFFICE VISIT (OUTPATIENT)
Dept: HEMATOLOGY/ONCOLOGY | Facility: CLINIC | Age: 89
End: 2024-12-11
Payer: MEDICARE

## 2024-12-11 ENCOUNTER — INFUSION (OUTPATIENT)
Dept: INFUSION THERAPY | Facility: HOSPITAL | Age: 89
End: 2024-12-11
Attending: INTERNAL MEDICINE
Payer: MEDICARE

## 2024-12-11 VITALS
HEART RATE: 72 BPM | SYSTOLIC BLOOD PRESSURE: 135 MMHG | OXYGEN SATURATION: 98 % | RESPIRATION RATE: 18 BRPM | DIASTOLIC BLOOD PRESSURE: 60 MMHG

## 2024-12-11 VITALS
OXYGEN SATURATION: 98 % | RESPIRATION RATE: 18 BRPM | HEART RATE: 70 BPM | SYSTOLIC BLOOD PRESSURE: 120 MMHG | TEMPERATURE: 98 F | DIASTOLIC BLOOD PRESSURE: 59 MMHG

## 2024-12-11 DIAGNOSIS — D50.8 OTHER IRON DEFICIENCY ANEMIA: Primary | ICD-10-CM

## 2024-12-11 DIAGNOSIS — D70.8 OTHER NEUTROPENIA: ICD-10-CM

## 2024-12-11 PROCEDURE — 96374 THER/PROPH/DIAG INJ IV PUSH: CPT | Mod: HCNC

## 2024-12-11 PROCEDURE — 3288F FALL RISK ASSESSMENT DOCD: CPT | Mod: HCNC,CPTII,S$GLB, | Performed by: INTERNAL MEDICINE

## 2024-12-11 PROCEDURE — 25000003 PHARM REV CODE 250: Mod: HCNC | Performed by: INTERNAL MEDICINE

## 2024-12-11 PROCEDURE — A4216 STERILE WATER/SALINE, 10 ML: HCPCS | Mod: HCNC | Performed by: INTERNAL MEDICINE

## 2024-12-11 PROCEDURE — 1160F RVW MEDS BY RX/DR IN RCRD: CPT | Mod: HCNC,CPTII,S$GLB, | Performed by: INTERNAL MEDICINE

## 2024-12-11 PROCEDURE — 1157F ADVNC CARE PLAN IN RCRD: CPT | Mod: HCNC,CPTII,S$GLB, | Performed by: INTERNAL MEDICINE

## 2024-12-11 PROCEDURE — 1159F MED LIST DOCD IN RCRD: CPT | Mod: HCNC,CPTII,S$GLB, | Performed by: INTERNAL MEDICINE

## 2024-12-11 PROCEDURE — 99214 OFFICE O/P EST MOD 30 MIN: CPT | Mod: HCNC,S$GLB,, | Performed by: INTERNAL MEDICINE

## 2024-12-11 PROCEDURE — 1101F PT FALLS ASSESS-DOCD LE1/YR: CPT | Mod: HCNC,CPTII,S$GLB, | Performed by: INTERNAL MEDICINE

## 2024-12-11 PROCEDURE — 99999 PR PBB SHADOW E&M-EST. PATIENT-LVL IV: CPT | Mod: PBBFAC,HCNC,, | Performed by: INTERNAL MEDICINE

## 2024-12-11 PROCEDURE — 1126F AMNT PAIN NOTED NONE PRSNT: CPT | Mod: HCNC,CPTII,S$GLB, | Performed by: INTERNAL MEDICINE

## 2024-12-11 PROCEDURE — 63600175 PHARM REV CODE 636 W HCPCS: Mod: HCNC | Performed by: INTERNAL MEDICINE

## 2024-12-11 RX ORDER — DIPHENHYDRAMINE HYDROCHLORIDE 50 MG/ML
50 INJECTION INTRAMUSCULAR; INTRAVENOUS ONCE AS NEEDED
Status: DISCONTINUED | OUTPATIENT
Start: 2024-12-11 | End: 2024-12-11 | Stop reason: HOSPADM

## 2024-12-11 RX ORDER — EPINEPHRINE 0.3 MG/.3ML
0.3 INJECTION SUBCUTANEOUS ONCE AS NEEDED
Status: DISCONTINUED | OUTPATIENT
Start: 2024-12-11 | End: 2024-12-11 | Stop reason: HOSPADM

## 2024-12-11 RX ORDER — SODIUM CHLORIDE 0.9 % (FLUSH) 0.9 %
10 SYRINGE (ML) INJECTION
Status: DISCONTINUED | OUTPATIENT
Start: 2024-12-11 | End: 2024-12-11 | Stop reason: HOSPADM

## 2024-12-11 RX ADMIN — Medication 10 ML: at 02:12

## 2024-12-11 RX ADMIN — Medication 10 ML: at 01:12

## 2024-12-11 RX ADMIN — IRON SUCROSE 200 MG: 20 INJECTION, SOLUTION INTRAVENOUS at 01:12

## 2024-12-11 NOTE — PLAN OF CARE
Problem: Anemia  Goal: Anemia Symptom Improvement  Intervention: Monitor and Manage Anemia  Flowsheets (Taken 12/11/2024 1090)  Safety Promotion/Fall Prevention:   assistive device/personal item within reach   pulse ox   Patient tolerated Venofer IVP today. PIV + blood return upon deaccess. NAD noted. VSS. Patient discharged via w/c off unit with sister by her side. RTC  1/10/24. Calendar provided to sister.

## 2024-12-13 ENCOUNTER — TELEPHONE (OUTPATIENT)
Dept: PRIMARY CARE CLINIC | Facility: CLINIC | Age: 89
End: 2024-12-13
Payer: MEDICARE

## 2024-12-13 DIAGNOSIS — I67.9 CEREBROVASCULAR DISEASE: ICD-10-CM

## 2024-12-13 DIAGNOSIS — M17.0 OSTEOARTHRITIS OF BOTH KNEES, UNSPECIFIED OSTEOARTHRITIS TYPE: ICD-10-CM

## 2024-12-13 DIAGNOSIS — R53.81 DEBILITY: Primary | ICD-10-CM

## 2024-12-13 DIAGNOSIS — Z86.73 HISTORY OF CVA (CEREBROVASCULAR ACCIDENT): ICD-10-CM

## 2024-12-18 ENCOUNTER — OFFICE VISIT (OUTPATIENT)
Dept: PRIMARY CARE CLINIC | Facility: CLINIC | Age: 89
End: 2024-12-18
Payer: MEDICARE

## 2024-12-18 DIAGNOSIS — E03.9 HYPOTHYROIDISM, UNSPECIFIED TYPE: ICD-10-CM

## 2024-12-18 DIAGNOSIS — D50.9 IRON DEFICIENCY ANEMIA, UNSPECIFIED IRON DEFICIENCY ANEMIA TYPE: ICD-10-CM

## 2024-12-18 DIAGNOSIS — I10 HYPERTENSION, UNSPECIFIED TYPE: Primary | ICD-10-CM

## 2024-12-18 DIAGNOSIS — E78.5 HYPERLIPIDEMIA, UNSPECIFIED HYPERLIPIDEMIA TYPE: ICD-10-CM

## 2024-12-18 DIAGNOSIS — R53.81 DEBILITY: ICD-10-CM

## 2024-12-18 DIAGNOSIS — K29.70 GASTRITIS, PRESENCE OF BLEEDING UNSPECIFIED, UNSPECIFIED CHRONICITY, UNSPECIFIED GASTRITIS TYPE: ICD-10-CM

## 2024-12-18 DIAGNOSIS — I67.9 CEREBROVASCULAR DISEASE: ICD-10-CM

## 2024-12-18 DIAGNOSIS — Z86.73 HISTORY OF CVA (CEREBROVASCULAR ACCIDENT): ICD-10-CM

## 2024-12-18 DIAGNOSIS — F33.9 EPISODE OF RECURRENT MAJOR DEPRESSIVE DISORDER, UNSPECIFIED DEPRESSION EPISODE SEVERITY: ICD-10-CM

## 2024-12-18 DIAGNOSIS — F41.9 ANXIETY: ICD-10-CM

## 2024-12-18 DIAGNOSIS — R68.2 DRY MOUTH: ICD-10-CM

## 2024-12-18 PROCEDURE — 1159F MED LIST DOCD IN RCRD: CPT | Mod: HCNC,CPTII,95, | Performed by: INTERNAL MEDICINE

## 2024-12-18 PROCEDURE — 99214 OFFICE O/P EST MOD 30 MIN: CPT | Mod: HCNC,95,, | Performed by: INTERNAL MEDICINE

## 2024-12-18 PROCEDURE — 1157F ADVNC CARE PLAN IN RCRD: CPT | Mod: HCNC,CPTII,95, | Performed by: INTERNAL MEDICINE

## 2024-12-18 PROCEDURE — 1160F RVW MEDS BY RX/DR IN RCRD: CPT | Mod: HCNC,CPTII,95, | Performed by: INTERNAL MEDICINE

## 2024-12-18 RX ORDER — LEVOTHYROXINE SODIUM 100 UG/1
100 TABLET ORAL
Qty: 90 TABLET | Refills: 1 | Status: SHIPPED | OUTPATIENT
Start: 2024-12-18

## 2024-12-18 RX ORDER — PANTOPRAZOLE SODIUM 40 MG/1
TABLET, DELAYED RELEASE ORAL
Qty: 90 TABLET | Refills: 0 | Status: SHIPPED | OUTPATIENT
Start: 2024-12-18

## 2024-12-18 RX ORDER — BUSPIRONE HYDROCHLORIDE 15 MG/1
TABLET ORAL
Qty: 180 TABLET | Refills: 0 | Status: SHIPPED | OUTPATIENT
Start: 2024-12-18
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
## 2024-12-20 DIAGNOSIS — Z86.73 OLD CARDIOEMBOLIC STROKE WITHOUT LATE EFFECT: ICD-10-CM

## 2024-12-20 DIAGNOSIS — E78.00 PURE HYPERCHOLESTEROLEMIA: ICD-10-CM

## 2024-12-20 RX ORDER — ROSUVASTATIN CALCIUM 20 MG/1
TABLET, COATED ORAL
Qty: 90 TABLET | Refills: 1 | Status: SHIPPED | OUTPATIENT
Start: 2024-12-20

## 2024-12-20 NOTE — TELEPHONE ENCOUNTER
No care due was identified.  [facility] Embedded Care Due Messages. Reference number: [id number].   12/20/2024 3:28:30 PM CST

## 2024-12-20 NOTE — TELEPHONE ENCOUNTER
Refill Decision Note   Nickie Colton  is requesting a refill authorization.  Brief Assessment and Rationale for Refill:  Approve     Medication Therapy Plan:         Comments:     Note composed:3:49 PM 12/20/2024

## 2025-01-08 ENCOUNTER — PATIENT MESSAGE (OUTPATIENT)
Dept: PRIMARY CARE CLINIC | Facility: CLINIC | Age: 89
End: 2025-01-08
Payer: MEDICARE

## 2025-01-08 DIAGNOSIS — F41.9 ANXIETY: ICD-10-CM

## 2025-01-08 RX ORDER — LORAZEPAM 0.5 MG/1
0.5 TABLET ORAL DAILY PRN
Qty: 30 TABLET | Refills: 1 | Status: CANCELLED | OUTPATIENT
Start: 2025-01-08

## 2025-01-08 RX ORDER — LORAZEPAM 0.5 MG/1
0.5 TABLET ORAL DAILY PRN
Qty: 30 TABLET | Refills: 1 | Status: SHIPPED | OUTPATIENT
Start: 2025-01-08

## 2025-01-08 NOTE — TELEPHONE ENCOUNTER
No care due was identified.  [facility] Embedded Care Due Messages. Reference number: [id number].   1/08/2025 9:29:12 AM CST

## 2025-01-08 NOTE — TELEPHONE ENCOUNTER
No care due was identified.  [facility] Embedded Care Due Messages. Reference number: [id number].   1/08/2025 2:44:04 PM CST

## 2025-01-10 ENCOUNTER — INFUSION (OUTPATIENT)
Dept: INFUSION THERAPY | Facility: HOSPITAL | Age: 89
End: 2025-01-10
Attending: INTERNAL MEDICINE
Payer: MEDICARE

## 2025-01-10 VITALS
HEART RATE: 76 BPM | DIASTOLIC BLOOD PRESSURE: 57 MMHG | TEMPERATURE: 98 F | RESPIRATION RATE: 18 BRPM | SYSTOLIC BLOOD PRESSURE: 112 MMHG | OXYGEN SATURATION: 96 %

## 2025-01-10 DIAGNOSIS — D50.8 OTHER IRON DEFICIENCY ANEMIA: Primary | ICD-10-CM

## 2025-01-10 PROCEDURE — 96374 THER/PROPH/DIAG INJ IV PUSH: CPT | Mod: HCNC

## 2025-01-10 PROCEDURE — 63600175 PHARM REV CODE 636 W HCPCS: Mod: HCNC | Performed by: INTERNAL MEDICINE

## 2025-01-10 PROCEDURE — 25000003 PHARM REV CODE 250: Mod: HCNC | Performed by: INTERNAL MEDICINE

## 2025-01-10 PROCEDURE — A4216 STERILE WATER/SALINE, 10 ML: HCPCS | Mod: HCNC | Performed by: INTERNAL MEDICINE

## 2025-01-10 RX ORDER — EPINEPHRINE 0.3 MG/.3ML
0.3 INJECTION SUBCUTANEOUS ONCE AS NEEDED
Status: DISCONTINUED | OUTPATIENT
Start: 2025-01-10 | End: 2025-01-10 | Stop reason: HOSPADM

## 2025-01-10 RX ORDER — DIPHENHYDRAMINE HYDROCHLORIDE 50 MG/ML
50 INJECTION INTRAMUSCULAR; INTRAVENOUS ONCE AS NEEDED
Status: DISCONTINUED | OUTPATIENT
Start: 2025-01-10 | End: 2025-01-10 | Stop reason: HOSPADM

## 2025-01-10 RX ORDER — SODIUM CHLORIDE 0.9 % (FLUSH) 0.9 %
10 SYRINGE (ML) INJECTION
Status: DISCONTINUED | OUTPATIENT
Start: 2025-01-10 | End: 2025-01-10 | Stop reason: HOSPADM

## 2025-01-10 RX ADMIN — IRON SUCROSE 200 MG: 20 INJECTION, SOLUTION INTRAVENOUS at 10:01

## 2025-01-10 RX ADMIN — Medication 10 ML: at 10:01

## 2025-01-10 NOTE — PLAN OF CARE
Patient tolerated iron treatment well. VSS. Removed PIV. Sister with patient. AVS via [place].

## 2025-02-07 ENCOUNTER — INFUSION (OUTPATIENT)
Dept: INFUSION THERAPY | Facility: HOSPITAL | Age: 89
End: 2025-02-07
Attending: INTERNAL MEDICINE
Payer: MEDICARE

## 2025-02-07 VITALS
TEMPERATURE: 98 F | OXYGEN SATURATION: 97 % | DIASTOLIC BLOOD PRESSURE: 53 MMHG | RESPIRATION RATE: 18 BRPM | HEART RATE: 81 BPM | SYSTOLIC BLOOD PRESSURE: 117 MMHG

## 2025-02-07 DIAGNOSIS — D50.8 OTHER IRON DEFICIENCY ANEMIA: Primary | ICD-10-CM

## 2025-02-07 PROCEDURE — 25000003 PHARM REV CODE 250: Mod: HCNC | Performed by: INTERNAL MEDICINE

## 2025-02-07 PROCEDURE — A4216 STERILE WATER/SALINE, 10 ML: HCPCS | Mod: HCNC | Performed by: INTERNAL MEDICINE

## 2025-02-07 PROCEDURE — 96374 THER/PROPH/DIAG INJ IV PUSH: CPT | Mod: HCNC

## 2025-02-07 PROCEDURE — 63600175 PHARM REV CODE 636 W HCPCS: Mod: HCNC | Performed by: INTERNAL MEDICINE

## 2025-02-07 RX ORDER — SODIUM CHLORIDE 0.9 % (FLUSH) 0.9 %
10 SYRINGE (ML) INJECTION
Status: DISCONTINUED | OUTPATIENT
Start: 2025-02-07 | End: 2025-02-07 | Stop reason: HOSPADM

## 2025-02-07 RX ORDER — EPINEPHRINE 0.3 MG/.3ML
0.3 INJECTION SUBCUTANEOUS ONCE AS NEEDED
Status: DISCONTINUED | OUTPATIENT
Start: 2025-02-07 | End: 2025-02-07 | Stop reason: HOSPADM

## 2025-02-07 RX ORDER — DIPHENHYDRAMINE HYDROCHLORIDE 50 MG/ML
50 INJECTION INTRAMUSCULAR; INTRAVENOUS ONCE AS NEEDED
Status: DISCONTINUED | OUTPATIENT
Start: 2025-02-07 | End: 2025-02-07 | Stop reason: HOSPADM

## 2025-02-07 RX ORDER — HEPARIN 100 UNIT/ML
500 SYRINGE INTRAVENOUS
Status: DISCONTINUED | OUTPATIENT
Start: 2025-02-07 | End: 2025-02-07 | Stop reason: HOSPADM

## 2025-02-07 RX ADMIN — IRON SUCROSE 200 MG: 20 INJECTION, SOLUTION INTRAVENOUS at 10:02

## 2025-02-07 RX ADMIN — SODIUM CHLORIDE, PRESERVATIVE FREE 10 ML: 5 INJECTION INTRAVENOUS at 10:02

## 2025-02-07 NOTE — PLAN OF CARE
Patient tolerated Venofer IVP today. NAD noted. VSS. PIV + blood return upon deaccess. Discharged home via w/c off unit with sister by her side.

## 2025-02-17 ENCOUNTER — HOSPITAL ENCOUNTER (EMERGENCY)
Facility: HOSPITAL | Age: 89
Discharge: HOME | End: 2025-02-17
Attending: STUDENT IN AN ORGANIZED HEALTH CARE EDUCATION/TRAINING PROGRAM
Payer: MEDICARE

## 2025-02-17 DIAGNOSIS — R31.9 URINARY TRACT INFECTION WITH HEMATURIA, SITE UNSPECIFIED: ICD-10-CM

## 2025-02-17 DIAGNOSIS — N39.0 URINARY TRACT INFECTION WITH HEMATURIA, SITE UNSPECIFIED: ICD-10-CM

## 2025-02-17 DIAGNOSIS — R53.1 GENERALIZED WEAKNESS: ICD-10-CM

## 2025-02-17 DIAGNOSIS — D64.9 ANEMIA, UNSPECIFIED TYPE: Primary | ICD-10-CM

## 2025-02-17 LAB
ABO + RH BLD: NORMAL
ALBUMIN SERPL BCP-MCNC: 2.7 G/DL (ref 3.5–5.2)
ALP SERPL-CCNC: 83 U/L (ref 40–150)
ALT SERPL W/O P-5'-P-CCNC: 32 U/L (ref 10–44)
ANION GAP SERPL CALC-SCNC: 9 MMOL/L (ref 8–16)
AST SERPL-CCNC: 76 U/L (ref 10–40)
BACTERIA #/AREA URNS HPF: ABNORMAL /HPF
BASOPHILS # BLD AUTO: 0.02 K/UL (ref 0–0.2)
BASOPHILS NFR BLD: 0.4 % (ref 0–1.9)
BILIRUB SERPL-MCNC: 0.2 MG/DL (ref 0.1–1)
BILIRUB UR QL STRIP: NEGATIVE
BLD GP AB SCN CELLS X3 SERPL QL: NORMAL
BLD PROD TYP BPU: NORMAL
BLOOD UNIT EXPIRATION DATE: NORMAL
BLOOD UNIT TYPE CODE: 7300
BLOOD UNIT TYPE: NORMAL
BUN SERPL-MCNC: 16 MG/DL (ref 8–23)
CALCIUM SERPL-MCNC: 8.5 MG/DL (ref 8.7–10.5)
CHLORIDE SERPL-SCNC: 108 MMOL/L (ref 95–110)
CLARITY UR: ABNORMAL
CO2 SERPL-SCNC: 22 MMOL/L (ref 23–29)
CODING SYSTEM: NORMAL
COLOR UR: YELLOW
CREAT SERPL-MCNC: 1.1 MG/DL (ref 0.5–1.4)
CROSSMATCH INTERPRETATION: NORMAL
CTP QC/QA: YES
DIFFERENTIAL METHOD BLD: ABNORMAL
DISPENSE STATUS: NORMAL
EOSINOPHIL # BLD AUTO: 0 K/UL (ref 0–0.5)
EOSINOPHIL NFR BLD: 0.9 % (ref 0–8)
ERYTHROCYTE [DISTWIDTH] IN BLOOD BY AUTOMATED COUNT: 21.2 % (ref 11.5–14.5)
EST. GFR  (NO RACE VARIABLE): 48 ML/MIN/1.73 M^2
GLUCOSE SERPL-MCNC: 100 MG/DL (ref 70–110)
GLUCOSE UR QL STRIP: NEGATIVE
HCT VFR BLD AUTO: 23.6 % (ref 37–48.5)
HGB BLD-MCNC: 6.9 G/DL (ref 12–16)
HGB BLD-MCNC: 8 G/DL (ref 12–16)
HGB UR QL STRIP: ABNORMAL
HYALINE CASTS #/AREA URNS LPF: 0 /LPF
IMM GRANULOCYTES # BLD AUTO: 0.01 K/UL (ref 0–0.04)
IMM GRANULOCYTES NFR BLD AUTO: 0.2 % (ref 0–0.5)
KETONES UR QL STRIP: NEGATIVE
LEUKOCYTE ESTERASE UR QL STRIP: ABNORMAL
LYMPHOCYTES # BLD AUTO: 0.8 K/UL (ref 1–4.8)
LYMPHOCYTES NFR BLD: 16.9 % (ref 18–48)
MAGNESIUM SERPL-MCNC: 2.1 MG/DL (ref 1.6–2.6)
MCH RBC QN AUTO: 26.2 PG (ref 27–31)
MCHC RBC AUTO-ENTMCNC: 29.2 G/DL (ref 32–36)
MCV RBC AUTO: 90 FL (ref 82–98)
MICROSCOPIC COMMENT: ABNORMAL
MONOCYTES # BLD AUTO: 0.4 K/UL (ref 0.3–1)
MONOCYTES NFR BLD: 8.5 % (ref 4–15)
NEUTROPHILS # BLD AUTO: 3.4 K/UL (ref 1.8–7.7)
NEUTROPHILS NFR BLD: 73.1 % (ref 38–73)
NITRITE UR QL STRIP: POSITIVE
NRBC BLD-RTO: 0 /100 WBC
OHS QRS DURATION: 64 MS
OHS QTC CALCULATION: 448 MS
PH UR STRIP: 6 [PH] (ref 5–8)
PLATELET # BLD AUTO: 232 K/UL (ref 150–450)
PMV BLD AUTO: 9.4 FL (ref 9.2–12.9)
POC MOLECULAR INFLUENZA A AGN: NEGATIVE
POC MOLECULAR INFLUENZA B AGN: NEGATIVE
POTASSIUM SERPL-SCNC: 4.8 MMOL/L (ref 3.5–5.1)
PROT SERPL-MCNC: 6 G/DL (ref 6–8.4)
PROT UR QL STRIP: ABNORMAL
RBC # BLD AUTO: 2.63 M/UL (ref 4–5.4)
RBC #/AREA URNS HPF: 29 /HPF (ref 0–4)
SODIUM SERPL-SCNC: 139 MMOL/L (ref 136–145)
SP GR UR STRIP: 1.02 (ref 1–1.03)
SPECIMEN OUTDATE: NORMAL
SQUAMOUS #/AREA URNS HPF: 1 /HPF
TRANS ERYTHROCYTES VOL PATIENT: NORMAL ML
TSH SERPL DL<=0.005 MIU/L-ACNC: 3.04 UIU/ML (ref 0.4–4)
URN SPEC COLLECT METH UR: ABNORMAL
UROBILINOGEN UR STRIP-ACNC: ABNORMAL EU/DL
WBC # BLD AUTO: 4.68 K/UL (ref 3.9–12.7)
WBC #/AREA URNS HPF: >100 /HPF (ref 0–5)

## 2025-02-17 PROCEDURE — 93005 ELECTROCARDIOGRAM TRACING: CPT | Mod: HCNC

## 2025-02-17 PROCEDURE — 85025 COMPLETE CBC W/AUTO DIFF WBC: CPT | Mod: HCNC | Performed by: STUDENT IN AN ORGANIZED HEALTH CARE EDUCATION/TRAINING PROGRAM

## 2025-02-17 PROCEDURE — 86901 BLOOD TYPING SEROLOGIC RH(D): CPT | Mod: HCNC | Performed by: STUDENT IN AN ORGANIZED HEALTH CARE EDUCATION/TRAINING PROGRAM

## 2025-02-17 PROCEDURE — 84443 ASSAY THYROID STIM HORMONE: CPT | Mod: HCNC | Performed by: STUDENT IN AN ORGANIZED HEALTH CARE EDUCATION/TRAINING PROGRAM

## 2025-02-17 PROCEDURE — 85018 HEMOGLOBIN: CPT | Mod: HCNC | Performed by: STUDENT IN AN ORGANIZED HEALTH CARE EDUCATION/TRAINING PROGRAM

## 2025-02-17 PROCEDURE — 86922 COMPATIBILITY TEST ANTIGLOB: CPT | Mod: HCNC | Performed by: STUDENT IN AN ORGANIZED HEALTH CARE EDUCATION/TRAINING PROGRAM

## 2025-02-17 PROCEDURE — 87086 URINE CULTURE/COLONY COUNT: CPT | Mod: HCNC | Performed by: STUDENT IN AN ORGANIZED HEALTH CARE EDUCATION/TRAINING PROGRAM

## 2025-02-17 PROCEDURE — 36430 TRANSFUSION BLD/BLD COMPNT: CPT | Mod: HCNC

## 2025-02-17 PROCEDURE — P9021 RED BLOOD CELLS UNIT: HCPCS | Mod: HCNC | Performed by: STUDENT IN AN ORGANIZED HEALTH CARE EDUCATION/TRAINING PROGRAM

## 2025-02-17 PROCEDURE — 87502 INFLUENZA DNA AMP PROBE: CPT | Mod: HCNC

## 2025-02-17 PROCEDURE — 81000 URINALYSIS NONAUTO W/SCOPE: CPT | Mod: HCNC | Performed by: STUDENT IN AN ORGANIZED HEALTH CARE EDUCATION/TRAINING PROGRAM

## 2025-02-17 PROCEDURE — 83735 ASSAY OF MAGNESIUM: CPT | Mod: HCNC | Performed by: STUDENT IN AN ORGANIZED HEALTH CARE EDUCATION/TRAINING PROGRAM

## 2025-02-17 PROCEDURE — 27201040 HC RC 50 FILTER: Mod: HCNC | Performed by: STUDENT IN AN ORGANIZED HEALTH CARE EDUCATION/TRAINING PROGRAM

## 2025-02-17 PROCEDURE — 87088 URINE BACTERIA CULTURE: CPT | Mod: HCNC | Performed by: STUDENT IN AN ORGANIZED HEALTH CARE EDUCATION/TRAINING PROGRAM

## 2025-02-17 PROCEDURE — 87186 SC STD MICRODIL/AGAR DIL: CPT | Mod: HCNC | Performed by: STUDENT IN AN ORGANIZED HEALTH CARE EDUCATION/TRAINING PROGRAM

## 2025-02-17 PROCEDURE — 25000003 PHARM REV CODE 250: Mod: HCNC | Performed by: STUDENT IN AN ORGANIZED HEALTH CARE EDUCATION/TRAINING PROGRAM

## 2025-02-17 PROCEDURE — 99285 EMERGENCY DEPT VISIT HI MDM: CPT | Mod: 25,HCNC

## 2025-02-17 PROCEDURE — 80053 COMPREHEN METABOLIC PANEL: CPT | Mod: HCNC | Performed by: STUDENT IN AN ORGANIZED HEALTH CARE EDUCATION/TRAINING PROGRAM

## 2025-02-17 RX ORDER — HYDROCODONE BITARTRATE AND ACETAMINOPHEN 500; 5 MG/1; MG/1
TABLET ORAL
Status: DISCONTINUED | OUTPATIENT
Start: 2025-02-17 | End: 2025-02-18 | Stop reason: HOSPADM

## 2025-02-17 RX ORDER — NITROFURANTOIN 25; 75 MG/1; MG/1
100 CAPSULE ORAL 2 TIMES DAILY
Qty: 10 CAPSULE | Refills: 0 | Status: SHIPPED | OUTPATIENT
Start: 2025-02-17 | End: 2025-02-22

## 2025-02-17 RX ORDER — NITROFURANTOIN 25; 75 MG/1; MG/1
100 CAPSULE ORAL ONCE
Status: COMPLETED | OUTPATIENT
Start: 2025-02-17 | End: 2025-02-17

## 2025-02-17 RX ADMIN — NITROFURANTOIN MONOHYDRATE/MACROCRYSTALS 100 MG: 25; 75 CAPSULE ORAL at 06:02

## 2025-02-17 NOTE — ED NOTES
Perineal care completed. Applied barrier cream to perineal and buttocks. Repositioned in bed for comfort. Provided pitcher of water.

## 2025-02-17 NOTE — DISCHARGE INSTRUCTIONS
Take macrobid as prescribed.    Make an appointment with your hematologist to discuss next steps for your anemia.     Return with passing out, chest pain, shortness of breath, or any other concerning symptoms.    Future Appointments   Date Time Provider Department Center   3/7/2025 10:00 AM CHAIR [facility] CHEMO [facility]   3/11/2025 10:00 AM APPOINTMENT [facility] LAB [facility]   6/10/2025 10:00 AM APPOINTMENT [facility]   6/11/2025  2:40 PM Louis Gibson MD [facility] HEM ONC [facility]

## 2025-02-17 NOTE — ED NOTES
"Pt. Brought to ED by sister. She reports pt. Has had progressive, debilitating weakness and decreased functional status since a tia months ago that left her with some (R) sided weakness. She does not ambulate anymore. She has aids that come to the assisted living apartment 6 times a day and assist her to the wheelchair and bathroom. 3 days ago she was weaker than normal for a day and hardly ate anything. Today, she her "legs gave out" when she was being assisted. She describes as generalized weakness in both legs that has happened several times before. She denies v/d or gi symptoms. Denies urinary symptoms. She is A/O x4. She is currently at her baseline. The patient has no complaints presently.   "

## 2025-02-18 ENCOUNTER — PATIENT MESSAGE (OUTPATIENT)
Dept: HEMATOLOGY/ONCOLOGY | Facility: CLINIC | Age: 89
End: 2025-02-18
Payer: MEDICARE

## 2025-02-18 ENCOUNTER — RESULTS FOLLOW-UP (OUTPATIENT)
Dept: EMERGENCY MEDICINE | Facility: HOSPITAL | Age: 89
End: 2025-02-18

## 2025-02-18 VITALS
RESPIRATION RATE: 19 BRPM | HEART RATE: 76 BPM | WEIGHT: 200 LBS | TEMPERATURE: 98 F | SYSTOLIC BLOOD PRESSURE: 105 MMHG | OXYGEN SATURATION: 96 % | HEIGHT: 65 IN | BODY MASS INDEX: 33.32 KG/M2 | DIASTOLIC BLOOD PRESSURE: 55 MMHG

## 2025-02-18 NOTE — ED NOTES
Pt. Given a regular dinner tray. Sister (caregiver) also provided meal tray. Pt. Is tolerating well.

## 2025-02-18 NOTE — ED PROVIDER NOTES
Encounter Date: 2/17/2025       History     Chief Complaint   Patient presents with    Generalized Weakness     Pt presents to the ED from independent senior living with a complaint of a near syncopal episode and generalized weakness that started today.  No complaints noted at this time     HPI    The patient is an 89-year-old woman with a history of iron-deficiency anemia, hypertension, and hypothyroidism, with recurrent urinary tract infection, presenting from home via EMS for evaluation of generalized weakness.  The triage note states that the patient experienced a near syncopal episode.  When I speak with the patient and her sister at the bedside, this is not the case.  It turns out that the patient was home in her independent senior living community and experienced an episode of right leg weakness which led to her experiencing a trip and fall (This right leg weakness is baseline after the patient's prior stroke.)  She fell onto the ground.  She did not hit her head or lose consciousness.  She was able to stand after the incident.  Not have any preceding chest pain, shortness of breath, vomiting, diaphoresis.  Again, no syncopal episodes.  No recent fevers.  The patient's sister states that the patient sees Hematology and gets monthly iron infusions.  Both the patient and the sister state that given the patient's age they do not want to determine the etiology of the iron deficiency anemia, and have decided to forego colonoscopy and other workup.    Presently, the patient reports that she is asymptomatic and wants to go home.    Review of patient's allergies indicates:   Allergen Reactions    Penicillins      rash     Past Medical History:   Diagnosis Date    LORNA positive     Anxiety     Bilateral cataracts     Colitis     Colon polyp     COVID-19 09/2022    Depression     Elevated IgE level     Hepatomegaly     Hiatal hernia     Hypothyroidism     IFG (impaired fasting glucose)     Iron deficiency  anemia     Macular degeneration     Nicotine dependence     Osteoarthritis     Recurrent UTI     Renal cyst     Skin cancer     Tuberculosis of bladder     Urinary incontinence     Vitamin B12 deficiency     Wheezing      Past Surgical History:   Procedure Laterality Date    CHOLECYSTECTOMY       Family History   Problem Relation Name Age of Onset    Heart disease Mother      Diabetes Mother      Heart disease Father      Polycystic kidney disease Father      Atrial fibrillation Sister       Social History[1]  Review of Systems   Constitutional:  Negative for fatigue and fever.   HENT:  Negative for sore throat.    Respiratory:  Negative for shortness of breath.    Cardiovascular:  Negative for chest pain, palpitations and leg swelling.   Gastrointestinal:  Negative for abdominal distention, abdominal pain, diarrhea, nausea and vomiting.   Genitourinary:  Negative for dysuria.   Musculoskeletal:  Positive for gait problem. Negative for arthralgias, back pain, joint swelling, myalgias, neck pain and neck stiffness.   Skin:  Negative for rash and wound.   Neurological:  Positive for weakness (Chronic). Negative for syncope, light-headedness and headaches.       Physical Exam     Initial Vitals [02/17/25 1303]   BP Pulse Resp Temp SpO2   (!) 110/55 84 18 98 °F (36.7 °C) 97 %      MAP       --         Physical Exam    Nursing note and vitals reviewed.  Constitutional: She appears well-developed and well-nourished. She is not diaphoretic. No distress.   Pale, chronically ill-appearing 89-year-old lady   HENT:   Head: Normocephalic and atraumatic.   Eyes: EOM are normal.   Conjunctival pallor   Neck: Neck supple. No JVD present.   Normal range of motion.  Cardiovascular:  Normal rate and regular rhythm.           Murmur (Systolic) heard.  Pulmonary/Chest: Breath sounds normal. No respiratory distress. She has no wheezes. She has no rhonchi. She has no rales.   Abdominal: Abdomen is soft. She exhibits no  distension. There is no abdominal tenderness. There is no rebound.   Musculoskeletal:         General: No tenderness or edema. Normal range of motion.      Cervical back: Normal range of motion and neck supple.     Neurological: She is alert and oriented to person, place, and time. No cranial nerve deficit. Coordination normal. GCS eye subscore is 4. GCS verbal subscore is 5. GCS motor subscore is 6.   Baseline right lower extremity weakness   Skin: Skin is warm and dry. There is pallor.         ED Course   Procedures  Labs Reviewed   CBC W/ AUTO DIFFERENTIAL - Abnormal       Result Value    WBC 4.68      RBC 2.63 (*)     Hemoglobin 6.9 (*)     Hematocrit 23.6 (*)     MCV 90      MCH 26.2 (*)     MCHC 29.2 (*)     RDW 21.2 (*)     Platelets 232      MPV 9.4      Immature Granulocytes 0.2      Gran # (ANC) 3.4      Immature Grans (Abs) 0.01      Lymph # 0.8 (*)     Mono # 0.4      Eos # 0.0      Baso # 0.02      nRBC 0      Gran % 73.1 (*)     Lymph % 16.9 (*)     Mono % 8.5      Eosinophil % 0.9      Basophil % 0.4      Differential Method Automated     COMPREHENSIVE METABOLIC PANEL - Abnormal    Sodium 139      Potassium 4.8      Chloride 108      CO2 22 (*)     Glucose 100      BUN 16      Creatinine 1.1      Calcium 8.5 (*)     Total Protein 6.0      Albumin 2.7 (*)     Total Bilirubin 0.2      Alkaline Phosphatase 83      AST 76 (*)     ALT 32      eGFR 48 (*)     Anion Gap 9     URINALYSIS, REFLEX TO URINE CULTURE - Abnormal    Specimen UA Urine, Clean Catch      Color, UA Yellow      Appearance, UA Cloudy (*)     pH, UA 6.0      Specific Gravity, UA 1.020      Protein, UA 3+ (*)     Glucose, UA Negative      Ketones, UA Negative      Bilirubin (UA) Negative      Occult Blood UA 2+ (*)     Nitrite, UA Positive (*)     Urobilinogen, UA 2.0-3.0 (*)     Leukocytes, UA 3+ (*)     Narrative:     Specimen Source->Urine   URINALYSIS MICROSCOPIC - Abnormal    RBC, UA 29 (*)     WBC, UA >100 (*)     Bacteria Few (*)      Squam Epithel, UA 1      Hyaline Casts, UA 0      Microscopic Comment SEE COMMENT      Narrative:     Specimen Source->Urine   CULTURE, URINE   MAGNESIUM    Magnesium 2.1     TSH    TSH 3.045     HEMOGLOBIN   POCT INFLUENZA A/B MOLECULAR    POC Molecular Influenza A Ag Negative      POC Molecular Influenza B Ag Negative       Acceptable Yes     TYPE & SCREEN    Group & Rh B POS      Indirect Diony NEG      Specimen Outdate 02/20/2025 23:59     PREPARE RBC SOFT    UNIT NUMBER [id number]      Product Code [id number]      DISPENSE STATUS ISSUED      CODING SYSTEM KBSM913      Unit Blood Type Code 7300      Unit Blood Type B POS      Unit Expiration [id number]      CROSSMATCH INTERPRETATION Compatible       EKG Readings: (Independently Interpreted)   Initial Reading: No STEMI. Rhythm: Normal Sinus Rhythm. Ectopy: No Ectopy. Conduction: Normal. ST Segments: Normal ST Segments. T Waves: Normal.     ECG Results              EKG 12-lead (In process)        Collection Time Result Time QRS Duration OHS QTC Calculation    02/17/25 14:04:07 02/17/25 15:14:05 64 448                     In process by Interface, Lab In [facility] (02/17/25 15:14:13)                   Narrative:    Test Reason : R53.1,    Vent. Rate :  77 BPM     Atrial Rate :  77 BPM     P-R Int : 154 ms          QRS Dur :  64 ms      QT Int : 396 ms       P-R-T Axes :  54  56  74 degrees    QTcB Int : 448 ms    Normal sinus rhythm  Nonspecific ST abnormality  Abnormal ECG  When compared with ECG of 13-Aug-2022 12:41,  No significant change was found    Referred By:            Confirmed By:                                   Imaging Results              X-Ray Chest 1 View (Final result)  Result time 02/17/25 16:37:12      Final result by Edmar Max MD (02/17/25 16:37:12)                   Impression:      1. Chronic appearing interstitial findings, no large focal consolidation.      Electronically signed by: Edmar Max  MD  Date:    02/17/2025  Time:    16:37               Narrative:    EXAMINATION:  XR CHEST 1 VIEW    CLINICAL HISTORY:  Weakness    TECHNIQUE:  Single frontal view of the chest was performed.    COMPARISON:  06/20/2023    FINDINGS:  The cardiomediastinal silhouette is not enlarged noting calcification of the aorta..  There is no pleural effusion.  The trachea is midline.  The lungs are symmetrically expanded bilaterally with coarse interstitial attenuation, similar to the previous exam.  Patient's chin obscures views of the right apex..  No large focal consolidation seen.  There is no pneumothorax.  The osseous structures are remarkable for degenerative change and dextroscoliotic curvature of the spine..                                    X-Rays:   Independently Interpreted Readings:   Chest X-Ray: No focal consolidation.  No pulmonary edema.  Per my independent interpretation.     Medications   0.9%  NaCl infusion (for blood administration) (has no administration in time range)   nitrofurantoin (macrocrystal-monohydrate) 100 MG capsule 100 mg (100 mg Oral Given 2/17/25 1802)     Medical Decision Making    The patient presented after a trip and fall due to baseline right leg weakness that has been ongoing for years since his prior stroke.  No prodromal symptoms, though the patient has been experiencing recently some generalized weakness.  On arrival, the patient is afebrile, not tachycardic, normotensive, satting 98% on room air.  Physical exam is as above without acute findings requiring intervention.  Obtained lab work to assess for potential causes with the patient generalized weakness.  Differentials considered include:  Anemia, KINA, electrolyte disturbance, pneumonia, UTI among them.    The patient was found to have a normocytic anemia with a hemoglobin of 6.9 and additionally a urinary tract infection that is nitrite and leukocyte esterase positive.  Discussed potential admission with the patient and her  sister at the bedside.  Patient by no means wants to be admitted and so I think it is reasonable to give the patient a unit of blood and treat her urinary tract infection and then discharge her to follow up as scheduled with hematology oncology.    Patient is presently getting her unit of blood and we will repeat H&H.  Gave her 1st dose of Macrobid.  The patient will be discharged.  Already discussed return precautions with patient and her sister.  They understand and agree with the plan.    Problems Addressed:  Anemia, unspecified type: chronic illness or injury with severe exacerbation, progression, or side effects of treatment  Generalized weakness: acute illness or injury  Urinary tract infection with hematuria, site unspecified: complicated acute illness or injury    Amount and/or Complexity of Data Reviewed  Independent Historian: caregiver     Details: Sister  External Data Reviewed: notes.     Details: Prior family Medicine notes  Labs: ordered. Decision-making details documented in ED Course.  Radiology: ordered and independent interpretation performed.     Details: See above  ECG/medicine tests: ordered and independent interpretation performed.     Details: See above    Risk  Prescription drug management.  Decision regarding hospitalization.  Risk Details: Considered hospitalization, however, through discussion with the patient and her sister, decided to discharge as this is the patient's wish at her age of 89               ED Course as of 02/19/25 1752   Mon Feb 17, 2025   1447 Hemoglobin(!): 6.9 [EL]   1551 WBC, UA(!): >100 [EL]   1551 NITRITE UA(!): Positive [EL]   1606 Per my discussion with the patient and her sister, who is her caretaker, they do not want further workup of the patient's known recurrent normocytic anemia.  They state that the patient sees hematology at this hospital and that has been the plan for the last few months, patient gets iron infusions monthly.  The plan is to give the  patient a unit of blood, monitor her, and then discharge.  That is the patient and her caretakers wishes.  Plan to also treat incidentally found UTI. [EL]   1825 BUN: 16 [EL]   1825 Creatinine: 1.1 [EL]   2053 Hemoglobin(!): 8.0 [DS]      ED Course User Index  [DS] Alcides Howard MD  [EL] Basilia Dodd MD                           Clinical Impression:  Final diagnoses:  [R53.1] Generalized weakness  [D64.9] Anemia, unspecified type (Primary)  [N39.0, R31.9] Urinary tract infection with hematuria, site unspecified          ED Disposition Condition    Discharge Stable          ED Prescriptions       Medication Sig Dispense Start Date End Date Auth. Provider    nitrofurantoin, macrocrystal-monohydrate, (MACROBID) 100 MG capsule Take 1 capsule (100 mg total) by mouth 2 (two) times daily. for 5 days 10 capsule 2/17/2025 2/22/2025 Basilia Dodd MD          Follow-up Information       Follow up With Specialties Details Why Contact [facility] - Emergency Dept Emergency Medicine Go to  As needed, If symptoms worsen [address] [facility] [id number] [phone]               Basilia Dodd MD  02/17/25 1831         [1]  Social History  Tobacco Use    Smoking status: Every Day     Types: Vaping with nicotine    Smokeless tobacco: Never    Tobacco comments:     Formerly smoked cigarettes.   Substance Use Topics    Alcohol use: Never    Drug use: Never      Basilia Dodd MD  02/19/25 2261

## 2025-02-18 NOTE — ED NOTES
Bedside care hand off to WILMAR Luna RN. There is some redness directly around iv insertion site , no swelling or pain. Blood stopped and iv flushed easily with ns. Pt. Ate 100% of her dinner tray. Resting quietly without distress. After infusion stopped, redness dissipated and there is no swelling noted.

## 2025-02-19 LAB — BACTERIA UR CULT: ABNORMAL

## 2025-03-07 ENCOUNTER — LAB VISIT (OUTPATIENT)
Dept: LAB | Facility: HOSPITAL | Age: 89
End: 2025-03-07
Attending: INTERNAL MEDICINE
Payer: MEDICARE

## 2025-03-07 ENCOUNTER — INFUSION (OUTPATIENT)
Dept: INFUSION THERAPY | Facility: HOSPITAL | Age: 89
End: 2025-03-07
Attending: INTERNAL MEDICINE
Payer: MEDICARE

## 2025-03-07 VITALS
RESPIRATION RATE: 18 BRPM | TEMPERATURE: 98 F | OXYGEN SATURATION: 96 % | SYSTOLIC BLOOD PRESSURE: 120 MMHG | DIASTOLIC BLOOD PRESSURE: 57 MMHG | HEART RATE: 76 BPM

## 2025-03-07 DIAGNOSIS — D50.8 OTHER IRON DEFICIENCY ANEMIA: ICD-10-CM

## 2025-03-07 DIAGNOSIS — D50.8 OTHER IRON DEFICIENCY ANEMIA: Primary | ICD-10-CM

## 2025-03-07 DIAGNOSIS — I10 HYPERTENSION, UNSPECIFIED TYPE: ICD-10-CM

## 2025-03-07 DIAGNOSIS — E78.5 HYPERLIPIDEMIA, UNSPECIFIED HYPERLIPIDEMIA TYPE: ICD-10-CM

## 2025-03-07 LAB
ALBUMIN SERPL BCP-MCNC: 2.5 G/DL (ref 3.5–5.2)
ALP SERPL-CCNC: 111 U/L (ref 40–150)
ALT SERPL W/O P-5'-P-CCNC: 47 U/L (ref 10–44)
ANION GAP SERPL CALC-SCNC: 8 MMOL/L (ref 8–16)
AST SERPL-CCNC: 91 U/L (ref 10–40)
BASOPHILS # BLD AUTO: 0.03 K/UL (ref 0–0.2)
BASOPHILS NFR BLD: 0.5 % (ref 0–1.9)
BILIRUB SERPL-MCNC: 0.3 MG/DL (ref 0.1–1)
BUN SERPL-MCNC: 15 MG/DL (ref 8–23)
CALCIUM SERPL-MCNC: 8.5 MG/DL (ref 8.7–10.5)
CHLORIDE SERPL-SCNC: 108 MMOL/L (ref 95–110)
CHOLEST SERPL-MCNC: 67 MG/DL (ref 120–199)
CHOLEST/HDLC SERPL: 1.9 {RATIO} (ref 2–5)
CO2 SERPL-SCNC: 24 MMOL/L (ref 23–29)
CREAT SERPL-MCNC: 0.8 MG/DL (ref 0.5–1.4)
DIFFERENTIAL METHOD BLD: ABNORMAL
EOSINOPHIL # BLD AUTO: 0.1 K/UL (ref 0–0.5)
EOSINOPHIL NFR BLD: 1.9 % (ref 0–8)
ERYTHROCYTE [DISTWIDTH] IN BLOOD BY AUTOMATED COUNT: 21.6 % (ref 11.5–14.5)
EST. GFR  (NO RACE VARIABLE): >60 ML/MIN/1.73 M^2
FERRITIN SERPL-MCNC: 94 NG/ML (ref 20–300)
GLUCOSE SERPL-MCNC: 83 MG/DL (ref 70–110)
HCT VFR BLD AUTO: 30.2 % (ref 37–48.5)
HDLC SERPL-MCNC: 35 MG/DL (ref 40–75)
HDLC SERPL: 52.2 % (ref 20–50)
HGB BLD-MCNC: 8.5 G/DL (ref 12–16)
IMM GRANULOCYTES # BLD AUTO: 0.02 K/UL (ref 0–0.04)
IMM GRANULOCYTES NFR BLD AUTO: 0.3 % (ref 0–0.5)
IRON SERPL-MCNC: 41 UG/DL (ref 30–160)
LDLC SERPL CALC-MCNC: 18.4 MG/DL (ref 63–159)
LYMPHOCYTES # BLD AUTO: 1.4 K/UL (ref 1–4.8)
LYMPHOCYTES NFR BLD: 21.8 % (ref 18–48)
MCH RBC QN AUTO: 24.9 PG (ref 27–31)
MCHC RBC AUTO-ENTMCNC: 28.1 G/DL (ref 32–36)
MCV RBC AUTO: 88 FL (ref 82–98)
MONOCYTES # BLD AUTO: 0.5 K/UL (ref 0.3–1)
MONOCYTES NFR BLD: 7.9 % (ref 4–15)
NEUTROPHILS # BLD AUTO: 4.4 K/UL (ref 1.8–7.7)
NEUTROPHILS NFR BLD: 67.6 % (ref 38–73)
NONHDLC SERPL-MCNC: 32 MG/DL
NRBC BLD-RTO: 0 /100 WBC
PLATELET # BLD AUTO: 277 K/UL (ref 150–450)
PMV BLD AUTO: 8.9 FL (ref 9.2–12.9)
POTASSIUM SERPL-SCNC: 3.8 MMOL/L (ref 3.5–5.1)
PROT SERPL-MCNC: 6.2 G/DL (ref 6–8.4)
RBC # BLD AUTO: 3.42 M/UL (ref 4–5.4)
SATURATED IRON: 12 % (ref 20–50)
SODIUM SERPL-SCNC: 140 MMOL/L (ref 136–145)
TOTAL IRON BINDING CAPACITY: 355 UG/DL (ref 250–450)
TRANSFERRIN SERPL-MCNC: 240 MG/DL (ref 200–375)
TRIGL SERPL-MCNC: 68 MG/DL (ref 30–150)
WBC # BLD AUTO: 6.46 K/UL (ref 3.9–12.7)

## 2025-03-07 PROCEDURE — 82728 ASSAY OF FERRITIN: CPT | Mod: HCNC | Performed by: INTERNAL MEDICINE

## 2025-03-07 PROCEDURE — 85025 COMPLETE CBC W/AUTO DIFF WBC: CPT | Mod: HCNC | Performed by: INTERNAL MEDICINE

## 2025-03-07 PROCEDURE — 36415 COLL VENOUS BLD VENIPUNCTURE: CPT | Mod: HCNC | Performed by: INTERNAL MEDICINE

## 2025-03-07 PROCEDURE — 25000003 PHARM REV CODE 250: Mod: HCNC | Performed by: INTERNAL MEDICINE

## 2025-03-07 PROCEDURE — 80053 COMPREHEN METABOLIC PANEL: CPT | Mod: HCNC | Performed by: INTERNAL MEDICINE

## 2025-03-07 PROCEDURE — 63600175 PHARM REV CODE 636 W HCPCS: Mod: HCNC | Performed by: INTERNAL MEDICINE

## 2025-03-07 PROCEDURE — 80061 LIPID PANEL: CPT | Mod: HCNC | Performed by: INTERNAL MEDICINE

## 2025-03-07 PROCEDURE — 96374 THER/PROPH/DIAG INJ IV PUSH: CPT | Mod: HCNC

## 2025-03-07 PROCEDURE — A4216 STERILE WATER/SALINE, 10 ML: HCPCS | Mod: HCNC | Performed by: INTERNAL MEDICINE

## 2025-03-07 PROCEDURE — 83540 ASSAY OF IRON: CPT | Mod: HCNC | Performed by: INTERNAL MEDICINE

## 2025-03-07 RX ORDER — DIPHENHYDRAMINE HYDROCHLORIDE 50 MG/ML
50 INJECTION INTRAMUSCULAR; INTRAVENOUS ONCE AS NEEDED
Status: DISCONTINUED | OUTPATIENT
Start: 2025-03-07 | End: 2025-03-07 | Stop reason: HOSPADM

## 2025-03-07 RX ORDER — SODIUM CHLORIDE 0.9 % (FLUSH) 0.9 %
10 SYRINGE (ML) INJECTION
Status: DISCONTINUED | OUTPATIENT
Start: 2025-03-07 | End: 2025-03-07 | Stop reason: HOSPADM

## 2025-03-07 RX ORDER — HEPARIN 100 UNIT/ML
500 SYRINGE INTRAVENOUS
Status: DISCONTINUED | OUTPATIENT
Start: 2025-03-07 | End: 2025-03-07 | Stop reason: HOSPADM

## 2025-03-07 RX ORDER — EPINEPHRINE 0.3 MG/.3ML
0.3 INJECTION SUBCUTANEOUS ONCE AS NEEDED
Status: DISCONTINUED | OUTPATIENT
Start: 2025-03-07 | End: 2025-03-07 | Stop reason: HOSPADM

## 2025-03-07 RX ADMIN — SODIUM CHLORIDE, PRESERVATIVE FREE 10 ML: 5 INJECTION INTRAVENOUS at 10:03

## 2025-03-07 RX ADMIN — IRON SUCROSE 200 MG: 20 INJECTION, SOLUTION INTRAVENOUS at 10:03

## 2025-03-07 NOTE — PLAN OF CARE
Patient tolerated venofer IVP monthly today. PIV + blood return upon deaccess. NAD noted. VSS. Patient discharged via w/c with sister by her side off unit.

## 2025-03-08 DIAGNOSIS — F41.9 ANXIETY: ICD-10-CM

## 2025-03-08 NOTE — TELEPHONE ENCOUNTER
No care due was identified.  [facility] Embedded Care Due Messages. Reference number: [id number].   3/08/2025 4:05:31 PM CST

## 2025-03-10 ENCOUNTER — PATIENT MESSAGE (OUTPATIENT)
Dept: PRIMARY CARE CLINIC | Facility: CLINIC | Age: 89
End: 2025-03-10
Payer: MEDICARE

## 2025-03-10 DIAGNOSIS — R82.90 ABNORMAL URINE ODOR: Primary | ICD-10-CM

## 2025-03-10 RX ORDER — LORAZEPAM 0.5 MG/1
0.5 TABLET ORAL DAILY PRN
Qty: 30 TABLET | Refills: 1 | Status: SHIPPED | OUTPATIENT
Start: 2025-03-10

## 2025-03-11 ENCOUNTER — RESULTS FOLLOW-UP (OUTPATIENT)
Dept: PRIMARY CARE CLINIC | Facility: CLINIC | Age: 89
End: 2025-03-11

## 2025-03-11 NOTE — PROGRESS NOTES
I sent pt a my chart message -  Kurt Escalante,  I reviewed your labs.  Your anemia remains stable. Your kidney function looks good. Your albumin is low.  You need to increase the protein in your diet.  You could try high protein foods or try some protein shakes. Your liver functions remain elevated but stable. Your cholesterol looks really good on your crestor. Have a Happy Birthday tomorrow!  Dr. GARG

## 2025-03-16 ENCOUNTER — PATIENT MESSAGE (OUTPATIENT)
Dept: PRIMARY CARE CLINIC | Facility: CLINIC | Age: 89
End: 2025-03-16
Payer: MEDICARE

## 2025-03-18 ENCOUNTER — LAB VISIT (OUTPATIENT)
Dept: LAB | Facility: HOSPITAL | Age: 89
End: 2025-03-18
Attending: INTERNAL MEDICINE
Payer: MEDICARE

## 2025-03-18 DIAGNOSIS — R82.90 ABNORMAL URINE ODOR: ICD-10-CM

## 2025-03-18 LAB
BACTERIA #/AREA URNS HPF: ABNORMAL /HPF
BILIRUB UR QL STRIP: NEGATIVE
CLARITY UR: ABNORMAL
COLOR UR: YELLOW
GLUCOSE UR QL STRIP: NEGATIVE
HGB UR QL STRIP: ABNORMAL
HYALINE CASTS #/AREA URNS LPF: 0 /LPF
KETONES UR QL STRIP: NEGATIVE
LEUKOCYTE ESTERASE UR QL STRIP: ABNORMAL
MICROSCOPIC COMMENT: ABNORMAL
NITRITE UR QL STRIP: NEGATIVE
PH UR STRIP: 8 [PH] (ref 5–8)
PROT UR QL STRIP: ABNORMAL
RBC #/AREA URNS HPF: 52 /HPF (ref 0–4)
SP GR UR STRIP: 1.01 (ref 1–1.03)
SQUAMOUS #/AREA URNS HPF: 2 /HPF
URN SPEC COLLECT METH UR: ABNORMAL
UROBILINOGEN UR STRIP-ACNC: NEGATIVE EU/DL
WBC #/AREA URNS HPF: >100 /HPF (ref 0–5)
WBC CLUMPS URNS QL MICRO: ABNORMAL

## 2025-03-18 PROCEDURE — 87086 URINE CULTURE/COLONY COUNT: CPT | Mod: HCNC | Performed by: INTERNAL MEDICINE

## 2025-03-18 PROCEDURE — 87186 SC STD MICRODIL/AGAR DIL: CPT | Mod: HCNC | Performed by: INTERNAL MEDICINE

## 2025-03-18 PROCEDURE — 81000 URINALYSIS NONAUTO W/SCOPE: CPT | Mod: HCNC | Performed by: INTERNAL MEDICINE

## 2025-03-18 PROCEDURE — 87088 URINE BACTERIA CULTURE: CPT | Mod: HCNC | Performed by: INTERNAL MEDICINE

## 2025-03-19 ENCOUNTER — PATIENT MESSAGE (OUTPATIENT)
Dept: PRIMARY CARE CLINIC | Facility: CLINIC | Age: 89
End: 2025-03-19
Payer: MEDICARE

## 2025-03-20 ENCOUNTER — RESULTS FOLLOW-UP (OUTPATIENT)
Dept: PRIMARY CARE CLINIC | Facility: CLINIC | Age: 89
End: 2025-03-20

## 2025-03-20 DIAGNOSIS — N30.01 ACUTE CYSTITIS WITH HEMATURIA: Primary | ICD-10-CM

## 2025-03-20 RX ORDER — SULFAMETHOXAZOLE AND TRIMETHOPRIM 800; 160 MG/1; MG/1
1 TABLET ORAL 2 TIMES DAILY
Qty: 6 TABLET | Refills: 0 | Status: SHIPPED | OUTPATIENT
Start: 2025-03-20 | End: 2025-03-23

## 2025-03-20 NOTE — PROGRESS NOTES
I sent pt a my chart message -  I reviewed your Urinalysis which shows evidence of a Urinary tract infection. I still await her urine culture and sensitivity results. In the meantime, while we await her results I will send her a Rx for Abx - bactrim twice daily x 3 days.  Let me know if symptoms persist or she has any issues with the bactrim.  Dr. GARG

## 2025-03-20 NOTE — TELEPHONE ENCOUNTER
I sent her a reply with her Results earlier.  Is ent Abx and still await her final culture and sensitivities.   Dr. GARG

## 2025-03-21 ENCOUNTER — PATIENT MESSAGE (OUTPATIENT)
Dept: PRIMARY CARE CLINIC | Facility: CLINIC | Age: 89
End: 2025-03-21
Payer: MEDICARE

## 2025-03-21 ENCOUNTER — TELEPHONE (OUTPATIENT)
Dept: PRIMARY CARE CLINIC | Facility: CLINIC | Age: 89
End: 2025-03-21
Payer: MEDICARE

## 2025-03-21 LAB — BACTERIA UR CULT: ABNORMAL

## 2025-03-21 NOTE — PROGRESS NOTES
I sent pt a my chart message -  I reviewed your urine culture which showed a proteus UTI sensitive to the Bactrim I prescribed.  Let me know if your symptoms do not resolve or if you have any issues tolerating this medication.   Dr GARG

## 2025-03-21 NOTE — TELEPHONE ENCOUNTER
Copied from CRM #[id number]. Topic: General Inquiry - Test Results  >> Mar 19, 2025 12:44 PM Claudia wrote:  Calling to get test results.   Name of test (lab, x-ray): urine labs  Date of test: 03/18/2025  Where was the test performed[facility]  Would you like a call back, or a response through your [facility] portal?: call back to pt sister/Brittaney      Comments:Pt sister states she already sent a message thru [facility] already but she wants to speak to someone

## 2025-03-22 NOTE — TELEPHONE ENCOUNTER
Pt asking if the length of the antibiotics need to be adjusted now that the culture is back.    3/18 urine cx = proteus mirabilis sensative to Bactrim. Pt was prescribed 800-160mg BID x 3 days

## 2025-03-24 NOTE — TELEPHONE ENCOUNTER
The 3 day course should be sufficient for her symptoms.  Let me know if symptoms persist.  Dr. GARG

## 2025-03-27 ENCOUNTER — TELEPHONE (OUTPATIENT)
Dept: PRIMARY CARE CLINIC | Facility: CLINIC | Age: 89
End: 2025-03-27
Payer: MEDICARE

## 2025-03-27 DIAGNOSIS — N30.01 ACUTE CYSTITIS WITH HEMATURIA: Primary | ICD-10-CM

## 2025-03-27 RX ORDER — SULFAMETHOXAZOLE AND TRIMETHOPRIM 800; 160 MG/1; MG/1
1 TABLET ORAL 2 TIMES DAILY
Qty: 6 TABLET | Refills: 0 | Status: SHIPPED | OUTPATIENT
Start: 2025-03-27 | End: 2025-03-30

## 2025-03-27 NOTE — TELEPHONE ENCOUNTER
----- Message from Med Assistant Yuli sent at 3/27/2025  3:47 PM CDT -----  Please advise, pt sister called and stated that her urine still have a strong smell and I cloudy. Antibiotics was completed Saturday  ----- Message -----  From: Sirisha Duke  Sent: 3/27/2025   3:40 PM CDT  To: [facility] Staff    Name of Caller:sister Brittaney  Nature of Call: requesting advice Best Call Back Number: [phone] Additional Information:Nickie Segura sister Brittaney calling regarding Patient Advice for wanting to inform the doctor that the PT is still having cloudy urine with a strong smell since taking the antibiotics and is wanting to know what else can be done to treat. Please call back to advise

## 2025-04-07 ENCOUNTER — TELEPHONE (OUTPATIENT)
Dept: INFUSION THERAPY | Facility: HOSPITAL | Age: 89
End: 2025-04-07
Payer: MEDICARE

## 2025-04-07 NOTE — TELEPHONE ENCOUNTER
Pt relative called infusion center, unable to make it to iv iron appointment today. Requested to reschedule to next week. Appointment rescheduled to next Monday 4/14/25 at 11am

## 2025-04-11 ENCOUNTER — PATIENT OUTREACH (OUTPATIENT)
Facility: OTHER | Age: 89
End: 2025-04-11
Payer: MEDICARE

## 2025-04-11 ENCOUNTER — TELEPHONE (OUTPATIENT)
Dept: PRIMARY CARE CLINIC | Facility: CLINIC | Age: 89
End: 2025-04-11
Payer: MEDICARE

## 2025-04-11 ENCOUNTER — NURSE TRIAGE (OUTPATIENT)
Dept: ADMINISTRATIVE | Facility: CLINIC | Age: 89
End: 2025-04-11
Payer: MEDICARE

## 2025-04-11 ENCOUNTER — OCHSNER VIRTUAL EMERGENCY DEPARTMENT (OUTPATIENT)
Facility: CLINIC | Age: 89
End: 2025-04-11
Payer: MEDICARE

## 2025-04-11 DIAGNOSIS — R39.9 URINARY SYMPTOM OR SIGN: Primary | ICD-10-CM

## 2025-04-11 RX ORDER — CIPROFLOXACIN 250 MG/1
250 TABLET, FILM COATED ORAL 2 TIMES DAILY
Qty: 10 TABLET | Refills: 0 | Status: SHIPPED | OUTPATIENT
Start: 2025-04-11 | End: 2025-04-16

## 2025-04-11 NOTE — TELEPHONE ENCOUNTER
OOC RN  Brittaney has POA  for Ms Segura,  2/17/25 ED for UTI,  March still cloudy and smelly,   pos again for second ATB  2nd    Round of    ATB  uti,     OOC RN  Patient's sister is POA, reports that patient has taken 2 rounds of ATB for UTI,   Still has UTI, cloudy and odorous urine. Can family drop of urine or can current due for possible to new ATB??????   Patient is homebound but they have urine to bring to lab.  What is closet lab to bring urine to [place].   Can we get an order for UA for urine for patient as well as ATB if needed.?     For any new or worsening symptoms I advised Sister Brittaney  to call me back  at the OOC RN  phone number Care advise is discuss with PCP and CB by RN within one hours.Using MELLISSA   for advise.   Dr. Hopper not available.   Can do VV is needed.  Verified pharmacy is [facility] on [facility],   [phone]    mellissa made VV for patient on Wednesday, called in [facility] and will call family to check on them Monday, ordered Urine Culture to be brought to [place]..   Marge FOUNTAIN and gave number to [facility] Rn for any new or worsening symtoms  number given and family VU    Reason for Disposition   Caller has URGENT question and triager unable to answer question    Additional Information   Negative: Looks like blood in urine (pink or tea-colored)   Negative: [1] Dark yellow urine AND [2] whites of eyes (sclera) are also yellow   Negative: Poisoning is suspected   Negative: [1] Age < 4 weeks old AND [2] unusual odor of urine AND [3] not acting normal   Negative: Diabetes suspected by triager (e.g., excessive drinking, frequent urination, weight loss)   Negative: Child sounds very sick or weak to the triager   Negative: Shock suspected (e.g., cold/pale/clammy skin, too weak to stand, low BP, rapid pulse)   Negative: Widespread rash and bright red, sunburn-like and too weak to stand   Negative: Sounds like a life-threatening emergency to the triager   Negative: SEVERE pain in the  wound   Negative: SEVERE pain with bending of finger (or toe) and wound on hand (or foot)   Negative: Widespread rash and bright red, sunburn-like   Negative: Fever > 103 F (39.4 C)   Negative: Black (necrotic), dark purple, or blisters develop in area of wound   Negative: Patient sounds very sick or weak to the triager   Negative: Finger or toe wound and entire finger or toe swollen   Negative: New-onset fever > 100 F (37.8 C)   Negative: New-onset red streak runs from wound    Protocols used: Urine - Unusual Color or Odor-P-AH, Wound Infection on Antibiotic Follow-up Call-A-OH

## 2025-04-11 NOTE — TELEPHONE ENCOUNTER
----- Message from Jeff sent at 4/11/2025  1:01 PM CDT -----  Regarding: Consult/Advisory  Contact: [phone]  Consult/Advisory Name Of Caller: Brittaney  Contact Preference: [phone] Nature of call: Calling because they are trying to see if an order can be put in for a urinalysis due to urine being cloudy and smelly. Would like orders sent to [facility] in [place].

## 2025-04-11 NOTE — PROGRESS NOTES
"Patient's sister called the OOC RN on 4/11/25 for c/o "has taken 2 rounds of ATB for UTI, Still has UTI, cloudy and odorous urine. Can family drop of urine or can current due for possible to new ATB?????? Patient is homebound but they have urine to bring to lab". OOC RN consult Ruben.     Ruben Provider Dr Pratik Desai disposition recommendation patient to see primary care, rx for ciprofloxacin sent, and UA ordered.    Called and spoke to patient's sister to notify her that UA was ordered and she can drop the specimen off at any [facility] lab of her choosing.    Appointment scheduled for 4/16/2025 at 4:00 PM VIRTUAL with Em Fajardo NP.    Follow up scheduled for 4/17/25 to assess for additional needs.      "

## 2025-04-11 NOTE — PLAN OF CARE-OVED
[facility] Plan of Care Note  Referral Source: Nurse On-Call                               Chief Complaint   Patient presents with    Urinary Tract Infection       Recommendation: Primary Care                            Encounter Diagnosis   Name Primary?    Urinary symptom or sign Yes        Orders Placed This Encounter    ciprofloxacin HCl (CIPRO) 250 MG tablet     Sig: Take 1 tablet (250 mg total) by mouth 2 (two) times daily. for 5 days     Dispense:  10 tablet     Refill:  0    Urinalysis, Reflex to Urine Culture     Standing Status:   Future     Expected Date:   4/11/2025     Expiration Date:   6/10/2026     Specimen Source:   Urine     Send normal result to authorizing provider's In Basket if patient is active on MyChart::   Yes       89-year-old female with numerous comorbidities including HTN, previous CVA now nonambulatory, arthritis, anemia, hypothyroidism, recurrent UTIs; patient's sister/power of  called due to persistent cloudy and foul-smelling urine.  Per chart review patient was initially treated for UTI with Macrobid on ER visit 2/17, where she also got PRBC transfusion.  Subsequent urine culture was E coli sensitive to Macrobid.  She had recurrent urinary symptoms on 03/10, UA submitted on 03/18 again concerning for UTI with subsequent culture pansensitive Proteus, treated with Bactrim for 3 days.  She still had cloudy foul-smelling urine afterwards so was prescribed another 3 days of Bactrim which completed on 03/30.  She had some transient improvement but family reports recurrent symptoms since then.  No fevers or change in mental status, family reports occasional abdominal pain but none currently. Due to patient's debility unable to do virtual visit.  Patient could have recurrent new UTI or incomplete treatment of previous UTI, will order UA to confirm this.  Family requesting new antibiotic, given similar previous symptoms with confirmed UTI, reasonable to  start new antibiotic pending repeat UA and urine culture.  Since Bactrim was only partially effective, and previous E coli culture was resistant to Keflex, will try Cipro 250 mg b.i.d. x5 days which both recent cultures would be susceptible to.  Virtual visit made for early next week with PCP clinic, and family advised to message her PCP for urine culture results and to possibly adjust antibiotics.  They were also advised on ED precautions for fevers, AMS, worsening abdominal pain.

## 2025-04-11 NOTE — TELEPHONE ENCOUNTER
Returned call to pt's relative in regard to pt needing a urinalysis . Offered a same day appt with Markus JENKINS due to  is not in clinic on today. Relative- Brittaney stated pt can not come into clinic today because she is home bound. Pt has to set up transportation 1 week in advance to go places.     Relative stated she is currently on the other line with a triage nurse and is discussing this issue already. Relative stated she will call back if she needs anything further.

## 2025-04-14 ENCOUNTER — RESULTS FOLLOW-UP (OUTPATIENT)
Facility: CLINIC | Age: 89
End: 2025-04-14
Payer: MEDICARE

## 2025-04-14 ENCOUNTER — INFUSION (OUTPATIENT)
Dept: INFUSION THERAPY | Facility: HOSPITAL | Age: 89
End: 2025-04-14
Attending: INTERNAL MEDICINE
Payer: MEDICARE

## 2025-04-14 ENCOUNTER — LAB VISIT (OUTPATIENT)
Dept: LAB | Facility: HOSPITAL | Age: 89
End: 2025-04-14
Attending: EMERGENCY MEDICINE
Payer: MEDICARE

## 2025-04-14 VITALS
SYSTOLIC BLOOD PRESSURE: 103 MMHG | DIASTOLIC BLOOD PRESSURE: 55 MMHG | HEART RATE: 86 BPM | TEMPERATURE: 99 F | OXYGEN SATURATION: 96 % | RESPIRATION RATE: 18 BRPM

## 2025-04-14 DIAGNOSIS — D50.8 OTHER IRON DEFICIENCY ANEMIA: Primary | ICD-10-CM

## 2025-04-14 DIAGNOSIS — R39.9 URINARY SYMPTOM OR SIGN: ICD-10-CM

## 2025-04-14 LAB
BACTERIA #/AREA URNS AUTO: ABNORMAL /HPF
BILIRUB UR QL STRIP.AUTO: NEGATIVE
CLARITY UR: ABNORMAL
COLOR UR AUTO: YELLOW
GLUCOSE UR QL STRIP: NEGATIVE
HGB UR QL STRIP: ABNORMAL
HYALINE CASTS UR QL AUTO: 0 /LPF (ref 0–1)
KETONES UR QL STRIP: NEGATIVE
LEUKOCYTE ESTERASE UR QL STRIP: ABNORMAL
MICROSCOPIC COMMENT: ABNORMAL
NITRITE UR QL STRIP: POSITIVE
PH UR STRIP: 6 [PH]
PROT UR QL STRIP: ABNORMAL
RBC #/AREA URNS AUTO: 39 /HPF (ref 0–4)
SP GR UR STRIP: 1.02
SQUAMOUS #/AREA URNS AUTO: 32 /HPF
UROBILINOGEN UR STRIP-ACNC: NEGATIVE EU/DL
WBC #/AREA URNS AUTO: >100 /HPF (ref 0–5)

## 2025-04-14 PROCEDURE — 96374 THER/PROPH/DIAG INJ IV PUSH: CPT

## 2025-04-14 PROCEDURE — 25000003 PHARM REV CODE 250: Performed by: INTERNAL MEDICINE

## 2025-04-14 PROCEDURE — 63600175 PHARM REV CODE 636 W HCPCS: Performed by: INTERNAL MEDICINE

## 2025-04-14 PROCEDURE — 87088 URINE BACTERIA CULTURE: CPT

## 2025-04-14 PROCEDURE — A4216 STERILE WATER/SALINE, 10 ML: HCPCS | Performed by: INTERNAL MEDICINE

## 2025-04-14 PROCEDURE — 81001 URINALYSIS AUTO W/SCOPE: CPT

## 2025-04-14 RX ORDER — DIPHENHYDRAMINE HYDROCHLORIDE 50 MG/ML
50 INJECTION, SOLUTION INTRAMUSCULAR; INTRAVENOUS ONCE AS NEEDED
OUTPATIENT
Start: 2025-04-29

## 2025-04-14 RX ORDER — DIPHENHYDRAMINE HYDROCHLORIDE 50 MG/ML
50 INJECTION, SOLUTION INTRAMUSCULAR; INTRAVENOUS ONCE AS NEEDED
OUTPATIENT
Start: 2025-05-13

## 2025-04-14 RX ORDER — HEPARIN 100 UNIT/ML
500 SYRINGE INTRAVENOUS
OUTPATIENT
Start: 2025-05-27

## 2025-04-14 RX ORDER — SODIUM CHLORIDE 0.9 % (FLUSH) 0.9 %
10 SYRINGE (ML) INJECTION
OUTPATIENT
Start: 2025-04-29

## 2025-04-14 RX ORDER — DIPHENHYDRAMINE HYDROCHLORIDE 50 MG/ML
50 INJECTION, SOLUTION INTRAMUSCULAR; INTRAVENOUS ONCE AS NEEDED
Status: DISCONTINUED | OUTPATIENT
Start: 2025-04-14 | End: 2025-04-14 | Stop reason: HOSPADM

## 2025-04-14 RX ORDER — HEPARIN 100 UNIT/ML
500 SYRINGE INTRAVENOUS
OUTPATIENT
Start: 2025-05-20

## 2025-04-14 RX ORDER — HEPARIN 100 UNIT/ML
500 SYRINGE INTRAVENOUS
OUTPATIENT
Start: 2025-06-17

## 2025-04-14 RX ORDER — HEPARIN 100 UNIT/ML
500 SYRINGE INTRAVENOUS
OUTPATIENT
Start: 2025-06-03

## 2025-04-14 RX ORDER — EPINEPHRINE 0.3 MG/.3ML
0.3 INJECTION SUBCUTANEOUS ONCE AS NEEDED
OUTPATIENT
Start: 2025-06-17

## 2025-04-14 RX ORDER — HEPARIN 100 UNIT/ML
500 SYRINGE INTRAVENOUS
Status: CANCELLED | OUTPATIENT
Start: 2025-04-14

## 2025-04-14 RX ORDER — EPINEPHRINE 0.3 MG/.3ML
0.3 INJECTION SUBCUTANEOUS ONCE AS NEEDED
OUTPATIENT
Start: 2025-04-29

## 2025-04-14 RX ORDER — SODIUM CHLORIDE 0.9 % (FLUSH) 0.9 %
10 SYRINGE (ML) INJECTION
OUTPATIENT
Start: 2025-04-22

## 2025-04-14 RX ORDER — SODIUM CHLORIDE 0.9 % (FLUSH) 0.9 %
10 SYRINGE (ML) INJECTION
Status: CANCELLED | OUTPATIENT
Start: 2025-04-14

## 2025-04-14 RX ORDER — DIPHENHYDRAMINE HYDROCHLORIDE 50 MG/ML
50 INJECTION, SOLUTION INTRAMUSCULAR; INTRAVENOUS ONCE AS NEEDED
Status: CANCELLED | OUTPATIENT
Start: 2025-04-14

## 2025-04-14 RX ORDER — EPINEPHRINE 0.3 MG/.3ML
0.3 INJECTION SUBCUTANEOUS ONCE AS NEEDED
Status: CANCELLED | OUTPATIENT
Start: 2025-04-14

## 2025-04-14 RX ORDER — SODIUM CHLORIDE 0.9 % (FLUSH) 0.9 %
10 SYRINGE (ML) INJECTION
OUTPATIENT
Start: 2025-05-27

## 2025-04-14 RX ORDER — SODIUM CHLORIDE 0.9 % (FLUSH) 0.9 %
10 SYRINGE (ML) INJECTION
OUTPATIENT
Start: 2025-04-15

## 2025-04-14 RX ORDER — EPINEPHRINE 0.3 MG/.3ML
0.3 INJECTION SUBCUTANEOUS ONCE AS NEEDED
Status: DISCONTINUED | OUTPATIENT
Start: 2025-04-14 | End: 2025-04-14 | Stop reason: HOSPADM

## 2025-04-14 RX ORDER — SODIUM CHLORIDE 0.9 % (FLUSH) 0.9 %
10 SYRINGE (ML) INJECTION
OUTPATIENT
Start: 2025-06-10

## 2025-04-14 RX ORDER — EPINEPHRINE 0.3 MG/.3ML
0.3 INJECTION SUBCUTANEOUS ONCE AS NEEDED
OUTPATIENT
Start: 2025-05-13

## 2025-04-14 RX ORDER — DIPHENHYDRAMINE HYDROCHLORIDE 50 MG/ML
50 INJECTION, SOLUTION INTRAMUSCULAR; INTRAVENOUS ONCE AS NEEDED
OUTPATIENT
Start: 2025-04-15

## 2025-04-14 RX ORDER — EPINEPHRINE 0.3 MG/.3ML
0.3 INJECTION SUBCUTANEOUS ONCE AS NEEDED
OUTPATIENT
Start: 2025-05-20

## 2025-04-14 RX ORDER — SODIUM CHLORIDE 0.9 % (FLUSH) 0.9 %
10 SYRINGE (ML) INJECTION
OUTPATIENT
Start: 2025-05-20

## 2025-04-14 RX ORDER — DIPHENHYDRAMINE HYDROCHLORIDE 50 MG/ML
50 INJECTION, SOLUTION INTRAMUSCULAR; INTRAVENOUS ONCE AS NEEDED
OUTPATIENT
Start: 2025-06-03

## 2025-04-14 RX ORDER — DIPHENHYDRAMINE HYDROCHLORIDE 50 MG/ML
50 INJECTION, SOLUTION INTRAMUSCULAR; INTRAVENOUS ONCE AS NEEDED
OUTPATIENT
Start: 2025-05-20

## 2025-04-14 RX ORDER — DIPHENHYDRAMINE HYDROCHLORIDE 50 MG/ML
50 INJECTION, SOLUTION INTRAMUSCULAR; INTRAVENOUS ONCE AS NEEDED
OUTPATIENT
Start: 2025-06-17

## 2025-04-14 RX ORDER — SODIUM CHLORIDE 0.9 % (FLUSH) 0.9 %
10 SYRINGE (ML) INJECTION
OUTPATIENT
Start: 2025-05-06

## 2025-04-14 RX ORDER — EPINEPHRINE 0.3 MG/.3ML
0.3 INJECTION SUBCUTANEOUS ONCE AS NEEDED
OUTPATIENT
Start: 2025-04-15

## 2025-04-14 RX ORDER — HEPARIN 100 UNIT/ML
500 SYRINGE INTRAVENOUS
OUTPATIENT
Start: 2025-04-15

## 2025-04-14 RX ORDER — EPINEPHRINE 0.3 MG/.3ML
0.3 INJECTION SUBCUTANEOUS ONCE AS NEEDED
OUTPATIENT
Start: 2025-05-06

## 2025-04-14 RX ORDER — SODIUM CHLORIDE 0.9 % (FLUSH) 0.9 %
10 SYRINGE (ML) INJECTION
Status: DISCONTINUED | OUTPATIENT
Start: 2025-04-14 | End: 2025-04-14 | Stop reason: HOSPADM

## 2025-04-14 RX ORDER — EPINEPHRINE 0.3 MG/.3ML
0.3 INJECTION SUBCUTANEOUS ONCE AS NEEDED
OUTPATIENT
Start: 2025-06-03

## 2025-04-14 RX ORDER — EPINEPHRINE 0.3 MG/.3ML
0.3 INJECTION SUBCUTANEOUS ONCE AS NEEDED
OUTPATIENT
Start: 2025-06-10

## 2025-04-14 RX ORDER — HEPARIN 100 UNIT/ML
500 SYRINGE INTRAVENOUS
OUTPATIENT
Start: 2025-05-13

## 2025-04-14 RX ORDER — DIPHENHYDRAMINE HYDROCHLORIDE 50 MG/ML
50 INJECTION, SOLUTION INTRAMUSCULAR; INTRAVENOUS ONCE AS NEEDED
OUTPATIENT
Start: 2025-06-10

## 2025-04-14 RX ORDER — SODIUM CHLORIDE 0.9 % (FLUSH) 0.9 %
10 SYRINGE (ML) INJECTION
OUTPATIENT
Start: 2025-05-13

## 2025-04-14 RX ORDER — SODIUM CHLORIDE 0.9 % (FLUSH) 0.9 %
10 SYRINGE (ML) INJECTION
OUTPATIENT
Start: 2025-06-17

## 2025-04-14 RX ORDER — DIPHENHYDRAMINE HYDROCHLORIDE 50 MG/ML
50 INJECTION, SOLUTION INTRAMUSCULAR; INTRAVENOUS ONCE AS NEEDED
OUTPATIENT
Start: 2025-04-22

## 2025-04-14 RX ORDER — HEPARIN 100 UNIT/ML
500 SYRINGE INTRAVENOUS
OUTPATIENT
Start: 2025-05-06

## 2025-04-14 RX ORDER — EPINEPHRINE 0.3 MG/.3ML
0.3 INJECTION SUBCUTANEOUS ONCE AS NEEDED
OUTPATIENT
Start: 2025-05-27

## 2025-04-14 RX ORDER — HEPARIN 100 UNIT/ML
500 SYRINGE INTRAVENOUS
OUTPATIENT
Start: 2025-06-10

## 2025-04-14 RX ORDER — HEPARIN 100 UNIT/ML
500 SYRINGE INTRAVENOUS
OUTPATIENT
Start: 2025-04-22

## 2025-04-14 RX ORDER — DIPHENHYDRAMINE HYDROCHLORIDE 50 MG/ML
50 INJECTION, SOLUTION INTRAMUSCULAR; INTRAVENOUS ONCE AS NEEDED
OUTPATIENT
Start: 2025-05-06

## 2025-04-14 RX ORDER — SODIUM CHLORIDE 0.9 % (FLUSH) 0.9 %
10 SYRINGE (ML) INJECTION
OUTPATIENT
Start: 2025-06-03

## 2025-04-14 RX ORDER — DIPHENHYDRAMINE HYDROCHLORIDE 50 MG/ML
50 INJECTION, SOLUTION INTRAMUSCULAR; INTRAVENOUS ONCE AS NEEDED
OUTPATIENT
Start: 2025-05-27

## 2025-04-14 RX ORDER — EPINEPHRINE 0.3 MG/.3ML
0.3 INJECTION SUBCUTANEOUS ONCE AS NEEDED
OUTPATIENT
Start: 2025-04-22

## 2025-04-14 RX ORDER — HEPARIN 100 UNIT/ML
500 SYRINGE INTRAVENOUS
OUTPATIENT
Start: 2025-04-29

## 2025-04-14 RX ADMIN — Medication 10 ML: at 11:04

## 2025-04-14 RX ADMIN — IRON SUCROSE 200 MG: 20 INJECTION, SOLUTION INTRAVENOUS at 11:04

## 2025-04-14 NOTE — PLAN OF CARE
Pt received IV Venofer given per orders. Pt tolerated it well. Next appointment scheduled AVS sent to portal. Discharged with no acute distress.

## 2025-04-16 ENCOUNTER — OFFICE VISIT (OUTPATIENT)
Dept: PRIMARY CARE CLINIC | Facility: CLINIC | Age: 89
End: 2025-04-16
Payer: MEDICARE

## 2025-04-16 DIAGNOSIS — N39.0 URINARY TRACT INFECTION WITHOUT HEMATURIA, SITE UNSPECIFIED: Primary | ICD-10-CM

## 2025-04-16 NOTE — PROGRESS NOTES
[facility] Note    Chief Complaint    No chief complaint on file.      History of Present Illness      Nickie Segura is a 89 y.o. female who presents today via virtual visit for No chief complaint on file.        Patient reports via virtual visit that she has been treated for recent urinary tract infection.  She was treated ciprofloxacin.  Sitters that care for her site that her urine is getting less cloudy and does not have a foul smell to it anymore.  There are no other complaints at this time.               Family History:  family history includes Atrial fibrillation in her sister; Diabetes in her mother; Heart disease in her father and mother; Polycystic kidney disease in her father.   Family history was reviewed with patient.     Medications:  Encounter Medications[1]    Allergies:  Review of patient's allergies indicates:   Allergen Reactions    Penicillins      rash       Health Maintenance:  Health Maintenance   Topic Date Due    TETANUS VACCINE  Never done    Shingles Vaccine (1 of 2) Never done    RSV Vaccine (Age 60+ and Pregnant patients) (1 - 1-dose 75+ series) Never done    COVID-19 Vaccine (5 - 2024-25 season) 09/01/2024    Hemoglobin A1c (Prediabetes)  06/10/2025    Lipid Panel  03/07/2030    Influenza Vaccine  Completed    Pneumococcal Vaccines (Age 50+)  Completed     Health Maintenance Topics with due status: Not Due       Topic Last Completion Date    Hemoglobin A1c (Prediabetes) 06/10/2024    Lipid Panel 03/07/2025       Physical Exam       ]        Assessment/Plan     Nickie Segura is a 89 y.o.female with:    Urinary tract infection without hematuria, site unspecified    - complete current antibiotic  - increase water intake by mouth  - change incontinent pads when soiled    As above, continue current medications and maintain follow up with specialists.  Return to clinic as needed.    Greater than 50% of this time was spent face to face via virtual visit with patient.  All  questions were answered to patient's satisfaction.    The patient location is: home  The chief complaint leading to consultation is: Questions on how she continuously gets UTI's    Visit type: audiovisual    Face to Face time with patient:  Six minutes of total time spent on the encounter, which includes face to face time and non-face to face time preparing to see the patient (eg, review of tests), Obtaining and/or reviewing separately obtained history, Documenting clinical information in the electronic or other health record, Independently interpreting results (not separately reported) and communicating results to the patient/family/caregiver, or Care coordination (not separately reported).         Each patient to whom he or she provides medical services by telemedicine is:  (1) informed of the relationship between the physician and patient and the respective role of any other health care provider with respect to management of the patient; and (2) notified that he or she may decline to receive medical services by telemedicine and may withdraw from such care at any time.       Em Fajardo, NP-C  [facility]    Answers submitted by the patient for this visit:  Review of Systems Questionnaire (Submitted on 4/13/2025)  activity change: No  unexpected weight change: No  neck pain: No  hearing loss: Yes  rhinorrhea: No  trouble swallowing: No  eye discharge: No  visual disturbance: Yes  chest tightness: No  wheezing: No  chest pain: No  palpitations: No  blood in stool: No  constipation: No  vomiting: No  diarrhea: No  polydipsia: No  polyuria: Yes  difficulty urinating: No  hematuria: No  menstrual problem: No  dysuria: No  joint swelling: No  arthralgias: No  headaches: No  weakness: Yes  confusion: No  dysphoric mood: No         [1]   Outpatient Encounter Medications as of 4/16/2025   Medication Sig Note Dispense Refill    Al hyd-Mg tr-alg ac-sod bicarb (GAVISCON) 80-14.2 mg Chew Take 1 tablet by mouth  once daily. (Patient taking differently: Take 2 tablets by mouth once daily.) 5/18/2022: prn      albuterol (VENTOLIN HFA) 90 mcg/actuation inhaler Inhale 1-2 puffs into the lungs every 6 (six) hours as needed for Wheezing or Shortness of Breath. Rescue  18 g 0    aspirin (ECOTRIN) 81 MG EC tablet Take 81 mg by mouth once daily.       azelastine (ASTELIN) 137 mcg (0.1 %) nasal spray 2 sprays (274 mcg total) by Nasal route 2 (two) times daily.  90 mL 1    busPIRone (BUSPAR) 15 MG tablet TAKE 1 TABLET BY MOUTH TWICE DAILY  180 tablet 0    calcium carbonate (TUMS) 200 mg calcium (500 mg) chewable tablet Take 1 tablet by mouth once daily.       ciprofloxacin HCl (CIPRO) 250 MG tablet Take 1 tablet (250 mg total) by mouth 2 (two) times daily. for 5 days  10 tablet 0    citalopram (CELEXA) 20 MG tablet Take 1.5 tablets (30 mg total) by mouth once daily.  135 tablet 2    estradioL (ESTRACE) 0.01 % (0.1 mg/gram) vaginal cream Place 1 g vaginally twice a week.  8 g 2    glycopyrrolate (ROBINUL) 1 mg Tab TAKE 1 TABLET(1 MG) BY MOUTH THREE TIMES DAILY  90 tablet 2    levothyroxine (SYNTHROID) 100 MCG tablet Take 1 tablet (100 mcg total) by mouth before breakfast.  90 tablet 1    LORazepam (ATIVAN) 0.5 MG tablet Take 1 tablet (0.5 mg total) by mouth daily as needed for Anxiety.  30 tablet 1    MYRBETRIQ 50 mg Tb24 TAKE 1 TABLET(50 MG) BY MOUTH EVERY DAY  90 tablet 3    neomycin-polymyxin-dexamethasone (DEXACINE) 3.5 mg/g-10,000 unit/g-0.1 % Oint APPLY A SMALL AMOUNT IN BOTH EYES TWICE DAILY       nystatin (MYCOSTATIN) cream Apply topically 2 (two) times daily.  30 g 2    pantoprazole (PROTONIX) 40 MG tablet 1 po daily  90 tablet 0    polyethylene glycol (GLYCOLAX) 17 gram/dose powder Take 17 g by mouth once daily.  510 g 3    rosuvastatin (CRESTOR) 20 MG tablet TAKE 1 TABLET(20 MG) BY MOUTH EVERY DAY  90 tablet 1    sodium chloride (OCEAN) 0.65 % nasal spray 1 spray DAILY (route: nasal) 2/22/2022: Med Classification:  Respiratory Therapy Agents      vit C/E/zinc ox/amy/lut/zeax (ICAPS AREDS2 ORAL) Take by mouth Daily. 1/31/2022: Med Classification: Electrolyte Balance-Nutritional Products       No facility-administered encounter medications on file as of 4/16/2025.

## 2025-04-18 LAB — BACTERIA UR CULT: ABNORMAL

## 2025-04-21 ENCOUNTER — PATIENT MESSAGE (OUTPATIENT)
Dept: PRIMARY CARE CLINIC | Facility: CLINIC | Age: 89
End: 2025-04-21
Payer: MEDICARE

## 2025-04-21 DIAGNOSIS — Z87.440 HISTORY OF RECURRENT UTI (URINARY TRACT INFECTION): Primary | ICD-10-CM

## 2025-04-23 ENCOUNTER — TELEPHONE (OUTPATIENT)
Dept: PRIMARY CARE CLINIC | Facility: CLINIC | Age: 89
End: 2025-04-23
Payer: MEDICARE

## 2025-04-23 ENCOUNTER — LAB VISIT (OUTPATIENT)
Dept: LAB | Facility: HOSPITAL | Age: 89
End: 2025-04-23
Attending: INTERNAL MEDICINE
Payer: MEDICARE

## 2025-04-23 DIAGNOSIS — N30.80 OTHER CYSTITIS WITHOUT HEMATURIA: ICD-10-CM

## 2025-04-23 DIAGNOSIS — Z87.440 HISTORY OF RECURRENT UTI (URINARY TRACT INFECTION): ICD-10-CM

## 2025-04-23 DIAGNOSIS — Z87.440 HISTORY OF RECURRENT UTI (URINARY TRACT INFECTION): Primary | ICD-10-CM

## 2025-04-23 LAB
BACTERIA #/AREA URNS AUTO: ABNORMAL /HPF
BILIRUB UR QL STRIP.AUTO: NEGATIVE
CLARITY UR: ABNORMAL
COLOR UR AUTO: YELLOW
GLUCOSE UR QL STRIP: NEGATIVE
HGB UR QL STRIP: ABNORMAL
HOLD SPECIMEN: NORMAL
HYALINE CASTS UR QL AUTO: 0 /LPF (ref 0–1)
KETONES UR QL STRIP: NEGATIVE
LEUKOCYTE ESTERASE UR QL STRIP: ABNORMAL
MICROSCOPIC COMMENT: ABNORMAL
NITRITE UR QL STRIP: NEGATIVE
PH UR STRIP: 7 [PH]
PROT UR QL STRIP: ABNORMAL
RBC #/AREA URNS AUTO: 19 /HPF (ref 0–4)
SP GR UR STRIP: 1.01
SQUAMOUS #/AREA URNS AUTO: 3 /HPF
UROBILINOGEN UR STRIP-ACNC: NEGATIVE EU/DL
WBC #/AREA URNS AUTO: >100 /HPF (ref 0–5)
WBC CLUMPS UR QL AUTO: ABNORMAL

## 2025-04-23 PROCEDURE — 87086 URINE CULTURE/COLONY COUNT: CPT

## 2025-04-23 PROCEDURE — 81003 URINALYSIS AUTO W/O SCOPE: CPT

## 2025-04-23 NOTE — TELEPHONE ENCOUNTER
----- Message from Shweta sent at 4/23/2025  8:29 AM CDT -----  Spoke to pt's friend Ms. Barksdale stated it is hard to get pt to in person Urology appt informed will let Dr. Hopper know. I sent a message to Infectious Disease about scheduling a virtual visit.Thanks

## 2025-04-23 NOTE — TELEPHONE ENCOUNTER
Spoke to pt sister, pt sister stated that she would drop off urine tomorrow no later than Friday.

## 2025-04-23 NOTE — TELEPHONE ENCOUNTER
Spoke With Pt sister Brittaney Her Daughter will be home tomorrow To Do Virtual Visit with  about UTI     Pt niece Airam will Do Virtual Visit 4/24/25 Her number [phone]

## 2025-04-24 ENCOUNTER — PATIENT MESSAGE (OUTPATIENT)
Dept: PRIMARY CARE CLINIC | Facility: CLINIC | Age: 89
End: 2025-04-24

## 2025-04-24 ENCOUNTER — OFFICE VISIT (OUTPATIENT)
Dept: PRIMARY CARE CLINIC | Facility: CLINIC | Age: 89
End: 2025-04-24
Payer: MEDICARE

## 2025-04-24 DIAGNOSIS — N39.0 RECURRENT UTI: Primary | ICD-10-CM

## 2025-04-24 DIAGNOSIS — R53.81 DEBILITY: ICD-10-CM

## 2025-04-24 DIAGNOSIS — N30.01 ACUTE CYSTITIS WITH HEMATURIA: ICD-10-CM

## 2025-04-24 DIAGNOSIS — D50.9 IRON DEFICIENCY ANEMIA, UNSPECIFIED IRON DEFICIENCY ANEMIA TYPE: ICD-10-CM

## 2025-04-24 RX ORDER — CIPROFLOXACIN 500 MG/1
500 TABLET ORAL EVERY 12 HOURS
Qty: 28 TABLET | Refills: 0 | Status: SHIPPED | OUTPATIENT
Start: 2025-04-24 | End: 2025-05-08
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
## 2025-04-25 ENCOUNTER — TELEPHONE (OUTPATIENT)
Dept: PRIMARY CARE CLINIC | Facility: CLINIC | Age: 89
End: 2025-04-25
Payer: MEDICARE

## 2025-04-25 LAB — BACTERIA UR CULT: NORMAL

## 2025-04-25 NOTE — TELEPHONE ENCOUNTER
Assist with calling [employer] [phone] pt does not have transportation coverage and would need to apply at [employer] or call to apply- attempt to call pt to inform her of this information. No answer left vm

## 2025-04-25 NOTE — TELEPHONE ENCOUNTER
----- Message from Perla Hopper MD sent at 4/24/2025  5:19 PM CDT -----  Can we get in touch with a [employer]  and get an order and approval for the to get this pt transportation to keep her out of a hospital.  Dr. Bella made this recommendation which I think is a good one. Dr. GARG

## 2025-04-28 ENCOUNTER — SSC ENCOUNTER (OUTPATIENT)
Dept: ADMINISTRATIVE | Facility: OTHER | Age: 89
End: 2025-04-28
Payer: MEDICARE

## 2025-04-28 ENCOUNTER — RESULTS FOLLOW-UP (OUTPATIENT)
Dept: PRIMARY CARE CLINIC | Facility: CLINIC | Age: 89
End: 2025-04-28

## 2025-04-28 NOTE — PROGRESS NOTES
See how pt is feeling so far.  Her urine culture unfortunately showed a dirty specimen.  When she sees Urology they can perform a cath urine. I await her CT urogram as I am concerned she may have a stone that can cause recurrent UTI's.  Dr. GARG

## 2025-05-01 ENCOUNTER — OUTPATIENT CASE MANAGEMENT (OUTPATIENT)
Dept: ADMINISTRATIVE | Facility: OTHER | Age: 89
End: 2025-05-01
Payer: MEDICARE

## 2025-05-01 NOTE — LETTER
Nickie Segura  [address]  [place] [address]      Dear Nickie Segura,     Welcome to [facility]. We are here to assist patients with multiple long-term (chronic) conditions who often need more personalized healthcare.    It was a pleasure talking with you today. My name is Félix Jenkins RN. I look forward to working with you as your Care Manager. I will be contacting you by telephone routinely to help coordinate care and resolve issues.    My goal is to help you function at the healthiest and highest level possible. You can contact me directly at [phone].    As an [facility] patient with [employer], some of the services we provide, at no cost to you, include:     Development of an individualized care plan with a Registered Nurse   Connection with a   Assistance from a Community Health Worker  Connection with available resources and services    Coordinate communication among your care team members   Provide coaching and education  Help you understand your doctor's treatment plan  Help you obtain information about your insurance coverage.    All services provided by [facility] Outpatient Care Managers and other care team members are coordinated with and communicated to your primary care team.      As part of your enrollment, you will be receiving education materials and more information about these services in your [facility] account, by phone, or through the mail. If you do not wish to participate or receive information, you can Opt Out by contacting our office at [phone].     [facility] Patient Rights and Responsibilities available upon request.    Sincerely,      Félix Jenkins RN  [facility]   Outpatient Care Management

## 2025-05-01 NOTE — PROGRESS NOTES
Outpatient Care Management  Initial Patient Assessment    Patient: Nickie Segura  MRN: [id number]  Date of Service: 05/01/2025  Completed by: Félix Jenkins RN  Referral Date:   Date of Eligibility: 4/28/2025  Program:   High Risk  Status: Ongoing  Effective Dates: 5/1/2025 - present  Responsible Staff: Félix Jenkins RN        Reason for Visit   Patient presents with    OPCM Enrollment Call    Nursing Assessment       Brief Summary:  Nickie Segura was referred by OPCM management for UTI prevnetion. Patient qualifies for program based on her need for further education and management.   Active problem list, medical, surgical and social history reviewed. Active comorbidities include hypertension, anemia, gastritis. Areas of need identified by patient include UTI prevention/education.   Next steps: send welcome letter  Send education  Follow up in two weeks per sister's request     Disability Status  Is the patient alert and oriented (person, place, time, and situation)?: Alert and oriented x 4  Hearing Difficulty or Deaf: yes  Hearing Management: other (hearing aids)  Visual Difficulty or Blind: yes  Visual and Hearing Conclusion Statement: legally blind, wears hearing aids every day  Difficulty Concentrating, Remembering or Making Decisions: no  Communication Difficulty: no  Eating/Swallowing Difficulty: no  Walking or Climbing Stairs Difficulty: yes  Walking or Climbing Stairs: ambulation difficulty, requires equipment  Mobility Management: walker, assistance from sister  Dressing/Bathing Difficulty: yes  Dressing/Bathing: bathing difficulty, assistance 1 person  Dressing/Bathing Management: help from sister  Grooming: independent  Transferring (e.g., getting in and out of chairs): assistive equipment  Toileting : Independent  Continence : Continence - Not a problem  Difficulty Managing Errands Independently: yes  Errands Management: help from sister  Equipment Currently Used at Home: bath bench; walker,  rolling  ADL Conclusion Statement: needs assistance with most adls except tolieting  Change in Functional Status Since Onset of Current Illness/Injury: no        Spiritual Beliefs  Spiritual, Cultural Beliefs, Rastafari Practices, Values that Affect Care: no      Social History     Socioeconomic History    Marital status: Single   Tobacco Use    Smoking status: Every Day     Types: Vaping with nicotine    Smokeless tobacco: Never    Tobacco comments:     Formerly smoked cigarettes.   Substance and Sexual Activity    Alcohol use: Never    Drug use: Never    Sexual activity: Not Currently     Social Drivers of Health     Financial Resource Strain: Low Risk  (5/1/2025)    Overall Financial Resource Strain (CARDIA)     Difficulty of Paying Living Expenses: Not hard at all   Food Insecurity: No Food Insecurity (5/1/2025)    Hunger Vital Sign     Worried About Running Out of Food in the Last Year: Never true     Ran Out of Food in the Last Year: Never true   Transportation Needs: No Transportation Needs (5/1/2025)    PRAPARE - Transportation     Lack of Transportation (Medical): No     Lack of Transportation (Non-Medical): No   Physical Activity: Inactive (5/1/2025)    Exercise Vital Sign     Days of Exercise per Week: 0 days     Minutes of Exercise per Session: 0 min   Stress: Stress Concern Present (5/1/2025)    [employer] - Occupational Stress Questionnaire     Feeling of Stress : To some extent   Housing Stability: Low Risk  (5/1/2025)    Housing Stability Vital Sign     Unable to Pay for Housing in the Last Year: No     Number of Times Moved in the Last Year: 0     Homeless in the Last Year: No       Roles and Relationships  Primary Source of Support/Comfort: sibling(s)  Name of Support/Comfort Primary Source: Brittaney      Advance Directives (For Healthcare)  Advance Directive  (If Adv Dir status is received, view document under Adv Dir in header or Chart Review Media tab): Advance  Directive currently in Epic.        Patient Reported Insurance  Verified current insurance plan:: [employer]vantage  [employer] benefits discussed:: OTC Prescription Discounts; Transportation            5/1/2025    10:25 AM 6/10/2024    11:01 AM 3/29/2022     4:35 PM 1/5/2022    12:49 PM   Depression Patient Health Questionnaire   Over the last two weeks how often have you been bothered by little interest or pleasure in doing things Not at all Not at all     Over the last two weeks how often have you been bothered by feeling down, depressed or hopeless Not at all Several days     PHQ-2 Total Score 0 1     Over the last two weeks how often have you been bothered by trouble falling or staying asleep, or sleeping too much       Over the last two weeks how often have you been bothered by feeling tired or having little energy       Over the last two weeks how often have you been bothered by a poor appetite or overeating       Over the last two weeks how often have you been bothered by feeling bad about yourself - or that you are a failure or have let yourself or your family down       Over the last two weeks how often have you been bothered by trouble concentrating on things, such as reading the newspaper or watching television       Over the last two weeks how often have you been bothered by moving or speaking so slowly that other people could have noticed. Or the opposite - being so fidgety or restless that you have been moving around a lot more than usual.       Over the last two weeks how often have you been bothered by thoughts that you would be better off dead, or of hurting yourself       If you checked off any problems, how difficult have these problems made it for you to do your work, take care of things at home or get along with other people?       PHQ-9 Score       PHQ-9 Interpretation           Information is confidential and restricted. Go to Review Flowsheets to unlock data.       Learning Assessment        05/01/2025 1031 [facility] (5/1/2025 - Present)   Created by Félix Jenkins, RN -  (Nurse) Status: Complete                 PRIMARY LEARNER     Primary Learner Name:  Nickie Segura  - 05/01/2025 1031    Relationship:  Patient  - 05/01/2025 1031    Does the primary learner have any barriers to learning?:  No Barriers  - 05/01/2025 1031    What is the preferred language of the primary learner?:  English JM - 05/01/2025 1031    Is an  required?:  No JM - 05/01/2025 1031    How does the primary learner prefer to learn new concepts?:  Listening, Reading, Demonstration JM - 05/01/2025 1031    How often do you need to have someone help you read instructions, pamphlets, or written material from your doctor or pharmacy?:  Sometimes JM - 05/01/2025 1031        CO-LEARNER #1     Co-Learner Name (if applicable):  Kary  - 05/01/2025 1031    Relationship:  Family JM - 05/01/2025 1031    Does the co-learner have any barriers to learning?:  No Barriers JM - 05/01/2025 1031    What is the preferred language of the co-learner?:  English JM - 05/01/2025 1031    Is an  required?:  No JM - 05/01/2025 1031    How does the co-learner prefer to learn new concepts?:  Listening, Reading, Demonstration, Pictures/Video JM - 05/01/2025 1031        CO-LEARNER #2     No question answered        SPECIAL TOPICS     No question answered        ANSWERED BY:     No question answered        Comments         Edit History       Félix Jenkins, RN -  (Nurse)   05/01/2025 1031

## 2025-05-12 ENCOUNTER — INFUSION (OUTPATIENT)
Dept: INFUSION THERAPY | Facility: HOSPITAL | Age: 89
End: 2025-05-12
Attending: INTERNAL MEDICINE
Payer: MEDICARE

## 2025-05-12 ENCOUNTER — OFFICE VISIT (OUTPATIENT)
Dept: UROLOGY | Facility: CLINIC | Age: 89
End: 2025-05-12
Payer: MEDICARE

## 2025-05-12 ENCOUNTER — PATIENT MESSAGE (OUTPATIENT)
Dept: PRIMARY CARE CLINIC | Facility: CLINIC | Age: 89
End: 2025-05-12
Payer: MEDICARE

## 2025-05-12 VITALS
DIASTOLIC BLOOD PRESSURE: 51 MMHG | SYSTOLIC BLOOD PRESSURE: 112 MMHG | RESPIRATION RATE: 18 BRPM | HEART RATE: 83 BPM | OXYGEN SATURATION: 98 % | TEMPERATURE: 98 F

## 2025-05-12 VITALS
HEART RATE: 83 BPM | BODY MASS INDEX: 33.28 KG/M2 | SYSTOLIC BLOOD PRESSURE: 111 MMHG | DIASTOLIC BLOOD PRESSURE: 58 MMHG | HEIGHT: 65 IN

## 2025-05-12 DIAGNOSIS — D50.8 OTHER IRON DEFICIENCY ANEMIA: Primary | ICD-10-CM

## 2025-05-12 DIAGNOSIS — Z87.440 HISTORY OF RECURRENT UTI (URINARY TRACT INFECTION): ICD-10-CM

## 2025-05-12 DIAGNOSIS — R82.90 BAD ODOR OF URINE: Primary | ICD-10-CM

## 2025-05-12 DIAGNOSIS — F41.9 ANXIETY: ICD-10-CM

## 2025-05-12 PROCEDURE — 25000003 PHARM REV CODE 250: Mod: HCNC | Performed by: INTERNAL MEDICINE

## 2025-05-12 PROCEDURE — 87086 URINE CULTURE/COLONY COUNT: CPT | Mod: HCNC

## 2025-05-12 PROCEDURE — A4216 STERILE WATER/SALINE, 10 ML: HCPCS | Mod: HCNC | Performed by: INTERNAL MEDICINE

## 2025-05-12 PROCEDURE — 63600175 PHARM REV CODE 636 W HCPCS: Mod: HCNC | Performed by: INTERNAL MEDICINE

## 2025-05-12 PROCEDURE — 96374 THER/PROPH/DIAG INJ IV PUSH: CPT | Mod: HCNC

## 2025-05-12 PROCEDURE — 99999 PR PBB SHADOW E&M-EST. PATIENT-LVL IV: CPT | Mod: PBBFAC,HCNC,,

## 2025-05-12 RX ORDER — LORAZEPAM 0.5 MG/1
0.5 TABLET ORAL DAILY PRN
Qty: 30 TABLET | Refills: 1 | Status: CANCELLED | OUTPATIENT
Start: 2025-05-12

## 2025-05-12 RX ORDER — LORAZEPAM 0.5 MG/1
0.5 TABLET ORAL DAILY PRN
Qty: 30 TABLET | Refills: 1 | Status: SHIPPED | OUTPATIENT
Start: 2025-05-12

## 2025-05-12 RX ORDER — EPINEPHRINE 0.3 MG/.3ML
0.3 INJECTION SUBCUTANEOUS ONCE AS NEEDED
Status: DISCONTINUED | OUTPATIENT
Start: 2025-05-12 | End: 2025-05-12 | Stop reason: HOSPADM

## 2025-05-12 RX ORDER — BUSPIRONE HYDROCHLORIDE 15 MG/1
15 TABLET ORAL 2 TIMES DAILY
Qty: 180 TABLET | Refills: 1 | Status: SHIPPED | OUTPATIENT
Start: 2025-05-12

## 2025-05-12 RX ORDER — SODIUM CHLORIDE 0.9 % (FLUSH) 0.9 %
10 SYRINGE (ML) INJECTION
Status: DISCONTINUED | OUTPATIENT
Start: 2025-05-12 | End: 2025-05-12 | Stop reason: HOSPADM

## 2025-05-12 RX ORDER — DIPHENHYDRAMINE HYDROCHLORIDE 50 MG/ML
50 INJECTION, SOLUTION INTRAMUSCULAR; INTRAVENOUS ONCE AS NEEDED
Status: DISCONTINUED | OUTPATIENT
Start: 2025-05-12 | End: 2025-05-12 | Stop reason: HOSPADM

## 2025-05-12 RX ADMIN — SODIUM CHLORIDE: 9 INJECTION, SOLUTION INTRAVENOUS at 02:05

## 2025-05-12 RX ADMIN — IRON SUCROSE 200 MG: 20 INJECTION, SOLUTION INTRAVENOUS at 02:05

## 2025-05-12 RX ADMIN — Medication 10 ML: at 02:05

## 2025-05-12 NOTE — PROGRESS NOTES
Subjective:       Patient ID: Nickie Segura is a 89 y.o. female.    Chief Complaint: Recurrent UTI     This is a 89 y.o.  female patient that is new to me.  The patient was referred to me by Dr. Hopper for recurrent UTI. Patient is here with her sister today. She is very hard of hearing. Reports that she has caregivers that come help her throughout the day. Reports that since being hospitalized 2/2025 she has been having recurrent UTIs. UTI symptoms consist of cloudy and odorous urine. Also reports increase in incontinence, not always feeling the urge to go to the bathroom. Reports that she wears a depends with pads daily. The pads are changed every time she is helped to the bathroom but sometimes she is sitting in soiled depends until that time. Denies suprapubic pain, dysuria, fevers, chills, flank pain.         Lab Results   Component Value Date    CREATININE 0.8 03/07/2025       ---  PMH/PSH/Medications/Allergies/Social history reviewed and as in chart.    Review of Systems   Constitutional:  Negative for chills and fever.   Respiratory:  Negative for shortness of breath.    Cardiovascular:  Negative for chest pain and palpitations.   Gastrointestinal:  Negative for abdominal pain, constipation and diarrhea.   Genitourinary:  Positive for difficulty urinating. Negative for dysuria, flank pain, frequency, hematuria, pelvic pain, urgency and vaginal pain.   Neurological:  Negative for dizziness and weakness.   Psychiatric/Behavioral:  Negative for agitation, confusion and sleep disturbance.      Objective:      Physical Exam  HENT:      Head: Normocephalic.   Pulmonary:      Effort: Pulmonary effort is normal.   Musculoskeletal:      Cervical back: Normal range of motion.   Skin:     General: Skin is warm and dry.   Neurological:      Mental Status: She is alert.      Motor: Weakness present.       Assessment:     Problem Noted   History of Recurrent Uti (Urinary Tract Infection) 5/12/2025       Plan:      Straight cathed urine obtained, sent for culture. Disucssed low dose antbitoic for 3 months if positive  Advised to apply a barrier cream to the outside of the vagina  Increase water intke to at least 8 glasses per day  Follow-up pending urine culture results      ROBYN Coronado    I spent a total of 40 minutes on the day of the visit.This includes face to face time and non-face to face time preparing to see the patient (eg, review of tests), obtaining and/or reviewing separately obtained history, documenting clinical information in the electronic or other health record, independently interpreting results and communicating results to the patient/family/caregiver, or care coordinator.

## 2025-05-12 NOTE — NURSING
Pt received IV venofer infusion dose monthly. Pt tolerated it well. AVS given. Next appointment scheduled/ Pt will follow up with MD. Discharged with no acute distress.

## 2025-05-12 NOTE — TELEPHONE ENCOUNTER
No care due was identified.  [facility] Embedded Care Due Messages. Reference number: [id number].   5/12/2025 1:48:28 PM CDT

## 2025-05-13 ENCOUNTER — PATIENT MESSAGE (OUTPATIENT)
Dept: PRIMARY CARE CLINIC | Facility: CLINIC | Age: 89
End: 2025-05-13
Payer: MEDICARE

## 2025-05-14 LAB — BACTERIA UR CULT: NO GROWTH

## 2025-05-15 ENCOUNTER — RESULTS FOLLOW-UP (OUTPATIENT)
Dept: UROLOGY | Facility: CLINIC | Age: 89
End: 2025-05-15

## 2025-05-29 ENCOUNTER — TELEPHONE (OUTPATIENT)
Dept: PRIMARY CARE CLINIC | Facility: CLINIC | Age: 89
End: 2025-05-29
Payer: MEDICARE

## 2025-05-30 ENCOUNTER — PATIENT MESSAGE (OUTPATIENT)
Dept: PRIMARY CARE CLINIC | Facility: CLINIC | Age: 89
End: 2025-05-30
Payer: MEDICARE

## 2025-05-30 NOTE — LETTER
"[facility] - Primary Care  [address] [place] [address] [address]  [place] [address]  Phone: [phone]  Fax: [phone] To whom it may concern,     Nickie"Lissa Segura has been under my care as her primary care physician for several years. She is visually impaired. She has debility requiring transportation. She also is hearing impaired requiring hearing aids. She is unable to ambulate without assistance to stand, or transfer. She requires a wheelchair and someone to push the wheelchair. She needs assistance toileting, dressing, and with ADL's. This has become increasingly difficult for her family to care for her and for her to be properly cared for at an assisted living facility.  She is requiring more assistance and would greatly benefit from the care provided at a long term nursing facility. Please don't hesitate to contact my office at [phone] for any further questions or concerns.        Sincerely,       Perla Hopper M.D.  Section of Internal Medicine/Primary Care           "

## 2025-05-30 NOTE — TELEPHONE ENCOUNTER
Spoke to the patient's relative, brittaney. The patient and family would like the patient to be admitted to [facility]. They are requiring the last office note and a letter from Dr. Hopper stating the patient is a candidate to receive care at a long term nursing facility. Dr. Hopper has provided the letter. The letter was sent electronically through the portal and they will  a hard copy of the letter from the  on the first floor. Brittaney verbalized understanding.

## 2025-05-30 NOTE — TELEPHONE ENCOUNTER
"To whom it may concern,    Nickie Patel" Colton has been under my care as her primary care physician for several yrs.  She is visually impaired.  She has debility requiring transportation.  She also is hearing impaired requiring hearing aids.  She is unable to ambulate without assistance to stand, or transfer.  She requires a wheelchair and someone to push the wheelchair.  She needs assistance toileting, dressing, and with ADL's. This has become increasingly difficult for her family to care for her and for her to be properly cared for at an assisted living facility.  She is requiring more assistance and would greatly benefit from the care provided at a long term nursing facility. Please don't hesitate to contact my office at [phone] for any further questions/Concerns.      Sincerely,      Perla Hopper M.D.  Section of Internal Medicine/Primary Care    "

## 2025-06-02 ENCOUNTER — OUTPATIENT CASE MANAGEMENT (OUTPATIENT)
Dept: ADMINISTRATIVE | Facility: OTHER | Age: 89
End: 2025-06-02
Payer: MEDICARE

## 2025-06-03 ENCOUNTER — HOSPITAL ENCOUNTER (INPATIENT)
Facility: HOSPITAL | Age: 89
LOS: 1 days | Discharge: HOME HEALTH | DRG: 694 | End: 2025-06-05
Attending: EMERGENCY MEDICINE | Admitting: HOSPITALIST
Payer: MEDICARE

## 2025-06-03 ENCOUNTER — PATIENT MESSAGE (OUTPATIENT)
Dept: HEMATOLOGY/ONCOLOGY | Facility: CLINIC | Age: 89
End: 2025-06-03
Payer: MEDICARE

## 2025-06-03 DIAGNOSIS — N39.0 URINARY TRACT INFECTION WITH HEMATURIA, SITE UNSPECIFIED: ICD-10-CM

## 2025-06-03 DIAGNOSIS — R10.2 SUPRAPUBIC PAIN: ICD-10-CM

## 2025-06-03 DIAGNOSIS — R31.9 URINARY TRACT INFECTION WITH HEMATURIA, SITE UNSPECIFIED: ICD-10-CM

## 2025-06-03 DIAGNOSIS — R31.0 GROSS HEMATURIA: Primary | ICD-10-CM

## 2025-06-03 LAB
ABSOLUTE EOSINOPHIL (OHS): 0.05 K/UL
ABSOLUTE MONOCYTE (OHS): 0.38 K/UL (ref 0.3–1)
ABSOLUTE NEUTROPHIL COUNT (OHS): 3.8 K/UL (ref 1.8–7.7)
ALBUMIN SERPL BCP-MCNC: 2.9 G/DL (ref 3.5–5.2)
ALP SERPL-CCNC: 88 UNIT/L (ref 40–150)
ALT SERPL W/O P-5'-P-CCNC: 45 UNIT/L (ref 10–44)
ANION GAP (OHS): 7 MMOL/L (ref 8–16)
AST SERPL-CCNC: 78 UNIT/L (ref 11–45)
BACTERIA #/AREA URNS AUTO: ABNORMAL /HPF
BASOPHILS # BLD AUTO: 0.03 K/UL
BASOPHILS NFR BLD AUTO: 0.6 %
BILIRUB SERPL-MCNC: 0.3 MG/DL (ref 0.1–1)
BILIRUB UR QL STRIP.AUTO: NEGATIVE
BUN SERPL-MCNC: 15 MG/DL (ref 8–23)
CALCIUM SERPL-MCNC: 8.4 MG/DL (ref 8.7–10.5)
CHLORIDE SERPL-SCNC: 109 MMOL/L (ref 95–110)
CLARITY UR: ABNORMAL
CO2 SERPL-SCNC: 23 MMOL/L (ref 23–29)
COLOR UR AUTO: ABNORMAL
CREAT SERPL-MCNC: 0.9 MG/DL (ref 0.5–1.4)
ERYTHROCYTE [DISTWIDTH] IN BLOOD BY AUTOMATED COUNT: 19.2 % (ref 11.5–14.5)
GFR SERPLBLD CREATININE-BSD FMLA CKD-EPI: >60 ML/MIN/1.73/M2
GLUCOSE SERPL-MCNC: 92 MG/DL (ref 70–110)
GLUCOSE UR QL STRIP: NEGATIVE
HCT VFR BLD AUTO: 29.9 % (ref 37–48.5)
HGB BLD-MCNC: 8.9 GM/DL (ref 12–16)
HGB UR QL STRIP: ABNORMAL
HOLD SPECIMEN: NORMAL
HYALINE CASTS UR QL AUTO: 0 /LPF (ref 0–1)
IMM GRANULOCYTES # BLD AUTO: 0.02 K/UL (ref 0–0.04)
IMM GRANULOCYTES NFR BLD AUTO: 0.4 % (ref 0–0.5)
KETONES UR QL STRIP: NEGATIVE
LEUKOCYTE ESTERASE UR QL STRIP: ABNORMAL
LYMPHOCYTES # BLD AUTO: 1.13 K/UL (ref 1–4.8)
MCH RBC QN AUTO: 25.5 PG (ref 27–31)
MCHC RBC AUTO-ENTMCNC: 29.8 G/DL (ref 32–36)
MCV RBC AUTO: 86 FL (ref 82–98)
MICROSCOPIC COMMENT: ABNORMAL
NITRITE UR QL STRIP: NEGATIVE
NUCLEATED RBC (/100WBC) (OHS): 0 /100 WBC
PH UR STRIP: 7 [PH]
PLATELET # BLD AUTO: 231 K/UL (ref 150–450)
PMV BLD AUTO: 9 FL (ref 9.2–12.9)
POTASSIUM SERPL-SCNC: 4.3 MMOL/L (ref 3.5–5.1)
PROT SERPL-MCNC: 6.5 GM/DL (ref 6–8.4)
PROT UR QL STRIP: ABNORMAL
RBC # BLD AUTO: 3.49 M/UL (ref 4–5.4)
RBC #/AREA URNS AUTO: >100 /HPF (ref 0–4)
RELATIVE EOSINOPHIL (OHS): 0.9 %
RELATIVE LYMPHOCYTE (OHS): 20.9 % (ref 18–48)
RELATIVE MONOCYTE (OHS): 7 % (ref 4–15)
RELATIVE NEUTROPHIL (OHS): 70.2 % (ref 38–73)
SODIUM SERPL-SCNC: 139 MMOL/L (ref 136–145)
SP GR UR STRIP: >=1.03
UROBILINOGEN UR STRIP-ACNC: NEGATIVE EU/DL
WBC # BLD AUTO: 5.41 K/UL (ref 3.9–12.7)
WBC #/AREA URNS AUTO: >100 /HPF (ref 0–5)
WBC CLUMPS UR QL AUTO: ABNORMAL

## 2025-06-03 PROCEDURE — 99285 EMERGENCY DEPT VISIT HI MDM: CPT | Mod: 25,HCNC

## 2025-06-03 PROCEDURE — 25500020 PHARM REV CODE 255: Performed by: EMERGENCY MEDICINE

## 2025-06-03 PROCEDURE — 51702 INSERT TEMP BLADDER CATH: CPT | Mod: HCNC

## 2025-06-03 PROCEDURE — G0378 HOSPITAL OBSERVATION PER HR: HCPCS

## 2025-06-03 PROCEDURE — 25000003 PHARM REV CODE 250

## 2025-06-03 PROCEDURE — 25000003 PHARM REV CODE 250: Performed by: EMERGENCY MEDICINE

## 2025-06-03 PROCEDURE — 96365 THER/PROPH/DIAG IV INF INIT: CPT | Mod: HCNC

## 2025-06-03 PROCEDURE — S4991 NICOTINE PATCH NONLEGEND: HCPCS

## 2025-06-03 PROCEDURE — 82040 ASSAY OF SERUM ALBUMIN: CPT | Performed by: EMERGENCY MEDICINE

## 2025-06-03 PROCEDURE — 63600175 PHARM REV CODE 636 W HCPCS: Performed by: EMERGENCY MEDICINE

## 2025-06-03 PROCEDURE — 85025 COMPLETE CBC W/AUTO DIFF WBC: CPT | Performed by: EMERGENCY MEDICINE

## 2025-06-03 PROCEDURE — 99284 EMERGENCY DEPT VISIT MOD MDM: CPT | Mod: ,,, | Performed by: UROLOGY

## 2025-06-03 PROCEDURE — 87086 URINE CULTURE/COLONY COUNT: CPT | Performed by: EMERGENCY MEDICINE

## 2025-06-03 PROCEDURE — 81003 URINALYSIS AUTO W/O SCOPE: CPT | Performed by: EMERGENCY MEDICINE

## 2025-06-03 RX ORDER — TALC
6 POWDER (GRAM) TOPICAL NIGHTLY PRN
Status: DISCONTINUED | OUTPATIENT
Start: 2025-06-03 | End: 2025-06-05 | Stop reason: HOSPADM

## 2025-06-03 RX ORDER — ALUMINUM HYDROXIDE, MAGNESIUM HYDROXIDE, AND SIMETHICONE 1200; 120; 1200 MG/30ML; MG/30ML; MG/30ML
30 SUSPENSION ORAL
Status: DISCONTINUED | OUTPATIENT
Start: 2025-06-03 | End: 2025-06-05 | Stop reason: HOSPADM

## 2025-06-03 RX ORDER — SODIUM CHLORIDE 0.9 % (FLUSH) 0.9 %
5 SYRINGE (ML) INJECTION
Status: DISCONTINUED | OUTPATIENT
Start: 2025-06-03 | End: 2025-06-05 | Stop reason: HOSPADM

## 2025-06-03 RX ORDER — AMOXICILLIN 250 MG
1 CAPSULE ORAL 2 TIMES DAILY
Status: DISCONTINUED | OUTPATIENT
Start: 2025-06-03 | End: 2025-06-05 | Stop reason: HOSPADM

## 2025-06-03 RX ORDER — IBUPROFEN 200 MG
1 TABLET ORAL DAILY
Status: DISCONTINUED | OUTPATIENT
Start: 2025-06-03 | End: 2025-06-05 | Stop reason: HOSPADM

## 2025-06-03 RX ORDER — ASCORBIC ACID 500 MG
500 TABLET ORAL DAILY
Status: ON HOLD | COMMUNITY

## 2025-06-03 RX ORDER — IBUPROFEN 200 MG
24 TABLET ORAL
Status: DISCONTINUED | OUTPATIENT
Start: 2025-06-03 | End: 2025-06-05 | Stop reason: HOSPADM

## 2025-06-03 RX ORDER — ASPIRIN 81 MG/1
81 TABLET ORAL EVERY MORNING
Status: ON HOLD | COMMUNITY

## 2025-06-03 RX ORDER — POLYETHYLENE GLYCOL 3350 17 G/17G
17 POWDER, FOR SOLUTION ORAL DAILY PRN
Status: DISCONTINUED | OUTPATIENT
Start: 2025-06-03 | End: 2025-06-05 | Stop reason: HOSPADM

## 2025-06-03 RX ORDER — LEVOTHYROXINE SODIUM 100 UG/1
100 TABLET ORAL
Status: DISCONTINUED | OUTPATIENT
Start: 2025-06-04 | End: 2025-06-05 | Stop reason: HOSPADM

## 2025-06-03 RX ORDER — IBUPROFEN 200 MG
16 TABLET ORAL
Status: DISCONTINUED | OUTPATIENT
Start: 2025-06-03 | End: 2025-06-05 | Stop reason: HOSPADM

## 2025-06-03 RX ORDER — NALOXONE HCL 0.4 MG/ML
0.02 VIAL (ML) INJECTION
Status: DISCONTINUED | OUTPATIENT
Start: 2025-06-03 | End: 2025-06-05 | Stop reason: HOSPADM

## 2025-06-03 RX ORDER — SUCRALFATE 1 G/10ML
1 SUSPENSION ORAL EVERY 6 HOURS
Status: DISCONTINUED | OUTPATIENT
Start: 2025-06-03 | End: 2025-06-05 | Stop reason: HOSPADM

## 2025-06-03 RX ORDER — ACETAMINOPHEN 325 MG/1
650 TABLET ORAL EVERY 4 HOURS PRN
Status: DISCONTINUED | OUTPATIENT
Start: 2025-06-03 | End: 2025-06-03

## 2025-06-03 RX ORDER — GLUCAGON 1 MG
1 KIT INJECTION
Status: DISCONTINUED | OUTPATIENT
Start: 2025-06-03 | End: 2025-06-05 | Stop reason: HOSPADM

## 2025-06-03 RX ORDER — CITALOPRAM 20 MG/1
20 TABLET ORAL DAILY
Status: DISCONTINUED | OUTPATIENT
Start: 2025-06-04 | End: 2025-06-05 | Stop reason: HOSPADM

## 2025-06-03 RX ORDER — LORAZEPAM 0.5 MG/1
0.5 TABLET ORAL DAILY PRN
Status: DISCONTINUED | OUTPATIENT
Start: 2025-06-03 | End: 2025-06-05 | Stop reason: HOSPADM

## 2025-06-03 RX ORDER — GLYCOPYRROLATE 1 MG/1
1 TABLET ORAL DAILY
Status: DISCONTINUED | OUTPATIENT
Start: 2025-06-04 | End: 2025-06-05 | Stop reason: HOSPADM

## 2025-06-03 RX ORDER — BUSPIRONE HYDROCHLORIDE 5 MG/1
15 TABLET ORAL 2 TIMES DAILY
Status: DISCONTINUED | OUTPATIENT
Start: 2025-06-03 | End: 2025-06-05 | Stop reason: HOSPADM

## 2025-06-03 RX ORDER — ONDANSETRON HYDROCHLORIDE 2 MG/ML
4 INJECTION, SOLUTION INTRAVENOUS EVERY 8 HOURS PRN
Status: DISCONTINUED | OUTPATIENT
Start: 2025-06-03 | End: 2025-06-05 | Stop reason: HOSPADM

## 2025-06-03 RX ORDER — CIPROFLOXACIN 2 MG/ML
400 INJECTION, SOLUTION INTRAVENOUS
Status: COMPLETED | OUTPATIENT
Start: 2025-06-03 | End: 2025-06-03

## 2025-06-03 RX ORDER — ACETAMINOPHEN 325 MG/1
650 TABLET ORAL
Status: COMPLETED | OUTPATIENT
Start: 2025-06-03 | End: 2025-06-03

## 2025-06-03 RX ORDER — CHOLECALCIFEROL (VITAMIN D3) 25 MCG
1000 TABLET ORAL DAILY
Status: ON HOLD | COMMUNITY

## 2025-06-03 RX ORDER — CIPROFLOXACIN 2 MG/ML
400 INJECTION, SOLUTION INTRAVENOUS
Status: DISCONTINUED | OUTPATIENT
Start: 2025-06-04 | End: 2025-06-05 | Stop reason: HOSPADM

## 2025-06-03 RX ADMIN — ACETAMINOPHEN 650 MG: 325 TABLET ORAL at 11:06

## 2025-06-03 RX ADMIN — ALUMINUM HYDROXIDE, MAGNESIUM HYDROXIDE, AND SIMETHICONE 30 ML: 200; 200; 20 SUSPENSION ORAL at 08:06

## 2025-06-03 RX ADMIN — LORAZEPAM 0.5 MG: 0.5 TABLET ORAL at 10:06

## 2025-06-03 RX ADMIN — Medication 6 MG: at 10:06

## 2025-06-03 RX ADMIN — CIPROFLOXACIN 400 MG: 2 INJECTION, SOLUTION INTRAVENOUS at 04:06

## 2025-06-03 RX ADMIN — SUCRALFATE 1 G: 1 SUSPENSION ORAL at 07:06

## 2025-06-03 RX ADMIN — BUSPIRONE HYDROCHLORIDE 15 MG: 5 TABLET ORAL at 08:06

## 2025-06-03 RX ADMIN — SENNOSIDES AND DOCUSATE SODIUM 1 TABLET: 50; 8.6 TABLET ORAL at 08:06

## 2025-06-03 RX ADMIN — SUCRALFATE 1 G: 1 SUSPENSION ORAL at 11:06

## 2025-06-03 RX ADMIN — IOHEXOL 100 ML: 350 INJECTION, SOLUTION INTRAVENOUS at 12:06

## 2025-06-03 RX ADMIN — NICOTINE 1 PATCH: 14 PATCH, EXTENDED RELEASE TRANSDERMAL at 11:06

## 2025-06-03 NOTE — H&P
[facility]  History & Physical    Patient Name: Nickie Segura  MRN: [id number]  Patient Class: OP- Observation  Admission Date: 6/3/2025  Attending Physician: Carlos Alberto Bustamante MD   Primary Care Provider: Perla Hopper MD         Patient information was obtained from patient, relative(s), caregiver / friend, and ER records.     Subjective:     Principal Problem:Urinary tract infection with hematuria    Chief Complaint:   Chief Complaint   Patient presents with    Vaginal Bleeding     NH reports vaginal bleeding today. Presents awake, alert. Denies pain. Patient is [place] and sight impaired.        HPI: Patient is a 89 yo female w/ PMHx of anxiety,hypothyroidism, catarcts, ostoarthritis, recurrent UTI presenting today with from assisted living community with chief complaint of bloody diaper. Staff noticed blood in her diaper last night with clots, prompting ER visit. Prior to this, she had endorsed intermittent burning lower abodminal discomfort for two weeks, though to be anxiety related, however, due to symptoms above, she was transferred to the ED for further evaluation.    In the ED, initial vital signs /58, HR 78, Temp 98.3, SpO2 96% on room air. Labs include CBC with stable H/H 8.9/29.9 and WBC within normal limits 5.41. CMP without electrolyte abnormalities and BUN/Cr 15/0.9 (baseline Cr 0.9). UA notable for >100 RBCs, >100 WBCs, 2+ leukocyte Esterase. CT abdomen pelvis w/ Mild to moderate right hydroureteronephrosis, noting mild prominence of the left ureter as well as heterogeneous suspected enhancing material within the posterior aspect of the urinary bladder. Started on ciprofloxacin empirically for possible bacterial UTI.    Past Medical History:   Diagnosis Date    Allergy     LORNA positive     Anxiety     Bilateral cataracts     Colitis     Colon polyp     COVID-19 09/2022    Depression     Elevated IgE level     Hepatomegaly     Hiatal hernia     Hypothyroidism      IFG (impaired fasting glucose)     Iron deficiency anemia     Macular degeneration     Nicotine dependence     Osteoarthritis     Recurrent UTI     Renal cyst     Skin cancer     Tuberculosis of bladder     Urinary incontinence     Vitamin B12 deficiency     Wheezing        Past Surgical History:   Procedure Laterality Date    CHOLECYSTECTOMY         Review of patient's allergies indicates:   Allergen Reactions    Penicillins      rash       No current facility-administered medications on file prior to encounter.     Current Outpatient Medications on File Prior to Encounter   Medication Sig    Al hyd-Mg tr-alg ac-sod bicarb (GAVISCON) 80-14.2 mg Chew Take 1 tablet by mouth once daily.    albuterol (VENTOLIN HFA) 90 mcg/actuation inhaler Inhale 1-2 puffs into the lungs every 6 (six) hours as needed for Wheezing or Shortness of Breath. Rescue    aspirin (ECOTRIN) 81 MG EC tablet Take 81 mg by mouth once daily.    azelastine (ASTELIN) 137 mcg (0.1 %) nasal spray 2 sprays (274 mcg total) by Nasal route 2 (two) times daily.    busPIRone (BUSPAR) 15 MG tablet TAKE 1 TABLET BY MOUTH TWICE DAILY    calcium carbonate (TUMS) 200 mg calcium (500 mg) chewable tablet Take 1 tablet by mouth once daily.    citalopram (CELEXA) 20 MG tablet Take 1.5 tablets (30 mg total) by mouth once daily.    estradioL (ESTRACE) 0.01 % (0.1 mg/gram) vaginal cream Place 1 g vaginally twice a week.    glycopyrrolate (ROBINUL) 1 mg Tab TAKE 1 TABLET(1 MG) BY MOUTH THREE TIMES DAILY    levothyroxine (SYNTHROID) 100 MCG tablet Take 1 tablet (100 mcg total) by mouth before breakfast.    LORazepam (ATIVAN) 0.5 MG tablet Take 1 tablet (0.5 mg total) by mouth daily as needed for Anxiety.    MYRBETRIQ 50 mg Tb24 TAKE 1 TABLET(50 MG) BY MOUTH EVERY DAY    neomycin-polymyxin-dexamethasone (DEXACINE) 3.5 mg/g-10,000 unit/g-0.1 % Oint APPLY A SMALL AMOUNT IN BOTH EYES TWICE DAILY    nystatin (MYCOSTATIN) cream Apply topically 2 (two) times daily.     pantoprazole (PROTONIX) 40 MG tablet 1 po daily    polyethylene glycol (GLYCOLAX) 17 gram/dose powder Take 17 g by mouth once daily.    rosuvastatin (CRESTOR) 20 MG tablet TAKE 1 TABLET(20 MG) BY MOUTH EVERY DAY    sodium chloride (OCEAN) 0.65 % nasal spray 1 spray DAILY (route: nasal)    vit C/E/zinc ox/amy/lut/zeax (ICAPS AREDS2 ORAL) Take by mouth Daily.     Family History       Problem Relation (Age of Onset)    Atrial fibrillation Sister    Diabetes Mother    Heart disease Mother, Father    Polycystic kidney disease Father          Tobacco Use    Smoking status: Every Day     Types: Vaping with nicotine     Passive exposure: Current    Smokeless tobacco: Never    Tobacco comments:     Formerly smoked cigarettes.   Substance and Sexual Activity    Alcohol use: Never    Drug use: Never    Sexual activity: Not Currently     Review of Systems   Constitutional: Negative.    Eyes: Negative.    Respiratory: Negative.     Cardiovascular: Negative.    Gastrointestinal:  Positive for abdominal pain.   Endocrine: Negative.    Genitourinary:  Positive for difficulty urinating, dysuria and hematuria.   Musculoskeletal:  Positive for gait problem.   Skin:  Positive for pallor.   Allergic/Immunologic: Negative.    Psychiatric/Behavioral: Negative.       Objective:     Vital Signs (Most Recent):  Temp: 98.3 °F (36.8 °C) (06/03/25 1006)  Pulse: 74 (06/03/25 1110)  Resp: (!) 24 (06/03/25 1110)  BP: (!) 163/70 (06/03/25 1200)  SpO2: 96 % (06/03/25 1110) Vital Signs (24h Range):  Temp:  [98.3 °F (36.8 °C)] 98.3 °F (36.8 °C)  Pulse:  [74-78] 74  Resp:  [18-24] 24  SpO2:  [96 %-97 %] 96 %  BP: (102-163)/(51-70) 163/70     Weight: 90.7 kg (200 lb)  Body mass index is 39.06 kg/m².     Physical Exam  Vitals and nursing note reviewed. Exam conducted with a chaperone present.   Constitutional:       Appearance: Normal appearance.   HENT:      Head: Normocephalic and atraumatic.   Eyes:      Conjunctiva/sclera: Conjunctivae normal.    Cardiovascular:      Rate and Rhythm: Normal rate and regular rhythm.   Pulmonary:      Effort: Pulmonary effort is normal.      Breath sounds: Normal breath sounds.   Abdominal:      General: Bowel sounds are normal.      Palpations: Abdomen is soft.   Eason in place draining dark red blood   Musculoskeletal:         General: Normal range of motion.      Cervical back: Normal range of motion and neck supple.   Skin:     General: Skin is warm.      Capillary Refill: Capillary refill takes less than 2 seconds.   Neurological:      General: No focal deficit present.      Mental Status: She is alert and oriented to person, place, and time.   Psychiatric:         Mood and Affect: Mood normal.         Behavior: Behavior normal.                Significant Labs: All pertinent labs within the past 24 hours have been reviewed.    Significant Imaging: I have reviewed all pertinent imaging results/findings within the past 24 hours.  Assessment/Plan:     Assessment & Plan  Urinary tract infection with hematuria  Patient with  PMHx of recurrent UTI.  Staff noticed blood in her diaper last night with clots, prompting ER visit. Prior to this, she had endorsed intermittent burning lower abodminal discomfort for two weeks.    CT  abdomen pelvis w/ Mild to moderate right hydroureteronephrosis, noting mild prominence of the left ureter as well as heterogeneous suspected enhancing material within the posterior aspect of the urinary bladder.    Plan  -Consult Urology appreciate recs  -Eason placed   - Started on ciprofloxacin empirically     Iron deficiency anemia   Most recent hemoglobin and hematocrit are listed below. Patient had iron infusion monthly.   Recent Labs     06/03/25  1048   HGB 8.9*   HCT 29.9*     Plan  - Monitor serial CBC: Every 8 hours  - Transfuse PRBC if patient becomes hemodynamically unstable, symptomatic or H/H drops below 7/21.  - Patient has not received any PRBC transfusions to date  - Patient's anemia is  currently stable  Anxiety  Patient has a PMHX of anxiety     Plan  Continue current medications    VTE Risk Mitigation (From admission, onward)           Ordered     Reason for No Pharmacological VTE Prophylaxis  Once        Question:  Reasons:  Answer:  Active Bleeding    06/03/25 1701     IP VTE HIGH RISK PATIENT  Once         06/03/25 1701     Place sequential compression device  Until discontinued         06/03/25 1701                          Isatu Lopez MD  Family Medicine PGY-1  Department of [facility] Medicine  [place] - Emergency Dept

## 2025-06-03 NOTE — ASSESSMENT & PLAN NOTE
Patient with  PMHx of recurrent UTI.  Staff noticed blood in her diaper last night with clots, prompting ER visit. Prior to this, she had endorsed intermittent burning lower abodminal discomfort for two weeks.    CT  abdomen pelvis w/ Mild to moderate right hydroureteronephrosis, noting mild prominence of the left ureter as well as heterogeneous suspected enhancing material within the posterior aspect of the urinary bladder.    Plan  -Consult Urology appreciate recs  -Eason placed   - Started on ciprofloxacin empirically

## 2025-06-03 NOTE — CONSULTS
[facility] - Emergency Dept  Urology  Consult Note    Patient Name: Nickie Segura  MRN: [id number]  Admission Date: 6/3/2025  Attending Provider: Bill Herrera III, MD   Consulting Provider: Jose Benitez MD  Principal Problem:<principal problem not specified>    Inpatient consult to Urology  Consult performed by: Jose Benitez MD  Consult ordered by: Jose Benitez MD          Subjective:     HPI:   Nickie Segura is a 89 y.o. female here with gross hematuria, urology consulted for gross hematuria.      Pt with a h/o UTIs. Has been emptying poorly recently and had more incontinence. Noted to have gross hematuria at home, brought in by family. Bladder volumes elevated, guerrier placed with grossly bloody urine after CT revealed a distended bladder with evidence of hemorrhage and clot material in bladder. Urology consulted.    Past Medical History:   Diagnosis Date    Allergy     LORNA positive     Anxiety     Bilateral cataracts     Colitis     Colon polyp     COVID-19 09/2022    Depression     Elevated IgE level     Hepatomegaly     Hiatal hernia     Hypothyroidism     IFG (impaired fasting glucose)     Iron deficiency anemia     Macular degeneration     Nicotine dependence     Osteoarthritis     Recurrent UTI     Renal cyst     Skin cancer     Tuberculosis of bladder     Urinary incontinence     Vitamin B12 deficiency     Wheezing        Past Surgical History:   Procedure Laterality Date    CHOLECYSTECTOMY         Family History   Problem Relation Name Age of Onset    Heart disease Mother      Diabetes Mother      Heart disease Father      Polycystic kidney disease Father      Atrial fibrillation Sister         Social History[1]    Medications Ordered Prior to Encounter[2]    Review of patient's allergies indicates:   Allergen Reactions    Penicillins      rash       Review of Systems:  A review of 10+ systems was conducted with pertinent positive and negative findings documented in HPI with all other systems reviewed  "and negative.    Objective:     Vitals:   Temp:  [98.3 °F (36.8 °C)] 98.3 °F (36.8 °C)  Pulse:  [74-78] 74  Resp:  [18-24] 24  SpO2:  [96 %-97 %] 96 %  BP: (102-163)/(51-70) 163/70       I/O: No intake or output data in the 24 hours ending 06/03/25 1538    Physical Exam:  GENERAL: patient sitting comfortably  HEENT: normocephalic  NECK: supple, no JVD  PULM: normal chest rise, no increased WOB  HEART: non-diaphoretic  ABDO: soft, non-distended, non-tender  : guerrier bloody but draining  BACK: no CVA tenderness bilaterally  SKIN: warm, dry, well perfused  EXT: no bruising or edema  NEURO: grossly normal with no focal deficits  PSYCH: appropriate mood and affect    Significant Labs:  CBC:  Recent Labs   Lab 06/03/25  1048   WBC 5.41   HGB 8.9*   HCT 29.9*          BMP:  Recent Labs   Lab 06/03/25  1048      K 4.3      CO2 23   BUN 15   CREATININE 0.9   CALCIUM 8.4*       Urinalysis  No results for input(s): "COLORU", "CLARITYU", "SPECGRAV", "PHUR", "PROTEINUA", "GLUCOSEU", "BILIRUBINCON", "BLOODU", "WBCU", "RBCU", "BACTERIA", "MUCUS", "NITRITE", "LEUKOCYTESUR", "UROBILINOGEN", "HYALINECASTS" in the last 24 hours.    Imaging:  All pertinent imaging results/findings from the past 24 hours have been reviewed.    Results for orders placed or performed during the hospital encounter of 06/03/25 (from the past 2160 hours)   CT Abdomen Pelvis With IV Contrast NO Oral Contrast    Narrative    EXAMINATION:  CT ABDOMEN PELVIS WITH IV CONTRAST    CLINICAL HISTORY:  LLQ abdominal pain;RLQ abdominal pain (Age >= 14y);    TECHNIQUE:  Low dose axial images, sagittal and coronal reformations were obtained from the lung bases to the pubic symphysis following the IV administration of 100 mL of Omnipaque 350 .  Oral contrast was not given.    COMPARISON:  Ultrasound 08/09/2022    FINDINGS:  Images of the lower thorax are remarkable for bilateral dependent atelectasis, right greater than left.  There may be rounded " atelectasis within the right lower lobe noting trace right pleural effusion.    Allowing for motion artifact, the liver is somewhat hypoattenuating, possibly reflecting steatosis, correlation with LFTs recommended.  The liver is enlarged.  The spleen and right adrenal gland are grossly unremarkable.  There is subcentimeter nodular thickening of the left adrenal gland.  There is atrophic change of the pancreas without pancreatic ductal dilation.  The gallbladder is surgically absent, no significant biliary dilation status post cholecystectomy.  The stomach is relatively decompressed without wall thickening.  The portal vein, splenic vein, celiac axis and SMA all are patent noting prominent atherosclerotic calcification at the origin of the celiac artery and SMA likely resulting in moderate stenosis.  No significant abdominal lymphadenopathy.    The kidneys enhance symmetrically noting motion artifact limits evaluation of the kidneys.  There is bilateral renal cortical thinning.  Several low attenuating lesions arise from the kidneys, most of which are too small for characterization.  A cyst arises from the interpolar region of the right kidney measuring 3 cm.  There is no left nephrolithiasis or hydronephrosis noting left parapelvic cysts.  The left ureter is somewhat prominent without convincing calculi seen.  There is moderate right hydroureteronephrosis.  There is right nonobstructive nephrolithiasis.  The right ureter is somewhat prominent noting no calculi along its course.  There is heterogeneous soft tissue attenuating material within the posterior aspect of the urinary bladder, particularly at the level of the right and left UVJ.  The urinary bladder is mildly distended noting irregular wall thickening.  The uterus is absent there is a cystic structure within the left adnexa measuring 4.3 cm, nonspecific, possibly left ovarian or paraovarian in origin.  There is presacral edema.    There is moderate to large  amount of stool throughout the colon noting several scattered colonic diverticula.  There is surgical change of partial colectomy.  The small bowel is grossly unremarkable.  There are a few scattered shotty periaortic, pericaval, and mesenteric lymph nodes.  There is atherosclerotic calcification of the aorta and its branches.  There is questionable perianal thickening.  There is a cystic structure at the level of the vaginal labia suggesting possible Bartholin gland cyst.    There is osteopenia.  There are degenerative changes of the bilateral sacroiliac joints, pubic symphysis, and spine.  There is grade 1 anterolisthesis of L4 on L5.  No significant inguinal lymphadenopathy.  There is a fluid containing right inguinal hernia.      Impression    This report was flagged in Epic as abnormal.    1. Heterogeneous suspected enhancing material within the posterior aspect of the urinary bladder.  Differential would include blood products or sequela of infection however malignancy cannot be excluded.  Correlation with urinalysis recommended, direct visualization as warranted.  2. Mild to moderate right hydroureteronephrosis, noting mild prominence of the left ureter.  This is likely related to obstruction at the level of the bilateral UVJ is a by the above focus.  3. Irregularity of the urinary bladder walls, correlation with direct visualization as warranted.  4. Hepatomegaly noting suspected steatosis, correlation with LFTs recommended.  5. Please see above for several additional findings.      Electronically signed by: Edmar Max MD  Date:    06/03/2025  Time:    12:39     No results found for this or any previous visit (from the past 2160 hours).    Urology Specific Assessment:     Gross hematuria, urinary retention, hydronephrosis    Plan:      Would admit to medicine to obs to see if urine clears, it does appear to be clearing already  Hematuria likely secondary to overdistention, should clear with time and  hydration  Will continue to follow    Thank you for your consult.     Jose Benitez MD  Urology  [facility]    Disclaimer: This note has been generated using voice-recognition software. There may be typographical errors that have been missed during proof-reading.          [1]   Social History  Tobacco Use    Smoking status: Every Day     Types: Vaping with nicotine     Passive exposure: Current    Smokeless tobacco: Never    Tobacco comments:     Formerly smoked cigarettes.   Substance Use Topics    Alcohol use: Never    Drug use: Never   [2]   No current facility-administered medications on file prior to encounter.     Current Outpatient Medications on File Prior to Encounter   Medication Sig Dispense Refill    Al hyd-Mg tr-alg ac-sod bicarb (GAVISCON) 80-14.2 mg Chew Take 1 tablet by mouth once daily.      albuterol (VENTOLIN HFA) 90 mcg/actuation inhaler Inhale 1-2 puffs into the lungs every 6 (six) hours as needed for Wheezing or Shortness of Breath. Rescue 18 g 0    aspirin (ECOTRIN) 81 MG EC tablet Take 81 mg by mouth once daily.      azelastine (ASTELIN) 137 mcg (0.1 %) nasal spray 2 sprays (274 mcg total) by Nasal route 2 (two) times daily. 90 mL 1    busPIRone (BUSPAR) 15 MG tablet TAKE 1 TABLET BY MOUTH TWICE DAILY 180 tablet 1    calcium carbonate (TUMS) 200 mg calcium (500 mg) chewable tablet Take 1 tablet by mouth once daily.      citalopram (CELEXA) 20 MG tablet Take 1.5 tablets (30 mg total) by mouth once daily. 135 tablet 2    estradioL (ESTRACE) 0.01 % (0.1 mg/gram) vaginal cream Place 1 g vaginally twice a week. 8 g 2    glycopyrrolate (ROBINUL) 1 mg Tab TAKE 1 TABLET(1 MG) BY MOUTH THREE TIMES DAILY 90 tablet 2    levothyroxine (SYNTHROID) 100 MCG tablet Take 1 tablet (100 mcg total) by mouth before breakfast. 90 tablet 1    LORazepam (ATIVAN) 0.5 MG tablet Take 1 tablet (0.5 mg total) by mouth daily as needed for Anxiety. 30 tablet 1    MYRBETRIQ 50 mg Tb24 TAKE 1  TABLET(50 MG) BY MOUTH EVERY DAY 90 tablet 3    neomycin-polymyxin-dexamethasone (DEXACINE) 3.5 mg/g-10,000 unit/g-0.1 % Oint APPLY A SMALL AMOUNT IN BOTH EYES TWICE DAILY      nystatin (MYCOSTATIN) cream Apply topically 2 (two) times daily. 30 g 2    pantoprazole (PROTONIX) 40 MG tablet 1 po daily 90 tablet 0    polyethylene glycol (GLYCOLAX) 17 gram/dose powder Take 17 g by mouth once daily. 510 g 3    rosuvastatin (CRESTOR) 20 MG tablet TAKE 1 TABLET(20 MG) BY MOUTH EVERY DAY 90 tablet 1    sodium chloride (OCEAN) 0.65 % nasal spray 1 spray DAILY (route: nasal)      vit C/E/zinc ox/amy/lut/zeax (ICAPS AREDS2 ORAL) Take by mouth Daily.

## 2025-06-03 NOTE — ED PROVIDER NOTES
Emergency Department Provider Note    Nickie Segura   89 y.o. female   [id number]      6/3/2025       History     This history was obtained from the patient, without limitations, and her sister who is at the bedside.  She presented by ambulance.      She is an 89-year-old with the below past medical history.  She resides at a local assisted living community with her sister and has a sitter at all times.  She presents after her sitter noticed blood in her diaper last night and blood with clots this morning.  The patient has had intermittent burning lower abdominal discomfort for two weeks.  She denies fever, chills, headache, dizziness, chest pain, shortness of breath, nausea, vomiting, diarrhea, and constipation.         Past Medical History:   Diagnosis Date    Allergy     LORNA positive     Anxiety     Bilateral cataracts     Colitis     Colon polyp     COVID-19 09/2022    Depression     Elevated IgE level     Hepatomegaly     Hiatal hernia     Hypothyroidism     IFG (impaired fasting glucose)     Iron deficiency anemia     Macular degeneration     Nicotine dependence     Osteoarthritis     Recurrent UTI     Renal cyst     Skin cancer     Tuberculosis of bladder     Urinary incontinence     Vitamin B12 deficiency     Wheezing       Past Surgical History:   Procedure Laterality Date    CHOLECYSTECTOMY        Family History   Problem Relation Name Age of Onset    Heart disease Mother      Diabetes Mother      Heart disease Father      Polycystic kidney disease Father      Atrial fibrillation Sister        Social History     Socioeconomic History    Marital status: Single   Tobacco Use    Smoking status: Every Day     Types: Vaping with nicotine     Passive exposure: Current    Smokeless tobacco: Never    Tobacco comments:     Formerly smoked cigarettes.   Substance and Sexual Activity    Alcohol use: Never    Drug use: Never    Sexual activity: Not Currently     Social Drivers of Health     Financial Resource Strain:  Low Risk  (6/3/2025)    Overall Financial Resource Strain (CARDIA)     Difficulty of Paying Living Expenses: Not hard at all   Food Insecurity: No Food Insecurity (6/3/2025)    Hunger Vital Sign     Worried About Running Out of Food in the Last Year: Never true     Ran Out of Food in the Last Year: Never true   Transportation Needs: No Transportation Needs (6/3/2025)    PRAPARE - Transportation     Lack of Transportation (Medical): No     Lack of Transportation (Non-Medical): No   Physical Activity: Inactive (5/1/2025)    Exercise Vital Sign     Days of Exercise per Week: 0 days     Minutes of Exercise per Session: 0 min   Stress: Stress Concern Present (6/3/2025)    [employer] - Occupational Stress Questionnaire     Feeling of Stress : To some extent   Housing Stability: Low Risk  (6/3/2025)    Housing Stability Vital Sign     Unable to Pay for Housing in the Last Year: No     Number of Times Moved in the Last Year: 0     Homeless in the Last Year: No      Review of patient's allergies indicates:   Allergen Reactions    Penicillins      rash           Physical Examination     Initial Vitals [06/03/25 1006]   BP Pulse Resp Temp SpO2   (!) 114/58 78 18 98.3 °F (36.8 °C) 96 %      MAP       --           Physical Exam    Nursing note and vitals reviewed.  Constitutional: She is not diaphoretic. No distress.   HENT: Mouth/Throat: Mucous membranes are normal.   Eyes: Conjunctivae are normal. No scleral icterus.   Cardiovascular:  Normal rate, regular rhythm and normal heart sounds.     Exam reveals no gallop and no friction rub.       No murmur heard.  Pulmonary/Chest: No stridor. No respiratory distress. She has no wheezes. She has no rhonchi.   Abdominal: Abdomen is soft. She exhibits no distension and no mass. There is abdominal tenderness in the right lower quadrant, suprapubic area and left lower quadrant. There is no rebound and no guarding.   Genitourinary:    Genitourinary Comments:  Normal labia.  Small amount of blood in the vagina.  Unable to insert speculum due to patient discomfort.       Neurological: She is alert and oriented to person, place, and time. GCS score is 15. GCS eye subscore is 4. GCS verbal subscore is 5. GCS motor subscore is 6.   Skin: Skin is warm and dry. No pallor.            Labs     Labs Reviewed   COMPREHENSIVE METABOLIC PANEL - Abnormal       Result Value    Sodium 139      Potassium 4.3      Chloride 109      CO2 23      Glucose 92      BUN 15      Creatinine 0.9      Calcium 8.4 (*)     Protein Total 6.5      Albumin 2.9 (*)     Bilirubin Total 0.3      ALP 88      AST 78 (*)     ALT 45 (*)     Anion Gap 7 (*)     eGFR >60     URINALYSIS, REFLEX TO URINE CULTURE - Abnormal    Color, UA Red (*)     Appearance, UA Cloudy (*)     pH, UA 7.0      Spec Grav UA >=1.030 (*)     Protein, UA 2+ (*)     Glucose, UA Negative      Ketones, UA Negative      Bilirubin, UA Negative      Blood, UA 3+ (*)     Nitrites, UA Negative      Urobilinogen, UA Negative      Leukocyte Esterase, UA 2+ (*)    CBC WITH DIFFERENTIAL - Abnormal    WBC 5.41      RBC 3.49 (*)     HGB 8.9 (*)     HCT 29.9 (*)     MCV 86      MCH 25.5 (*)     MCHC 29.8 (*)     RDW 19.2 (*)     Platelet Count 231      MPV 9.0 (*)     Nucleated RBC 0      Neut % 70.2      Lymph % 20.9      Mono % 7.0      Eos % 0.9      Basophil % 0.6      Imm Grans % 0.4      Neut # 3.80      Lymph # 1.13      Mono # 0.38      Eos # 0.05      Baso # 0.03      Imm Grans # 0.02     URINALYSIS MICROSCOPIC - Abnormal    RBC, UA >100 (*)     WBC, UA >100 (*)     WBC Clumps, UA Many (*)     Bacteria, UA Many (*)     Hyaline Casts, UA 0      Microscopic Comment       CULTURE, URINE   CBC W/ AUTO DIFFERENTIAL    Narrative:     The following orders were created for panel order CBC auto differential.  Procedure                               Abnormality         Status                     ---------                               -----------          ------                     CBC with Differential[[id number]]       Abnormal            Final result                 Please view results for these tests on the individual orders.   GREY TOP URINE HOLD    Extra Tube Hold for add-ons.          Imaging     Imaging Results               CT Abdomen Pelvis With IV Contrast NO Oral Contrast (Final result)  Result time 06/03/25 12:39:47      Final result by Edmar Max MD (06/03/25 12:39:47)                   Impression:      This report was flagged in Epic as abnormal.    1. Heterogeneous suspected enhancing material within the posterior aspect of the urinary bladder.  Differential would include blood products or sequela of infection however malignancy cannot be excluded.  Correlation with urinalysis recommended, direct visualization as warranted.  2. Mild to moderate right hydroureteronephrosis, noting mild prominence of the left ureter.  This is likely related to obstruction at the level of the bilateral UVJ is a by the above focus.  3. Irregularity of the urinary bladder walls, correlation with direct visualization as warranted.  4. Hepatomegaly noting suspected steatosis, correlation with LFTs recommended.  5. Please see above for several additional findings.      Electronically signed by: Edmar Max MD  Date:    06/03/2025  Time:    12:39               Narrative:    EXAMINATION:  CT ABDOMEN PELVIS WITH IV CONTRAST    CLINICAL HISTORY:  LLQ abdominal pain;RLQ abdominal pain (Age >= 14y);    TECHNIQUE:  Low dose axial images, sagittal and coronal reformations were obtained from the lung bases to the pubic symphysis following the IV administration of 100 mL of Omnipaque 350 .  Oral contrast was not given.    COMPARISON:  Ultrasound 08/09/2022    FINDINGS:  Images of the lower thorax are remarkable for bilateral dependent atelectasis, right greater than left.  There may be rounded atelectasis within the right lower lobe noting trace right pleural  effusion.    Allowing for motion artifact, the liver is somewhat hypoattenuating, possibly reflecting steatosis, correlation with LFTs recommended.  The liver is enlarged.  The spleen and right adrenal gland are grossly unremarkable.  There is subcentimeter nodular thickening of the left adrenal gland.  There is atrophic change of the pancreas without pancreatic ductal dilation.  The gallbladder is surgically absent, no significant biliary dilation status post cholecystectomy.  The stomach is relatively decompressed without wall thickening.  The portal vein, splenic vein, celiac axis and SMA all are patent noting prominent atherosclerotic calcification at the origin of the celiac artery and SMA likely resulting in moderate stenosis.  No significant abdominal lymphadenopathy.    The kidneys enhance symmetrically noting motion artifact limits evaluation of the kidneys.  There is bilateral renal cortical thinning.  Several low attenuating lesions arise from the kidneys, most of which are too small for characterization.  A cyst arises from the interpolar region of the right kidney measuring 3 cm.  There is no left nephrolithiasis or hydronephrosis noting left parapelvic cysts.  The left ureter is somewhat prominent without convincing calculi seen.  There is moderate right hydroureteronephrosis.  There is right nonobstructive nephrolithiasis.  The right ureter is somewhat prominent noting no calculi along its course.  There is heterogeneous soft tissue attenuating material within the posterior aspect of the urinary bladder, particularly at the level of the right and left UVJ.  The urinary bladder is mildly distended noting irregular wall thickening.  The uterus is absent there is a cystic structure within the left adnexa measuring 4.3 cm, nonspecific, possibly left ovarian or paraovarian in origin.  There is presacral edema.    There is moderate to large amount of stool throughout the colon noting several scattered  colonic diverticula.  There is surgical change of partial colectomy.  The small bowel is grossly unremarkable.  There are a few scattered shotty periaortic, pericaval, and mesenteric lymph nodes.  There is atherosclerotic calcification of the aorta and its branches.  There is questionable perianal thickening.  There is a cystic structure at the level of the vaginal labia suggesting possible Bartholin gland cyst.    There is osteopenia.  There are degenerative changes of the bilateral sacroiliac joints, pubic symphysis, and spine.  There is grade 1 anterolisthesis of L4 on L5.  No significant inguinal lymphadenopathy.  There is a fluid containing right inguinal hernia.                                        ED Course     The patient received the following medications:  Medications   sodium chloride 0.9% flush 5 mL (has no administration in time range)   melatonin tablet 6 mg (6 mg Oral Given 6/3/25 2213)   ondansetron injection 4 mg (has no administration in time range)   polyethylene glycol packet 17 g (has no administration in time range)   senna-docusate 8.6-50 mg per tablet 1 tablet (1 tablet Oral Given 6/4/25 0935)   naloxone 0.4 mg/mL injection 0.02 mg (has no administration in time range)   glucose chewable tablet 16 g (has no administration in time range)   glucose chewable tablet 24 g (has no administration in time range)   dextrose 50% injection 12.5 g (has no administration in time range)   dextrose 50% injection 25 g (has no administration in time range)   glucagon (human recombinant) injection 1 mg (has no administration in time range)   busPIRone tablet 15 mg (15 mg Oral Given 6/4/25 0935)   citalopram tablet 20 mg (20 mg Oral Given 6/4/25 0935)   levothyroxine tablet 100 mcg (100 mcg Oral Given 6/4/25 0523)   LORazepam tablet 0.5 mg (0.5 mg Oral Given 6/4/25 1022)   glycopyrrolate tablet 1 mg (1 mg Oral Given 6/4/25 1222)   sucralfate 100 mg/mL suspension 1 g (1 g Oral Given 6/4/25 1222)    aluminum-magnesium hydroxide-simethicone 200-200-20 mg/5 mL suspension 30 mL (30 mLs Oral Given 6/4/25 0935)   ciprofloxacin (CIPRO)400mg/200ml D5W IVPB 400 mg (0 mg Intravenous Stopped 6/4/25 0624)   nicotine 14 mg/24 hr 1 patch (1 patch Transdermal Patch Applied 6/4/25 0941)   0.9%  NaCl infusion (for blood administration) (has no administration in time range)   ferric gluconate (Ferrlecit) 125 mg in 0.9% NaCl 110 mL IVPB (has no administration in time range)   acetaminophen tablet 650 mg (650 mg Oral Given 6/3/25 1141)   iohexoL (OMNIPAQUE 350) injection 100 mL (100 mLs Intravenous Given 6/3/25 1200)   ciprofloxacin (CIPRO)400mg/200ml D5W IVPB 400 mg (0 mg Intravenous Stopped 6/3/25 1736)       Procedures    ED Course as of 06/04/25 1306   Tue Jun 03, 2025   1436 Just called and discussed the patient's presentation, exam, and workup with Dr. Benitez who has requested that I attempt to place the Eason catheter. [LP]      ED Course User Index  [LP] Bill Herrera III, MD        Medical Decision Making                 Medical Decision Making  Differential diagnoses included malignancy, acute cystitis, ureterolithiasis, pyelonephritis, and other conditions.  The patient did not appear to be in distress.  She had mild lower abdominal tenderness.  She was unable to void so Eason catheter was placed with return of grossly bloody urine.  CT of the abdomen and pelvis was performed and showed findings consistent with cystitis, obstruction, and hematuria.    Amount and/or Complexity of Data Reviewed  Labs: ordered.  Radiology: ordered.    Risk  OTC drugs.  Prescription drug management.              Diagnoses       ICD-10-CM ICD-9-CM   1. Gross hematuria  R31.0 599.71   2. Urinary tract infection with hematuria, site unspecified  N39.0 599.0         Dispostion     Patient signed out to Dr. Diaz at shift change pending urinalysis results and assessment by Urology.      Bill Herrera III, MD  06/04/25 8898

## 2025-06-03 NOTE — HPI
Patient is a 89 yo female w/ PMHx of anxiety,hypothyroidism, catarcts, ostoarthritis, recurrent UTI presenting today with from assisted living community with chief complaint of bloody diaper. Staff noticed blood in her diaper last night with clots, prompting ER visit. Prior to this, she had endorsed intermittent burning lower abodminal discomfort for two weeks, though to be anxiety related, however, due to symptoms above, she was transferred to the ED for further evaluation.    In the ED, initial vital signs /58, HR 78, Temp 98.3, SpO2 96% on room air. Labs include CBC with stable H/H 8.9/29.9 and WBC within normal limits 5.41. CMP without electrolyte abnormalities and BUN/Cr 15/0.9 (baseline Cr 0.9). UA notable for >100 RBCs, >100 WBCs, 2+ leukocyte Esterase. CT abdomen pelvis w/ Mild to moderate right hydroureteronephrosis, noting mild prominence of the left ureter as well as heterogeneous suspected enhancing material within the posterior aspect of the urinary bladder. Started on ciprofloxacin empirically for possible bacterial UTI.

## 2025-06-03 NOTE — SUBJECTIVE & OBJECTIVE
Past Medical History:   Diagnosis Date    Allergy     LORNA positive     Anxiety     Bilateral cataracts     Colitis     Colon polyp     COVID-19 09/2022    Depression     Elevated IgE level     Hepatomegaly     Hiatal hernia     Hypothyroidism     IFG (impaired fasting glucose)     Iron deficiency anemia     Macular degeneration     Nicotine dependence     Osteoarthritis     Recurrent UTI     Renal cyst     Skin cancer     Tuberculosis of bladder     Urinary incontinence     Vitamin B12 deficiency     Wheezing        Past Surgical History:   Procedure Laterality Date    CHOLECYSTECTOMY         Review of patient's allergies indicates:   Allergen Reactions    Penicillins      rash       No current facility-administered medications on file prior to encounter.     Current Outpatient Medications on File Prior to Encounter   Medication Sig    Al hyd-Mg tr-alg ac-sod bicarb (GAVISCON) 80-14.2 mg Chew Take 1 tablet by mouth once daily.    albuterol (VENTOLIN HFA) 90 mcg/actuation inhaler Inhale 1-2 puffs into the lungs every 6 (six) hours as needed for Wheezing or Shortness of Breath. Rescue    aspirin (ECOTRIN) 81 MG EC tablet Take 81 mg by mouth once daily.    azelastine (ASTELIN) 137 mcg (0.1 %) nasal spray 2 sprays (274 mcg total) by Nasal route 2 (two) times daily.    busPIRone (BUSPAR) 15 MG tablet TAKE 1 TABLET BY MOUTH TWICE DAILY    calcium carbonate (TUMS) 200 mg calcium (500 mg) chewable tablet Take 1 tablet by mouth once daily.    citalopram (CELEXA) 20 MG tablet Take 1.5 tablets (30 mg total) by mouth once daily.    estradioL (ESTRACE) 0.01 % (0.1 mg/gram) vaginal cream Place 1 g vaginally twice a week.    glycopyrrolate (ROBINUL) 1 mg Tab TAKE 1 TABLET(1 MG) BY MOUTH THREE TIMES DAILY    levothyroxine (SYNTHROID) 100 MCG tablet Take 1 tablet (100 mcg total) by mouth before breakfast.    LORazepam (ATIVAN) 0.5 MG tablet Take 1 tablet (0.5 mg total) by mouth daily as needed for Anxiety.    MYRBETRIQ 50 mg Tb24  TAKE 1 TABLET(50 MG) BY MOUTH EVERY DAY    neomycin-polymyxin-dexamethasone (DEXACINE) 3.5 mg/g-10,000 unit/g-0.1 % Oint APPLY A SMALL AMOUNT IN BOTH EYES TWICE DAILY    nystatin (MYCOSTATIN) cream Apply topically 2 (two) times daily.    pantoprazole (PROTONIX) 40 MG tablet 1 po daily    polyethylene glycol (GLYCOLAX) 17 gram/dose powder Take 17 g by mouth once daily.    rosuvastatin (CRESTOR) 20 MG tablet TAKE 1 TABLET(20 MG) BY MOUTH EVERY DAY    sodium chloride (OCEAN) 0.65 % nasal spray 1 spray DAILY (route: nasal)    vit C/E/zinc ox/amy/lut/zeax (ICAPS AREDS2 ORAL) Take by mouth Daily.     Family History       Problem Relation (Age of Onset)    Atrial fibrillation Sister    Diabetes Mother    Heart disease Mother, Father    Polycystic kidney disease Father          Tobacco Use    Smoking status: Every Day     Types: Vaping with nicotine     Passive exposure: Current    Smokeless tobacco: Never    Tobacco comments:     Formerly smoked cigarettes.   Substance and Sexual Activity    Alcohol use: Never    Drug use: Never    Sexual activity: Not Currently     Review of Systems   Constitutional: Negative.    Eyes: Negative.    Respiratory: Negative.     Cardiovascular: Negative.    Gastrointestinal:  Positive for abdominal pain.   Endocrine: Negative.    Genitourinary:  Positive for difficulty urinating, dysuria and hematuria.   Musculoskeletal:  Positive for gait problem.   Skin:  Positive for pallor.   Allergic/Immunologic: Negative.    Psychiatric/Behavioral: Negative.       Objective:     Vital Signs (Most Recent):  Temp: 98.3 °F (36.8 °C) (06/03/25 1006)  Pulse: 74 (06/03/25 1110)  Resp: (!) 24 (06/03/25 1110)  BP: (!) 163/70 (06/03/25 1200)  SpO2: 96 % (06/03/25 1110) Vital Signs (24h Range):  Temp:  [98.3 °F (36.8 °C)] 98.3 °F (36.8 °C)  Pulse:  [74-78] 74  Resp:  [18-24] 24  SpO2:  [96 %-97 %] 96 %  BP: (102-163)/(51-70) 163/70     Weight: 90.7 kg (200 lb)  Body mass index is 39.06 kg/m².     Physical  Exam  Vitals and nursing note reviewed. Exam conducted with a chaperone present.   Constitutional:       Appearance: Normal appearance.   HENT:      Head: Normocephalic and atraumatic.   Eyes:      Conjunctiva/sclera: Conjunctivae normal.   Cardiovascular:      Rate and Rhythm: Normal rate and regular rhythm.   Pulmonary:      Effort: Pulmonary effort is normal.      Breath sounds: Normal breath sounds.   Abdominal:      General: Bowel sounds are normal.      Palpations: Abdomen is soft.   Musculoskeletal:         General: Normal range of motion.      Cervical back: Normal range of motion and neck supple.   Skin:     General: Skin is warm.      Capillary Refill: Capillary refill takes less than 2 seconds.   Neurological:      General: No focal deficit present.      Mental Status: She is alert and oriented to person, place, and time.   Psychiatric:         Mood and Affect: Mood normal.         Behavior: Behavior normal.                Significant Labs: All pertinent labs within the past 24 hours have been reviewed.    Significant Imaging: I have reviewed all pertinent imaging results/findings within the past 24 hours.

## 2025-06-03 NOTE — ED NOTES
Patient seen in room, sister at bedside. Patient reports bleeding was a little yesterday and has increased this morning. Patient's brief filled with red blood, and clots. Patient has hearing aids in and is [place] in both ears, and also reports macular generation and inability to see. Patient's Alert and orientated. Reports pain 2/10 in abdomen and 4/10 in vagina.

## 2025-06-03 NOTE — ASSESSMENT & PLAN NOTE
Most recent hemoglobin and hematocrit are listed below. Patient had iron infusion monthly.   Recent Labs     06/03/25  1048   HGB 8.9*   HCT 29.9*     Plan  - Monitor serial CBC: Every 8 hours  - Transfuse PRBC if patient becomes hemodynamically unstable, symptomatic or H/H drops below 7/21.  - Patient has not received any PRBC transfusions to date  - Patient's anemia is currently stable

## 2025-06-03 NOTE — PHARMACY MED REC
"  [facility]           Pharmacy  Admission Medication History     The home medication history was taken by Adriane Trejo.      Medication history obtained from Medications listed below were obtained from: Patient/family    Based on information gathered for medication list, you may go to "Admission" then "Reconcile Home Medications" tabs to review and/or act upon those items.     The home medication list has been updated by the Pharmacy department.   Please read ALL comments highlighted in yellow.   Please address this information as you see fit.    Feel free to contact us if you have any questions or require assistance.    The medications listed below were removed from the home medication list.  Please reorder if appropriate:    Patient reports NOT TAKING the following medication(s):  Gaviscon  Ventolin hfa  Astelin  Estrace vag cream  Dexacine oint  Mycostatin cream  Ocean nasal      No current facility-administered medications on file prior to encounter.     Current Outpatient Medications on File Prior to Encounter   Medication Sig Dispense Refill    ascorbic acid, vitamin C, (VITAMIN C) 500 MG tablet Take 500 mg by mouth once daily.      aspirin (ECOTRIN) 81 MG EC tablet Take 81 mg by mouth every morning.      busPIRone (BUSPAR) 15 MG tablet TAKE 1 TABLET BY MOUTH TWICE DAILY 180 tablet 1    citalopram (CELEXA) 20 MG tablet Take 1.5 tablets (30 mg total) by mouth once daily. 135 tablet 2    glycopyrrolate (ROBINUL) 1 mg Tab TAKE 1 TABLET(1 MG) BY MOUTH THREE TIMES DAILY (Patient taking differently: Take 1 mg by mouth every morning.) 90 tablet 2    levothyroxine (SYNTHROID) 100 MCG tablet Take 1 tablet (100 mcg total) by mouth before breakfast. 90 tablet 1    LORazepam (ATIVAN) 0.5 MG tablet Take 1 tablet (0.5 mg total) by mouth daily as needed for Anxiety. 30 tablet 1    MYRBETRIQ 50 mg Tb24 TAKE 1 TABLET(50 MG) BY MOUTH EVERY DAY (Patient taking differently: Take 50 mg by mouth every " evening.) 90 tablet 3    pantoprazole (PROTONIX) 40 MG tablet 1 po daily (Patient taking differently: Take 40 mg by mouth once daily.) 90 tablet 0    polyethylene glycol (GLYCOLAX) 17 gram/dose powder Take 17 g by mouth once daily. (Patient taking differently: Take 17 g by mouth every Mon, Wed, Fri.) 510 g 3    rosuvastatin (CRESTOR) 20 MG tablet TAKE 1 TABLET(20 MG) BY MOUTH EVERY DAY (Patient taking differently: Take 20 mg by mouth every evening.) 90 tablet 1    vit C/E/zinc ox/amy/lut/zeax (ICAPS AREDS2 ORAL) Take 1 capsule by mouth 2 (two) times a day.      vitamin D (VITAMIN D3) 1000 units Tab Take 1,000 Units by mouth once daily.         Please address this information as you see fit.  Feel free to contact us if you have any questions or require assistance.    Adriane Trejo  [phone]                .

## 2025-06-03 NOTE — PROVIDER PROGRESS NOTES - EMERGENCY DEPT.
Encounter Date: 6/3/2025    ED Physician Progress Notes          Patient is an 89-year-old female presenting for hematuria.  Patient has had frequent UTIs recently.  Patient handed off to me by previous physician pending UA.  UA noted.  Discussed with Urology.    Physical exam re-evaluation: Well-appearing 89-year-old female, no distress, appropriately conversational.  Benign cardiac, respiratory, abdominal exam.  Eason catheter appears to continue to put out blood clots.    Plan:  We will admit for continued observation and irrigation of bladder as needed to prevent clogging of folate.  Discussed with urology and Hospital Medicine and they agree.  Family also agrees.

## 2025-06-04 ENCOUNTER — CLINICAL SUPPORT (OUTPATIENT)
Dept: SMOKING CESSATION | Facility: CLINIC | Age: 89
End: 2025-06-04

## 2025-06-04 ENCOUNTER — TELEPHONE (OUTPATIENT)
Dept: HEMATOLOGY/ONCOLOGY | Facility: CLINIC | Age: 89
End: 2025-06-04
Payer: MEDICARE

## 2025-06-04 DIAGNOSIS — F17.210 CIGARETTE SMOKER: Primary | ICD-10-CM

## 2025-06-04 LAB
ABO + RH BLD: NORMAL
ABSOLUTE EOSINOPHIL (OHS): 0.1 K/UL
ABSOLUTE EOSINOPHIL (OHS): 0.13 K/UL
ABSOLUTE MONOCYTE (OHS): 0.46 K/UL (ref 0.3–1)
ABSOLUTE MONOCYTE (OHS): 0.58 K/UL (ref 0.3–1)
ABSOLUTE NEUTROPHIL COUNT (OHS): 3.38 K/UL (ref 1.8–7.7)
ABSOLUTE NEUTROPHIL COUNT (OHS): 3.69 K/UL (ref 1.8–7.7)
ALBUMIN SERPL BCP-MCNC: 2.5 G/DL (ref 3.5–5.2)
ALP SERPL-CCNC: 83 UNIT/L (ref 40–150)
ALT SERPL W/O P-5'-P-CCNC: 37 UNIT/L (ref 10–44)
ANION GAP (OHS): 7 MMOL/L (ref 8–16)
AST SERPL-CCNC: 63 UNIT/L (ref 11–45)
BASOPHILS # BLD AUTO: 0.03 K/UL
BASOPHILS # BLD AUTO: 0.03 K/UL
BASOPHILS NFR BLD AUTO: 0.5 %
BASOPHILS NFR BLD AUTO: 0.6 %
BILIRUB SERPL-MCNC: 0.3 MG/DL (ref 0.1–1)
BLD PROD TYP BPU: NORMAL
BLOOD UNIT EXPIRATION DATE: NORMAL
BLOOD UNIT TYPE CODE: 1700
BUN SERPL-MCNC: 15 MG/DL (ref 8–23)
CALCIUM SERPL-MCNC: 8 MG/DL (ref 8.7–10.5)
CHLORIDE SERPL-SCNC: 108 MMOL/L (ref 95–110)
CO2 SERPL-SCNC: 21 MMOL/L (ref 23–29)
CREAT SERPL-MCNC: 0.8 MG/DL (ref 0.5–1.4)
CROSSMATCH INTERPRETATION: NORMAL
DISPENSE STATUS: NORMAL
ERYTHROCYTE [DISTWIDTH] IN BLOOD BY AUTOMATED COUNT: 18.2 % (ref 11.5–14.5)
ERYTHROCYTE [DISTWIDTH] IN BLOOD BY AUTOMATED COUNT: 19.5 % (ref 11.5–14.5)
FERRITIN SERPL-MCNC: 77.8 NG/ML (ref 20–300)
GFR SERPLBLD CREATININE-BSD FMLA CKD-EPI: >60 ML/MIN/1.73/M2
GLUCOSE SERPL-MCNC: 103 MG/DL (ref 70–110)
HCT VFR BLD AUTO: 24.4 % (ref 37–48.5)
HCT VFR BLD AUTO: 27.4 % (ref 37–48.5)
HGB BLD-MCNC: 7.2 GM/DL (ref 12–16)
HGB BLD-MCNC: 8.6 GM/DL (ref 12–16)
IMM GRANULOCYTES # BLD AUTO: 0.02 K/UL (ref 0–0.04)
IMM GRANULOCYTES # BLD AUTO: 0.02 K/UL (ref 0–0.04)
IMM GRANULOCYTES NFR BLD AUTO: 0.4 % (ref 0–0.5)
IMM GRANULOCYTES NFR BLD AUTO: 0.4 % (ref 0–0.5)
INDIRECT COOMBS: NORMAL
LYMPHOCYTES # BLD AUTO: 1.07 K/UL (ref 1–4.8)
LYMPHOCYTES # BLD AUTO: 1.49 K/UL (ref 1–4.8)
MAGNESIUM SERPL-MCNC: 2.3 MG/DL (ref 1.6–2.6)
MCH RBC QN AUTO: 25.5 PG (ref 27–31)
MCH RBC QN AUTO: 26.7 PG (ref 27–31)
MCHC RBC AUTO-ENTMCNC: 29.5 G/DL (ref 32–36)
MCHC RBC AUTO-ENTMCNC: 31.4 G/DL (ref 32–36)
MCV RBC AUTO: 85 FL (ref 82–98)
MCV RBC AUTO: 87 FL (ref 82–98)
NUCLEATED RBC (/100WBC) (OHS): 0 /100 WBC
NUCLEATED RBC (/100WBC) (OHS): 0 /100 WBC
PHOSPHATE SERPL-MCNC: 3.2 MG/DL (ref 2.7–4.5)
PLATELET # BLD AUTO: 184 K/UL (ref 150–450)
PLATELET # BLD AUTO: 201 K/UL (ref 150–450)
PMV BLD AUTO: 9 FL (ref 9.2–12.9)
PMV BLD AUTO: 9.1 FL (ref 9.2–12.9)
POTASSIUM SERPL-SCNC: 4.5 MMOL/L (ref 3.5–5.1)
PROT SERPL-MCNC: 5.7 GM/DL (ref 6–8.4)
RBC # BLD AUTO: 2.82 M/UL (ref 4–5.4)
RBC # BLD AUTO: 3.22 M/UL (ref 4–5.4)
RELATIVE EOSINOPHIL (OHS): 1.9 %
RELATIVE EOSINOPHIL (OHS): 2.3 %
RELATIVE LYMPHOCYTE (OHS): 19.9 % (ref 18–48)
RELATIVE LYMPHOCYTE (OHS): 26.5 % (ref 18–48)
RELATIVE MONOCYTE (OHS): 10.3 % (ref 4–15)
RELATIVE MONOCYTE (OHS): 8.6 % (ref 4–15)
RELATIVE NEUTROPHIL (OHS): 60 % (ref 38–73)
RELATIVE NEUTROPHIL (OHS): 68.6 % (ref 38–73)
RH BLD: NORMAL
SODIUM SERPL-SCNC: 136 MMOL/L (ref 136–145)
SPECIMEN OUTDATE: NORMAL
UNIT NUMBER: NORMAL
WBC # BLD AUTO: 5.37 K/UL (ref 3.9–12.7)
WBC # BLD AUTO: 5.63 K/UL (ref 3.9–12.7)

## 2025-06-04 PROCEDURE — 30233N1 TRANSFUSION OF NONAUTOLOGOUS RED BLOOD CELLS INTO PERIPHERAL VEIN, PERCUTANEOUS APPROACH: ICD-10-PCS | Performed by: HOSPITALIST

## 2025-06-04 PROCEDURE — 83735 ASSAY OF MAGNESIUM: CPT

## 2025-06-04 PROCEDURE — 86900 BLOOD TYPING SEROLOGIC ABO: CPT | Mod: HCNC

## 2025-06-04 PROCEDURE — 11000001 HC ACUTE MED/SURG PRIVATE ROOM: Mod: HCNC

## 2025-06-04 PROCEDURE — 80053 COMPREHEN METABOLIC PANEL: CPT

## 2025-06-04 PROCEDURE — 25000003 PHARM REV CODE 250

## 2025-06-04 PROCEDURE — 36415 COLL VENOUS BLD VENIPUNCTURE: CPT

## 2025-06-04 PROCEDURE — 36415 COLL VENOUS BLD VENIPUNCTURE: CPT | Mod: HCNC | Performed by: HOSPITALIST

## 2025-06-04 PROCEDURE — 84100 ASSAY OF PHOSPHORUS: CPT

## 2025-06-04 PROCEDURE — 25000003 PHARM REV CODE 250: Mod: HCNC | Performed by: INTERNAL MEDICINE

## 2025-06-04 PROCEDURE — P9016 RBC LEUKOCYTES REDUCED: HCPCS | Mod: HCNC

## 2025-06-04 PROCEDURE — S4991 NICOTINE PATCH NONLEGEND: HCPCS

## 2025-06-04 PROCEDURE — 82728 ASSAY OF FERRITIN: CPT | Performed by: INTERNAL MEDICINE

## 2025-06-04 PROCEDURE — 85025 COMPLETE CBC W/AUTO DIFF WBC: CPT | Mod: HCNC | Performed by: HOSPITALIST

## 2025-06-04 PROCEDURE — 63600175 PHARM REV CODE 636 W HCPCS

## 2025-06-04 PROCEDURE — 63600175 PHARM REV CODE 636 W HCPCS: Mod: HCNC | Performed by: INTERNAL MEDICINE

## 2025-06-04 PROCEDURE — 36430 TRANSFUSION BLD/BLD COMPNT: CPT | Mod: HCNC

## 2025-06-04 PROCEDURE — 96366 THER/PROPH/DIAG IV INF ADDON: CPT

## 2025-06-04 PROCEDURE — 85025 COMPLETE CBC W/AUTO DIFF WBC: CPT

## 2025-06-04 PROCEDURE — 86922 COMPATIBILITY TEST ANTIGLOB: CPT | Mod: HCNC

## 2025-06-04 RX ORDER — HYDROCODONE BITARTRATE AND ACETAMINOPHEN 500; 5 MG/1; MG/1
TABLET ORAL
Status: DISCONTINUED | OUTPATIENT
Start: 2025-06-04 | End: 2025-06-05 | Stop reason: HOSPADM

## 2025-06-04 RX ORDER — ACETAMINOPHEN 325 MG/1
650 TABLET ORAL EVERY 6 HOURS PRN
Status: DISCONTINUED | OUTPATIENT
Start: 2025-06-04 | End: 2025-06-05 | Stop reason: HOSPADM

## 2025-06-04 RX ADMIN — Medication 6 MG: at 08:06

## 2025-06-04 RX ADMIN — ALUMINUM HYDROXIDE, MAGNESIUM HYDROXIDE, AND SIMETHICONE 30 ML: 200; 200; 20 SUSPENSION ORAL at 09:06

## 2025-06-04 RX ADMIN — CIPROFLOXACIN 400 MG: 2 INJECTION, SOLUTION INTRAVENOUS at 07:06

## 2025-06-04 RX ADMIN — SENNOSIDES AND DOCUSATE SODIUM 1 TABLET: 50; 8.6 TABLET ORAL at 09:06

## 2025-06-04 RX ADMIN — GLYCOPYRROLATE 1 MG: 1 TABLET ORAL at 12:06

## 2025-06-04 RX ADMIN — SUCRALFATE 1 G: 1 SUSPENSION ORAL at 12:06

## 2025-06-04 RX ADMIN — BUSPIRONE HYDROCHLORIDE 15 MG: 5 TABLET ORAL at 09:06

## 2025-06-04 RX ADMIN — ALUMINUM HYDROXIDE, MAGNESIUM HYDROXIDE, AND SIMETHICONE 30 ML: 200; 200; 20 SUSPENSION ORAL at 06:06

## 2025-06-04 RX ADMIN — ACETAMINOPHEN 650 MG: 325 TABLET ORAL at 01:06

## 2025-06-04 RX ADMIN — SENNOSIDES AND DOCUSATE SODIUM 1 TABLET: 50; 8.6 TABLET ORAL at 08:06

## 2025-06-04 RX ADMIN — LORAZEPAM 0.5 MG: 0.5 TABLET ORAL at 08:06

## 2025-06-04 RX ADMIN — ALUMINUM HYDROXIDE, MAGNESIUM HYDROXIDE, AND SIMETHICONE 30 ML: 200; 200; 20 SUSPENSION ORAL at 08:06

## 2025-06-04 RX ADMIN — CIPROFLOXACIN 400 MG: 2 INJECTION, SOLUTION INTRAVENOUS at 05:06

## 2025-06-04 RX ADMIN — SUCRALFATE 1 G: 1 SUSPENSION ORAL at 11:06

## 2025-06-04 RX ADMIN — SODIUM CHLORIDE 125 MG: 9 INJECTION, SOLUTION INTRAVENOUS at 06:06

## 2025-06-04 RX ADMIN — ALUMINUM HYDROXIDE, MAGNESIUM HYDROXIDE, AND SIMETHICONE 30 ML: 200; 200; 20 SUSPENSION ORAL at 05:06

## 2025-06-04 RX ADMIN — CITALOPRAM HYDROBROMIDE 20 MG: 20 TABLET ORAL at 09:06

## 2025-06-04 RX ADMIN — SUCRALFATE 1 G: 1 SUSPENSION ORAL at 05:06

## 2025-06-04 RX ADMIN — LORAZEPAM 0.5 MG: 0.5 TABLET ORAL at 10:06

## 2025-06-04 RX ADMIN — LEVOTHYROXINE SODIUM 100 MCG: 100 TABLET ORAL at 05:06

## 2025-06-04 RX ADMIN — NICOTINE 1 PATCH: 14 PATCH, EXTENDED RELEASE TRANSDERMAL at 09:06

## 2025-06-04 RX ADMIN — SUCRALFATE 1 G: 1 SUSPENSION ORAL at 06:06

## 2025-06-04 RX ADMIN — BUSPIRONE HYDROCHLORIDE 15 MG: 5 TABLET ORAL at 08:06

## 2025-06-04 NOTE — PROGRESS NOTES
[facility] Medicine  Progress Note    Patient Name: Nickie Segura  MRN: [id number]  Patient Class: OP- Observation   Admission Date: 6/3/2025  Length of Stay: 0 days  Attending Physician: Carlos Alberto Bustamante MD  Primary Care Provider: Perla Hopper MD    Subjective     Principal Problem:Urinary tract infection with hematuria      HPI:  Patient is a 89 yo female w/ PMHx of anxiety,hypothyroidism, catarcts, ostoarthritis, recurrent UTI presenting today with from assisted living community with chief complaint of bloody diaper. Staff noticed blood in her diaper last night with clots, prompting ER visit. Prior to this, she had endorsed intermittent burning lower abodminal discomfort for two weeks, though to be anxiety related, however, due to symptoms above, she was transferred to the ED for further evaluation.    In the ED, initial vital signs /58, HR 78, Temp 98.3, SpO2 96% on room air. Labs include CBC with stable H/H 8.9/29.9 and WBC within normal limits 5.41. CMP without electrolyte abnormalities and BUN/Cr 15/0.9 (baseline Cr 0.9). UA notable for >100 RBCs, >100 WBCs, 2+ leukocyte Esterase. CT abdomen pelvis w/ Mild to moderate right hydroureteronephrosis, noting mild prominence of the left ureter as well as heterogeneous suspected enhancing material within the posterior aspect of the urinary bladder. Started on ciprofloxacin empirically for possible bacterial UTI.    Overview/Hospital Course:  No notes on file    Interval History: Hgb down from 8.9-->7.2. Denies any acute complaints or issues. Type and screen pending. Consented this morning for blood transfusion per Urology recommendations to transfuse.    Review of Systems   Constitutional: Negative.    Eyes: Negative.    Respiratory: Negative.     Cardiovascular: Negative.    Gastrointestinal:  Positive for abdominal pain.   Endocrine: Negative.    Genitourinary:  Positive for difficulty urinating, dysuria and hematuria.    Musculoskeletal:  Positive for gait problem.   Allergic/Immunologic: Negative.    Psychiatric/Behavioral: Negative.       Objective:     Vital Signs (Most Recent):  Temp: 97.8 °F (36.6 °C) (06/04/25 0329)  Pulse: 64 (06/04/25 0741)  Resp: 18 (06/04/25 0741)  BP: (!) 114/56 (06/04/25 0741)  SpO2: 98 % (06/04/25 0741) Vital Signs (24h Range):  Temp:  [97.6 °F (36.4 °C)-98.7 °F (37.1 °C)] 97.8 °F (36.6 °C)  Pulse:  [62-74] 64  Resp:  [16-24] 18  SpO2:  [95 %-98 %] 98 %  BP: (100-163)/(55-86) 114/56     Weight: 90.7 kg (200 lb)  Body mass index is 39.06 kg/m².    Intake/Output Summary (Last 24 hours) at 6/4/2025 1054  Last data filed at 6/3/2025 2049  Gross per 24 hour   Intake --   Output 850 ml   Net -850 ml         Physical Exam  Vitals and nursing note reviewed. Exam conducted with a chaperone present.   Constitutional:       Appearance: Normal appearance.   HENT:      Head: Normocephalic and atraumatic.   Eyes:      Conjunctiva/sclera: Conjunctivae normal.   Cardiovascular:      Rate and Rhythm: Normal rate and regular rhythm.   Pulmonary:      Effort: Pulmonary effort is normal.      Breath sounds: Normal breath sounds.   Abdominal:      General: Bowel sounds are normal.      Palpations: Abdomen is soft.   Musculoskeletal:         General: Normal range of motion.      Cervical back: Normal range of motion and neck supple.   Skin:     General: Skin is warm.      Capillary Refill: Capillary refill takes less than 2 seconds.   Neurological:      General: No focal deficit present.      Mental Status: She is alert and oriented to person, place, and time.   Psychiatric:         Mood and Affect: Mood normal.         Behavior: Behavior normal.               Significant Labs: All pertinent labs within the past 24 hours have been reviewed.  CBC:   Recent Labs   Lab 06/03/25  1048 06/04/25  0543   WBC 5.41 5.37   HGB 8.9* 7.2*   HCT 29.9* 24.4*    201     CMP:   Recent Labs   Lab 06/03/25  1048 06/04/25  0543   NA  139 136   K 4.3 4.5    108   CO2 23 21*   GLU 92 103   BUN 15 15   CREATININE 0.9 0.8   CALCIUM 8.4* 8.0*   PROT 6.5 5.7*   ALBUMIN 2.9* 2.5*   BILITOT 0.3 0.3   ALKPHOS 88 83   AST 78* 63*   ALT 45* 37   ANIONGAP 7* 7*       Significant Imaging: I have reviewed all pertinent imaging results/findings within the past 24 hours.      Assessment & Plan  Urinary tract infection with hematuria  Patient with  PMHx of recurrent UTI.  Staff noticed blood in her diaper last night with clots, prompting ER visit. Prior to this, she had endorsed intermittent burning lower abodminal discomfort for two weeks.    CT  abdomen pelvis w/ Mild to moderate right hydroureteronephrosis, noting mild prominence of the left ureter as well as heterogeneous suspected enhancing material within the posterior aspect of the urinary bladder.    Plan  -Urology consulted, appreciate recs  - Guerrier replacement today for larger guerrier 2/2 clots  - Started on ciprofloxacin empirically   - Will transfuse 1u today for Hgb 8.9-->7.2  Iron deficiency anemia   Most recent hemoglobin and hematocrit are listed below. Patient had iron infusion monthly.   Recent Labs     06/03/25  1048 06/04/25  0543   HGB 8.9* 7.2*   HCT 29.9* 24.4*     Plan  - Monitor serial CBC: Daily  - Patient has not received any PRBC transfusions to date  - Patient's anemia is currently worsening. Will transfuse 1u given drop from 8.9 to 7.2 this AM.  Anxiety  Patient has a PMHX of anxiety     Plan  Continue current medications  VTE Risk Mitigation (From admission, onward)           Ordered     Reason for No Pharmacological VTE Prophylaxis  Once        Question:  Reasons:  Answer:  Active Bleeding    06/03/25 1701     IP VTE HIGH RISK PATIENT  Once         06/03/25 1701     Place sequential compression device  Until discontinued         06/03/25 1701                    Discharge Planning   CEM: 6/5/2025     Code Status: DNR   Medical Readiness for Discharge Date:                 Solo Ibarra MD  Department of Hospital Medicine   [facility]

## 2025-06-04 NOTE — ASSESSMENT & PLAN NOTE
-Continue abx  -F/u culture  -Plan to upsize small caliber catheter today  -Nurses to irrigate prn  -Recommend hgb transfusion. 7.2 from 8.9

## 2025-06-04 NOTE — PLAN OF CARE
MOON Message     Medicare Outpatient and Observation Notification regarding financial responsibility Other (comments); Explained to patient/caregiverMedicare Outpatient and Observation Notification regarding financial responsibility. Other (comments); Explained to patient/caregiver. Has comment. Taken on 6/4/25 1056   Date ROQUE was signed 6/4/2025   Time ROQUE was signed 1015

## 2025-06-04 NOTE — PLAN OF CARE
Pt vss, breathing equal/unlabored on room air. Family at bedside. Pt has hx [place] with hearing aid in use. Hx macular degeneration with vision loss to both eyes. Pt needing help with feeding and ADL's. Currently living in independent living with family member, having staff transfer pt from bed or chair to commode with 2 person assist q 5 times a day. Due to increased weakness pt bed bound and unable to stand with assist. Eason placed in ED due to dark red urine and diagnosed with UTI. Pt able to swallow pills without difficulty.

## 2025-06-04 NOTE — PROGRESS NOTES
[facility]  Urology  Progress Note    Patient Name: Nickie Segura  MRN: [id number]  Admission Date: 6/3/2025  Hospital Length of Stay: 0 days  Code Status: DNR   Attending Provider: Carlos Alberto Bustamante MD   Primary Care Physician: Perla Hopper MD    Subjective:     HPI:  No notes on file    Interval History: Catheter poorly draining overnight. Irrigated multiple times. Plan to upsize catheter if not draining appropriately at lunch.    Hgb 7.2 from 8.9    Review of Systems  Objective:     Temp:  [97.6 °F (36.4 °C)-98.7 °F (37.1 °C)] 97.8 °F (36.6 °C)  Pulse:  [62-78] 73  Resp:  [16-24] 18  SpO2:  [95 %-98 %] 98 %  BP: (100-163)/(51-86) 118/59     Body mass index is 39.06 kg/m².           Drains       Drain  Duration                  Urethral Catheter 06/03/25 1449 Straight-tip <1 day                     Physical Exam      Significant Labs:    BMP:  Recent Labs   Lab 06/03/25  1048      K 4.3      CO2 23   BUN 15   CREATININE 0.9   CALCIUM 8.4*       CBC:   Recent Labs   Lab 06/03/25  1048 06/04/25  0543   WBC 5.41 5.37   HGB 8.9* 7.2*   HCT 29.9* 24.4*    201       All pertinent labs results from the past 24 hours have been reviewed.    Significant Imaging:  All pertinent imaging results/findings from the past 24 hours have been reviewed.                  Assessment/Plan:     * Urinary tract infection with hematuria  -Continue abx  -F/u culture  -Plan to upsize small caliber catheter today  -Nurses to irrigate prn  -Recommend hgb transfusion. 7.2 from 8.9        VTE Risk Mitigation (From admission, onward)           Ordered     Reason for No Pharmacological VTE Prophylaxis  Once        Question:  Reasons:  Answer:  Active Bleeding    06/03/25 1701     IP VTE HIGH RISK PATIENT  Once         06/03/25 1701     Place sequential compression device  Until discontinued         06/03/25 1701                    Missael Agrawal MD  Urology  [facility]

## 2025-06-04 NOTE — NURSING
[facility] Resident Aravind notified that pt campbell pad changed due to being saturated with blood/urine. Eason draining throughout night. Suspected that blood/urine draining from cath site. Verbal order to flush cath.

## 2025-06-04 NOTE — ASSESSMENT & PLAN NOTE
Patient has a PMHX of anxiety     Plan  Continue current medications   Rx Refill Note  Requested Prescriptions     Pending Prescriptions Disp Refills    tadalafil (CIALIS) 20 MG tablet 10 tablet 2     Sig: Take 1 tablet by mouth Daily As Needed for Erectile Dysfunction.      Last office visit with prescribing clinician: 12/21/2023   Next office visit with prescribing clinician: Visit date not found     Shahzad Mooney  02/20/25, 13:10 EST

## 2025-06-04 NOTE — PLAN OF CARE
06/04/25 1237   Discharge Planning   Assessment Type Discharge Planning Brief Assessment   Resource/Environmental Concerns none   Support Systems Family members;Friends/neighbors   Equipment Currently Used at Home walker, rolling;wheelchair;raised toilet   Current Living Arrangements independent living facility   Care Facility Name [facility]   Patient/Family Anticipates Transition to other (see comments)  (Return to Independent living)   DME Needed Upon Discharge  none   Discharge Plan A Independent living facility     CM met with pt and her sister Brittaney at the bedside. Pt is [place] and blind, most of the information obtained from sister. Pt resides at [facility] with her long time friend of 50 yrs Apolonia. [facility] staff provides assistance 5-6 times a day. Pt is now mostly wheelchair bound. Family of pt and family of pt's roommate are working together to get them both to senior care Nursing Home together. An application for [employer] was submitted yesterday by the family. DC plan is for pt to return to [facility] with staff support while family is working on senior care placement.    Future Appointments   Date Time Provider Department Center   6/9/2025  3:15 PM [facility]   6/10/2025 10:00 AM APPOINTMENT LAB, [facility]   6/11/2025  2:40 PM Louis Gibson MD [facility] HEM ONC [facility]   6/11/2025  3:00 PM CHAIR 01 [facility]MO [facility]   7/9/2025  1:00 PM CHAIR 02 [facility]     Saba Rizo RN, [facility]  Case Management- [facility]  [phone]

## 2025-06-04 NOTE — PLAN OF CARE
Problem: Adult Inpatient Plan of Care  Goal: Plan of Care Review  Outcome: Progressing   VIRTUAL NURSE:  Called patient's room via telephone;   Introduced as VN for night shift that will be working with floor nurse and nursing assistant.  Educated family on VN's role in patient care and VIP model.  Plan of care reviewed with sister.  Education per flowsheet.   Informed  that staff will round on them every 2 hours but to use call light for any other needs they may have; informed of fall risk and fall precautions. Verbalized understanding.   Opportunity given for questions and questions answered.  Admission assessment questions answered.  Initiated plan of care.

## 2025-06-04 NOTE — SUBJECTIVE & OBJECTIVE
Interval History: Hgb down from 8.9-->7.2. Denies any acute complaints or issues. Type and screen pending. Consented this morning for blood transfusion per Urology recommendations to transfuse.    Review of Systems   Constitutional: Negative.    Eyes: Negative.    Respiratory: Negative.     Cardiovascular: Negative.    Gastrointestinal:  Positive for abdominal pain.   Endocrine: Negative.    Genitourinary:  Positive for difficulty urinating, dysuria and hematuria.   Musculoskeletal:  Positive for gait problem.   Allergic/Immunologic: Negative.    Psychiatric/Behavioral: Negative.       Objective:     Vital Signs (Most Recent):  Temp: 97.8 °F (36.6 °C) (06/04/25 0329)  Pulse: 64 (06/04/25 0741)  Resp: 18 (06/04/25 0741)  BP: (!) 114/56 (06/04/25 0741)  SpO2: 98 % (06/04/25 0741) Vital Signs (24h Range):  Temp:  [97.6 °F (36.4 °C)-98.7 °F (37.1 °C)] 97.8 °F (36.6 °C)  Pulse:  [62-74] 64  Resp:  [16-24] 18  SpO2:  [95 %-98 %] 98 %  BP: (100-163)/(55-86) 114/56     Weight: 90.7 kg (200 lb)  Body mass index is 39.06 kg/m².    Intake/Output Summary (Last 24 hours) at 6/4/2025 1054  Last data filed at 6/3/2025 2049  Gross per 24 hour   Intake --   Output 850 ml   Net -850 ml         Physical Exam  Vitals and nursing note reviewed. Exam conducted with a chaperone present.   Constitutional:       Appearance: Normal appearance.   HENT:      Head: Normocephalic and atraumatic.   Eyes:      Conjunctiva/sclera: Conjunctivae normal.   Cardiovascular:      Rate and Rhythm: Normal rate and regular rhythm.   Pulmonary:      Effort: Pulmonary effort is normal.      Breath sounds: Normal breath sounds.   Abdominal:      General: Bowel sounds are normal.      Palpations: Abdomen is soft.   Musculoskeletal:         General: Normal range of motion.      Cervical back: Normal range of motion and neck supple.   Skin:     General: Skin is warm.      Capillary Refill: Capillary refill takes less than 2 seconds.   Neurological:      General:  No focal deficit present.      Mental Status: She is alert and oriented to person, place, and time.   Psychiatric:         Mood and Affect: Mood normal.         Behavior: Behavior normal.               Significant Labs: All pertinent labs within the past 24 hours have been reviewed.  CBC:   Recent Labs   Lab 06/03/25  1048 06/04/25  0543   WBC 5.41 5.37   HGB 8.9* 7.2*   HCT 29.9* 24.4*    201     CMP:   Recent Labs   Lab 06/03/25  1048 06/04/25  0543    136   K 4.3 4.5    108   CO2 23 21*   GLU 92 103   BUN 15 15   CREATININE 0.9 0.8   CALCIUM 8.4* 8.0*   PROT 6.5 5.7*   ALBUMIN 2.9* 2.5*   BILITOT 0.3 0.3   ALKPHOS 88 83   AST 78* 63*   ALT 45* 37   ANIONGAP 7* 7*       Significant Imaging: I have reviewed all pertinent imaging results/findings within the past 24 hours.

## 2025-06-04 NOTE — NURSING
Pt guerrier cath manually irrigated. Noted several large clots. Unable to fully flush cath to have continuous flow due to clots. Calling [facility] Hospital medicine for further orders.

## 2025-06-04 NOTE — ASSESSMENT & PLAN NOTE
Most recent hemoglobin and hematocrit are listed below. Patient had iron infusion monthly.   Recent Labs     06/03/25  1048 06/04/25  0543   HGB 8.9* 7.2*   HCT 29.9* 24.4*     Plan  - Monitor serial CBC: Daily  - Patient has not received any PRBC transfusions to date  - Patient's anemia is currently worsening. Will transfuse 1u given drop from 8.9 to 7.2 this AM.

## 2025-06-04 NOTE — SUBJECTIVE & OBJECTIVE
Interval History: Catheter poorly draining overnight. Irrigated multiple times. Plan to upsize catheter if not draining appropriately at lunch.    Hgb 7.2 from 8.9    Review of Systems  Objective:     Temp:  [97.6 °F (36.4 °C)-98.7 °F (37.1 °C)] 97.8 °F (36.6 °C)  Pulse:  [62-78] 73  Resp:  [16-24] 18  SpO2:  [95 %-98 %] 98 %  BP: (100-163)/(51-86) 118/59     Body mass index is 39.06 kg/m².           Drains       Drain  Duration                  Urethral Catheter 06/03/25 1449 Straight-tip <1 day                     Physical Exam      Significant Labs:    BMP:  Recent Labs   Lab 06/03/25  1048      K 4.3      CO2 23   BUN 15   CREATININE 0.9   CALCIUM 8.4*       CBC:   Recent Labs   Lab 06/03/25  1048 06/04/25  0543   WBC 5.41 5.37   HGB 8.9* 7.2*   HCT 29.9* 24.4*    201       All pertinent labs results from the past 24 hours have been reviewed.    Significant Imaging:  All pertinent imaging results/findings from the past 24 hours have been reviewed.

## 2025-06-04 NOTE — PLAN OF CARE
Hematology/Oncology note:    Asked to see patient per her daughter. Given worsening anemia and decreasing ferritin, recommend IV iron for multifactorial anemia (blood loss, developing iron deficiency, anemia of chronic disease)  I will place an order for IV iron.    Louis Gibson M.D.  Hematology/Oncology  [facility] - [address], [address]  [place] [address]  Phone: [phone]  Fax: [phone]

## 2025-06-04 NOTE — NURSING
[facility] Hospital Medicine Aravind notified that guerrier continues to become clogged due to large clots even after several attempts of irrigation. Noted dark red urine draining around cath site onto campbell pad. Verbal order to keep guerrier placed until urology sees patient. Pt denies pain at this time.

## 2025-06-04 NOTE — ASSESSMENT & PLAN NOTE
Patient with  PMHx of recurrent UTI.  Staff noticed blood in her diaper last night with clots, prompting ER visit. Prior to this, she had endorsed intermittent burning lower abodminal discomfort for two weeks.    CT  abdomen pelvis w/ Mild to moderate right hydroureteronephrosis, noting mild prominence of the left ureter as well as heterogeneous suspected enhancing material within the posterior aspect of the urinary bladder.    Plan  -Urology consulted, appreciate recs  - Guerrier replacement today for larger guerrier 2/2 clots  - Started on ciprofloxacin empirically   - Will transfuse 1u today for Hgb 8.9-->7.2

## 2025-06-04 NOTE — PLAN OF CARE
Problem: Violence Risk or Actual  Goal: Anger and Impulse Control  6/4/2025 0312 by Malathi Gibson RN  Outcome: Progressing  6/4/2025 0307 by Malathi Gibson RN  Outcome: Progressing     Problem: Skin Injury Risk Increased  Goal: Skin Health and Integrity  6/4/2025 0312 by Malathi Gibson RN  Outcome: Progressing  6/4/2025 0307 by Malathi Gibson RN  Outcome: Progressing     Problem: Infection  Goal: Absence of Infection Signs and Symptoms  6/4/2025 0312 by Malathi Gibson RN  Outcome: Progressing  6/4/2025 0307 by Malathi Gibson RN  Outcome: Progressing     Problem: Adult Inpatient Plan of Care  Goal: Plan of Care Review  6/4/2025 0312 by Malathi Gibson RN  Outcome: Progressing  6/4/2025 0307 by Malathi Gibson RN  Outcome: Progressing  Goal: Patient-Specific Goal (Individualized)  6/4/2025 0312 by Malathi Gibson RN  Outcome: Progressing  6/4/2025 0307 by Malathi Gibson RN  Outcome: Progressing  Goal: Absence of Hospital-Acquired Illness or Injury  6/4/2025 0312 by Malathi Gibson RN  Outcome: Progressing  6/4/2025 0307 by Malathi Gibson RN  Outcome: Progressing  Goal: Optimal Comfort and Wellbeing  6/4/2025 0312 by Malathi Gibson RN  Outcome: Progressing  6/4/2025 0307 by Malathi Gibson RN  Outcome: Progressing  Goal: Readiness for Transition of Care  6/4/2025 0312 by Malathi Gibson RN  Outcome: Progressing  6/4/2025 0307 by Malathi Gibson RN  Outcome: Progressing     Problem: UTI (Urinary Tract Infection)  Goal: Improved Infection Symptoms  6/4/2025 0312 by Malathi Gibson RN  Outcome: Progressing  6/4/2025 0307 by Malathi Gibson RN  Outcome: Progressing     Problem: Fall Injury Risk  Goal: Absence of Fall and Fall-Related Injury  6/4/2025 0312 by Malahti Gibson RN  Outcome: Progressing  6/4/2025 0307 by Malathi Gibson RN  Outcome: Progressing     Problem: Anemia  Goal: Anemia Symptom Improvement  6/4/2025 0312 by Malathi Gibson RN  Outcome: Progressing  6/4/2025 0307 by Arthur, Malathi, RN  Outcome: Progressing      Problem: Comorbidity Management  Goal: Blood Pressure in Desired Range  6/4/2025 0312 by Malathi Gibson, RN  Outcome: Progressing  6/4/2025 0307 by Malathi Gibson, RN  Outcome: Progressing

## 2025-06-05 VITALS
DIASTOLIC BLOOD PRESSURE: 63 MMHG | SYSTOLIC BLOOD PRESSURE: 99 MMHG | OXYGEN SATURATION: 97 % | HEIGHT: 60 IN | RESPIRATION RATE: 16 BRPM | WEIGHT: 200 LBS | TEMPERATURE: 98 F | BODY MASS INDEX: 39.27 KG/M2 | HEART RATE: 72 BPM

## 2025-06-05 DIAGNOSIS — D50.8 OTHER IRON DEFICIENCY ANEMIA: Primary | ICD-10-CM

## 2025-06-05 LAB
ABSOLUTE EOSINOPHIL (OHS): 0.17 K/UL
ABSOLUTE MONOCYTE (OHS): 0.62 K/UL (ref 0.3–1)
ABSOLUTE NEUTROPHIL COUNT (OHS): 4.47 K/UL (ref 1.8–7.7)
ALBUMIN SERPL BCP-MCNC: 2.3 G/DL (ref 3.5–5.2)
ALP SERPL-CCNC: 76 UNIT/L (ref 40–150)
ALT SERPL W/O P-5'-P-CCNC: 32 UNIT/L (ref 10–44)
ANION GAP (OHS): 6 MMOL/L (ref 8–16)
AST SERPL-CCNC: 54 UNIT/L (ref 11–45)
BACTERIA UR CULT: ABNORMAL
BASOPHILS # BLD AUTO: 0.04 K/UL
BASOPHILS NFR BLD AUTO: 0.6 %
BILIRUB SERPL-MCNC: 0.2 MG/DL (ref 0.1–1)
BUN SERPL-MCNC: 14 MG/DL (ref 8–23)
CALCIUM SERPL-MCNC: 7.9 MG/DL (ref 8.7–10.5)
CHLORIDE SERPL-SCNC: 106 MMOL/L (ref 95–110)
CO2 SERPL-SCNC: 21 MMOL/L (ref 23–29)
CREAT SERPL-MCNC: 1 MG/DL (ref 0.5–1.4)
ERYTHROCYTE [DISTWIDTH] IN BLOOD BY AUTOMATED COUNT: 18.6 % (ref 11.5–14.5)
GFR SERPLBLD CREATININE-BSD FMLA CKD-EPI: 54 ML/MIN/1.73/M2
GLUCOSE SERPL-MCNC: 112 MG/DL (ref 70–110)
HCT VFR BLD AUTO: 25.6 % (ref 37–48.5)
HGB BLD-MCNC: 7.9 GM/DL (ref 12–16)
IMM GRANULOCYTES # BLD AUTO: 0.06 K/UL (ref 0–0.04)
IMM GRANULOCYTES NFR BLD AUTO: 0.9 % (ref 0–0.5)
LYMPHOCYTES # BLD AUTO: 1.09 K/UL (ref 1–4.8)
MAGNESIUM SERPL-MCNC: 2.5 MG/DL (ref 1.6–2.6)
MCH RBC QN AUTO: 26.4 PG (ref 27–31)
MCHC RBC AUTO-ENTMCNC: 30.9 G/DL (ref 32–36)
MCV RBC AUTO: 86 FL (ref 82–98)
NUCLEATED RBC (/100WBC) (OHS): 0 /100 WBC
PHOSPHATE SERPL-MCNC: 2.7 MG/DL (ref 2.7–4.5)
PLATELET # BLD AUTO: 189 K/UL (ref 150–450)
PMV BLD AUTO: 9.6 FL (ref 9.2–12.9)
POTASSIUM SERPL-SCNC: 4.2 MMOL/L (ref 3.5–5.1)
PROT SERPL-MCNC: 5.3 GM/DL (ref 6–8.4)
RBC # BLD AUTO: 2.99 M/UL (ref 4–5.4)
RELATIVE EOSINOPHIL (OHS): 2.6 %
RELATIVE LYMPHOCYTE (OHS): 16.9 % (ref 18–48)
RELATIVE MONOCYTE (OHS): 9.6 % (ref 4–15)
RELATIVE NEUTROPHIL (OHS): 69.4 % (ref 38–73)
SODIUM SERPL-SCNC: 133 MMOL/L (ref 136–145)
WBC # BLD AUTO: 6.45 K/UL (ref 3.9–12.7)

## 2025-06-05 PROCEDURE — S4991 NICOTINE PATCH NONLEGEND: HCPCS | Mod: HCNC

## 2025-06-05 PROCEDURE — 25000003 PHARM REV CODE 250: Mod: HCNC

## 2025-06-05 PROCEDURE — 83735 ASSAY OF MAGNESIUM: CPT | Mod: HCNC

## 2025-06-05 PROCEDURE — 85025 COMPLETE CBC W/AUTO DIFF WBC: CPT | Mod: HCNC

## 2025-06-05 PROCEDURE — 80053 COMPREHEN METABOLIC PANEL: CPT | Mod: HCNC

## 2025-06-05 PROCEDURE — 99232 SBSQ HOSP IP/OBS MODERATE 35: CPT | Mod: HCNC,,, | Performed by: UROLOGY

## 2025-06-05 PROCEDURE — 63600175 PHARM REV CODE 636 W HCPCS: Mod: HCNC | Performed by: INTERNAL MEDICINE

## 2025-06-05 PROCEDURE — 25000003 PHARM REV CODE 250: Mod: HCNC | Performed by: INTERNAL MEDICINE

## 2025-06-05 PROCEDURE — 36415 COLL VENOUS BLD VENIPUNCTURE: CPT | Mod: HCNC

## 2025-06-05 PROCEDURE — 84100 ASSAY OF PHOSPHORUS: CPT | Mod: HCNC

## 2025-06-05 PROCEDURE — 63600175 PHARM REV CODE 636 W HCPCS: Mod: HCNC

## 2025-06-05 RX ORDER — SUCRALFATE 1 G/10ML
1 SUSPENSION ORAL 4 TIMES DAILY
Qty: 473 ML | Refills: 1 | Status: ON HOLD | OUTPATIENT
Start: 2025-06-05

## 2025-06-05 RX ORDER — CIPROFLOXACIN 250 MG/1
500 TABLET, FILM COATED ORAL 2 TIMES DAILY
Qty: 6 TABLET | Refills: 0 | Status: SHIPPED | OUTPATIENT
Start: 2025-06-05 | End: 2025-06-07

## 2025-06-05 RX ORDER — SYRING-NEEDL,DISP,INSUL,0.3 ML 29 G X1/2"
296 SYRINGE, EMPTY DISPOSABLE MISCELLANEOUS ONCE
Qty: 296 ML | Refills: 0 | Status: SHIPPED | OUTPATIENT
Start: 2025-06-05 | End: 2025-06-06

## 2025-06-05 RX ADMIN — SENNOSIDES AND DOCUSATE SODIUM 1 TABLET: 50; 8.6 TABLET ORAL at 08:06

## 2025-06-05 RX ADMIN — GLYCOPYRROLATE 1 MG: 1 TABLET ORAL at 08:06

## 2025-06-05 RX ADMIN — LORAZEPAM 0.5 MG: 0.5 TABLET ORAL at 08:06

## 2025-06-05 RX ADMIN — CIPROFLOXACIN 400 MG: 2 INJECTION, SOLUTION INTRAVENOUS at 05:06

## 2025-06-05 RX ADMIN — SODIUM CHLORIDE 125 MG: 9 INJECTION, SOLUTION INTRAVENOUS at 08:06

## 2025-06-05 RX ADMIN — SUCRALFATE 1 G: 1 SUSPENSION ORAL at 11:06

## 2025-06-05 RX ADMIN — ALUMINUM HYDROXIDE, MAGNESIUM HYDROXIDE, AND SIMETHICONE 30 ML: 200; 200; 20 SUSPENSION ORAL at 05:06

## 2025-06-05 RX ADMIN — CITALOPRAM HYDROBROMIDE 20 MG: 20 TABLET ORAL at 08:06

## 2025-06-05 RX ADMIN — ALUMINUM HYDROXIDE, MAGNESIUM HYDROXIDE, AND SIMETHICONE 30 ML: 200; 200; 20 SUSPENSION ORAL at 10:06

## 2025-06-05 RX ADMIN — LEVOTHYROXINE SODIUM 100 MCG: 100 TABLET ORAL at 05:06

## 2025-06-05 RX ADMIN — ACETAMINOPHEN 650 MG: 325 TABLET ORAL at 08:06

## 2025-06-05 RX ADMIN — SUCRALFATE 1 G: 1 SUSPENSION ORAL at 05:06

## 2025-06-05 RX ADMIN — NICOTINE 1 PATCH: 14 PATCH, EXTENDED RELEASE TRANSDERMAL at 08:06

## 2025-06-05 RX ADMIN — BUSPIRONE HYDROCHLORIDE 15 MG: 5 TABLET ORAL at 08:06

## 2025-06-05 NOTE — PLAN OF CARE
Problem: Violence Risk or Actual  Goal: Anger and Impulse Control  Outcome: Progressing     Problem: Skin Injury Risk Increased  Goal: Skin Health and Integrity  Outcome: Progressing     Problem: Infection  Goal: Absence of Infection Signs and Symptoms  Outcome: Progressing     Problem: Adult Inpatient Plan of Care  Goal: Plan of Care Review  Outcome: Progressing  Goal: Patient-Specific Goal (Individualized)  Outcome: Progressing  Goal: Absence of Hospital-Acquired Illness or Injury  Outcome: Progressing  Goal: Optimal Comfort and Wellbeing  Outcome: Progressing  Goal: Readiness for Transition of Care  Outcome: Progressing     Problem: UTI (Urinary Tract Infection)  Goal: Improved Infection Symptoms  Outcome: Progressing     Problem: Fall Injury Risk  Goal: Absence of Fall and Fall-Related Injury  Outcome: Progressing     Problem: Anemia  Goal: Anemia Symptom Improvement  Outcome: Progressing     Problem: Comorbidity Management  Goal: Blood Pressure in Desired Range  Outcome: Progressing

## 2025-06-05 NOTE — NURSING
Full linen change. Pt guerrier cath continues to drain through tubing and through cath insertion. Urine dark red with clots. Pt denies cp, sob, dizziness at this time. Pt had 1 small soft BM.     Hospital medicine notified of amount of drainage noted.

## 2025-06-05 NOTE — PLAN OF CARE
Introduced as VN and will be reviewing discharge instructions.   Reviewed AVS with sister including home care, education on urinary tract infections, medications upon discharge, signs and symptoms of bleeding. Educated sister on reason for admission, home medication list, and discharge instructions including when to return to ED and the following doctor appointments.  Education per flowsheet.  Opportunity given for questions and questions answered.                    Nurse notified of   completion of discharge education. Patient is waiting for transportation to home

## 2025-06-05 NOTE — DISCHARGE SUMMARY
[facility] Medicine  Discharge Summary      Patient Name: Nickie Segura  MRN: [id number]  DREAD: [id number]  Patient Class: IP- Inpatient  Admission Date: 6/3/2025  Hospital Length of Stay: 1 days  Discharge Date and Time: 06/05/2025 10:50 AM  Attending Physician: Carlos Alberto Bustamante MD   Discharging Provider: Solo Ibarra MD  Primary Care Provider: Perla Hopper MD    Primary Care Team: Networked reference to record PCT     HPI:   Patient is a 89 yo female w/ PMHx of anxiety,hypothyroidism, catarcts, ostoarthritis, recurrent UTI presenting today with from assisted living community with chief complaint of bloody diaper. Staff noticed blood in her diaper last night with clots, prompting ER visit. Prior to this, she had endorsed intermittent burning lower abodminal discomfort for two weeks, though to be anxiety related, however, due to symptoms above, she was transferred to the ED for further evaluation.    In the ED, initial vital signs /58, HR 78, Temp 98.3, SpO2 96% on room air. Labs include CBC with stable H/H 8.9/29.9 and WBC within normal limits 5.41. CMP without electrolyte abnormalities and BUN/Cr 15/0.9 (baseline Cr 0.9). UA notable for >100 RBCs, >100 WBCs, 2+ leukocyte Esterase. CT abdomen pelvis w/ Mild to moderate right hydroureteronephrosis, noting mild prominence of the left ureter as well as heterogeneous suspected enhancing material within the posterior aspect of the urinary bladder. Started on ciprofloxacin empirically for possible bacterial UTI.    * No surgery found *      Hospital Course:   HPI as above. Patient empirically started on ciprofloxacin pending cultures. Urology consulted for management of hematuria, recommended observation as hematuria likely from overdistension. Patient was transfused for hemoglobin drop from  8.9 to 7.2. She was given 1u pRBCs in addition to IV iron per Heme/Onc recommendations. Guerrier was exchanged at day of discharge for a bigger guerrier.  Patient continued to void with return of clear urine. She was given ciprofloxacin to take for x7 day course for complicated UTI. Urine culture with GNR, instructed patient that she will be contact if ciproflaxin does not cover isolate. Urology follow up in place. Patient was hemodynamically stable and ready to go home. Home health orders placed for patient to have guerrier removed on the following Monday. ED return precautions discussed. All questions answered. Patient and family verbalized understanding of discharge plans.      Physical Exam  Constitutional:       Appearance: Normal appearance.   HENT:      Head: Normocephalic and atraumatic.      Mouth/Throat:      Mouth: Mucous membranes are moist.      Pharynx: Oropharynx is clear.   Eyes:      Conjunctiva/sclera: Conjunctivae normal.      Pupils: Pupils are equal, round, and reactive to light.   Cardiovascular:      Rate and Rhythm: Normal rate and regular rhythm.      Pulses: Normal pulses.   Pulmonary:      Effort: Pulmonary effort is normal.   Abdominal:      General: Abdomen is flat.      Palpations: Abdomen is soft.   Genitourinary:     Comments: Guerrier in place with clear urine  Musculoskeletal:         General: Normal range of motion.   Skin:     General: Skin is warm.      Capillary Refill: Capillary refill takes less than 2 seconds.   Neurological:      Mental Status: She is alert.          Goals of Care Treatment Preferences:  Code Status: DNR    Living Will: Yes              SDOH Screening:  The patient was screened for utility difficulties, food insecurity, transport difficulties, housing insecurity, and interpersonal safety and there were no concerns identified this admission.     Consults:   Consults (From admission, onward)          Status Ordering Provider     Inpatient consult to Urology  Once        Provider:  (Not yet assigned)    Completed SHANNON GORDON     Inpatient consult to Urology  Once        Provider:  (Not yet assigned)    Completed  LENORA STRINGER            Assessment & Plan    Final Active Diagnoses:    Diagnosis Date Noted POA    PRINCIPAL PROBLEM:  Urinary tract infection with hematuria [N39.0, R31.9] 06/03/2025 Unknown    Anxiety [F41.9] 01/25/2022 Yes    Iron deficiency anemia [D50.9]  Yes      Problems Resolved During this Admission:       Discharged Condition: good    Disposition: Home or Self Care    Follow Up:    Patient Instructions:      Ambulatory referral/consult to Home Health   Standing Status: Future   Referral Priority: Routine Referral Type: Home Health   Referral Reason: Specialty Services Required   Requested Specialty: Home Health Services   Number of Visits Requested: 1     Diet Adult Regular     Notify your health care provider if you experience any of the following:  temperature >100.4     Notify your health care provider if you experience any of the following:  persistent nausea and vomiting or diarrhea     Notify your health care provider if you experience any of the following:  severe uncontrolled pain     Notify your health care provider if you experience any of the following:  redness, tenderness, or signs of infection (pain, swelling, redness, odor or green/yellow discharge around incision site)     Notify your health care provider if you experience any of the following:  difficulty breathing or increased cough     Notify your health care provider if you experience any of the following:  severe persistent headache     Notify your health care provider if you experience any of the following:  worsening rash     Notify your health care provider if you experience any of the following:  persistent dizziness, light-headedness, or visual disturbances     Reason for not Prescribing Nicotine Replacement     Order Specific Question Answer Comments   Reason for not Prescribing: Patient refused      Reason for not Ordering Smoking Cessation Referral     Order Specific Question Answer Comments   Reason for not ordering:  Patient refused      Activity as tolerated       Significant Diagnostic Studies: Labs: CMP   Recent Labs   Lab 06/04/25  0543 06/05/25  0653    133*   K 4.5 4.2    106   CO2 21* 21*    112*   BUN 15 14   CREATININE 0.8 1.0   CALCIUM 8.0* 7.9*   PROT 5.7* 5.3*   ALBUMIN 2.5* 2.3*   BILITOT 0.3 0.2   ALKPHOS 83 76   AST 63* 54*   ALT 37 32   ANIONGAP 7* 6*    and CBC   Recent Labs   Lab 06/04/25  0543 06/04/25  1821 06/05/25  0653   WBC 5.37 5.63 6.45   HGB 7.2* 8.6* 7.9*   HCT 24.4* 27.4* 25.6*    184 189       Pending Diagnostic Studies:       None           Medications:  Reconciled Home Medications:      Medication List        START taking these medications      ciprofloxacin HCl 250 MG tablet  Commonly known as: CIPRO  Take 2 tablets (500 mg total) by mouth 2 (two) times daily. for 5 days            CHANGE how you take these medications      glycopyrrolate 1 mg Tab  Commonly known as: ROBINUL  TAKE 1 TABLET(1 MG) BY MOUTH THREE TIMES DAILY  What changed:   how much to take  how to take this  when to take this  additional instructions     pantoprazole 40 MG tablet  Commonly known as: PROTONIX  1 po daily  What changed:   how much to take  how to take this  when to take this  additional instructions     rosuvastatin 20 MG tablet  Commonly known as: CRESTOR  TAKE 1 TABLET(20 MG) BY MOUTH EVERY DAY  What changed: when to take this            CONTINUE taking these medications      aspirin 81 MG EC tablet  Commonly known as: ECOTRIN  Take 81 mg by mouth every morning.     busPIRone 15 MG tablet  Commonly known as: BUSPAR  TAKE 1 TABLET BY MOUTH TWICE DAILY     citalopram 20 MG tablet  Commonly known as: CeleXA  Take 1.5 tablets (30 mg total) by mouth once daily.     ICAPS AREDS2 ORAL  Take 1 capsule by mouth 2 (two) times a day.     levothyroxine 100 MCG tablet  Commonly known as: SYNTHROID  Take 1 tablet (100 mcg total) by mouth before breakfast.     LORazepam 0.5 MG tablet  Commonly known as:  ATIVAN  Take 1 tablet (0.5 mg total) by mouth daily as needed for Anxiety.     MYRBETRIQ 50 mg Tb24  Generic drug: mirabegron  TAKE 1 TABLET(50 MG) BY MOUTH EVERY DAY     polyethylene glycol 17 gram/dose powder  Commonly known as: GLYCOLAX  Take 17 g by mouth once daily.     VITAMIN C 500 MG tablet  Generic drug: ascorbic acid (vitamin C)  Take 500 mg by mouth once daily.     vitamin D 1000 units Tab  Commonly known as: VITAMIN D3  Take 1,000 Units by mouth once daily.              Indwelling Lines/Drains at time of discharge:   Lines/Drains/Airways       Drain  Duration                  Urethral Catheter 06/05/25 0800 Latex 20 Fr. <1 day                    Time spent on the discharge of patient: 35 minutes         Solo Ibarra MD  Department of Hospital Medicine  [place] [facility]

## 2025-06-05 NOTE — CARE UPDATE
[facility]    FACILITY TRANSFER ORDERS      Patient Name: Nickie Segura  YOB: 1935    PCP: Perla Hopper MD   PCP Address: [address] [facility] / [place] [address]  PCP Phone Number: [phone]  PCP Fax: [phone]    Encounter Date: 06/05/2025    Admit to: [facility]     Vital Signs:  Routine    Diagnoses:   Active Hospital Problems    Diagnosis  POA    *Urinary tract infection with hematuria [N39.0, R31.9]  Unknown    Anxiety [F41.9]  Yes    Iron deficiency anemia [D50.9]  Yes      Resolved Hospital Problems   No resolved problems to display.       Allergies:  Review of patient's allergies indicates:   Allergen Reactions    Penicillins      rash       Diet: regular diet    Activities: Activity as tolerated, Bathroom privileges with assistance, Bed rest with bathroom privileges, Commode at bedside, and Up with assistance    Goals of Care Treatment Preferences:  Code Status: DNR    Living Will: Yes              Nursing: Routine     Labs: CBC and CMP Once     CONSULTS:     to evaluate for community resources/long-range planning.    MISCELLANEOUS CARE:  Eason Care: Empty Eason bag every shift. Change Monday June 9, 2025 per Urology.    WOUND CARE ORDERS  None    Medications: Review discharge medications with patient and family and provide education.         Medication List        START taking these medications      ciprofloxacin HCl 250 MG tablet  Commonly known as: CIPRO  Take 2 tablets (500 mg total) by mouth 2 (two) times daily. for 5 days            CHANGE how you take these medications      glycopyrrolate 1 mg Tab  Commonly known as: ROBINUL  TAKE 1 TABLET(1 MG) BY MOUTH THREE TIMES DAILY  What changed:   how much to take  how to take this  when to take this  additional instructions     pantoprazole 40 MG tablet  Commonly known as: PROTONIX  1 po daily  What changed:   how much to take  how to take this  when to take  this  additional instructions     rosuvastatin 20 MG tablet  Commonly known as: CRESTOR  TAKE 1 TABLET(20 MG) BY MOUTH EVERY DAY  What changed: when to take this            CONTINUE taking these medications      aspirin 81 MG EC tablet  Commonly known as: ECOTRIN  Take 81 mg by mouth every morning.     busPIRone 15 MG tablet  Commonly known as: BUSPAR  TAKE 1 TABLET BY MOUTH TWICE DAILY     citalopram 20 MG tablet  Commonly known as: CeleXA  Take 1.5 tablets (30 mg total) by mouth once daily.     ICAPS AREDS2 ORAL  Take 1 capsule by mouth 2 (two) times a day.     levothyroxine 100 MCG tablet  Commonly known as: SYNTHROID  Take 1 tablet (100 mcg total) by mouth before breakfast.     LORazepam 0.5 MG tablet  Commonly known as: ATIVAN  Take 1 tablet (0.5 mg total) by mouth daily as needed for Anxiety.     MYRBETRIQ 50 mg Tb24  Generic drug: mirabegron  TAKE 1 TABLET(50 MG) BY MOUTH EVERY DAY     polyethylene glycol 17 gram/dose powder  Commonly known as: GLYCOLAX  Take 17 g by mouth once daily.     VITAMIN C 500 MG tablet  Generic drug: ascorbic acid (vitamin C)  Take 500 mg by mouth once daily.     vitamin D 1000 units Tab  Commonly known as: VITAMIN D3  Take 1,000 Units by mouth once daily.                Immunizations Administered as of 6/5/2025       Name Date Dose VIS Date Route Exp Date    COVID-19, MRNA, LN-S, PF (Moderna) 11/7/2021 0.5 mL 3/26/2021 Intramuscular --    Site: Right deltoid     : Modern[employer]     Lot: [id number]     Comment: Adminis     COVID-19, MRNA, LN-S, PF (Moderna) 3/5/2021  1:40 PM 0.5 mL 12/19/2020 Intramuscular 8/22/2021    Site: Right deltoid     Given By: Arline Kearney RN     : Moderna [employer]     Lot: [id number]     COVID-19, MRNA, LN-S, PF (Moderna) 2/5/2021  1:43 PM 0.5 mL 12/19/2020 Intramuscular 7/20/2021    Site: Right deltoid     Given By: Brent KING LPN     : Moderna [employer] Inc[employer]     Lot: [id number]             This patient has had both covid  vaccinations    Some patients may experience side effects after vaccination.  These may include fever, headache, muscle or joint aches.  Most symptoms resolve with 24-48 hours and do not require urgent medical evaluation unless they persist for more than 72 hours or symptoms are concerning for an unrelated medical condition.          _________________________________  Solo Ibarra MD  06/05/2025

## 2025-06-05 NOTE — HOSPITAL COURSE
HPI as above. Patient empirically started on ciprofloxacin pending cultures. Urology consulted for management of hematuria, recommended observation as hematuria likely from overdistension. Patient was transfused for hemoglobin drop from  8.9 to 7.2. She was given 1u pRBCs in addition to IV iron per Heme/Onc recommendations. Guerrier was exchanged at day of discharge for a bigger guerrier. Patient continued to void with return of clear urine. She was given ciprofloxacin to take for x7 day course for complicated UTI. Urine culture with GNR, instructed patient that she will be contact if ciproflaxin does not cover isolate. Urology follow up in place. Patient was hemodynamically stable and ready to go home. Home health orders placed for patient to have guerrier removed on the following Monday. ED return precautions discussed. All questions answered. Patient and family verbalized understanding of discharge plans.

## 2025-06-05 NOTE — PLAN OF CARE
D/c orders noted.     BRITTNEY spoke with pt's sister Brittaney ([phone]) to discuss d/c plans. Brittaney is agreeable for pt to return to [facility] with HH services with [facility].    BRITTNEY spoke with intake with [facility]. [facility] is able to accept and will see pt tomorrow. Set up completed for pt to have HH services with [facility].    BRITTNEY arranged transportation for pt to return to [facility].     Pt is cleared to go from CM standpoint.     Future Appointments   Date Time Provider Department Center   6/9/2025  3:15 PM [facility]   7/9/2025  1:00 PM CHAIR 02 [facility]         06/05/25 1202   Final Note   Assessment Type Final Discharge Note   Anticipated Discharge Disposition Home-Health   What phone number can be called within the next 1-3 days to see how you are doing after discharge? [phone]   Post-Acute Status   Post-Acute Authorization Home Health   Discharge Delays None known at this time

## 2025-06-05 NOTE — SUBJECTIVE & OBJECTIVE
Interval History: Catheter upsized this morning and irrigated out old clot. Eason draining clear at this time  Hgb drift 7.9 from 8.6    Review of Systems  Objective:     Temp:  [97 °F (36.1 °C)-97.8 °F (36.6 °C)] 97.6 °F (36.4 °C)  Pulse:  [67-78] 73  Resp:  [16-18] 16  SpO2:  [93 %-98 %] 95 %  BP: ()/(45-72) 113/70     Body mass index is 39.06 kg/m².           Drains       Drain  Duration                  Urethral Catheter 06/03/25 1449 Straight-tip 1 day                     Physical Exam  Abdominal:      General: Abdomen is flat.      Palpations: Abdomen is soft.   Genitourinary:     Comments: Eason upsized to 20 fr catheter. Irrigated. Draining clear at this time  Skin:     General: Skin is warm.   Neurological:      Mental Status: She is alert.           Significant Labs:    BMP:  Recent Labs   Lab 06/03/25  1048 06/04/25  0543 06/05/25  0653    136 133*   K 4.3 4.5 4.2    108 106   CO2 23 21* 21*   BUN 15 15 14   CREATININE 0.9 0.8 1.0   CALCIUM 8.4* 8.0* 7.9*       CBC:   Recent Labs   Lab 06/04/25  0543 06/04/25  1821 06/05/25  0653   WBC 5.37 5.63 6.45   HGB 7.2* 8.6* 7.9*   HCT 24.4* 27.4* 25.6*    184 189       All pertinent labs results from the past 24 hours have been reviewed.    Significant Imaging:  All pertinent imaging results/findings from the past 24 hours have been reviewed.

## 2025-06-05 NOTE — PROGRESS NOTES
[place] [facility]  [facility]rology  Progress Note    Patient Name: Nickie Segura  MRN: [id number]  Admission Date: 6/3/2025  Hospital Length of Stay: 1 days  Code Status: DNR   Attending Provider: Carlos Alberto Bustamante MD   Primary Care Physician: Perla Hopper MD    Subjective:     HPI:  No notes on file    Interval History: Catheter upsized this morning and irrigated out old clot. Guerrier draining clear at this time  Hgb drift 7.9 from 8.6    Review of Systems  Objective:     Temp:  [97 °F (36.1 °C)-97.8 °F (36.6 °C)] 97.6 °F (36.4 °C)  Pulse:  [67-78] 73  Resp:  [16-18] 16  SpO2:  [93 %-98 %] 95 %  BP: ()/(45-72) 113/70     Body mass index is 39.06 kg/m².           Drains       Drain  Duration                  Urethral Catheter 06/03/25 1449 Straight-tip 1 day                     Physical Exam  Abdominal:      General: Abdomen is flat.      Palpations: Abdomen is soft.   Genitourinary:     Comments: Guerrier upsized to 20 fr catheter. Irrigated. Draining clear at this time  Skin:     General: Skin is warm.   Neurological:      Mental Status: She is alert.           Significant Labs:    BMP:  Recent Labs   Lab 06/03/25  1048 06/04/25  0543 06/05/25  0653    136 133*   K 4.3 4.5 4.2    108 106   CO2 23 21* 21*   BUN 15 15 14   CREATININE 0.9 0.8 1.0   CALCIUM 8.4* 8.0* 7.9*       CBC:   Recent Labs   Lab 06/04/25  0543 06/04/25  1821 06/05/25  0653   WBC 5.37 5.63 6.45   HGB 7.2* 8.6* 7.9*   HCT 24.4* 27.4* 25.6*    184 189       All pertinent labs results from the past 24 hours have been reviewed.    Significant Imaging:  All pertinent imaging results/findings from the past 24 hours have been reviewed.                  Assessment/Plan:     * Urinary tract infection with hematuria  -Continue abx  -F/u culture. Prelim GNR  -Guerrier upsized and draining clear  -Nurses to irrigate prn  -Hgb 7.9 from 8.6  -Okay for discharge with guerrier removal by home health on Monday 6/9        VTE Risk Mitigation (From  admission, onward)           Ordered     Reason for No Pharmacological VTE Prophylaxis  Once        Question:  Reasons:  Answer:  Active Bleeding    06/03/25 1701     IP VTE HIGH RISK PATIENT  Once         06/03/25 1701     Place sequential compression device  Until discontinued         06/03/25 1701                    Missael Agrawal MD  Urology  [place] [facility]

## 2025-06-05 NOTE — PLAN OF CARE
Problem: Adult Inpatient Plan of Care  Goal: Plan of Care Review  Outcome: Progressing     Problem: UTI (Urinary Tract Infection)  Goal: Improved Infection Symptoms  Outcome: Progressing     Problem: Fall Injury Risk  Goal: Absence of Fall and Fall-Related Injury  Outcome: Progressing     Problem: Anemia  Goal: Anemia Symptom Improvement  Outcome: Progressing

## 2025-06-05 NOTE — ASSESSMENT & PLAN NOTE
-Continue abx  -F/u culture. Prelim GNR  -Guerrier upsized and draining clear  -Nurses to irrigate prn  -Hgb 7.9 from 8.6  -Okay for discharge with guerrier removal by home health on Monday 6/9

## 2025-06-05 NOTE — PLAN OF CARE
Problem: Violence Risk or Actual  Goal: Anger and Impulse Control  Outcome: Adequate for Care Transition     Problem: Skin Injury Risk Increased  Goal: Skin Health and Integrity  Outcome: Adequate for Care Transition     Problem: Infection  Goal: Absence of Infection Signs and Symptoms  Outcome: Adequate for Care Transition     Problem: Adult Inpatient Plan of Care  Goal: Plan of Care Review  Outcome: Adequate for Care Transition  Goal: Patient-Specific Goal (Individualized)  Outcome: Adequate for Care Transition  Goal: Absence of Hospital-Acquired Illness or Injury  Outcome: Adequate for Care Transition  Goal: Optimal Comfort and Wellbeing  Outcome: Adequate for Care Transition  Goal: Readiness for Transition of Care  Outcome: Adequate for Care Transition     Problem: UTI (Urinary Tract Infection)  Goal: Improved Infection Symptoms  Outcome: Adequate for Care Transition     Problem: Fall Injury Risk  Goal: Absence of Fall and Fall-Related Injury  Outcome: Adequate for Care Transition     Problem: Anemia  Goal: Anemia Symptom Improvement  Outcome: Adequate for Care Transition     Problem: Comorbidity Management  Goal: Blood Pressure in Desired Range  Outcome: Adequate for Care Transition

## 2025-06-07 ENCOUNTER — TELEPHONE (OUTPATIENT)
Dept: ADMINISTRATIVE | Facility: CLINIC | Age: 89
End: 2025-06-07
Payer: MEDICARE

## 2025-06-07 DIAGNOSIS — N39.0 URINARY TRACT INFECTION WITH HEMATURIA, SITE UNSPECIFIED: Primary | ICD-10-CM

## 2025-06-07 DIAGNOSIS — R31.9 URINARY TRACT INFECTION WITH HEMATURIA, SITE UNSPECIFIED: Primary | ICD-10-CM

## 2025-06-07 RX ORDER — SULFAMETHOXAZOLE AND TRIMETHOPRIM 800; 160 MG/1; MG/1
1 TABLET ORAL 2 TIMES DAILY
Qty: 14 TABLET | Refills: 0 | Status: ON HOLD | OUTPATIENT
Start: 2025-06-07 | End: 2025-06-14

## 2025-06-07 NOTE — PROGRESS NOTES
FORREST Chavez contacted, identity confirmed. Instructed POA that patient had E.coli resistant to ciprofloxacin. Bactrim sent in to pharmacy.     Solo Ibarra MD  [facility] PGY-2

## 2025-06-07 NOTE — PROGRESS NOTES
Sister, Brittaney/FORREST, called in concerned about home health company. Pt was assigned [facility] , but Evon from the company called Brittaney and notified not in network and will be assigned to new company. Brittaney hadn't heard any news and was wondering about new company. Félix Jenkins RN OPCM noted in chart. Secure message routed to make aware. Also, routed message to PCP to make aware.
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
## 2025-06-09 ENCOUNTER — PATIENT MESSAGE (OUTPATIENT)
Dept: PRIMARY CARE CLINIC | Facility: CLINIC | Age: 89
End: 2025-06-09

## 2025-06-09 ENCOUNTER — OFFICE VISIT (OUTPATIENT)
Dept: PRIMARY CARE CLINIC | Facility: CLINIC | Age: 89
End: 2025-06-09
Payer: MEDICARE

## 2025-06-09 DIAGNOSIS — F41.9 ANXIETY: ICD-10-CM

## 2025-06-09 DIAGNOSIS — N32.81 OAB (OVERACTIVE BLADDER): ICD-10-CM

## 2025-06-09 DIAGNOSIS — R31.0 GROSS HEMATURIA: ICD-10-CM

## 2025-06-09 DIAGNOSIS — N39.0 RECURRENT UTI: Primary | ICD-10-CM

## 2025-06-09 DIAGNOSIS — R53.81 DEBILITY: ICD-10-CM

## 2025-06-09 DIAGNOSIS — Z97.8 FOLEY CATHETER IN PLACE: ICD-10-CM

## 2025-06-09 DIAGNOSIS — D50.9 IRON DEFICIENCY ANEMIA, UNSPECIFIED IRON DEFICIENCY ANEMIA TYPE: ICD-10-CM

## 2025-06-10 NOTE — PROGRESS NOTES
Subjective:       Patient ID: Nickie Segura is a 89 y.o. female.    Chief Complaint: acute cystitis with hematuria     This is a 89 y.o.  female patient that is new to me.  The patient was referred to me by Dr. Hopper for recurrent UTI.   5/12/25-- Patient is here with her sister today. She is very hard of hearing. Reports that she has caregivers that come help her throughout the day. Reports that since being hospitalized 2/2025 she has been having recurrent UTIs. UTI symptoms consist of cloudy and odorous urine. Also reports increase in incontinence, not always feeling the urge to go to the bathroom. Reports that she wears a depends with pads daily. The pads are changed every time she is helped to the bathroom but sometimes she is sitting in soiled depends until that time. Denies suprapubic pain, dysuria, fevers, chills, flank pain.   Urine culture done through straight catheterization showed no infection.  Went  to the ED on 6/3/25 w/ c/o gross hematuria, dysuria, lower abdominal discomfort. Guerrier catheter was placed in the hospital. Urology consulted, guerrier catheter was up sized to 20fr, old blood clot irrigated out. Was started in ciprofloxacin. Orders to discontinue guerrier catheter by HH 6/9/25.  Urine culture-- positive for E. Coli, resistant to ciprofloxacin, switched to bactrim.   CT abdomen and pelvis with IV contrast-- Heterogeneous suspected enhancing material within the posterior aspect of the urinary bladder. Mild to moderate right hydroureteronephrosis, noting mild prominence of the left ureter.  This is likely related to obstruction at the level of the bilateral UVJ is a by the above focus.  Reports that dysuria and lower abdominal pain has resolved, taking bactrim now. Reports urine started becoming blood again yesterday. Denies seeing clots in the catheter. HH has not been able to come yet to remove the catheter. Plan for HH next week to remove catheter.    Lab Results   Component Value Date     CREATININE 1.0 06/05/2025       ---  PMH/PSH/Medications/Allergies/Social history reviewed and as in chart.    Review of Systems   Constitutional:  Negative for chills and fever.   Respiratory:  Negative for shortness of breath.    Cardiovascular:  Negative for chest pain and palpitations.   Gastrointestinal:  Negative for abdominal pain, constipation and diarrhea.   Genitourinary:  Positive for difficulty urinating. Negative for dysuria, flank pain, frequency, hematuria, pelvic pain, urgency and vaginal pain.   Neurological:  Negative for dizziness and weakness.   Psychiatric/Behavioral:  Negative for agitation, confusion and sleep disturbance.        Objective:      Physical Exam  HENT:      Head: Normocephalic.   Pulmonary:      Effort: Pulmonary effort is normal.   Musculoskeletal:      Cervical back: Normal range of motion.   Skin:     General: Skin is warm and dry.   Neurological:      Mental Status: She is alert.      Motor: Weakness present.         Assessment:     Problem Noted   History of Recurrent Uti (Urinary Tract Infection) 5/12/2025       Plan:     Thoroughly discussed with patient and family members recurrent UTIs as well as gross hematuria and CT findings. Offered work-up with cystoscopy vs holding off to see if gross hematuria and UTIs continue. Discussed the risks and benefits of a gross hematuria work-up. We came to the conclusion to hold off on cystoscopy for now based on patients age and not wanting invasive treatment if needed.  Rx sent for trimethoprim 100mg daily for 3 months  HH to remove guerrier catheter next week. Advised family to go to the ER if not voiding within 8 hours.  Follow-up 7/1/25 for PVR      ROBYN Coronado    I spent a total of 40 minutes on the day of the visit.This includes face to face time and non-face to face time preparing to see the patient (eg, review of tests), obtaining and/or reviewing separately obtained history, documenting clinical information in the  electronic or other health record, independently interpreting results and communicating results to the patient/family/caregiver, or care coordinator.

## 2025-06-10 NOTE — TELEPHONE ENCOUNTER
I already asked them to push it up if possible when they messaged me about this yesterday evening after our appt. Not sure who else we can reach out to in order to try to move this up. Any thoughts?  Dr. GARG

## 2025-06-10 NOTE — TELEPHONE ENCOUNTER
LOV with Perla Hopper MD , 6/9/2025 d/t recurrent UTI. Hosp f/u  6/5/25 referral to [facility] by Solo Ibarra MD. ([facility] PGY-2)  6/3/25 hospital encounter: d/c 6/5/25    Pt requesting assistance in scheduling with HH earlier than current appt 6/18/25

## 2025-06-11 ENCOUNTER — OFFICE VISIT (OUTPATIENT)
Dept: UROLOGY | Facility: CLINIC | Age: 89
End: 2025-06-11
Payer: MEDICARE

## 2025-06-11 VITALS
DIASTOLIC BLOOD PRESSURE: 65 MMHG | BODY MASS INDEX: 39.06 KG/M2 | HEIGHT: 60 IN | SYSTOLIC BLOOD PRESSURE: 115 MMHG | HEART RATE: 82 BPM

## 2025-06-11 DIAGNOSIS — N30.01 ACUTE CYSTITIS WITH HEMATURIA: Primary | ICD-10-CM

## 2025-06-11 DIAGNOSIS — R31.0 GROSS HEMATURIA: ICD-10-CM

## 2025-06-11 PROCEDURE — 1126F AMNT PAIN NOTED NONE PRSNT: CPT | Mod: CPTII,HCNC,S$GLB,

## 2025-06-11 PROCEDURE — 99999 PR PBB SHADOW E&M-EST. PATIENT-LVL V: CPT | Mod: PBBFAC,HCNC,,

## 2025-06-11 PROCEDURE — 1160F RVW MEDS BY RX/DR IN RCRD: CPT | Mod: CPTII,HCNC,S$GLB,

## 2025-06-11 PROCEDURE — 1111F DSCHRG MED/CURRENT MED MERGE: CPT | Mod: CPTII,HCNC,S$GLB,

## 2025-06-11 PROCEDURE — 1159F MED LIST DOCD IN RCRD: CPT | Mod: CPTII,HCNC,S$GLB,

## 2025-06-11 PROCEDURE — 3288F FALL RISK ASSESSMENT DOCD: CPT | Mod: CPTII,HCNC,S$GLB,

## 2025-06-11 PROCEDURE — 1101F PT FALLS ASSESS-DOCD LE1/YR: CPT | Mod: CPTII,HCNC,S$GLB,

## 2025-06-11 PROCEDURE — 1157F ADVNC CARE PLAN IN RCRD: CPT | Mod: CPTII,HCNC,S$GLB,

## 2025-06-11 PROCEDURE — 99215 OFFICE O/P EST HI 40 MIN: CPT | Mod: HCNC,S$GLB,,

## 2025-06-11 RX ORDER — TRIMETHOPRIM 100 MG/1
100 TABLET ORAL NIGHTLY
Qty: 90 TABLET | Refills: 0 | Status: ON HOLD | OUTPATIENT
Start: 2025-06-11

## 2025-06-12 ENCOUNTER — PATIENT MESSAGE (OUTPATIENT)
Dept: PRIMARY CARE CLINIC | Facility: CLINIC | Age: 89
End: 2025-06-12
Payer: MEDICARE

## 2025-06-12 DIAGNOSIS — R31.0 GROSS HEMATURIA: Primary | ICD-10-CM

## 2025-06-12 NOTE — TELEPHONE ENCOUNTER
LOV with Perla Hopper MD , 6/9/2025    Patient will start 3 month course of antibiotic once Bactrim DS is completed, per urology. Patient now has stage one possible two pressure ulcer of left buttock, hydrocoidal patch has been placed. Requesting wound care orders once HH starts on 6/18/25    Note dated 6/9/25:  Assessment/Plan      Nickie Segura is a 89 y.o.female with:     Recurrent UTI  - Pt on bactrim per . Has fu in Mid July. Has indwelling guerrier. Awaits HH.      Debility  - Planning to be admitted to NH.      Iron deficiency anemia, unspecified iron deficiency anemia type  - Fu by H/O - requires freq IV iron. Received IV iron and PRBC's during recent hospitalization.      Anxiety  - Stable. Cont current.      Gross hematuria  - Fu by . Guerrier in dwelling. Being tx for UTI wthh bactrim.      OAB (overactive bladder)  - Stable.  Cont current regimen.     Guerrier catheter in place  - Awaits .  Fu by .

## 2025-06-12 NOTE — TELEPHONE ENCOUNTER
I will reach out and find out which home health agency will be seeing the patient. I will then reach out and set up wound care.

## 2025-06-12 NOTE — TELEPHONE ENCOUNTER
Thanks for the update. Agree she needs wound care. Can we verbal an order for Wound care to be added to her Home health?  Dr. GAGR

## 2025-06-13 NOTE — TELEPHONE ENCOUNTER
I have pended the HH order with wound care included. There a few questions you have to answer about the wound itself. I will fax it once signed.

## 2025-06-13 NOTE — TELEPHONE ENCOUNTER
Spoke with [facility] in regard to wound orders. They stated because this is a pending patient the orders will need to be faxed in.

## 2025-06-14 ENCOUNTER — HOSPITAL ENCOUNTER (OUTPATIENT)
Facility: HOSPITAL | Age: 89
Discharge: HOSPICE | End: 2025-06-16
Attending: STUDENT IN AN ORGANIZED HEALTH CARE EDUCATION/TRAINING PROGRAM
Payer: MEDICARE

## 2025-06-14 DIAGNOSIS — T83.511D URINARY TRACT INFECTION ASSOCIATED WITH INDWELLING URETHRAL CATHETER, SUBSEQUENT ENCOUNTER: ICD-10-CM

## 2025-06-14 DIAGNOSIS — N17.9 AKI (ACUTE KIDNEY INJURY): ICD-10-CM

## 2025-06-14 DIAGNOSIS — F41.9 ANXIETY: ICD-10-CM

## 2025-06-14 DIAGNOSIS — R31.9 HEMATURIA, UNSPECIFIED TYPE: Primary | ICD-10-CM

## 2025-06-14 DIAGNOSIS — N39.0 URINARY TRACT INFECTION ASSOCIATED WITH INDWELLING URETHRAL CATHETER, SUBSEQUENT ENCOUNTER: ICD-10-CM

## 2025-06-14 DIAGNOSIS — R07.9 CHEST PAIN: ICD-10-CM

## 2025-06-14 PROBLEM — Z97.8 CHRONIC INDWELLING FOLEY CATHETER: Status: ACTIVE | Noted: 2025-06-14

## 2025-06-14 PROBLEM — C67.9 BLADDER CANCER: Status: ACTIVE | Noted: 2025-06-14

## 2025-06-14 LAB
ABSOLUTE EOSINOPHIL (OHS): 0.13 K/UL
ABSOLUTE MONOCYTE (OHS): 0.83 K/UL (ref 0.3–1)
ABSOLUTE NEUTROPHIL COUNT (OHS): 5.03 K/UL (ref 1.8–7.7)
ALBUMIN SERPL BCP-MCNC: 2.8 G/DL (ref 3.5–5.2)
ALP SERPL-CCNC: 108 UNIT/L (ref 40–150)
ALT SERPL W/O P-5'-P-CCNC: 42 UNIT/L (ref 10–44)
ANION GAP (OHS): 7 MMOL/L (ref 8–16)
AST SERPL-CCNC: 94 UNIT/L (ref 11–45)
BACTERIA #/AREA URNS AUTO: ABNORMAL /HPF
BASOPHILS # BLD AUTO: 0.05 K/UL
BASOPHILS NFR BLD AUTO: 0.6 %
BILIRUB SERPL-MCNC: 0.2 MG/DL (ref 0.1–1)
BILIRUB UR QL STRIP.AUTO: NEGATIVE
BUN SERPL-MCNC: 23 MG/DL (ref 8–23)
CALCIUM SERPL-MCNC: 8 MG/DL (ref 8.7–10.5)
CHLORIDE SERPL-SCNC: 107 MMOL/L (ref 95–110)
CLARITY UR: ABNORMAL
CO2 SERPL-SCNC: 20 MMOL/L (ref 23–29)
COLOR UR AUTO: ABNORMAL
CREAT SERPL-MCNC: 1.7 MG/DL (ref 0.5–1.4)
ERYTHROCYTE [DISTWIDTH] IN BLOOD BY AUTOMATED COUNT: 20.5 % (ref 11.5–14.5)
GFR SERPLBLD CREATININE-BSD FMLA CKD-EPI: 29 ML/MIN/1.73/M2
GLUCOSE SERPL-MCNC: 101 MG/DL (ref 70–110)
GLUCOSE UR QL STRIP: NEGATIVE
HCT VFR BLD AUTO: 24.5 % (ref 37–48.5)
HGB BLD-MCNC: 7.5 GM/DL (ref 12–16)
HGB UR QL STRIP: ABNORMAL
HOLD SPECIMEN: NORMAL
HYALINE CASTS UR QL AUTO: 0 /LPF (ref 0–1)
IMM GRANULOCYTES # BLD AUTO: 0.04 K/UL (ref 0–0.04)
IMM GRANULOCYTES NFR BLD AUTO: 0.5 % (ref 0–0.5)
INDIRECT COOMBS: NORMAL
KETONES UR QL STRIP: NEGATIVE
LEUKOCYTE ESTERASE UR QL STRIP: ABNORMAL
LYMPHOCYTES # BLD AUTO: 1.64 K/UL (ref 1–4.8)
MCH RBC QN AUTO: 26.9 PG (ref 27–31)
MCHC RBC AUTO-ENTMCNC: 30.6 G/DL (ref 32–36)
MCV RBC AUTO: 88 FL (ref 82–98)
MICROSCOPIC COMMENT: ABNORMAL
NITRITE UR QL STRIP: NEGATIVE
NON-SQ EPI CELLS #/AREA URNS AUTO: 8 /HPF
NUCLEATED RBC (/100WBC) (OHS): 0 /100 WBC
PH UR STRIP: 6 [PH]
PLATELET # BLD AUTO: 219 K/UL (ref 150–450)
PMV BLD AUTO: 9 FL (ref 9.2–12.9)
POTASSIUM SERPL-SCNC: 4.4 MMOL/L (ref 3.5–5.1)
PROT SERPL-MCNC: 5.8 GM/DL (ref 6–8.4)
PROT UR QL STRIP: ABNORMAL
RBC # BLD AUTO: 2.79 M/UL (ref 4–5.4)
RBC #/AREA URNS AUTO: >100 /HPF (ref 0–4)
RELATIVE EOSINOPHIL (OHS): 1.7 %
RELATIVE LYMPHOCYTE (OHS): 21.2 % (ref 18–48)
RELATIVE MONOCYTE (OHS): 10.8 % (ref 4–15)
RELATIVE NEUTROPHIL (OHS): 65.2 % (ref 38–73)
RH BLD: NORMAL
SODIUM SERPL-SCNC: 134 MMOL/L (ref 136–145)
SP GR UR STRIP: 1.01
SPECIMEN OUTDATE: NORMAL
SQUAMOUS #/AREA URNS AUTO: 9 /HPF
UROBILINOGEN UR STRIP-ACNC: NEGATIVE EU/DL
WBC # BLD AUTO: 7.72 K/UL (ref 3.9–12.7)
WBC #/AREA URNS AUTO: >100 /HPF (ref 0–5)
WBC CLUMPS UR QL AUTO: ABNORMAL

## 2025-06-14 PROCEDURE — 86902 BLOOD TYPE ANTIGEN DONOR EA: CPT | Mod: HCNC

## 2025-06-14 PROCEDURE — G0378 HOSPITAL OBSERVATION PER HR: HCPCS | Mod: HCNC

## 2025-06-14 PROCEDURE — 80053 COMPREHEN METABOLIC PANEL: CPT | Mod: HCNC

## 2025-06-14 PROCEDURE — 87086 URINE CULTURE/COLONY COUNT: CPT | Mod: HCNC

## 2025-06-14 PROCEDURE — 81003 URINALYSIS AUTO W/O SCOPE: CPT | Mod: HCNC

## 2025-06-14 PROCEDURE — 25000003 PHARM REV CODE 250: Mod: HCNC

## 2025-06-14 PROCEDURE — 85025 COMPLETE CBC W/AUTO DIFF WBC: CPT | Mod: HCNC

## 2025-06-14 PROCEDURE — 86901 BLOOD TYPING SEROLOGIC RH(D): CPT | Mod: HCNC

## 2025-06-14 PROCEDURE — S4991 NICOTINE PATCH NONLEGEND: HCPCS | Mod: HCNC

## 2025-06-14 PROCEDURE — 63600175 PHARM REV CODE 636 W HCPCS: Mod: HCNC | Performed by: STUDENT IN AN ORGANIZED HEALTH CARE EDUCATION/TRAINING PROGRAM

## 2025-06-14 PROCEDURE — 63600175 PHARM REV CODE 636 W HCPCS: Mod: HCNC

## 2025-06-14 RX ORDER — ATORVASTATIN CALCIUM 40 MG/1
80 TABLET, FILM COATED ORAL NIGHTLY
Status: DISCONTINUED | OUTPATIENT
Start: 2025-06-14 | End: 2025-06-16 | Stop reason: HOSPADM

## 2025-06-14 RX ORDER — SODIUM CHLORIDE 0.9 % (FLUSH) 0.9 %
10 SYRINGE (ML) INJECTION EVERY 12 HOURS PRN
Status: DISCONTINUED | OUTPATIENT
Start: 2025-06-14 | End: 2025-06-16 | Stop reason: HOSPADM

## 2025-06-14 RX ORDER — IBUPROFEN 200 MG
24 TABLET ORAL
Status: CANCELLED | OUTPATIENT
Start: 2025-06-14

## 2025-06-14 RX ORDER — GLUCAGON 1 MG
1 KIT INJECTION
Status: CANCELLED | OUTPATIENT
Start: 2025-06-14

## 2025-06-14 RX ORDER — IBUPROFEN 200 MG
16 TABLET ORAL
Status: DISCONTINUED | OUTPATIENT
Start: 2025-06-14 | End: 2025-06-16 | Stop reason: HOSPADM

## 2025-06-14 RX ORDER — IBUPROFEN 200 MG
24 TABLET ORAL
Status: DISCONTINUED | OUTPATIENT
Start: 2025-06-14 | End: 2025-06-16 | Stop reason: HOSPADM

## 2025-06-14 RX ORDER — GLUCAGON 1 MG
1 KIT INJECTION
Status: DISCONTINUED | OUTPATIENT
Start: 2025-06-14 | End: 2025-06-16 | Stop reason: HOSPADM

## 2025-06-14 RX ORDER — CEFTRIAXONE 1 G/1
1 INJECTION, POWDER, FOR SOLUTION INTRAMUSCULAR; INTRAVENOUS
Status: DISCONTINUED | OUTPATIENT
Start: 2025-06-14 | End: 2025-06-16 | Stop reason: HOSPADM

## 2025-06-14 RX ORDER — IBUPROFEN 200 MG
16 TABLET ORAL
Status: CANCELLED | OUTPATIENT
Start: 2025-06-14

## 2025-06-14 RX ORDER — HEPARIN SODIUM 5000 [USP'U]/ML
5000 INJECTION, SOLUTION INTRAVENOUS; SUBCUTANEOUS EVERY 8 HOURS
Status: DISCONTINUED | OUTPATIENT
Start: 2025-06-14 | End: 2025-06-14

## 2025-06-14 RX ORDER — QUETIAPINE FUMARATE 25 MG/1
25 TABLET, FILM COATED ORAL ONCE
Status: COMPLETED | OUTPATIENT
Start: 2025-06-15 | End: 2025-06-14

## 2025-06-14 RX ORDER — SODIUM CHLORIDE 0.9 % (FLUSH) 0.9 %
10 SYRINGE (ML) INJECTION EVERY 12 HOURS PRN
Status: CANCELLED | OUTPATIENT
Start: 2025-06-14

## 2025-06-14 RX ORDER — IBUPROFEN 200 MG
1 TABLET ORAL
Status: COMPLETED | OUTPATIENT
Start: 2025-06-14 | End: 2025-06-15

## 2025-06-14 RX ORDER — NALOXONE HCL 0.4 MG/ML
0.02 VIAL (ML) INJECTION
Status: DISCONTINUED | OUTPATIENT
Start: 2025-06-14 | End: 2025-06-16 | Stop reason: HOSPADM

## 2025-06-14 RX ORDER — CITALOPRAM 10 MG/1
30 TABLET ORAL DAILY
Status: DISCONTINUED | OUTPATIENT
Start: 2025-06-15 | End: 2025-06-16 | Stop reason: HOSPADM

## 2025-06-14 RX ORDER — NALOXONE HCL 0.4 MG/ML
0.02 VIAL (ML) INJECTION
Status: CANCELLED | OUTPATIENT
Start: 2025-06-14

## 2025-06-14 RX ORDER — LEVOTHYROXINE SODIUM 100 UG/1
100 TABLET ORAL
Status: DISCONTINUED | OUTPATIENT
Start: 2025-06-15 | End: 2025-06-16 | Stop reason: HOSPADM

## 2025-06-14 RX ADMIN — Medication 1 PATCH: at 05:06

## 2025-06-14 RX ADMIN — CEFTRIAXONE 1 G: 1 INJECTION, POWDER, FOR SOLUTION INTRAMUSCULAR; INTRAVENOUS at 07:06

## 2025-06-14 RX ADMIN — ATORVASTATIN CALCIUM 80 MG: 40 TABLET, FILM COATED ORAL at 10:06

## 2025-06-14 RX ADMIN — QUETIAPINE FUMARATE 25 MG: 25 TABLET ORAL at 11:06

## 2025-06-14 RX ADMIN — SODIUM CHLORIDE, POTASSIUM CHLORIDE, SODIUM LACTATE AND CALCIUM CHLORIDE 500 ML: 600; 310; 30; 20 INJECTION, SOLUTION INTRAVENOUS at 06:06

## 2025-06-14 NOTE — PLAN OF CARE
[facility] - Emergency Dept  Initial Discharge Assessment       Primary Care Provider: Perla Hopper MD    Admission Diagnosis: No admission diagnoses are documented for this encounter.    Admission Date: 6/14/2025  Expected Discharge Date:     Transition of Care Barriers: (P) None    Payor: HU[employer] MANAGED MEDICARE / Plan: HU[employer]NA MEDICARE SELECT PARTNER / Product Type: Medicare Advantage /     Extended Emergency Contact Information  Primary Emergency Contact: Apolonia Corona  Mobile Phone: [phone]  Relation: Friend  Secondary Emergency Contact: Brittaney Albarran  Mobile Phone: [phone]  Relation: Relative  Preferred language: English   needed? No    Discharge Plan A: (P) Home Health  Discharge Plan B: (P) Home Health      [id number] - [place] - [address] AT [facility]  [address]  [place] [address]  Phone: [phone] Fax: [phone]    [id number] - [place] - [address] AT [facility]  [address] [place] [address]  Phone: [phone] Fax: [phone]    [id number] - [place] - [address] AT [facility] [address]  [address]  [place] [address]  Phone: [phone] Fax: [phone]      Initial Assessment (most recent)       Adult Discharge Assessment - 06/14/25 1805          Discharge Assessment    Assessment Type Discharge Planning Assessment (P)      Confirmed/corrected address, phone number and insurance Yes (P)      Confirmed Demographics Correct on Facesheet (P)      Source of Information patient;family;health record (P)      Communicated CEM with patient/caregiver Yes (P)      People in Home sibling(s) (P)      Facility Arrived From: [facility] (P)      Do you expect to return to your current living situation? Yes (P)      Do you have help at home or someone to help you manage your care at home? Yes (P)      Prior to  hospitilization cognitive status: Alert/Oriented (P)      Current cognitive status: Alert/Oriented (P)      Equipment Currently Used at Home walker, rolling (P)      Patient currently being followed by outpatient case management? No (P)      Do you take prescription medications? Yes (P)      Do you have prescription coverage? Yes (P)      Do you have any problems affording any of your prescribed medications? No (P)      How do you get to doctors appointments? family or friend will provide (P)      Are you on dialysis? No (P)      Discharge Plan A Home Health (P)      Discharge Plan B Home Health (P)      DME Needed Upon Discharge  none (P)      Discharge Plan discussed with: Patient (P)      Transition of Care Barriers None (P)         Physical Activity    On average, how many days per week do you engage in moderate to strenuous exercise (like a brisk walk)? 0 days (P)         Financial Resource Strain    How hard is it for you to pay for the very basics like food, housing, medical care, and heating? Not very hard (P)         Housing Stability    In the last 12 months, was there a time when you were not able to pay the mortgage or rent on time? No (P)      At any time in the past 12 months, were you homeless or living in a shelter (including now)? No (P)         Transportation Needs    In the past 12 months, has lack of transportation kept you from medical appointments or from getting medications? No (P)      In the past 12 months, has lack of transportation kept you from meetings, work, or from getting things needed for daily living? No (P)         Food Insecurity    Within the past 12 months, you worried that your food would run out before you got the money to buy more. Never true (P)         Stress    Do you feel stress - tense, restless, nervous, or anxious, or unable to sleep at night because your mind is troubled all the time - these days? Only a little (P)         Social Isolation    How often do you feel  lonely or isolated from those around you?  Rarely (P)         Alcohol Use    Q1: How often do you have a drink containing alcohol? Patient declined (P)         Utilities    In the past 12 months has the electric, gas, oil, or water company threatened to shut off services in your home? No (P)         Health Literacy    How often do you need to have someone help you when you read instructions, pamphlets, or other written material from your doctor or pharmacy? Sometimes (P)

## 2025-06-14 NOTE — PLAN OF CARE
Discharge planning    Chart reviewed today  Care plan discussed at bedside today  Anticipate discharge tonight or tomorrow  Spoke with Em @  [facility]  [facility] will have the 1st home visit for home health services on this Wednesday or possibly sooner  Family aware of the plan  Case management to follow tomorrow

## 2025-06-14 NOTE — ED PROVIDER NOTES
Encounter Date: 6/14/2025       History     Chief Complaint   Patient presents with    Hematuria     EMS pt from [facility] living for possible bloodin guerrier bag. Aaox4, difficulty hearing.      89-year-old female with PMHx of anxiety, hypothyroidism, catarcts, ostoarthritis, recurrent UTI with guerrier catheter who is bed bound presents to the emergency department from assisted living community accompanied by her sister for gross hematuria and fatigue x 3 days. Denies any fever, chills, nausea, vomiting, chest pain, shortness of breath, abdominal pain, recent falls/trauma.    Of note, patient had recent admission for same symptoms, urology follows her.  She just finished a course of Bactrim today. Most recent guerrier placement was 6/05/25.        Review of patient's allergies indicates:   Allergen Reactions    Penicillins      rash     Past Medical History:   Diagnosis Date    Allergy     LORNA positive     Anxiety     Bilateral cataracts     Colitis     Colon polyp     COVID-19 09/2022    Depression     Elevated IgE level     Hepatomegaly     Hiatal hernia     Hypothyroidism     IFG (impaired fasting glucose)     Iron deficiency anemia     Macular degeneration     Nicotine dependence     Osteoarthritis     Recurrent UTI     Renal cyst     Skin cancer     Tuberculosis of bladder     Urinary incontinence     Vitamin B12 deficiency     Wheezing      Past Surgical History:   Procedure Laterality Date    CHOLECYSTECTOMY       Family History   Problem Relation Name Age of Onset    Heart disease Mother      Diabetes Mother      Heart disease Father      Polycystic kidney disease Father      Atrial fibrillation Sister       Social History[1]  Review of Systems    Physical Exam     Initial Vitals [06/14/25 1616]   BP Pulse Resp Temp SpO2   130/80 72 18 98 °F (36.7 °C) 99 %      MAP       --         Physical Exam    Nursing note and vitals reviewed.  Constitutional: She is not diaphoretic. No distress.   Elderly   HENT:    Head: Normocephalic and atraumatic. Mouth/Throat: Oropharynx is clear and moist.   Eyes: EOM are normal.   Neck: Neck supple.   Normal range of motion.  Cardiovascular:  Normal rate, regular rhythm, normal heart sounds and intact distal pulses.           Pulmonary/Chest: Breath sounds normal. No respiratory distress. She exhibits no tenderness.   Abdominal: Abdomen is soft. There is no abdominal tenderness.   Genitourinary:    Genitourinary Comments: Eason catheter in place with gross hematuria in output bag     Musculoskeletal:         General: Normal range of motion.      Cervical back: Normal range of motion and neck supple.     Neurological: She is alert and oriented to person, place, and time. She has normal strength.   Skin: Skin is warm and dry. Capillary refill takes less than 2 seconds.   Psychiatric: She has a normal mood and affect.         ED Course   Procedures  Labs Reviewed   COMPREHENSIVE METABOLIC PANEL - Abnormal       Result Value    Sodium 134 (*)     Potassium 4.4      Chloride 107      CO2 20 (*)     Glucose 101      BUN 23      Creatinine 1.7 (*)     Calcium 8.0 (*)     Protein Total 5.8 (*)     Albumin 2.8 (*)     Bilirubin Total 0.2            AST 94 (*)     ALT 42      Anion Gap 7 (*)     eGFR 29 (*)    URINALYSIS, REFLEX TO URINE CULTURE - Abnormal    Color, UA Brown (*)     Appearance, UA Cloudy (*)     pH, UA 6.0      Spec Grav UA 1.015      Protein, UA 2+ (*)     Glucose, UA Negative      Ketones, UA Negative      Bilirubin, UA Negative      Blood, UA 3+ (*)     Nitrites, UA Negative      Urobilinogen, UA Negative      Leukocyte Esterase, UA 3+ (*)    CBC WITH DIFFERENTIAL - Abnormal    WBC 7.72      RBC 2.79 (*)     HGB 7.5 (*)     HCT 24.5 (*)     MCV 88      MCH 26.9 (*)     MCHC 30.6 (*)     RDW 20.5 (*)     Platelet Count 219      MPV 9.0 (*)     Nucleated RBC 0      Neut % 65.2      Lymph % 21.2      Mono % 10.8      Eos % 1.7      Basophil % 0.6      Imm Grans % 0.5       Neut # 5.03      Lymph # 1.64      Mono # 0.83      Eos # 0.13      Baso # 0.05      Imm Grans # 0.04     URINALYSIS MICROSCOPIC - Abnormal    RBC, UA >100 (*)     WBC, UA >100 (*)     WBC Clumps, UA Many (*)     Bacteria, UA Moderate (*)     Squamous Epithelial Cells, UA 9      Non-Squamous Epithelial Cells 8 (*)     Hyaline Casts, UA 0      Microscopic Comment       CULTURE, URINE   CBC W/ AUTO DIFFERENTIAL    Narrative:     The following orders were created for panel order CBC auto differential.  Procedure                               Abnormality         Status                     ---------                               -----------         ------                     CBC with Differential[[id number]]       Abnormal            Final result                 Please view results for these tests on the individual orders.   GREY TOP URINE HOLD    Extra Tube Hold for add-ons.     TYPE & SCREEN    Specimen Outdate 06/17/2025 23:59      Group & Rh B POS      Indirect Diony NEG            Imaging Results    None          Medications   nicotine 21 mg/24 hr 1 patch (1 patch Transdermal Patch Applied 6/14/25 5994)   sodium chloride 0.9% flush 10 mL (has no administration in time range)   naloxone 0.4 mg/mL injection 0.02 mg (has no administration in time range)   glucose chewable tablet 16 g (has no administration in time range)   glucose chewable tablet 24 g (has no administration in time range)   dextrose 50% injection 12.5 g (has no administration in time range)   dextrose 50% injection 25 g (has no administration in time range)   glucagon (human recombinant) injection 1 mg (has no administration in time range)   cefTRIAXone injection 1 g (has no administration in time range)   citalopram tablet 30 mg (has no administration in time range)   levothyroxine tablet 100 mcg (has no administration in time range)   atorvastatin tablet 80 mg (has no administration in time range)   lactated ringers bolus 500 mL (500 mLs Intravenous  New Bag 6/14/25 1814)     Medical Decision Making  89-year-old female with PMHx of anxiety, hypothyroidism, catarcts, ostoarthritis, recurrent UTI with guerrier catheter who is bed bound presents to the emergency department from assisted living community accompanied by her sister for gross hematuria and fatigue x 3 days. Physical exam as above    Based on available information and the initial assessment, the working differential diagnoses considered during this evaluation include but are not limited to anemia, electrolyte abnormality, UTI    Discussed case with Alcides Larsen MD ([facility]) who agrees with current care and management of pt and accepts admission.     Re-evaluated pt. I have discussed test results, shared treatment plan, and the need for admission with patient and and family at bedside. Pt and family  express understanding at this time and agree with all information. All questions answered. Pt and family  have no further questions or concerns at this time. Pt is ready for admit.    Amount and/or Complexity of Data Reviewed  Labs: ordered. Decision-making details documented in ED Course.    Risk  OTC drugs.               ED Course as of 06/14/25 1919   Sat Jun 14, 2025   1735 Hemoglobin(!): 7.5  Similar to prior [AC]   1757 Hemoglobin(!): 7.5  Last Hgb 7.9 - 9 days ago [ST]   1801 Consulted urology for help with guerrier catheter exchange, no exchange need at this time. Just change output bag.  [ST]      ED Course User Index  [AC] Chester Miranda, DO  [ST] Lisa Valladares PA-C                           Clinical Impression:  Final diagnoses:  [R31.9] Hematuria, unspecified type (Primary)  [T83.511D, N39.0] Urinary tract infection associated with indwelling urethral catheter, subsequent encounter  [N17.9] KINA (acute kidney injury)  [N17.9] Acute kidney injury          ED Disposition Condition    Observation                       [1]   Social History  Tobacco Use    Smoking status: Every Day      Types: Vaping with nicotine     Passive exposure: Current    Smokeless tobacco: Never    Tobacco comments:     Formerly smoked cigarettes.   Substance Use Topics    Alcohol use: Never    Drug use: Never        Lisa Valladares PA-C  06/14/25 1920

## 2025-06-14 NOTE — ASSESSMENT & PLAN NOTE
Patient with a chronic indwelling guerrier and recurrent UTIs who follows with urology outpatient. Patient with a hx of OAB but has had multiple UTIs. Most recent urine cx on 6/5/25 was positive for E. Coli and patient was started on bactrim for 3 months by her urology NP.    Plan:  - Continue ceftriaxone

## 2025-06-14 NOTE — ASSESSMENT & PLAN NOTE
KINA is likely due to post-obstructive d/t possible malignancy as seen on recent CT scan. Baseline creatinine is 1.0. Most recent creatinine and eGFR are listed below.  Recent Labs     06/14/25  1708   CREATININE 1.7*   EGFRNORACEVR 29*       Plan:  - KINA is worsening  - Consult urology as patient may be a candidate for possible nephrostomy tubes   - F/u retroperitoneal US  - Avoid nephrotoxins and renally dose meds for GFR listed above  - Monitor urine output, serial BMP, and adjust therapy as needed

## 2025-06-14 NOTE — ED NOTES
Guerrier drainage bag replaced by RN. Per Urology, no change of guerrier catheter at this time, just change drainage bag.

## 2025-06-14 NOTE — ED TRIAGE NOTES
Nickie Segura, a 89 y.o. female presents to the ED w/ complaint of hematuria since Thursday with chronic guerrier placed by urology. Very hard to hear, macular degeneration. Aaox3.

## 2025-06-14 NOTE — HPI
Nickie Castellano is a 89 year old female with a PMHx HTN, hypothyroidism, colon polyps s/p partial colon resection, chronic guerrier, bed bound who [place] [facility] [place] and weakness. According to sister, patient has had gross hematuria with associated weakness since 6/3. Patient recently discharged from [facility] on 06/05 after presenting for hematuria. CT scan at that time was remarkable for a heterogeneous suspected enhancing material within the posterior aspect of the urinary bladder most likely 2/2 to  blood products or sequela of infection however malignancy cannot be excluded, mild to moderate right hydroureteronephrosis,  and irregularity of the urinary bladder walls. Patient followed up with urology after discharge where they had an extensive discussion. Patient and family members were offered further workup with cystoscopy vs holding off to see if the gross hematuria and UTIs continue. At that time, patient and family came to a conclusion of wanting to hold off on a cystoscopy as patient would not like any invasive treatment at this time. She was sent home with prophylactic bactrim daily for 10 months.     In the ED, patient was HDS. Labs were remarkable for Hgb 7.5 (baseline 8.6), Na 134, Cr 1.7 (baseline 1), and UA with 3+ LE, >100 WBC, and moderate bacteria. She was given ceftriaxone and given a 500cc LR bolus and urology was consulted.     Admitted to Hospital Medicine for further workup of KINA.     History obtained through chart review by sister.

## 2025-06-15 LAB
ABSOLUTE EOSINOPHIL (OHS): 0.16 K/UL
ABSOLUTE MONOCYTE (OHS): 0.53 K/UL (ref 0.3–1)
ABSOLUTE NEUTROPHIL COUNT (OHS): 3.94 K/UL (ref 1.8–7.7)
ALBUMIN SERPL BCP-MCNC: 2.7 G/DL (ref 3.5–5.2)
ALP SERPL-CCNC: 100 UNIT/L (ref 40–150)
ALT SERPL W/O P-5'-P-CCNC: 40 UNIT/L (ref 10–44)
ANION GAP (OHS): 8 MMOL/L (ref 8–16)
AST SERPL-CCNC: 83 UNIT/L (ref 11–45)
BASOPHILS # BLD AUTO: 0.03 K/UL
BASOPHILS NFR BLD AUTO: 0.5 %
BILIRUB SERPL-MCNC: 0.2 MG/DL (ref 0.1–1)
BUN SERPL-MCNC: 22 MG/DL (ref 8–23)
CALCIUM SERPL-MCNC: 8.2 MG/DL (ref 8.7–10.5)
CHLORIDE SERPL-SCNC: 107 MMOL/L (ref 95–110)
CO2 SERPL-SCNC: 19 MMOL/L (ref 23–29)
CREAT SERPL-MCNC: 1.5 MG/DL (ref 0.5–1.4)
ERYTHROCYTE [DISTWIDTH] IN BLOOD BY AUTOMATED COUNT: 20.8 % (ref 11.5–14.5)
GFR SERPLBLD CREATININE-BSD FMLA CKD-EPI: 33 ML/MIN/1.73/M2
GLUCOSE SERPL-MCNC: 93 MG/DL (ref 70–110)
HCT VFR BLD AUTO: 23 % (ref 37–48.5)
HGB BLD-MCNC: 7 GM/DL (ref 12–16)
IMM GRANULOCYTES # BLD AUTO: 0.02 K/UL (ref 0–0.04)
IMM GRANULOCYTES NFR BLD AUTO: 0.3 % (ref 0–0.5)
LYMPHOCYTES # BLD AUTO: 1.57 K/UL (ref 1–4.8)
MAGNESIUM SERPL-MCNC: 2.1 MG/DL (ref 1.6–2.6)
MCH RBC QN AUTO: 26.7 PG (ref 27–31)
MCHC RBC AUTO-ENTMCNC: 30.4 G/DL (ref 32–36)
MCV RBC AUTO: 88 FL (ref 82–98)
NUCLEATED RBC (/100WBC) (OHS): 0 /100 WBC
PHOSPHATE SERPL-MCNC: 3.1 MG/DL (ref 2.7–4.5)
PLATELET # BLD AUTO: 203 K/UL (ref 150–450)
PMV BLD AUTO: 9.1 FL (ref 9.2–12.9)
POTASSIUM SERPL-SCNC: 4.3 MMOL/L (ref 3.5–5.1)
PROT SERPL-MCNC: 5.7 GM/DL (ref 6–8.4)
RBC # BLD AUTO: 2.62 M/UL (ref 4–5.4)
RELATIVE EOSINOPHIL (OHS): 2.6 %
RELATIVE LYMPHOCYTE (OHS): 25.1 % (ref 18–48)
RELATIVE MONOCYTE (OHS): 8.5 % (ref 4–15)
RELATIVE NEUTROPHIL (OHS): 63 % (ref 38–73)
SODIUM SERPL-SCNC: 134 MMOL/L (ref 136–145)
WBC # BLD AUTO: 6.25 K/UL (ref 3.9–12.7)

## 2025-06-15 PROCEDURE — 85025 COMPLETE CBC W/AUTO DIFF WBC: CPT | Mod: HCNC

## 2025-06-15 PROCEDURE — 25000003 PHARM REV CODE 250: Mod: HCNC

## 2025-06-15 PROCEDURE — 82088 ASSAY OF ALDOSTERONE: CPT | Mod: HCNC

## 2025-06-15 PROCEDURE — P9016 RBC LEUKOCYTES REDUCED: HCPCS | Mod: HCNC

## 2025-06-15 PROCEDURE — 84100 ASSAY OF PHOSPHORUS: CPT | Mod: HCNC

## 2025-06-15 PROCEDURE — 36415 COLL VENOUS BLD VENIPUNCTURE: CPT | Mod: HCNC

## 2025-06-15 PROCEDURE — S4991 NICOTINE PATCH NONLEGEND: HCPCS | Mod: HCNC

## 2025-06-15 PROCEDURE — 86922 COMPATIBILITY TEST ANTIGLOB: CPT | Mod: HCNC

## 2025-06-15 PROCEDURE — 83735 ASSAY OF MAGNESIUM: CPT | Mod: HCNC

## 2025-06-15 PROCEDURE — G0378 HOSPITAL OBSERVATION PER HR: HCPCS | Mod: HCNC

## 2025-06-15 PROCEDURE — 80053 COMPREHEN METABOLIC PANEL: CPT | Mod: HCNC

## 2025-06-15 PROCEDURE — 63600175 PHARM REV CODE 636 W HCPCS: Mod: HCNC

## 2025-06-15 RX ORDER — LORAZEPAM 0.5 MG/1
0.5 TABLET ORAL DAILY PRN
Status: DISCONTINUED | OUTPATIENT
Start: 2025-06-15 | End: 2025-06-16

## 2025-06-15 RX ORDER — LORAZEPAM 0.5 MG/1
0.5 TABLET ORAL DAILY
Status: DISCONTINUED | OUTPATIENT
Start: 2025-06-15 | End: 2025-06-15

## 2025-06-15 RX ORDER — HYDROCODONE BITARTRATE AND ACETAMINOPHEN 500; 5 MG/1; MG/1
TABLET ORAL
Status: DISCONTINUED | OUTPATIENT
Start: 2025-06-15 | End: 2025-06-16 | Stop reason: HOSPADM

## 2025-06-15 RX ORDER — IBUPROFEN 200 MG
1 TABLET ORAL DAILY
Status: DISCONTINUED | OUTPATIENT
Start: 2025-06-15 | End: 2025-06-16 | Stop reason: HOSPADM

## 2025-06-15 RX ORDER — LORAZEPAM 0.5 MG/1
0.5 TABLET ORAL EVERY 6 HOURS PRN
Status: DISCONTINUED | OUTPATIENT
Start: 2025-06-15 | End: 2025-06-15

## 2025-06-15 RX ORDER — TALC
6 POWDER (GRAM) TOPICAL NIGHTLY PRN
Status: DISCONTINUED | OUTPATIENT
Start: 2025-06-15 | End: 2025-06-16 | Stop reason: HOSPADM

## 2025-06-15 RX ADMIN — SODIUM CHLORIDE 500 ML: 9 INJECTION, SOLUTION INTRAVENOUS at 08:06

## 2025-06-15 RX ADMIN — Medication 1 PATCH: at 05:06

## 2025-06-15 RX ADMIN — LORAZEPAM 0.5 MG: 0.5 TABLET ORAL at 03:06

## 2025-06-15 RX ADMIN — ATORVASTATIN CALCIUM 80 MG: 40 TABLET, FILM COATED ORAL at 08:06

## 2025-06-15 RX ADMIN — Medication 6 MG: at 08:06

## 2025-06-15 RX ADMIN — CITALOPRAM HYDROBROMIDE 30 MG: 10 TABLET ORAL at 09:06

## 2025-06-15 RX ADMIN — CEFTRIAXONE 1 G: 1 INJECTION, POWDER, FOR SOLUTION INTRAMUSCULAR; INTRAVENOUS at 08:06

## 2025-06-15 NOTE — H&P
[facility] - Emergency Dept  [facility] Medicine  History & Physical    Patient Name: Nickie Segura  MRN: [id number]  Patient Class: OP- Observation  Admission Date: 6/14/2025  Attending Physician: Vita Ko MD   Primary Care Provider: Perla Hopper MD         Patient information was obtained from patient, relative(s), past medical records, and ER records.     Subjective:     Principal Problem:Hematuria    Chief Complaint:   Chief Complaint   Patient presents with    Hematuria     EMS pt from [facility] assisted living for possible bloodin guerrier bag. Aaox4, difficulty hearing.         HPI: Nickie Segura is a 89 year old female with a PMHx HTN, hypothyroidism, colon polyps s/p partial colon resection, chronic guerrier, bed bound who presents to the [facility] for hematuria and weakness. According to sister, patient has had gross hematuria with associated weakness since 6/3. Patient recently discharged from [facility] on 06/05 after presenting for hematuria. CT scan at that time was remarkable for a heterogeneous suspected enhancing material within the posterior aspect of the urinary bladder most likely 2/2 to  blood products or sequela of infection however malignancy cannot be excluded, mild to moderate right hydroureteronephrosis,  and irregularity of the urinary bladder walls. Patient followed up with urology after discharge where they had an extensive discussion. Patient and family members were offered further workup with cystoscopy vs holding off to see if the gross hematuria and UTIs continue. At that time, patient and family came to a conclusion of wanting to hold off on a cystoscopy as patient would not like any invasive treatment at this time. She was sent home with prophylactic bactrim daily for 10 months.     In the ED, patient was HDS. Labs were remarkable for Hgb 7.5 (baseline 8.6), Na 134, Cr 1.7 (baseline 1), and UA with 3+ LE, >100 WBC, and moderate bacteria. She was given ceftriaxone and given a  500cc LR bolus and urology was consulted.     Admitted to Hospital Medicine for further workup of KINA.     History obtained through chart review by sister.       Past Medical History:   Diagnosis Date    Allergy     LORNA positive     Anxiety     Bilateral cataracts     Colitis     Colon polyp     COVID-19 09/2022    Depression     Elevated IgE level     Hepatomegaly     Hiatal hernia     Hypothyroidism     IFG (impaired fasting glucose)     Iron deficiency anemia     Macular degeneration     Nicotine dependence     Osteoarthritis     Recurrent UTI     Renal cyst     Skin cancer     Tuberculosis of bladder     Urinary incontinence     Vitamin B12 deficiency     Wheezing        Past Surgical History:   Procedure Laterality Date    CHOLECYSTECTOMY         Review of patient's allergies indicates:   Allergen Reactions    Penicillins      rash       No current facility-administered medications on file prior to encounter.     Current Outpatient Medications on File Prior to Encounter   Medication Sig    ascorbic acid, vitamin C, (VITAMIN C) 500 MG tablet Take 500 mg by mouth once daily.    aspirin (ECOTRIN) 81 MG EC tablet Take 81 mg by mouth every morning.    busPIRone (BUSPAR) 15 MG tablet TAKE 1 TABLET BY MOUTH TWICE DAILY    citalopram (CELEXA) 20 MG tablet Take 1.5 tablets (30 mg total) by mouth once daily.    glycopyrrolate (ROBINUL) 1 mg Tab TAKE 1 TABLET(1 MG) BY MOUTH THREE TIMES DAILY (Patient taking differently: Take 1 mg by mouth every morning.)    levothyroxine (SYNTHROID) 100 MCG tablet Take 1 tablet (100 mcg total) by mouth before breakfast.    LORazepam (ATIVAN) 0.5 MG tablet Take 1 tablet (0.5 mg total) by mouth daily as needed for Anxiety.    MYRBETRIQ 50 mg Tb24 TAKE 1 TABLET(50 MG) BY MOUTH EVERY DAY (Patient taking differently: Take 50 mg by mouth every evening.)    pantoprazole (PROTONIX) 40 MG tablet 1 po daily (Patient taking differently: Take 40 mg by mouth once daily.)    polyethylene glycol  (GLYCOLAX) 17 gram/dose powder Take 17 g by mouth once daily. (Patient taking differently: Take 17 g by mouth every Mon, Wed, Fri.)    rosuvastatin (CRESTOR) 20 MG tablet TAKE 1 TABLET(20 MG) BY MOUTH EVERY DAY (Patient taking differently: Take 20 mg by mouth every evening.)    sucralfate (CARAFATE) 100 mg/mL suspension Take 10 mLs (1 g total) by mouth 4 (four) times daily.    sulfamethoxazole-trimethoprim 800-160mg (BACTRIM DS) 800-160 mg Tab Take 1 tablet by mouth 2 (two) times daily. for 7 days    trimethoprim (TRIMPEX) 100 mg Tab Take 1 tablet (100 mg total) by mouth every evening.    vit C/E/zinc ox/amy/lut/zeax (ICAPS AREDS2 ORAL) Take 1 capsule by mouth 2 (two) times a day.    vitamin D (VITAMIN D3) 1000 units Tab Take 1,000 Units by mouth once daily.     Family History       Problem Relation (Age of Onset)    Atrial fibrillation Sister    Diabetes Mother    Heart disease Mother, Father    Polycystic kidney disease Father          Tobacco Use    Smoking status: Every Day     Types: Vaping with nicotine     Passive exposure: Current    Smokeless tobacco: Never    Tobacco comments:     Formerly smoked cigarettes.   Substance and Sexual Activity    Alcohol use: Never    Drug use: Never    Sexual activity: Not Currently     Review of Systems   Constitutional:  Positive for activity change. Negative for chills, fatigue and fever.   Respiratory:  Negative for cough, choking, chest tightness, shortness of breath and wheezing.    Cardiovascular:  Negative for chest pain, palpitations and leg swelling.   Gastrointestinal:  Negative for abdominal distention, abdominal pain, constipation, diarrhea, nausea and vomiting.   Genitourinary:  Positive for hematuria. Negative for difficulty urinating, dysuria, flank pain, frequency and urgency.   Musculoskeletal: Negative.    Neurological:  Positive for weakness.   Psychiatric/Behavioral: Negative.       Objective:     Vital Signs (Most Recent):  Temp: 98.2 °F (36.8 °C)  (06/14/25 1635)  Pulse: 65 (06/14/25 1900)  Resp: 20 (06/14/25 1800)  BP: (!) 123/56 (06/14/25 1900)  SpO2: 100 % (06/14/25 1900) Vital Signs (24h Range):  Temp:  [98 °F (36.7 °C)-98.2 °F (36.8 °C)] 98.2 °F (36.8 °C)  Pulse:  [65-77] 65  Resp:  [13-20] 20  SpO2:  [99 %-100 %] 100 %  BP: (103-130)/(52-80) 123/56     Weight: 90.7 kg (199 lb 15.3 oz)  Body mass index is 39.05 kg/m².     Physical Exam  Vitals and nursing note reviewed.   Constitutional:       General: She is not in acute distress.     Appearance: She is not ill-appearing, toxic-appearing or diaphoretic.   HENT:      Mouth/Throat:      Mouth: Mucous membranes are moist.      Pharynx: Oropharynx is clear.   Eyes:      General: No scleral icterus.        Right eye: No discharge.         Left eye: No discharge.      Extraocular Movements: Extraocular movements intact.      Conjunctiva/sclera: Conjunctivae normal.      Pupils: Pupils are equal, round, and reactive to light.   Cardiovascular:      Rate and Rhythm: Normal rate and regular rhythm.      Heart sounds: Murmur heard.   Pulmonary:      Effort: Pulmonary effort is normal. No respiratory distress.      Breath sounds: Normal breath sounds. No stridor. No wheezing, rhonchi or rales.   Chest:      Chest wall: No tenderness.   Abdominal:      General: Abdomen is flat. Bowel sounds are normal. There is no distension.      Palpations: Abdomen is soft. There is no mass.      Tenderness: There is no abdominal tenderness. There is no guarding or rebound.      Hernia: No hernia is present.   Genitourinary:     Comments: Eason in place ~ blood notable  Musculoskeletal:         General: No swelling, tenderness, deformity or signs of injury.      Right lower leg: No edema.      Left lower leg: No edema.   Skin:     General: Skin is warm and dry.      Capillary Refill: Capillary refill takes less than 2 seconds.      Coloration: Skin is not jaundiced or pale.      Findings: No erythema or rash.   Neurological:       Mental Status: She is alert.              CRANIAL NERVES     CN III, IV, VI   Pupils are equal, round, and reactive to light.       Significant Labs: CBC:   Recent Labs   Lab 06/14/25  1708   WBC 7.72   HGB 7.5*   HCT 24.5*        CMP:   Recent Labs   Lab 06/14/25  1708   *   K 4.4      CO2 20*      BUN 23   CREATININE 1.7*   CALCIUM 8.0*   PROT 5.8*   ALBUMIN 2.8*   BILITOT 0.2   ALKPHOS 108   AST 94*   ALT 42   ANIONGAP 7*       Significant Imaging: I have reviewed all pertinent imaging results/findings within the past 24 hours.  Assessment/Plan:     Assessment & Plan  Hematuria  Suspected Bladder cancer  Patient presents with painless gross hematuria for the past 10 days. She was recent CT abd/pelvis with findings concerning for malignancy. She was seen in urology clinic and deferred cystoscopy or further intervention.     She presents from assisted living with concern of UTI and gross painless hematuria. Otherwise no other symptoms.     Plan  - f/u retro U/S  - urology consulted; appreciate recs   - possible palliative nephrostomy tube   - GOC discussions ~ MPOA electing for home hospice if no interventions are offered by urology   KINA (acute kidney injury)  KINA is likely due to post-obstructive d/t possible malignancy as seen on recent CT scan. Baseline creatinine is 1.0. Most recent creatinine and eGFR are listed below.  Recent Labs     06/14/25  1708   CREATININE 1.7*   EGFRNORACEVR 29*       Plan:  - KINA is worsening  - Consult urology as patient may be a candidate for possible nephrostomy tubes   - F/u retroperitoneal US  - Avoid nephrotoxins and renally dose meds for GFR listed above  - Monitor urine output, serial BMP, and adjust therapy as needed    UTI (urinary tract infection)  Chronic indwelling Guerrier catheter  Patient notable for recurrent UTI and was recently seen in urology clinic for prophylaxis abx. Has chronic guerrier    UA notable for pyuria. However no leukocytosis or  systematic symptoms. No suprapubic tenderness.     Plan  - CTX   - f/u culture     Iron deficiency anemia  Anemia is likely due to . Most recent hemoglobin and hematocrit are listed below. Baseline ~8-9   Recent Labs     06/14/25  1708   HGB 7.5*   HCT 24.5*     Plan  - Monitor serial CBC: Daily  - Transfuse PRBC if patient becomes hemodynamically unstable, symptomatic or H/H drops below 7/21.  - Patient has not received any PRBC transfusions to date  - Patient's anemia is currently stable    RENÉE (generalized anxiety disorder)  Continue home medication     Pure hypercholesterolemia  Continue home statin       VTE Risk Mitigation (From admission, onward)           Ordered     IP VTE HIGH RISK PATIENT  Once         06/14/25 1839     Place sequential compression device  Until discontinued         06/14/25 1839                           On 06/14/2025, patient should be placed in hospital observation services under my care in collaboration with Dr. Ko.         Lobo Obando MD  Department of Hospital Medicine  [facility] - Emergency Dept

## 2025-06-15 NOTE — SUBJECTIVE & OBJECTIVE
Past Medical History:   Diagnosis Date    Allergy     LORNA positive     Anxiety     Bilateral cataracts     Colitis     Colon polyp     COVID-19 09/2022    Depression     Elevated IgE level     Hepatomegaly     Hiatal hernia     Hypothyroidism     IFG (impaired fasting glucose)     Iron deficiency anemia     Macular degeneration     Nicotine dependence     Osteoarthritis     Recurrent UTI     Renal cyst     Skin cancer     Tuberculosis of bladder     Urinary incontinence     Vitamin B12 deficiency     Wheezing        Past Surgical History:   Procedure Laterality Date    CHOLECYSTECTOMY         Review of patient's allergies indicates:   Allergen Reactions    Penicillins      rash       Family History       Problem Relation (Age of Onset)    Atrial fibrillation Sister    Diabetes Mother    Heart disease Mother, Father    Polycystic kidney disease Father            Tobacco Use    Smoking status: Every Day     Types: Vaping with nicotine     Passive exposure: Current    Smokeless tobacco: Never    Tobacco comments:     Formerly smoked cigarettes.   Substance and Sexual Activity    Alcohol use: Never    Drug use: Never    Sexual activity: Not Currently           Objective:     Temp:  [98 °F (36.7 °C)-98.4 °F (36.9 °C)] 98.4 °F (36.9 °C)  Pulse:  [65-77] 74  Resp:  [13-20] 18  SpO2:  [95 %-100 %] 95 %  BP: ()/(51-80) 112/56  Weight: 71 kg (156 lb 8.4 oz)  Body mass index is 30.57 kg/m².           Drains       Drain  Duration                  Urethral Catheter 06/14/25 Coude 20 Fr. 1 day                     Physical Exam  Constitutional:       Comments: Confused    Cardiovascular:      Rate and Rhythm: Normal rate.   Pulmonary:      Effort: No respiratory distress.   Abdominal:      General: There is no distension.   Genitourinary:     Comments: Eason in place draining clear yellow urine with some sediment          Significant Labs:    BMP:  Recent Labs   Lab 06/14/25  1708   *   K 4.4      CO2 20*   BUN  23   CREATININE 1.7*   CALCIUM 8.0*       CBC:  Recent Labs   Lab 06/14/25  1708   WBC 7.72   HGB 7.5*   HCT 24.5*          All pertinent labs results from the past 24 hours have been reviewed.    Significant Imaging:  All pertinent imaging results/findings from the past 24 hours have been reviewed.

## 2025-06-15 NOTE — ASSESSMENT & PLAN NOTE
89yoF with KINA and concern for hydronephrosis consult for recommendations on nephrostomy tube placement     - Patient does not have hydronephrosis on renal US today   - No indication for nephrostomy tube or ureteral stent placement   - Had long discussion with patients POA about GOC, under no circumstances would they desire surgical intervention, anesthesia, or nephrostomy tube placement regardless of clinical indication    - Keep guerrier in place   - Please arrange for follow up with patient urology NP and Chiqui for further management and possible void trial    - KINA unlikely post-renal obstructive given lack of hydronephrosis. Recommend work up of other causes of KINA if aligned with patient's GOC

## 2025-06-15 NOTE — HOSPITAL COURSE
89F PMHx HTN, hypothyroidism, colon polyps s/p partial colon resection, chronic guerrier, bed bound presented to [facility] for hematuria and weakness. On admission, she was notable for KINA which improved with IV fluids. In addition, she received 1U of pRBC for Hgb 7 in setting of hematuria. Urology consulted for evaluation and possible treatment. Extensive GOC conversations with family regarding prognosis of suspected malignancy. Family elected for home hospice.

## 2025-06-15 NOTE — ASSESSMENT & PLAN NOTE
Patient presents with painless gross hematuria for the past 10 days. She was recent CT abd/pelvis with findings concerning for malignancy. She was seen in urology clinic and deferred cystoscopy or further intervention.     She presents from assisted living with concern of UTI and gross painless hematuria. Otherwise no other symptoms.     Plan  - f/u retro U/S  - urology consulted; appreciate recs   - possible palliative nephrostomy tube   - GOC discussions ~ MPOA electing for home hospice if no interventions are offered by urology

## 2025-06-15 NOTE — ASSESSMENT & PLAN NOTE
Patient notable for recurrent UTI and was recently seen in urology clinic for prophylaxis abx. Has chronic guerrier    UA notable for pyuria. However no leukocytosis or systematic symptoms. No suprapubic tenderness. Less concern for UTI ~ likely colonization with chronic guerrier.     Plan  - received 1 dose of CTX    - f/u urine culture pending   - will resume prophylactic abx for recurrent UTI on discharge

## 2025-06-15 NOTE — ASSESSMENT & PLAN NOTE
Patient notable for recurrent UTI and was recently seen in urology clinic for prophylaxis abx. Has chronic guerrier    UA notable for pyuria. However no leukocytosis or systematic symptoms. No suprapubic tenderness.     Plan  - CTX   - f/u culture

## 2025-06-15 NOTE — SUBJECTIVE & OBJECTIVE
Past Medical History:   Diagnosis Date    Allergy     LORNA positive     Anxiety     Bilateral cataracts     Colitis     Colon polyp     COVID-19 09/2022    Depression     Elevated IgE level     Hepatomegaly     Hiatal hernia     Hypothyroidism     IFG (impaired fasting glucose)     Iron deficiency anemia     Macular degeneration     Nicotine dependence     Osteoarthritis     Recurrent UTI     Renal cyst     Skin cancer     Tuberculosis of bladder     Urinary incontinence     Vitamin B12 deficiency     Wheezing        Past Surgical History:   Procedure Laterality Date    CHOLECYSTECTOMY         Review of patient's allergies indicates:   Allergen Reactions    Penicillins      rash       No current facility-administered medications on file prior to encounter.     Current Outpatient Medications on File Prior to Encounter   Medication Sig    ascorbic acid, vitamin C, (VITAMIN C) 500 MG tablet Take 500 mg by mouth once daily.    aspirin (ECOTRIN) 81 MG EC tablet Take 81 mg by mouth every morning.    busPIRone (BUSPAR) 15 MG tablet TAKE 1 TABLET BY MOUTH TWICE DAILY    citalopram (CELEXA) 20 MG tablet Take 1.5 tablets (30 mg total) by mouth once daily.    glycopyrrolate (ROBINUL) 1 mg Tab TAKE 1 TABLET(1 MG) BY MOUTH THREE TIMES DAILY (Patient taking differently: Take 1 mg by mouth every morning.)    levothyroxine (SYNTHROID) 100 MCG tablet Take 1 tablet (100 mcg total) by mouth before breakfast.    LORazepam (ATIVAN) 0.5 MG tablet Take 1 tablet (0.5 mg total) by mouth daily as needed for Anxiety.    MYRBETRIQ 50 mg Tb24 TAKE 1 TABLET(50 MG) BY MOUTH EVERY DAY (Patient taking differently: Take 50 mg by mouth every evening.)    pantoprazole (PROTONIX) 40 MG tablet 1 po daily (Patient taking differently: Take 40 mg by mouth once daily.)    polyethylene glycol (GLYCOLAX) 17 gram/dose powder Take 17 g by mouth once daily. (Patient taking differently: Take 17 g by mouth every Mon, Wed, Fri.)    rosuvastatin (CRESTOR) 20 MG  tablet TAKE 1 TABLET(20 MG) BY MOUTH EVERY DAY (Patient taking differently: Take 20 mg by mouth every evening.)    sucralfate (CARAFATE) 100 mg/mL suspension Take 10 mLs (1 g total) by mouth 4 (four) times daily.    sulfamethoxazole-trimethoprim 800-160mg (BACTRIM DS) 800-160 mg Tab Take 1 tablet by mouth 2 (two) times daily. for 7 days    trimethoprim (TRIMPEX) 100 mg Tab Take 1 tablet (100 mg total) by mouth every evening.    vit C/E/zinc ox/amy/lut/zeax (ICAPS AREDS2 ORAL) Take 1 capsule by mouth 2 (two) times a day.    vitamin D (VITAMIN D3) 1000 units Tab Take 1,000 Units by mouth once daily.     Family History       Problem Relation (Age of Onset)    Atrial fibrillation Sister    Diabetes Mother    Heart disease Mother, Father    Polycystic kidney disease Father          Tobacco Use    Smoking status: Every Day     Types: Vaping with nicotine     Passive exposure: Current    Smokeless tobacco: Never    Tobacco comments:     Formerly smoked cigarettes.   Substance and Sexual Activity    Alcohol use: Never    Drug use: Never    Sexual activity: Not Currently     Review of Systems   Constitutional:  Positive for activity change. Negative for chills, fatigue and fever.   Respiratory:  Negative for cough, choking, chest tightness, shortness of breath and wheezing.    Cardiovascular:  Negative for chest pain, palpitations and leg swelling.   Gastrointestinal:  Negative for abdominal distention, abdominal pain, constipation, diarrhea, nausea and vomiting.   Genitourinary:  Positive for hematuria. Negative for difficulty urinating, dysuria, flank pain, frequency and urgency.   Musculoskeletal: Negative.    Neurological:  Positive for weakness.   Psychiatric/Behavioral: Negative.       Objective:     Vital Signs (Most Recent):  Temp: 98.2 °F (36.8 °C) (06/14/25 1635)  Pulse: 65 (06/14/25 1900)  Resp: 20 (06/14/25 1800)  BP: (!) 123/56 (06/14/25 1900)  SpO2: 100 % (06/14/25 1900) Vital Signs (24h Range):  Temp:  [98 °F  (36.7 °C)-98.2 °F (36.8 °C)] 98.2 °F (36.8 °C)  Pulse:  [65-77] 65  Resp:  [13-20] 20  SpO2:  [99 %-100 %] 100 %  BP: (103-130)/(52-80) 123/56     Weight: 90.7 kg (199 lb 15.3 oz)  Body mass index is 39.05 kg/m².     Physical Exam  Vitals and nursing note reviewed.   Constitutional:       General: She is not in acute distress.     Appearance: She is not ill-appearing, toxic-appearing or diaphoretic.   HENT:      Mouth/Throat:      Mouth: Mucous membranes are moist.      Pharynx: Oropharynx is clear.   Eyes:      General: No scleral icterus.        Right eye: No discharge.         Left eye: No discharge.      Extraocular Movements: Extraocular movements intact.      Conjunctiva/sclera: Conjunctivae normal.      Pupils: Pupils are equal, round, and reactive to light.   Cardiovascular:      Rate and Rhythm: Normal rate and regular rhythm.      Heart sounds: Murmur heard.   Pulmonary:      Effort: Pulmonary effort is normal. No respiratory distress.      Breath sounds: Normal breath sounds. No stridor. No wheezing, rhonchi or rales.   Chest:      Chest wall: No tenderness.   Abdominal:      General: Abdomen is flat. Bowel sounds are normal. There is no distension.      Palpations: Abdomen is soft. There is no mass.      Tenderness: There is no abdominal tenderness. There is no guarding or rebound.      Hernia: No hernia is present.   Genitourinary:     Comments: Eason in place ~ blood notable  Musculoskeletal:         General: No swelling, tenderness, deformity or signs of injury.      Right lower leg: No edema.      Left lower leg: No edema.   Skin:     General: Skin is warm and dry.      Capillary Refill: Capillary refill takes less than 2 seconds.      Coloration: Skin is not jaundiced or pale.      Findings: No erythema or rash.   Neurological:      Mental Status: She is alert.              CRANIAL NERVES     CN III, IV, VI   Pupils are equal, round, and reactive to light.       Significant Labs: CBC:   Recent Labs    Lab 06/14/25  1708   WBC 7.72   HGB 7.5*   HCT 24.5*        CMP:   Recent Labs   Lab 06/14/25  1708   *   K 4.4      CO2 20*      BUN 23   CREATININE 1.7*   CALCIUM 8.0*   PROT 5.8*   ALBUMIN 2.8*   BILITOT 0.2   ALKPHOS 108   AST 94*   ALT 42   ANIONGAP 7*       Significant Imaging: I have reviewed all pertinent imaging results/findings within the past 24 hours.

## 2025-06-15 NOTE — PLAN OF CARE
Pt oriented to self only. NAD. VSS on room air. Eason to gravity draining yellow urine. Hgb 7.0 this morning; 1 'u' RBC given as ordered; no transfusion reaction noted. PRN ativan given for anxiety as ordered. Safety measures in place. Sister at bedside. No concerns voiced at present time. Call light in reach.    Problem: Violence Risk or Actual  Goal: Anger and Impulse Control  Outcome: Progressing     Problem: Skin Injury Risk Increased  Goal: Skin Health and Integrity  Outcome: Progressing     Problem: Adult Inpatient Plan of Care  Goal: Plan of Care Review  Outcome: Progressing  Goal: Patient-Specific Goal (Individualized)  Outcome: Progressing  Goal: Absence of Hospital-Acquired Illness or Injury  Outcome: Progressing  Goal: Optimal Comfort and Wellbeing  Outcome: Progressing  Goal: Readiness for Transition of Care  Outcome: Progressing     Problem: Acute Kidney Injury/Impairment  Goal: Fluid and Electrolyte Balance  Outcome: Progressing  Goal: Improved Oral Intake  Outcome: Progressing  Goal: Effective Renal Function  Outcome: Progressing     Problem: Wound  Goal: Optimal Coping  Outcome: Progressing  Goal: Optimal Functional Ability  Outcome: Progressing  Goal: Absence of Infection Signs and Symptoms  Outcome: Progressing  Goal: Improved Oral Intake  Outcome: Progressing  Goal: Optimal Pain Control and Function  Outcome: Progressing  Goal: Skin Health and Integrity  Outcome: Progressing  Goal: Optimal Wound Healing  Outcome: Progressing     Problem: Fall Injury Risk  Goal: Absence of Fall and Fall-Related Injury  Outcome: Progressing     Problem: Infection  Goal: Absence of Infection Signs and Symptoms  Outcome: Progressing

## 2025-06-15 NOTE — ASSESSMENT & PLAN NOTE
Anemia is likely due to . Most recent hemoglobin and hematocrit are listed below. Baseline ~8-9   Recent Labs     06/14/25  1708   HGB 7.5*   HCT 24.5*     Plan  - Monitor serial CBC: Daily  - Transfuse PRBC if patient becomes hemodynamically unstable, symptomatic or H/H drops below 7/21.  - Patient has not received any PRBC transfusions to date  - Patient's anemia is currently stable

## 2025-06-15 NOTE — ASSESSMENT & PLAN NOTE
Patient presents with painless gross hematuria for the past 10 days. She was recent CT abd/pelvis with findings concerning for malignancy. She was seen in urology clinic and deferred cystoscopy or further intervention.     She presents from assisted living with concern of UTI and gross painless hematuria. Otherwise no other symptoms. Retroperitoneal U/S with no evidence of hydronephrosis    Plan  - urology consulted; appreciate recs   - no interventions offered; family deferring for hospice   - receiving 1U pRBC for down trending Hgb

## 2025-06-15 NOTE — PLAN OF CARE
06/14/25 1904   Readmission   Was this a planned readmission? No   Why were you readmitted? Alarmed about signs/symptoms   When you left the hospital where did you go? Home with Home Health   Did you have  a follow-up appointment on discharge? Yes

## 2025-06-15 NOTE — PLAN OF CARE
[facility] Acute [facility]down  Discharge Reassessment    Primary Care Provider: Perla Hopper MD    Expected Discharge Date: 6/15/2025    Reassessment (most recent)       Discharge Reassessment - 06/15/25 1000          Discharge Reassessment    Assessment Type Discharge Planning Reassessment (P)      Did the patient's condition or plan change since previous assessment? No (P)      Discharge Plan discussed with: Sibling (P)      Name(s) and Number(s) Brittaney (P)      Discharge Plan A Hospice/home (P)      Discharge Plan B New Nursing Home placement - long term care facility (P)    with hospice    DME Needed Upon Discharge  other (see comments) (P)    TBD    Transition of Care Barriers None (P)      Why the patient remains in the hospital Requires continued medical care (P)         Post-Acute Status    Discharge Delays None known at this time (P)                  Spoke with Sister, Brittaney, at bedside.  Pt is a resident of [facility].  Pt has an roommate that assist her and a company that comes in 6 x day to assist with ADLs.  Sister is interested in hospice.  Explained hospice benefits and their role.  Sister would like to speak it over with her brother and will notify CM of decision and possible referral compaines.  CM will continue to follow for care coordination.    Nida Nicole RN, BSN  Case Management  [phone]

## 2025-06-15 NOTE — SUBJECTIVE & OBJECTIVE
Interval History: Seroquel 25 mg overnight agitation. Urology evaluated ~ family deferred any interventions. U/S retro with no evidence of hydronephrosis. Cr improved with IV fluids. Hgb 7 ~ receiving 1U pRBC. Pending home with hospice     Sister bedside. Brother on phone.     Review of Systems   Constitutional:  Positive for activity change. Negative for chills, fatigue and fever.   Respiratory:  Negative for cough, choking, chest tightness, shortness of breath and wheezing.    Cardiovascular:  Negative for chest pain, palpitations and leg swelling.   Gastrointestinal:  Negative for abdominal distention, abdominal pain, constipation, diarrhea, nausea and vomiting.   Genitourinary:  Positive for hematuria. Negative for difficulty urinating, dysuria, flank pain, frequency and urgency.   Musculoskeletal: Negative.    Neurological:  Positive for weakness.   Psychiatric/Behavioral: Negative.       Objective:     Vital Signs (Most Recent):  Temp: 97.6 °F (36.4 °C) (06/15/25 1215)  Pulse: 75 (06/15/25 1215)  Resp: 19 (06/15/25 1215)  BP: (!) 122/56 (06/15/25 1215)  SpO2: 100 % (06/15/25 1215) Vital Signs (24h Range):  Temp:  [96.7 °F (35.9 °C)-98.7 °F (37.1 °C)] 97.6 °F (36.4 °C)  Pulse:  [65-83] 75  Resp:  [13-20] 19  SpO2:  [95 %-100 %] 100 %  BP: ()/(51-86) 122/56     Weight: 71 kg (156 lb 8.4 oz)  Body mass index is 30.57 kg/m².    Intake/Output Summary (Last 24 hours) at 6/15/2025 1225  Last data filed at 6/15/2025 0425  Gross per 24 hour   Intake --   Output 600 ml   Net -600 ml         Physical Exam  Vitals and nursing note reviewed.   Constitutional:       General: She is not in acute distress.     Appearance: She is not ill-appearing, toxic-appearing or diaphoretic.   HENT:      Mouth/Throat:      Mouth: Mucous membranes are dry.      Pharynx: Oropharynx is clear.   Eyes:      General: No scleral icterus.        Right eye: No discharge.         Left eye: No discharge.      Extraocular Movements: Extraocular  movements intact.      Conjunctiva/sclera: Conjunctivae normal.      Pupils: Pupils are equal, round, and reactive to light.   Cardiovascular:      Rate and Rhythm: Normal rate and regular rhythm.      Heart sounds: Murmur heard.      No friction rub. No gallop.   Pulmonary:      Effort: Pulmonary effort is normal. No respiratory distress.      Breath sounds: Normal breath sounds. No stridor. No wheezing, rhonchi or rales.   Chest:      Chest wall: No tenderness.   Abdominal:      General: Abdomen is flat. Bowel sounds are normal. There is no distension.      Palpations: Abdomen is soft. There is no mass.      Tenderness: There is no abdominal tenderness. There is no guarding or rebound.      Hernia: No hernia is present.   Genitourinary:     Comments: Eason in place ~ blood notable  Musculoskeletal:         General: No swelling, tenderness, deformity or signs of injury.      Right lower leg: No edema.      Left lower leg: No edema.   Skin:     General: Skin is warm and dry.      Capillary Refill: Capillary refill takes less than 2 seconds.      Coloration: Skin is not jaundiced or pale.      Findings: No erythema or rash.   Neurological:      Mental Status: She is alert.               Significant Labs: CBC:   Recent Labs   Lab 06/14/25  1708 06/15/25  0447   WBC 7.72 6.25   HGB 7.5* 7.0*   HCT 24.5* 23.0*    203     CMP:   Recent Labs   Lab 06/14/25  1708 06/15/25  0447   * 134*   K 4.4 4.3    107   CO2 20* 19*    93   BUN 23 22   CREATININE 1.7* 1.5*   CALCIUM 8.0* 8.2*   PROT 5.8* 5.7*   ALBUMIN 2.8* 2.7*   BILITOT 0.2 0.2   ALKPHOS 108 100   AST 94* 83*   ALT 42 40   ANIONGAP 7* 8       Significant Imaging:   Imaging Results              US Retroperitoneal Complete (Final result)  Result time 06/14/25 22:15:22      Final result by Efe Melvin MD (06/14/25 22:15:22)                   Impression:      Findings suggesting medical renal disease.    No hydronephrosis or renal  stones.    Hyperechoic left renal lesion, possibly representing an angiomyolipoma.    Electronically signed by resident: Junior Vega  Date:    06/14/2025  Time:    21:30    Electronically signed by: Efe Melvin  Date:    06/14/2025  Time:    22:15               Narrative:    EXAMINATION:  US RETROPERITONEAL COMPLETE    CLINICAL HISTORY:  KINA;    TECHNIQUE:  Ultrasound of the kidneys and urinary bladder was performed including color flow and Doppler evaluation of the kidneys.    COMPARISON:  CT abdomen pelvis 06/03/2025, abdominal ultrasound 08/09/2022    FINDINGS:  Right kidney: The right kidney measures 12.1 cm.  Cortical thinning.  No loss of corticomedullary distinction  Resistive index measures 0.74.  Decreased perfusion. Multiple anechoic well-circumscribed lesions, the largest measuring 3.1 x 2.4 x 3.4 cm, 3.0 x 1.9 x 2.8 cm, and 2.8 x 2.5 x 2.7 cm.  No solid mass. No renal stone. No hydronephrosis.    Left kidney: The left kidney measures 10.7 cm.  Cortical thinning. No loss of corticomedullary distinction  Resistive index measures 0.75.  Decreased perfusion. Hyperechoic lesion in the midpole without associated Doppler signal measuring 0.6 x 0.6 x 0.6 cm.  Nonspecific and may represent an angiomyolipoma.  No solid mass. No renal stone. No hydronephrosis.    The bladder is decompressed with Eason catheter in place..    Splenic resistive index: 0.65.

## 2025-06-15 NOTE — ASSESSMENT & PLAN NOTE
Anemia is likely due to malignancy. Most recent hemoglobin and hematocrit are listed below. Baseline ~8-9   Recent Labs     06/14/25  1708 06/15/25  0447   HGB 7.5* 7.0*   HCT 24.5* 23.0*     Plan  - Monitor serial CBC: Daily  - Transfuse PRBC if patient becomes hemodynamically unstable, symptomatic or H/H drops below 7/21.  - Receiving 1U pRBC today

## 2025-06-15 NOTE — PLAN OF CARE
06/15/25 1427   Post-Acute Status   Post-Acute Authorization Hospice   Hospice Status Referrals Sent   Discharge Delays None known at this time   Discharge Plan   Discharge Plan A Hospice/home   Discharge Plan B New Nursing Home placement - nursing home care facility  (with hospice)     Met with sister Brittaney and P'ts brother to review discharge recommendation of hospice and is agreeable to plan.  Pt/ family would like to return home with hospice services.    Patient/family provided list of facilities in-network with patient's payor plan. Providers that are owned, operated, or affiliated with [facility] are included on the list.     Notified that referral sent to below listed facilities from in-network list based on proximity to home/family support:   [facility]  2.   [facility]  3.  4.  5. (can send more than 5)    Patient/family instructed to identify preference.    Preferred Facility: (if more than 1, listed in order of descending preference)  [facility]      1600 Received call from Alis with [facility].  [facility] to contact family to set up meeting.      If an additional preferred facility not listed above is identified, additional referral to be sent. If above facilities unable to accept, will send additional referrals to in-network providers.

## 2025-06-15 NOTE — PROGRESS NOTES
[facility] Hospital Medicine  Progress Note    Patient Name: Nickie Segura  MRN: [id number]  Patient Class: OP- Observation   Admission Date: 6/14/2025  Length of Stay: 0 days  Attending Physician: Vita Ko MD  Primary Care Provider: Perla Hopper MD        Subjective     Principal Problem:Hematuria        HPI:  Nickie Sgeura is a 89 year old female with a PMHx HTN, hypothyroidism, colon polyps s/p partial colon resection, chronic guerrier, bed bound who presents to the [facility] for hematuria and weakness. According to sister, patient has had gross hematuria with associated weakness since 6/3. Patient recently discharged from [facility] on 06/05 after presenting for hematuria. CT scan at that time was remarkable for a heterogeneous suspected enhancing material within the posterior aspect of the urinary bladder most likely 2/2 to  blood products or sequela of infection however malignancy cannot be excluded, mild to moderate right hydroureteronephrosis,  and irregularity of the urinary bladder walls. Patient followed up with urology after discharge where they had an extensive discussion. Patient and family members were offered further workup with cystoscopy vs holding off to see if the gross hematuria and UTIs continue. At that time, patient and family came to a conclusion of wanting to hold off on a cystoscopy as patient would not like any invasive treatment at this time. She was sent home with prophylactic bactrim daily for 10 months.     In the ED, patient was HDS. Labs were remarkable for Hgb 7.5 (baseline 8.6), Na 134, Cr 1.7 (baseline 1), and UA with 3+ LE, >100 WBC, and moderate bacteria. She was given ceftriaxone and given a 500cc LR bolus and urology was consulted.     Admitted to Hospital Medicine for further workup of KINA.     History obtained through chart review by sister.       Overview/Hospital Course:  89F PMHx HTN, hypothyroidism, colon polyps s/p partial colon  resection, chronic guerrier, bed bound presented to [facility] for hematuria and weakness. On admission, she was notable for KINA which improved with IV fluids. In addition, she received 1U of pRBC for Hgb 7 in setting of hematuria. Urology consulted for evaluation and possible treatment. Extensive GOC conversations with family regarding prognosis of suspected malignancy. Family elected for home hospice.      Interval History: Seroquel 25 mg overnight agitation. Urology evaluated ~ family deferred any interventions. U/S retro with no evidence of hydronephrosis. Cr improved with IV fluids. Hgb 7 ~ receiving 1U pRBC. Pending home with hospice     Sister bedside. Brother on phone.     Review of Systems   Constitutional:  Positive for activity change. Negative for chills, fatigue and fever.   Respiratory:  Negative for cough, choking, chest tightness, shortness of breath and wheezing.    Cardiovascular:  Negative for chest pain, palpitations and leg swelling.   Gastrointestinal:  Negative for abdominal distention, abdominal pain, constipation, diarrhea, nausea and vomiting.   Genitourinary:  Positive for hematuria. Negative for difficulty urinating, dysuria, flank pain, frequency and urgency.   Musculoskeletal: Negative.    Neurological:  Positive for weakness.   Psychiatric/Behavioral: Negative.       Objective:     Vital Signs (Most Recent):  Temp: 97.6 °F (36.4 °C) (06/15/25 1215)  Pulse: 75 (06/15/25 1215)  Resp: 19 (06/15/25 1215)  BP: (!) 122/56 (06/15/25 1215)  SpO2: 100 % (06/15/25 1215) Vital Signs (24h Range):  Temp:  [96.7 °F (35.9 °C)-98.7 °F (37.1 °C)] 97.6 °F (36.4 °C)  Pulse:  [65-83] 75  Resp:  [13-20] 19  SpO2:  [95 %-100 %] 100 %  BP: ()/(51-86) 122/56     Weight: 71 kg (156 lb 8.4 oz)  Body mass index is 30.57 kg/m².    Intake/Output Summary (Last 24 hours) at 6/15/2025 1225  Last data filed at 6/15/2025 0425  Gross per 24 hour   Intake --   Output 600 ml   Net -600 ml         Physical Exam  Vitals and  nursing note reviewed.   Constitutional:       General: She is not in acute distress.     Appearance: She is not ill-appearing, toxic-appearing or diaphoretic.   HENT:      Mouth/Throat:      Mouth: Mucous membranes are dry.      Pharynx: Oropharynx is clear.   Eyes:      General: No scleral icterus.        Right eye: No discharge.         Left eye: No discharge.      Extraocular Movements: Extraocular movements intact.      Conjunctiva/sclera: Conjunctivae normal.      Pupils: Pupils are equal, round, and reactive to light.   Cardiovascular:      Rate and Rhythm: Normal rate and regular rhythm.      Heart sounds: Murmur heard.      No friction rub. No gallop.   Pulmonary:      Effort: Pulmonary effort is normal. No respiratory distress.      Breath sounds: Normal breath sounds. No stridor. No wheezing, rhonchi or rales.   Chest:      Chest wall: No tenderness.   Abdominal:      General: Abdomen is flat. Bowel sounds are normal. There is no distension.      Palpations: Abdomen is soft. There is no mass.      Tenderness: There is no abdominal tenderness. There is no guarding or rebound.      Hernia: No hernia is present.   Genitourinary:     Comments: Eason in place ~ blood notable  Musculoskeletal:         General: No swelling, tenderness, deformity or signs of injury.      Right lower leg: No edema.      Left lower leg: No edema.   Skin:     General: Skin is warm and dry.      Capillary Refill: Capillary refill takes less than 2 seconds.      Coloration: Skin is not jaundiced or pale.      Findings: No erythema or rash.   Neurological:      Mental Status: She is alert.               Significant Labs: CBC:   Recent Labs   Lab 06/14/25  1708 06/15/25  0447   WBC 7.72 6.25   HGB 7.5* 7.0*   HCT 24.5* 23.0*    203     CMP:   Recent Labs   Lab 06/14/25  1708 06/15/25  0447   * 134*   K 4.4 4.3    107   CO2 20* 19*    93   BUN 23 22   CREATININE 1.7* 1.5*   CALCIUM 8.0* 8.2*   PROT 5.8* 5.7*    ALBUMIN 2.8* 2.7*   BILITOT 0.2 0.2   ALKPHOS 108 100   AST 94* 83*   ALT 42 40   ANIONGAP 7* 8       Significant Imaging:   Imaging Results              US Retroperitoneal Complete (Final result)  Result time 06/14/25 22:15:22      Final result by Efe Melvin MD (06/14/25 22:15:22)                   Impression:      Findings suggesting medical renal disease.    No hydronephrosis or renal stones.    Hyperechoic left renal lesion, possibly representing an angiomyolipoma.    Electronically signed by resident: Junior Vega  Date:    06/14/2025  Time:    21:30    Electronically signed by: Efe Melvin  Date:    06/14/2025  Time:    22:15               Narrative:    EXAMINATION:  US RETROPERITONEAL COMPLETE    CLINICAL HISTORY:  KINA;    TECHNIQUE:  Ultrasound of the kidneys and urinary bladder was performed including color flow and Doppler evaluation of the kidneys.    COMPARISON:  CT abdomen pelvis 06/03/2025, abdominal ultrasound 08/09/2022    FINDINGS:  Right kidney: The right kidney measures 12.1 cm.  Cortical thinning.  No loss of corticomedullary distinction  Resistive index measures 0.74.  Decreased perfusion. Multiple anechoic well-circumscribed lesions, the largest measuring 3.1 x 2.4 x 3.4 cm, 3.0 x 1.9 x 2.8 cm, and 2.8 x 2.5 x 2.7 cm.  No solid mass. No renal stone. No hydronephrosis.    Left kidney: The left kidney measures 10.7 cm.  Cortical thinning. No loss of corticomedullary distinction  Resistive index measures 0.75.  Decreased perfusion. Hyperechoic lesion in the midpole without associated Doppler signal measuring 0.6 x 0.6 x 0.6 cm.  Nonspecific and may represent an angiomyolipoma.  No solid mass. No renal stone. No hydronephrosis.    The bladder is decompressed with Eason catheter in place..    Splenic resistive index: 0.65.                                         Assessment & Plan  Hematuria  Suspected Bladder cancer  Patient presents with painless gross hematuria for the past 10 days.  She was recent CT abd/pelvis with findings concerning for malignancy. She was seen in urology clinic and deferred cystoscopy or further intervention.     She presents from assisted living with concern of UTI and gross painless hematuria. Otherwise no other symptoms. Retroperitoneal U/S with no evidence of hydronephrosis    Plan  - urology consulted; appreciate recs   - no interventions offered; family deferring for hospice   - receiving 1U pRBC for down trending Hgb    KINA (acute kidney injury)  KINA is likely due to dehydration/poor PO intake. Baseline creatinine is 1.0. Most recent creatinine and eGFR are listed below.  Recent Labs     06/14/25  1708 06/15/25  0447   CREATININE 1.7* 1.5*   EGFRNORACEVR 29* 33*     Patient's Cr improved with 500 cc IV fluids.     Plan:  - KINA is improving   - Retroperitoneal US ~ no evidence of hydronephrosis   - receiving additional 500 cc bolus of fluids   - Avoid nephrotoxins and renally dose meds for GFR listed above  - Monitor urine output, serial BMP, and adjust therapy as needed    UTI (urinary tract infection)  Chronic indwelling Guerrier catheter  Patient notable for recurrent UTI and was recently seen in urology clinic for prophylaxis abx. Has chronic guerrier    UA notable for pyuria. However no leukocytosis or systematic symptoms. No suprapubic tenderness. Less concern for UTI ~ likely colonization with chronic guerrier.     Plan  - received 1 dose of CTX    - f/u urine culture pending   - will resume prophylactic abx for recurrent UTI on discharge     Iron deficiency anemia  Anemia is likely due to malignancy. Most recent hemoglobin and hematocrit are listed below. Baseline ~8-9   Recent Labs     06/14/25  1708 06/15/25  0447   HGB 7.5* 7.0*   HCT 24.5* 23.0*     Plan  - Monitor serial CBC: Daily  - Transfuse PRBC if patient becomes hemodynamically unstable, symptomatic or H/H drops below 7/21.  - Receiving 1U pRBC today    RENÉE (generalized anxiety disorder)  Continue home  medication     Pure hypercholesterolemia  Continue home statin     VTE Risk Mitigation (From admission, onward)           Ordered     IP VTE HIGH RISK PATIENT  Once         06/14/25 1839     Place sequential compression device  Until discontinued         06/14/25 1839                    Discharge Planning   CEM: 6/15/2025     Code Status: DNR   Medical Readiness for Discharge Date:   Discharge Plan A: Hospice/home   Discharge Delays: None known at this time                    Lobo Obando MD  Department of Hospital Medicine   [facility] - Acute Medical Stepdown

## 2025-06-15 NOTE — ASSESSMENT & PLAN NOTE
KINA is likely due to dehydration/poor PO intake. Baseline creatinine is 1.0. Most recent creatinine and eGFR are listed below.  Recent Labs     06/14/25  1708 06/15/25  0447   CREATININE 1.7* 1.5*   EGFRNORACEVR 29* 33*     Patient's Cr improved with 500 cc IV fluids.     Plan:  - KINA is improving   - Retroperitoneal US ~ no evidence of hydronephrosis   - receiving additional 500 cc bolus of fluids   - Avoid nephrotoxins and renally dose meds for GFR listed above  - Monitor urine output, serial BMP, and adjust therapy as needed

## 2025-06-15 NOTE — CONSULTS
[facility]  Urology  Consult Note    Patient Name: Nickie Segura  MRN: [id number]  Admission Date: 6/14/2025  Hospital Length of Stay: 0   Code Status: DNR   Attending Provider: Vita Ko MD   Consulting Provider: Los Kelly MD  Primary Care Physician: Perla Hopper MD  Principal Problem:Hematuria    Inpatient consult to Urology  Consult performed by: Los Kelly MD  Consult ordered by: Alberto Elena DO  Reason for consult: nephrostomy tube GOC          Subjective:     HPI:  89yoF consult for KINA and concern for hydronephrosis.     Recently had admission at [facility] for urinary retention, hematuria, and KINA. At that time had bilateral hydronephrosis. Guerrier was placed at that time and patient clinically improved and was discharged.    Returns to ED today for fatigue. Otherwise UTO subjective.     On exam she is AF, HDS, WBC wnl, Cr 1.7 (baseline 0.8-1), UA contaminated but with guerrier in place, renal US today showing possible AML but no hydronephrosis bilaterally.     Past Medical History:   Diagnosis Date    Allergy     LORNA positive     Anxiety     Bilateral cataracts     Colitis     Colon polyp     COVID-19 09/2022    Depression     Elevated IgE level     Hepatomegaly     Hiatal hernia     Hypothyroidism     IFG (impaired fasting glucose)     Iron deficiency anemia     Macular degeneration     Nicotine dependence     Osteoarthritis     Recurrent UTI     Renal cyst     Skin cancer     Tuberculosis of bladder     Urinary incontinence     Vitamin B12 deficiency     Wheezing        Past Surgical History:   Procedure Laterality Date    CHOLECYSTECTOMY         Review of patient's allergies indicates:   Allergen Reactions    Penicillins      rash       Family History       Problem Relation (Age of Onset)    Atrial fibrillation Sister    Diabetes Mother    Heart disease Mother, Father    Polycystic kidney disease Father            Tobacco Use    Smoking status: Every Day     Types:  Vaping with nicotine     Passive exposure: Current    Smokeless tobacco: Never    Tobacco comments:     Formerly smoked cigarettes.   Substance and Sexual Activity    Alcohol use: Never    Drug use: Never    Sexual activity: Not Currently           Objective:     Temp:  [98 °F (36.7 °C)-98.4 °F (36.9 °C)] 98.4 °F (36.9 °C)  Pulse:  [65-77] 74  Resp:  [13-20] 18  SpO2:  [95 %-100 %] 95 %  BP: ()/(51-80) 112/56  Weight: 71 kg (156 lb 8.4 oz)  Body mass index is 30.57 kg/m².           Drains       Drain  Duration                  Urethral Catheter 06/14/25 Coude 20 Fr. 1 day                     Physical Exam  Constitutional:       Comments: Confused    Cardiovascular:      Rate and Rhythm: Normal rate.   Pulmonary:      Effort: No respiratory distress.   Abdominal:      General: There is no distension.   Genitourinary:     Comments: Guerrier in place draining clear yellow urine with some sediment          Significant Labs:    BMP:  Recent Labs   Lab 06/14/25  1708   *   K 4.4      CO2 20*   BUN 23   CREATININE 1.7*   CALCIUM 8.0*       CBC:  Recent Labs   Lab 06/14/25  1708   WBC 7.72   HGB 7.5*   HCT 24.5*          All pertinent labs results from the past 24 hours have been reviewed.    Significant Imaging:  All pertinent imaging results/findings from the past 24 hours have been reviewed.                    Assessment and Plan:     KINA (acute kidney injury)  89yoF with KINA and concern for hydronephrosis consult for recommendations on nephrostomy tube placement     - Patient does not have hydronephrosis on renal US today   - No indication for nephrostomy tube or ureteral stent placement   - Had long discussion with patients POA about GOC, under no circumstances would they desire surgical intervention, anesthesia, or nephrostomy tube placement regardless of clinical indication    - Keep guerrier in place   - Please arrange for follow up with patient urology NP and Chiqui for further management and  possible void trial    - KINA unlikely post-renal obstructive given lack of hydronephrosis. Recommend work up of other causes of KINA if aligned with patient's GOC         VTE Risk Mitigation (From admission, onward)           Ordered     IP VTE HIGH RISK PATIENT  Once         06/14/25 1839     Place sequential compression device  Until discontinued         06/14/25 1839                    Thank you for your consult. I will sign off. Please contact us if you have any additional questions.    Los Kelly MD  Urology  [facility]

## 2025-06-15 NOTE — HPI
89yoF consult for KINA and concern for hydronephrosis.     Recently had admission at [facility] for urinary retention, hematuria, and KINA. At that time had bilateral hydronephrosis. Guerrier was placed at that time and patient clinically improved and was discharged.    Returns to ED today for fatigue. Otherwise UTO subjective.     On exam she is AF, HDS, WBC wnl, Cr 1.7 (baseline 0.8-1), UA contaminated but with guerrier in place, renal US today showing possible AML but no hydronephrosis bilaterally.

## 2025-06-15 NOTE — PLAN OF CARE
Problem: Violence Risk or Actual  Goal: Anger and Impulse Control  Outcome: Progressing     Problem: Skin Injury Risk Increased  Goal: Skin Health and Integrity  Outcome: Progressing     Problem: Adult Inpatient Plan of Care  Goal: Plan of Care Review  Outcome: Progressing  Goal: Patient-Specific Goal (Individualized)  Outcome: Progressing  Goal: Absence of Hospital-Acquired Illness or Injury  Outcome: Progressing  Goal: Optimal Comfort and Wellbeing  Outcome: Progressing  Goal: Readiness for Transition of Care  Outcome: Progressing     Problem: Acute Kidney Injury/Impairment  Goal: Fluid and Electrolyte Balance  Outcome: Progressing  Goal: Improved Oral Intake  Outcome: Progressing  Goal: Effective Renal Function  Outcome: Progressing     Problem: Wound  Goal: Optimal Coping  Outcome: Progressing  Goal: Optimal Functional Ability  Outcome: Progressing  Goal: Absence of Infection Signs and Symptoms  Outcome: Progressing  Goal: Improved Oral Intake  Outcome: Progressing  Goal: Optimal Pain Control and Function  Outcome: Progressing  Goal: Skin Health and Integrity  Outcome: Progressing  Goal: Optimal Wound Healing  Outcome: Progressing     Problem: Fall Injury Risk  Goal: Absence of Fall and Fall-Related Injury  Outcome: Progressing     Problem: Infection  Goal: Absence of Infection Signs and Symptoms  Outcome: Progressing   Pt oriented to person. Pt sundowns. No PRNs given. Family present at bedside. Call light within reach, safety measures in place, rounded per facility policy.

## 2025-06-16 VITALS
BODY MASS INDEX: 30.73 KG/M2 | RESPIRATION RATE: 18 BRPM | TEMPERATURE: 98 F | OXYGEN SATURATION: 97 % | DIASTOLIC BLOOD PRESSURE: 58 MMHG | SYSTOLIC BLOOD PRESSURE: 121 MMHG | HEART RATE: 72 BPM | WEIGHT: 156.5 LBS | HEIGHT: 60 IN

## 2025-06-16 PROBLEM — Z71.89 ACP (ADVANCE CARE PLANNING): Status: ACTIVE | Noted: 2025-06-16

## 2025-06-16 LAB
ABSOLUTE EOSINOPHIL (OHS): 0.18 K/UL
ABSOLUTE MONOCYTE (OHS): 0.45 K/UL (ref 0.3–1)
ABSOLUTE NEUTROPHIL COUNT (OHS): 3.05 K/UL (ref 1.8–7.7)
ALBUMIN SERPL BCP-MCNC: 2.6 G/DL (ref 3.5–5.2)
ALP SERPL-CCNC: 77 UNIT/L (ref 40–150)
ALT SERPL W/O P-5'-P-CCNC: 42 UNIT/L (ref 10–44)
ANION GAP (OHS): 7 MMOL/L (ref 8–16)
AST SERPL-CCNC: 88 UNIT/L (ref 11–45)
BACTERIA UR CULT: ABNORMAL
BASOPHILS # BLD AUTO: 0.03 K/UL
BASOPHILS NFR BLD AUTO: 0.6 %
BILIRUB SERPL-MCNC: 0.2 MG/DL (ref 0.1–1)
BUN SERPL-MCNC: 16 MG/DL (ref 8–23)
CALCIUM SERPL-MCNC: 8.2 MG/DL (ref 8.7–10.5)
CHLORIDE SERPL-SCNC: 111 MMOL/L (ref 95–110)
CO2 SERPL-SCNC: 20 MMOL/L (ref 23–29)
CREAT SERPL-MCNC: 1 MG/DL (ref 0.5–1.4)
ERYTHROCYTE [DISTWIDTH] IN BLOOD BY AUTOMATED COUNT: 19.5 % (ref 11.5–14.5)
GFR SERPLBLD CREATININE-BSD FMLA CKD-EPI: 54 ML/MIN/1.73/M2
GLUCOSE SERPL-MCNC: 75 MG/DL (ref 70–110)
HCT VFR BLD AUTO: 25.4 % (ref 37–48.5)
HGB BLD-MCNC: 7.9 GM/DL (ref 12–16)
IMM GRANULOCYTES # BLD AUTO: 0.02 K/UL (ref 0–0.04)
IMM GRANULOCYTES NFR BLD AUTO: 0.4 % (ref 0–0.5)
LYMPHOCYTES # BLD AUTO: 1.22 K/UL (ref 1–4.8)
MAGNESIUM SERPL-MCNC: 2 MG/DL (ref 1.6–2.6)
MCH RBC QN AUTO: 27.2 PG (ref 27–31)
MCHC RBC AUTO-ENTMCNC: 31.1 G/DL (ref 32–36)
MCV RBC AUTO: 88 FL (ref 82–98)
NUCLEATED RBC (/100WBC) (OHS): 0 /100 WBC
PHOSPHATE SERPL-MCNC: 3 MG/DL (ref 2.7–4.5)
PLATELET # BLD AUTO: 174 K/UL (ref 150–450)
PMV BLD AUTO: 8.6 FL (ref 9.2–12.9)
POTASSIUM SERPL-SCNC: 4.2 MMOL/L (ref 3.5–5.1)
PROT SERPL-MCNC: 5.1 GM/DL (ref 6–8.4)
RBC # BLD AUTO: 2.9 M/UL (ref 4–5.4)
RELATIVE EOSINOPHIL (OHS): 3.6 %
RELATIVE LYMPHOCYTE (OHS): 24.6 % (ref 18–48)
RELATIVE MONOCYTE (OHS): 9.1 % (ref 4–15)
RELATIVE NEUTROPHIL (OHS): 61.7 % (ref 38–73)
SODIUM SERPL-SCNC: 138 MMOL/L (ref 136–145)
WBC # BLD AUTO: 4.95 K/UL (ref 3.9–12.7)

## 2025-06-16 PROCEDURE — 63600175 PHARM REV CODE 636 W HCPCS: Mod: HCNC

## 2025-06-16 PROCEDURE — 25000003 PHARM REV CODE 250: Mod: HCNC

## 2025-06-16 PROCEDURE — 85025 COMPLETE CBC W/AUTO DIFF WBC: CPT | Mod: HCNC

## 2025-06-16 PROCEDURE — S4991 NICOTINE PATCH NONLEGEND: HCPCS | Mod: HCNC

## 2025-06-16 PROCEDURE — 36415 COLL VENOUS BLD VENIPUNCTURE: CPT | Mod: HCNC

## 2025-06-16 PROCEDURE — G0378 HOSPITAL OBSERVATION PER HR: HCPCS | Mod: HCNC

## 2025-06-16 PROCEDURE — 82040 ASSAY OF SERUM ALBUMIN: CPT | Mod: HCNC

## 2025-06-16 PROCEDURE — 84100 ASSAY OF PHOSPHORUS: CPT | Mod: HCNC

## 2025-06-16 PROCEDURE — 83735 ASSAY OF MAGNESIUM: CPT | Mod: HCNC

## 2025-06-16 RX ORDER — TALC
6 POWDER (GRAM) TOPICAL NIGHTLY PRN
Qty: 90 TABLET | Refills: 0 | Status: SHIPPED | OUTPATIENT
Start: 2025-06-16 | End: 2025-09-14

## 2025-06-16 RX ORDER — LORAZEPAM 0.5 MG/1
0.5 TABLET ORAL DAILY
Status: DISCONTINUED | OUTPATIENT
Start: 2025-06-16 | End: 2025-06-16 | Stop reason: HOSPADM

## 2025-06-16 RX ORDER — LORAZEPAM 0.5 MG/1
0.5 TABLET ORAL DAILY
Qty: 30 TABLET | Refills: 1 | Status: SHIPPED | OUTPATIENT
Start: 2025-06-16

## 2025-06-16 RX ADMIN — CEFTRIAXONE 1 G: 1 INJECTION, POWDER, FOR SOLUTION INTRAMUSCULAR; INTRAVENOUS at 12:06

## 2025-06-16 RX ADMIN — CITALOPRAM HYDROBROMIDE 30 MG: 10 TABLET ORAL at 10:06

## 2025-06-16 RX ADMIN — LORAZEPAM 0.5 MG: 0.5 TABLET ORAL at 11:06

## 2025-06-16 RX ADMIN — Medication 1 PATCH: at 10:06

## 2025-06-16 NOTE — ASSESSMENT & PLAN NOTE
Anemia is likely due to malignancy. Most recent hemoglobin and hematocrit are listed below. Baseline ~8-9   Recent Labs     06/14/25  1708 06/15/25  0447 06/16/25  0425   HGB 7.5* 7.0* 7.9*   HCT 24.5* 23.0* 25.4*     Plan  - Monitor serial CBC: Daily  - Transfuse PRBC if patient becomes hemodynamically unstable, symptomatic or H/H drops below 7/21.  - Receiving 1U pRBC today

## 2025-06-16 NOTE — PLAN OF CARE
[facility]  Department of Hospital Medicine  [address] [place] [address]  [phone] after hours  [phone] fax    HOSPICE  ORDERS    06/16/2025    Admit to Hospice:  Home Service     Diagnoses:   Active Hospital Problems    Diagnosis  POA    *Hematuria [R31.9]  Unknown    KINA (acute kidney injury) [N17.9]  Yes    Chronic indwelling Eason catheter [Z97.8]  Not Applicable    Suspected Bladder cancer [C67.9]  Unknown    UTI (urinary tract infection) [N39.0]  Yes    Iron deficiency anemia [D50.9]  Yes    RENÉE (generalized anxiety disorder) [F41.1]  Yes    Pure hypercholesterolemia [E78.00]  Yes      Resolved Hospital Problems   No resolved problems to display.       Hospice Qualifying Diagnoses:        Patient has a life expectancy < 6 months due to:  Primary Hospice Diagnosis: Suspected Bladder cancer   Comorbid Conditions Contributing to Decline: KINA, UTI    Vital Signs: Routine per Hospice Protocol.    Code Status: DNR    Allergies:   Review of patient's allergies indicates:   Allergen Reactions    Penicillins      rash       Diet: Pleasure feeds - Soft and bite sized diet     Activities: As tolerated    Goals of Care Treatment Preferences:  Code Status: DNR    Living Will: Yes              Nursing: Per Hospice Routine.    Eason Care: Empty Eason bag Q shift and PRN.  Change Eason every month.    Routine Skin for Bedridden Patients: Apply moisture barrier cream to all skin folds and   wet areas in perineal area daily and after baths and all bowel movements.    Oxygen: N/A    Other Miscellaneous Care: N/A    Medications:        Medication List        START taking these medications      melatonin 3 mg tablet  Commonly known as: MELATIN  Take 2 tablets (6 mg total) by mouth nightly as needed for Insomnia.            CHANGE how you take these medications      glycopyrrolate 1 mg Tab  Commonly known as: ROBINUL  TAKE 1 TABLET(1 MG) BY MOUTH THREE TIMES DAILY  What  changed:   how much to take  how to take this  when to take this  additional instructions     LORazepam 0.5 MG tablet  Commonly known as: ATIVAN  Take 1 tablet (0.5 mg total) by mouth once daily.  What changed:   when to take this  reasons to take this     MYRBETRIQ 50 mg Tb24  Generic drug: mirabegron  TAKE 1 TABLET(50 MG) BY MOUTH EVERY DAY  What changed: when to take this     pantoprazole 40 MG tablet  Commonly known as: PROTONIX  1 po daily  What changed:   how much to take  how to take this  when to take this  additional instructions     polyethylene glycol 17 gram/dose powder  Commonly known as: GLYCOLAX  Take 17 g by mouth once daily.  What changed: when to take this            CONTINUE taking these medications      busPIRone 15 MG tablet  Commonly known as: BUSPAR  TAKE 1 TABLET BY MOUTH TWICE DAILY     citalopram 20 MG tablet  Commonly known as: CeleXA  Take 1.5 tablets (30 mg total) by mouth once daily.     ICAPS AREDS2 ORAL  Take 1 capsule by mouth 2 (two) times a day.     levothyroxine 100 MCG tablet  Commonly known as: SYNTHROID  Take 1 tablet (100 mcg total) by mouth before breakfast.     sucralfate 100 mg/mL suspension  Commonly known as: CARAFATE  Take 10 mLs (1 g total) by mouth 4 (four) times daily.     trimethoprim 100 mg Tab  Commonly known as: TRIMPEX  Take 1 tablet (100 mg total) by mouth every evening.            STOP taking these medications      aspirin 81 MG EC tablet  Commonly known as: ECOTRIN     rosuvastatin 20 MG tablet  Commonly known as: CRESTOR     sulfamethoxazole-trimethoprim 800-160mg 800-160 mg Tab  Commonly known as: BACTRIM DS     VITAMIN C 500 MG tablet  Generic drug: ascorbic acid (vitamin C)     vitamin D 1000 units Tab  Commonly known as: VITAMIN D3                DIABETES CARE: N/A    Future Orders:  Hospice Medical Director may dictate new orders for comfortable care measures & sign death certificate.        _________________________________  Saqeeb DO Samuel  06/16/2025

## 2025-06-16 NOTE — ASSESSMENT & PLAN NOTE
Advance Care Planning     Date: 06/16/2025    [facility]  I engaged the family in a voluntary conversation about advance care planning and we specifically addressed what the goals of care would be moving forward, in light of the patient's change in clinical status.  We did specifically address the patient's likely prognosis, which is poor.  We explored the patient's values and preferences for future care.  The family endorses that what is most important right now is to focus on symptom/pain control and quality of life, even if it means sacrificing a little time    Accordingly, we have decided that the best plan to meet the patient's goals includes enrolling in hospice care    - Lorazepam 0.5 mg PO daily for anxiety

## 2025-06-16 NOTE — PLAN OF CARE
Problem: Violence Risk or Actual  Goal: Anger and Impulse Control  Outcome: Progressing     Problem: Skin Injury Risk Increased  Goal: Skin Health and Integrity  Outcome: Progressing     Problem: Adult Inpatient Plan of Care  Goal: Plan of Care Review  Outcome: Progressing  Goal: Patient-Specific Goal (Individualized)  Outcome: Progressing  Goal: Absence of Hospital-Acquired Illness or Injury  Outcome: Progressing  Goal: Optimal Comfort and Wellbeing  Outcome: Progressing  Goal: Readiness for Transition of Care  Outcome: Progressing     Problem: Acute Kidney Injury/Impairment  Goal: Fluid and Electrolyte Balance  Outcome: Progressing  Goal: Improved Oral Intake  Outcome: Progressing  Goal: Effective Renal Function  Outcome: Progressing     Problem: Wound  Goal: Optimal Coping  Outcome: Progressing  Goal: Optimal Functional Ability  Outcome: Progressing  Goal: Absence of Infection Signs and Symptoms  Outcome: Progressing  Goal: Improved Oral Intake  Outcome: Progressing  Goal: Optimal Pain Control and Function  Outcome: Progressing  Goal: Skin Health and Integrity  Outcome: Progressing  Goal: Optimal Wound Healing  Outcome: Progressing     Problem: Fall Injury Risk  Goal: Absence of Fall and Fall-Related Injury  Outcome: Progressing     Problem: Infection  Goal: Absence of Infection Signs and Symptoms  Outcome: Progressing   Pt oriented to person, family present at bedside. PRNs given see MAR. Call light within reach, safety measures in place, rounded per facility policy.

## 2025-06-16 NOTE — PROGRESS NOTES
[facility] Medicine  Progress Note    Patient Name: Nickie Segura  MRN: [id number]  Patient Class: OP- Observation   Admission Date: 6/14/2025  Length of Stay: 0 days  Attending Physician: Vita Ko MD  Primary Care Provider: Perla Hopper MD        Subjective     Principal Problem:Hematuria        HPI:  Nickie Segura is a 89 year old female with a PMHx HTN, hypothyroidism, colon polyps s/p partial colon resection, chronic guerrier, bed bound who presents to the [facility] for hematuria and weakness. According to sister, patient has had gross hematuria with associated weakness since 6/3. Patient recently discharged from [facility] on 06/05 after presenting for hematuria. CT scan at that time was remarkable for a heterogeneous suspected enhancing material within the posterior aspect of the urinary bladder most likely 2/2 to  blood products or sequela of infection however malignancy cannot be excluded, mild to moderate right hydroureteronephrosis,  and irregularity of the urinary bladder walls. Patient followed up with urology after discharge where they had an extensive discussion. Patient and family members were offered further workup with cystoscopy vs holding off to see if the gross hematuria and UTIs continue. At that time, patient and family came to a conclusion of wanting to hold off on a cystoscopy as patient would not like any invasive treatment at this time. She was sent home with prophylactic bactrim daily for 10 months.     In the ED, patient was HDS. Labs were remarkable for Hgb 7.5 (baseline 8.6), Na 134, Cr 1.7 (baseline 1), and UA with 3+ LE, >100 WBC, and moderate bacteria. She was given ceftriaxone and given a 500cc LR bolus and urology was consulted.     Admitted to Hospital Medicine for further workup of KINA.     History obtained through chart review by sister.       Overview/Hospital Course:  89F PMHx HTN, hypothyroidism, colon polyps s/p partial colon  resection, chronic guerrier, bed bound presented to [facility] for hematuria and weakness. On admission, she was notable for KINA which improved with IV fluids. In addition, she received 1U of pRBC for Hgb 7 in setting of hematuria. Urology consulted for evaluation and possible treatment. Extensive GOC conversations with family regarding prognosis of suspected malignancy. Family elected for home hospice.      Interval History: NAEON. Vital signs stable. Pending dispo with home hospice.     Review of Systems   Constitutional:  Positive for activity change. Negative for chills, fatigue and fever.   Respiratory:  Negative for cough, choking, chest tightness, shortness of breath and wheezing.    Cardiovascular:  Negative for chest pain, palpitations and leg swelling.   Gastrointestinal:  Negative for abdominal distention, abdominal pain, constipation, diarrhea, nausea and vomiting.   Genitourinary:  Positive for hematuria. Negative for difficulty urinating, dysuria, flank pain, frequency and urgency.   Musculoskeletal: Negative.    Neurological:  Positive for weakness.   Psychiatric/Behavioral: Negative.       Objective:     Vital Signs (Most Recent):  Temp: 97.8 °F (36.6 °C) (06/16/25 1221)  Pulse: 72 (06/16/25 1221)  Resp: 18 (06/16/25 1221)  BP: (!) 121/58 (06/16/25 1221)  SpO2: 97 % (06/16/25 1221) Vital Signs (24h Range):  Temp:  [97.1 °F (36.2 °C)-97.9 °F (36.6 °C)] 97.8 °F (36.6 °C)  Pulse:  [69-89] 72  Resp:  [16-20] 18  SpO2:  [96 %-100 %] 97 %  BP: (107-170)/(56-72) 121/58     Weight: 71 kg (156 lb 8.4 oz)  Body mass index is 30.57 kg/m².    Intake/Output Summary (Last 24 hours) at 6/16/2025 1230  Last data filed at 6/16/2025 1000  Gross per 24 hour   Intake 891.48 ml   Output 1000 ml   Net -108.52 ml         Physical Exam  Vitals and nursing note reviewed.   Constitutional:       General: She is not in acute distress.     Appearance: She is not ill-appearing, toxic-appearing or diaphoretic.   HENT:      Mouth/Throat:       Mouth: Mucous membranes are dry.      Pharynx: Oropharynx is clear.   Eyes:      General: No scleral icterus.        Right eye: No discharge.         Left eye: No discharge.      Extraocular Movements: Extraocular movements intact.      Conjunctiva/sclera: Conjunctivae normal.      Pupils: Pupils are equal, round, and reactive to light.   Cardiovascular:      Rate and Rhythm: Normal rate and regular rhythm.      Heart sounds: Murmur heard.      No friction rub. No gallop.   Pulmonary:      Effort: Pulmonary effort is normal. No respiratory distress.      Breath sounds: Normal breath sounds. No stridor. No wheezing, rhonchi or rales.   Chest:      Chest wall: No tenderness.   Abdominal:      General: Abdomen is flat. Bowel sounds are normal. There is no distension.      Palpations: Abdomen is soft. There is no mass.      Tenderness: There is no abdominal tenderness. There is no guarding or rebound.      Hernia: No hernia is present.   Genitourinary:     Comments: Eason in place ~ blood notable  Musculoskeletal:         General: No swelling, tenderness, deformity or signs of injury.      Right lower leg: No edema.      Left lower leg: No edema.   Skin:     General: Skin is warm and dry.      Capillary Refill: Capillary refill takes less than 2 seconds.      Coloration: Skin is not jaundiced or pale.      Findings: No erythema or rash.   Neurological:      Mental Status: She is alert.               Significant Labs: CBC:   Recent Labs   Lab 06/14/25  1708 06/15/25  0447 06/16/25  0425   WBC 7.72 6.25 4.95   HGB 7.5* 7.0* 7.9*   HCT 24.5* 23.0* 25.4*    203 174     CMP:   Recent Labs   Lab 06/14/25  1708 06/15/25  0447 06/16/25  0425   * 134* 138   K 4.4 4.3 4.2    107 111*   CO2 20* 19* 20*    93 75   BUN 23 22 16   CREATININE 1.7* 1.5* 1.0   CALCIUM 8.0* 8.2* 8.2*   PROT 5.8* 5.7* 5.1*   ALBUMIN 2.8* 2.7* 2.6*   BILITOT 0.2 0.2 0.2   ALKPHOS 108 100 77   AST 94* 83* 88*   ALT 42 40 42    ANIONGAP 7* 8 7*       Significant Imaging:   Imaging Results              US Retroperitoneal Complete (Final result)  Result time 06/14/25 22:15:22      Final result by Efe Melvin MD (06/14/25 22:15:22)                   Impression:      Findings suggesting medical renal disease.    No hydronephrosis or renal stones.    Hyperechoic left renal lesion, possibly representing an angiomyolipoma.    Electronically signed by resident: Junior Vega  Date:    06/14/2025  Time:    21:30    Electronically signed by: Efe Melvin  Date:    06/14/2025  Time:    22:15               Narrative:    EXAMINATION:  US RETROPERITONEAL COMPLETE    CLINICAL HISTORY:  KINA;    TECHNIQUE:  Ultrasound of the kidneys and urinary bladder was performed including color flow and Doppler evaluation of the kidneys.    COMPARISON:  CT abdomen pelvis 06/03/2025, abdominal ultrasound 08/09/2022    FINDINGS:  Right kidney: The right kidney measures 12.1 cm.  Cortical thinning.  No loss of corticomedullary distinction  Resistive index measures 0.74.  Decreased perfusion. Multiple anechoic well-circumscribed lesions, the largest measuring 3.1 x 2.4 x 3.4 cm, 3.0 x 1.9 x 2.8 cm, and 2.8 x 2.5 x 2.7 cm.  No solid mass. No renal stone. No hydronephrosis.    Left kidney: The left kidney measures 10.7 cm.  Cortical thinning. No loss of corticomedullary distinction  Resistive index measures 0.75.  Decreased perfusion. Hyperechoic lesion in the midpole without associated Doppler signal measuring 0.6 x 0.6 x 0.6 cm.  Nonspecific and may represent an angiomyolipoma.  No solid mass. No renal stone. No hydronephrosis.    The bladder is decompressed with Eason catheter in place..    Splenic resistive index: 0.65.                                         Assessment & Plan  Hematuria  Suspected Bladder cancer  Patient presents with painless gross hematuria for the past 10 days. She was recent CT abd/pelvis with findings concerning for malignancy. She was  seen in urology clinic and deferred cystoscopy or further intervention.     She presents from assisted living with concern of UTI and gross painless hematuria. Otherwise no other symptoms. Retroperitoneal U/S with no evidence of hydronephrosis    Plan  - urology consulted; appreciate recs  - no interventions offered; family deferring for hospice    KINA (acute kidney injury)  KINA is likely due to dehydration/poor PO intake. Baseline creatinine is 1.0. Most recent creatinine and eGFR are listed below.  Recent Labs     06/14/25  1708 06/15/25  0447 06/16/25  0425   CREATININE 1.7* 1.5* 1.0   EGFRNORACEVR 29* 33* 54*     Patient's Cr improved with 500 cc IV fluids.     Plan:  - KINA is improving   - Retroperitoneal US ~ no evidence of hydronephrosis   - receiving additional 500 cc bolus of fluids   - Avoid nephrotoxins and renally dose meds for GFR listed above  - Monitor urine output, serial BMP, and adjust therapy as needed    UTI (urinary tract infection)  Chronic indwelling Guerrier catheter  Patient notable for recurrent UTI and was recently seen in urology clinic for prophylaxis abx. Has chronic guerrier    UA notable for pyuria. However no leukocytosis or systematic symptoms. No suprapubic tenderness. Less concern for UTI ~ likely colonization with chronic guerrier.     Plan  - received 1 dose of CTX    - f/u urine culture pending   - will resume prophylactic abx for recurrent UTI on discharge     Iron deficiency anemia  Anemia is likely due to malignancy. Most recent hemoglobin and hematocrit are listed below. Baseline ~8-9   Recent Labs     06/14/25  1708 06/15/25  0447 06/16/25  0425   HGB 7.5* 7.0* 7.9*   HCT 24.5* 23.0* 25.4*     Plan  - Monitor serial CBC: Daily  - Transfuse PRBC if patient becomes hemodynamically unstable, symptomatic or H/H drops below 7/21.  - Receiving 1U pRBC today    RENÉE (generalized anxiety disorder)  Continue home medication     Pure hypercholesterolemia  Continue home statin     VTE Risk  Mitigation (From admission, onward)           Ordered     IP VTE HIGH RISK PATIENT  Once         06/14/25 1839     Place sequential compression device  Until discontinued         06/14/25 1839                    Discharge Planning   CEM: 6/16/2025     Code Status: DNR   Medical Readiness for Discharge Date:   Discharge Plan A: Hospice/home   Discharge Delays: None known at this time                    Charlene Baker DO  Department of Hospital Medicine   [facility] - Acute Medical Stepdown

## 2025-06-16 NOTE — ASSESSMENT & PLAN NOTE
Anemia is likely due to malignancy. Most recent hemoglobin and hematocrit are listed below. Baseline ~8-9   Recent Labs     06/14/25  1708 06/15/25  0447 06/16/25  0425   HGB 7.5* 7.0* 7.9*   HCT 24.5* 23.0* 25.4*     Plan  - Monitor serial CBC: Daily  - Transfuse PRBC if patient becomes hemodynamically unstable, symptomatic or H/H drops below 7/21.

## 2025-06-16 NOTE — SUBJECTIVE & OBJECTIVE
Interval History: NAEON. Vital signs stable. Pending dispo with home hospice.     Review of Systems   Constitutional:  Positive for activity change. Negative for chills, fatigue and fever.   Respiratory:  Negative for cough, choking, chest tightness, shortness of breath and wheezing.    Cardiovascular:  Negative for chest pain, palpitations and leg swelling.   Gastrointestinal:  Negative for abdominal distention, abdominal pain, constipation, diarrhea, nausea and vomiting.   Genitourinary:  Positive for hematuria. Negative for difficulty urinating, dysuria, flank pain, frequency and urgency.   Musculoskeletal: Negative.    Neurological:  Positive for weakness.   Psychiatric/Behavioral: Negative.       Objective:     Vital Signs (Most Recent):  Temp: 97.8 °F (36.6 °C) (06/16/25 1221)  Pulse: 72 (06/16/25 1221)  Resp: 18 (06/16/25 1221)  BP: (!) 121/58 (06/16/25 1221)  SpO2: 97 % (06/16/25 1221) Vital Signs (24h Range):  Temp:  [97.1 °F (36.2 °C)-97.9 °F (36.6 °C)] 97.8 °F (36.6 °C)  Pulse:  [69-89] 72  Resp:  [16-20] 18  SpO2:  [96 %-100 %] 97 %  BP: (107-170)/(56-72) 121/58     Weight: 71 kg (156 lb 8.4 oz)  Body mass index is 30.57 kg/m².    Intake/Output Summary (Last 24 hours) at 6/16/2025 1230  Last data filed at 6/16/2025 1000  Gross per 24 hour   Intake 891.48 ml   Output 1000 ml   Net -108.52 ml         Physical Exam  Vitals and nursing note reviewed.   Constitutional:       General: She is not in acute distress.     Appearance: She is not ill-appearing, toxic-appearing or diaphoretic.   HENT:      Mouth/Throat:      Mouth: Mucous membranes are dry.      Pharynx: Oropharynx is clear.   Eyes:      General: No scleral icterus.        Right eye: No discharge.         Left eye: No discharge.      Extraocular Movements: Extraocular movements intact.      Conjunctiva/sclera: Conjunctivae normal.      Pupils: Pupils are equal, round, and reactive to light.   Cardiovascular:      Rate and Rhythm: Normal rate and  regular rhythm.      Heart sounds: Murmur heard.      No friction rub. No gallop.   Pulmonary:      Effort: Pulmonary effort is normal. No respiratory distress.      Breath sounds: Normal breath sounds. No stridor. No wheezing, rhonchi or rales.   Chest:      Chest wall: No tenderness.   Abdominal:      General: Abdomen is flat. Bowel sounds are normal. There is no distension.      Palpations: Abdomen is soft. There is no mass.      Tenderness: There is no abdominal tenderness. There is no guarding or rebound.      Hernia: No hernia is present.   Genitourinary:     Comments: Eason in place ~ blood notable  Musculoskeletal:         General: No swelling, tenderness, deformity or signs of injury.      Right lower leg: No edema.      Left lower leg: No edema.   Skin:     General: Skin is warm and dry.      Capillary Refill: Capillary refill takes less than 2 seconds.      Coloration: Skin is not jaundiced or pale.      Findings: No erythema or rash.   Neurological:      Mental Status: She is alert.               Significant Labs: CBC:   Recent Labs   Lab 06/14/25  1708 06/15/25  0447 06/16/25  0425   WBC 7.72 6.25 4.95   HGB 7.5* 7.0* 7.9*   HCT 24.5* 23.0* 25.4*    203 174     CMP:   Recent Labs   Lab 06/14/25  1708 06/15/25  0447 06/16/25  0425   * 134* 138   K 4.4 4.3 4.2    107 111*   CO2 20* 19* 20*    93 75   BUN 23 22 16   CREATININE 1.7* 1.5* 1.0   CALCIUM 8.0* 8.2* 8.2*   PROT 5.8* 5.7* 5.1*   ALBUMIN 2.8* 2.7* 2.6*   BILITOT 0.2 0.2 0.2   ALKPHOS 108 100 77   AST 94* 83* 88*   ALT 42 40 42   ANIONGAP 7* 8 7*       Significant Imaging:   Imaging Results              US Retroperitoneal Complete (Final result)  Result time 06/14/25 22:15:22      Final result by Efe Melvin MD (06/14/25 22:15:22)                   Impression:      Findings suggesting medical renal disease.    No hydronephrosis or renal stones.    Hyperechoic left renal lesion, possibly representing an  angiomyolipoma.    Electronically signed by resident: Junior Vega  Date:    06/14/2025  Time:    21:30    Electronically signed by: Efe Melvin  Date:    06/14/2025  Time:    22:15               Narrative:    EXAMINATION:  US RETROPERITONEAL COMPLETE    CLINICAL HISTORY:  KINA;    TECHNIQUE:  Ultrasound of the kidneys and urinary bladder was performed including color flow and Doppler evaluation of the kidneys.    COMPARISON:  CT abdomen pelvis 06/03/2025, abdominal ultrasound 08/09/2022    FINDINGS:  Right kidney: The right kidney measures 12.1 cm.  Cortical thinning.  No loss of corticomedullary distinction  Resistive index measures 0.74.  Decreased perfusion. Multiple anechoic well-circumscribed lesions, the largest measuring 3.1 x 2.4 x 3.4 cm, 3.0 x 1.9 x 2.8 cm, and 2.8 x 2.5 x 2.7 cm.  No solid mass. No renal stone. No hydronephrosis.    Left kidney: The left kidney measures 10.7 cm.  Cortical thinning. No loss of corticomedullary distinction  Resistive index measures 0.75.  Decreased perfusion. Hyperechoic lesion in the midpole without associated Doppler signal measuring 0.6 x 0.6 x 0.6 cm.  Nonspecific and may represent an angiomyolipoma.  No solid mass. No renal stone. No hydronephrosis.    The bladder is decompressed with Aeson catheter in place..    Splenic resistive index: 0.65.

## 2025-06-16 NOTE — PLAN OF CARE
[facility]  Discharge Final Note    Primary Care Provider: Perla Hopper MD    Expected Discharge Date: 6/16/2025    Final Discharge Note (most recent)       Final Note - 06/16/25 1521          Final Note    Assessment Type Final Discharge Note     Anticipated Discharge Disposition Hospice/Home        Post-Acute Status    Post-Acute Authorization Hospice     Hospice Status Set-up Complete/Auth obtained (P)      Discharge Delays None known at this time (P)                    Patient discharging home with [facility].  All DME in place.  EMS to transport.  Discharge Plan A and Plan B have been determined by review of patient's clinical status, future medical and therapeutic needs, and coverage/benefits for post-acute care in coordination with multidisciplinary team members.  Mac Stout, RN, BSN

## 2025-06-16 NOTE — ASSESSMENT & PLAN NOTE
Patient presents with painless gross hematuria for the past 10 days. She was recent CT abd/pelvis with findings concerning for malignancy. She was seen in urology clinic and deferred cystoscopy or further intervention.     She presents from assisted living with concern of UTI and gross painless hematuria. Otherwise no other symptoms. Retroperitoneal U/S with no evidence of hydronephrosis    Plan  - urology consulted; appreciate recs  - no interventions offered; family deferring for hospice

## 2025-06-16 NOTE — NURSING
Patient Alert and oriented to self. Room air. VSS. Patient discharge ordered. Iv removed. Medication delivered bedside from pharmacy. Discharge paperwork provided and reviewed. Patient discharging via ambulance.     Patient discharging with chronic guerrier placed by urology.

## 2025-06-16 NOTE — PLAN OF CARE
Goals met adequate for discharge.     Problem: Violence Risk or Actual  Goal: Anger and Impulse Control  Outcome: Met     Problem: Skin Injury Risk Increased  Goal: Skin Health and Integrity  Outcome: Met     Problem: Adult Inpatient Plan of Care  Goal: Plan of Care Review  Outcome: Met  Goal: Patient-Specific Goal (Individualized)  Outcome: Met  Goal: Absence of Hospital-Acquired Illness or Injury  Outcome: Met  Goal: Optimal Comfort and Wellbeing  Outcome: Met  Goal: Readiness for Transition of Care  Outcome: Met     Problem: Acute Kidney Injury/Impairment  Goal: Fluid and Electrolyte Balance  Outcome: Met  Goal: Improved Oral Intake  Outcome: Met  Goal: Effective Renal Function  Outcome: Met     Problem: Wound  Goal: Optimal Coping  Outcome: Met  Goal: Optimal Functional Ability  Outcome: Met  Goal: Absence of Infection Signs and Symptoms  Outcome: Met  Goal: Improved Oral Intake  Outcome: Met  Goal: Optimal Pain Control and Function  Outcome: Met  Goal: Skin Health and Integrity  Outcome: Met  Goal: Optimal Wound Healing  Outcome: Met     Problem: Fall Injury Risk  Goal: Absence of Fall and Fall-Related Injury  Outcome: Met     Problem: Infection  Goal: Absence of Infection Signs and Symptoms  Outcome: Met

## 2025-06-16 NOTE — DISCHARGE SUMMARY
[facility]  Discharge Summary      Patient Name: Nickie Segura  MRN: [id number]  DREAD: [id number]  Patient Class: OP- Observation  Admission Date: 6/14/2025  Hospital Length of Stay: 0 days  Discharge Date and Time: 06/16/2025 1:58 PM  Attending Physician: Vita Ko MD   Discharging Provider: Charlene Baker DO  Primary Care Provider: Perla Hopper MD  Hospital Medicine Team: [facility]  Charlene Baker DO  Primary Care Team: [facility] 5    HPI:   Nickie Segura is a 89 year old female with a PMHx HTN, hypothyroidism, colon polyps s/p partial colon resection, chronic guerrier, bed bound who presents to the [facility] for hematuria and weakness. According to sister, patient has had gross hematuria with associated weakness since 6/3. Patient recently discharged from [facility] on 06/05 after presenting for hematuria. CT scan at that time was remarkable for a heterogeneous suspected enhancing material within the posterior aspect of the urinary bladder most likely 2/2 to  blood products or sequela of infection however malignancy cannot be excluded, mild to moderate right hydroureteronephrosis,  and irregularity of the urinary bladder walls. Patient followed up with urology after discharge where they had an extensive discussion. Patient and family members were offered further workup with cystoscopy vs holding off to see if the gross hematuria and UTIs continue. At that time, patient and family came to a conclusion of wanting to hold off on a cystoscopy as patient would not like any invasive treatment at this time. She was sent home with prophylactic bactrim daily for 10 months.     In the ED, patient was HDS. Labs were remarkable for Hgb 7.5 (baseline 8.6), Na 134, Cr 1.7 (baseline 1), and UA with 3+ LE, >100 WBC, and moderate bacteria. She was given ceftriaxone and given a 500cc LR bolus and urology was consulted.     Admitted to Hospital Medicine for further workup of KINA.      History obtained through chart review by sister.       * No surgery found *      Hospital Course:   89F PMHx HTN, hypothyroidism, colon polyps s/p partial colon resection, chronic guerrier, bed bound presented to [facility] for hematuria and weakness. On admission, she was notable for KINA which improved with IV fluids. In addition, she received 1U of pRBC for Hgb 7 in setting of hematuria. Urology consulted for evaluation and possible treatment. Extensive GOC conversations with family regarding prognosis of suspected malignancy. Family elected for home hospice.       Goals of Care Treatment Preferences:  Code Status: DNR    Living Will: Yes            Consults:   Consults (From admission, onward)          Status Ordering Provider     Inpatient consult to Urology  Once        Provider:  (Not yet assigned)    Completed SHAWN MEADOWS     Inpatient consult to Social Work/Case Management  Once        Provider:  (Not yet assigned)    Completed RADHA WEBBER            Assessment & Plan  Hematuria  Suspected Bladder cancer  Patient presents with painless gross hematuria for the past 10 days. She was recent CT abd/pelvis with findings concerning for malignancy. She was seen in urology clinic and deferred cystoscopy or further intervention.     She presents from assisted living with concern of UTI and gross painless hematuria. Otherwise no other symptoms. Retroperitoneal U/S with no evidence of hydronephrosis    Plan  - urology consulted; appreciate recs  - no interventions offered; family deferring for hospice    KINA (acute kidney injury)  KINA is likely due to dehydration/poor PO intake. Baseline creatinine is 1.0. Most recent creatinine and eGFR are listed below.  Recent Labs     06/14/25  1708 06/15/25  0447 06/16/25  0425   CREATININE 1.7* 1.5* 1.0   EGFRNORACEVR 29* 33* 54*     Patient's Cr improved with 500 cc IV fluids.     Plan:  - KINA is improving   - Retroperitoneal US ~ no evidence of hydronephrosis   - receiving  additional 500 cc bolus of fluids   - Avoid nephrotoxins and renally dose meds for GFR listed above  - Monitor urine output, serial BMP, and adjust therapy as needed    UTI (urinary tract infection)  Chronic indwelling Guerrier catheter  Patient notable for recurrent UTI and was recently seen in urology clinic for prophylaxis abx. Has chronic guerrier    UA notable for pyuria. However no leukocytosis or systematic symptoms. No suprapubic tenderness. Less concern for UTI ~ likely colonization with chronic guerrier.     Plan  - received 1 dose of CTX    - f/u urine culture pending   - will resume prophylactic abx for recurrent UTI on discharge     Iron deficiency anemia  Anemia is likely due to malignancy. Most recent hemoglobin and hematocrit are listed below. Baseline ~8-9   Recent Labs     06/14/25  1708 06/15/25  0447 06/16/25  0425   HGB 7.5* 7.0* 7.9*   HCT 24.5* 23.0* 25.4*     Plan  - Monitor serial CBC: Daily  - Transfuse PRBC if patient becomes hemodynamically unstable, symptomatic or H/H drops below 7/21.    RENÉE (generalized anxiety disorder)  Continue home medication     Pure hypercholesterolemia  Continue home statin     ACP (advance care planning)  Advance Care Planning     Date: 06/16/2025    [facility]  I engaged the family in a voluntary conversation about advance care planning and we specifically addressed what the goals of care would be moving forward, in light of the patient's change in clinical status.  We did specifically address the patient's likely prognosis, which is poor.  We explored the patient's values and preferences for future care.  The family endorses that what is most important right now is to focus on symptom/pain control and quality of life, even if it means sacrificing a little time    Accordingly, we have decided that the best plan to meet the patient's goals includes enrolling in hospice care    - Lorazepam 0.5 mg PO daily for anxiety  Final Active Diagnoses:    Diagnosis Date Noted POA     PRINCIPAL PROBLEM:  Hematuria [R31.9] 06/14/2025 Unknown    KINA (acute kidney injury) [N17.9] 06/14/2025 Yes    Chronic indwelling Eason catheter [Z97.8] 06/14/2025 Not Applicable    Suspected Bladder cancer [C67.9] 06/14/2025 Unknown    UTI (urinary tract infection) [N39.0] 06/03/2025 Yes    Iron deficiency anemia [D50.9]  Yes    RENÉE (generalized anxiety disorder) [F41.1]  Yes    Pure hypercholesterolemia [E78.00]  Yes      Problems Resolved During this Admission:       Discharged Condition: stable    Disposition: Hospice/Home    Follow Up:    Patient Instructions:   No discharge procedures on file.    Significant Diagnostic Studies: N/A    Pending Diagnostic Studies:       Procedure Component Value Units Date/Time    Aldosterone [[id number]] Collected: 06/15/25 0914    Order Status: Sent Lab Status: In process Updated: 06/15/25 0920    Specimen: Blood            Medications:  Reconciled Home Medications:      Medication List        START taking these medications      melatonin 3 mg tablet  Commonly known as: MELATIN  Take 2 tablets (6 mg total) by mouth nightly as needed for Insomnia.            CHANGE how you take these medications      glycopyrrolate 1 mg Tab  Commonly known as: ROBINUL  TAKE 1 TABLET(1 MG) BY MOUTH THREE TIMES DAILY  What changed:   how much to take  how to take this  when to take this  additional instructions     LORazepam 0.5 MG tablet  Commonly known as: ATIVAN  Take 1 tablet (0.5 mg total) by mouth once daily.  What changed:   when to take this  reasons to take this     MYRBETRIQ 50 mg Tb24  Generic drug: mirabegron  TAKE 1 TABLET(50 MG) BY MOUTH EVERY DAY  What changed: when to take this     pantoprazole 40 MG tablet  Commonly known as: PROTONIX  1 po daily  What changed:   how much to take  how to take this  when to take this  additional instructions     polyethylene glycol 17 gram/dose powder  Commonly known as: GLYCOLAX  Take 17 g by mouth once daily.  What changed: when to take this             CONTINUE taking these medications      busPIRone 15 MG tablet  Commonly known as: BUSPAR  TAKE 1 TABLET BY MOUTH TWICE DAILY     citalopram 20 MG tablet  Commonly known as: CeleXA  Take 1.5 tablets (30 mg total) by mouth once daily.     ICAPS AREDS2 ORAL  Take 1 capsule by mouth 2 (two) times a day.     levothyroxine 100 MCG tablet  Commonly known as: SYNTHROID  Take 1 tablet (100 mcg total) by mouth before breakfast.     sucralfate 100 mg/mL suspension  Commonly known as: CARAFATE  Take 10 mLs (1 g total) by mouth 4 (four) times daily.     trimethoprim 100 mg Tab  Commonly known as: TRIMPEX  Take 1 tablet (100 mg total) by mouth every evening.            STOP taking these medications      aspirin 81 MG EC tablet  Commonly known as: ECOTRIN     rosuvastatin 20 MG tablet  Commonly known as: CRESTOR     sulfamethoxazole-trimethoprim 800-160mg 800-160 mg Tab  Commonly known as: BACTRIM DS     VITAMIN C 500 MG tablet  Generic drug: ascorbic acid (vitamin C)     vitamin D 1000 units Tab  Commonly known as: VITAMIN D3              Indwelling Lines/Drains at time of discharge:   Lines/Drains/Airways       Drain  Duration                  Urethral Catheter 06/14/25 Coude 20 Fr. 2 days                    Time spent on the discharge of patient: 35 minutes         Charlene Baker DO  Department of Hospital Medicine  [facility] - Acute Medical Stepdown

## 2025-06-16 NOTE — ASSESSMENT & PLAN NOTE
KINA is likely due to dehydration/poor PO intake. Baseline creatinine is 1.0. Most recent creatinine and eGFR are listed below.  Recent Labs     06/14/25  1708 06/15/25  0447 06/16/25  0425   CREATININE 1.7* 1.5* 1.0   EGFRNORACEVR 29* 33* 54*     Patient's Cr improved with 500 cc IV fluids.     Plan:  - KINA is improving   - Retroperitoneal US ~ no evidence of hydronephrosis   - receiving additional 500 cc bolus of fluids   - Avoid nephrotoxins and renally dose meds for GFR listed above  - Monitor urine output, serial BMP, and adjust therapy as needed

## 2025-06-17 ENCOUNTER — PATIENT MESSAGE (OUTPATIENT)
Dept: PRIMARY CARE CLINIC | Facility: CLINIC | Age: 89
End: 2025-06-17
Payer: MEDICARE

## 2025-06-17 ENCOUNTER — PATIENT MESSAGE (OUTPATIENT)
Dept: UROLOGY | Facility: CLINIC | Age: 89
End: 2025-06-17
Payer: MEDICARE

## 2025-06-17 ENCOUNTER — PATIENT MESSAGE (OUTPATIENT)
Dept: HEMATOLOGY/ONCOLOGY | Facility: CLINIC | Age: 89
End: 2025-06-17
Payer: MEDICARE

## 2025-06-18 LAB
ABO + RH BLD: NORMAL
ABO + RH BLD: NORMAL
BLD PROD TYP BPU: NORMAL
BLD PROD TYP BPU: NORMAL
BLOOD UNIT EXPIRATION DATE: NORMAL
BLOOD UNIT EXPIRATION DATE: NORMAL
BLOOD UNIT TYPE CODE: 7300
BLOOD UNIT TYPE CODE: 7300
CROSSMATCH INTERPRETATION: NORMAL
CROSSMATCH INTERPRETATION: NORMAL
DISPENSE STATUS: NORMAL
DISPENSE STATUS: NORMAL
UNIT NUMBER: NORMAL
UNIT NUMBER: NORMAL
W ALDOSTERONE: 12.2 NG/DL

## 2025-07-14 ENCOUNTER — OUTPATIENT CASE MANAGEMENT (OUTPATIENT)
Dept: ADMINISTRATIVE | Facility: OTHER | Age: 89
End: 2025-07-14
Payer: MEDICARE

## 2025-07-31 ENCOUNTER — OUTPATIENT CASE MANAGEMENT (OUTPATIENT)
Dept: ADMINISTRATIVE | Facility: OTHER | Age: 89
End: 2025-07-31
Payer: MEDICARE